# Patient Record
Sex: FEMALE | Race: WHITE | NOT HISPANIC OR LATINO | Employment: FULL TIME | URBAN - METROPOLITAN AREA
[De-identification: names, ages, dates, MRNs, and addresses within clinical notes are randomized per-mention and may not be internally consistent; named-entity substitution may affect disease eponyms.]

---

## 2023-10-03 ENCOUNTER — TELEMEDICINE (OUTPATIENT)
Dept: BEHAVIORAL HEALTH | Facility: CLINIC | Age: 38
End: 2023-10-03
Payer: COMMERCIAL

## 2023-10-03 DIAGNOSIS — F31.9 BIPOLAR 1 DISORDER (MULTI): ICD-10-CM

## 2023-10-03 PROCEDURE — 90853 GROUP PSYCHOTHERAPY: CPT | Mod: 95 | Performed by: PSYCHOLOGIST

## 2023-10-03 PROCEDURE — 90853 GROUP PSYCHOTHERAPY: CPT | Performed by: PSYCHOLOGIST

## 2023-10-05 ENCOUNTER — TELEPHONE (OUTPATIENT)
Dept: BEHAVIORAL HEALTH | Facility: CLINIC | Age: 38
End: 2023-10-05

## 2023-10-05 ENCOUNTER — TELEMEDICINE (OUTPATIENT)
Dept: BEHAVIORAL HEALTH | Facility: CLINIC | Age: 38
End: 2023-10-05
Payer: COMMERCIAL

## 2023-10-05 DIAGNOSIS — F90.0 ATTENTION DEFICIT HYPERACTIVITY DISORDER (ADHD), PREDOMINANTLY INATTENTIVE TYPE: ICD-10-CM

## 2023-10-05 DIAGNOSIS — F31.9 BIPOLAR 1 DISORDER (MULTI): ICD-10-CM

## 2023-10-05 PROCEDURE — 90837 PSYTX W PT 60 MINUTES: CPT | Performed by: PSYCHOLOGIST

## 2023-10-05 RX ORDER — LISDEXAMFETAMINE DIMESYLATE 50 MG/1
50 CAPSULE ORAL EVERY MORNING
Qty: 30 CAPSULE | Refills: 0 | Status: CANCELLED | OUTPATIENT
Start: 2023-12-04 | End: 2024-01-03

## 2023-10-05 RX ORDER — LISDEXAMFETAMINE DIMESYLATE 50 MG/1
50 CAPSULE ORAL EVERY MORNING
Qty: 30 CAPSULE | Refills: 0 | Status: CANCELLED | OUTPATIENT
Start: 2023-11-04 | End: 2023-12-04

## 2023-10-05 RX ORDER — LISDEXAMFETAMINE DIMESYLATE 50 MG/1
50 CAPSULE ORAL EVERY MORNING
Qty: 30 CAPSULE | Refills: 0 | Status: CANCELLED | OUTPATIENT
Start: 2023-10-05 | End: 2023-11-04

## 2023-10-05 NOTE — PSYCHOTHERAPY
Insight Oriented Therapy. 53 minutes. BP1.  We spoke about how she is working on getting back to work, ready to do it as 12 weeks off for healing is a lot.  We spoke about how she is trying to process the losses that happened in October, but being able to put it on the shelf while she continues to get through her day. Talked about self-care and SAD.     Chief complaint - Anxiety   Treatment plan - Insight and action consistent with ACT therapy.   Goals - Self-care, insight, coping skills  Prognosis - Average  Progress - Average  Functional status - 70/100  Focused mental exam - alert and oriented  Frequency - Every two weeks

## 2023-10-06 RX ORDER — LISDEXAMFETAMINE DIMESYLATE 50 MG/1
50 CAPSULE ORAL EVERY MORNING
Qty: 30 CAPSULE | Refills: 0 | Status: SHIPPED | OUTPATIENT
Start: 2023-11-05 | End: 2023-10-13 | Stop reason: SDUPTHER

## 2023-10-06 NOTE — GROUP NOTE
Group Topic: Coping Skills   Group Date: 10/3/2023  Start Time:  6:00 PM  End Time:  7:30 PM  Facilitators: Garima Wong PsyD   Department:  [facility]    Number of Participants: 5   Group Focus: other Cognitive Defusion  Treatment Modality: Cognitive Behavioral Therapy  Interventions utilized were patient education  Purpose: coping skills      Participants had opportunity to check-in with their peers and discuss recent stressors. They received psychoeducation on cognitive defusion and how to utilize this skill. Group members reviewed various cognitive defusion techniques and practiced. They identified instances in which this skill could be helpful and set intention to practice.     Name: Kelli Silva YOB: 1985   MR: [id number]      Kelli was present and actively engaged in discussion. She detailed some recent symptoms related to ángel and how she is coping with these. She identified support and explored ways to use cognitive defusion skills.

## 2023-10-07 ENCOUNTER — TELEPHONE (OUTPATIENT)
Dept: BEHAVIORAL HEALTH | Facility: CLINIC | Age: 38
End: 2023-10-07
Payer: COMMERCIAL

## 2023-10-07 ENCOUNTER — HOSPITAL ENCOUNTER (EMERGENCY)
Facility: HOSPITAL | Age: 38
Discharge: OTHER INSTITUTION | End: 2023-10-08
Attending: STUDENT IN AN ORGANIZED HEALTH CARE EDUCATION/TRAINING PROGRAM
Payer: COMMERCIAL

## 2023-10-07 DIAGNOSIS — F30.10 MANIC BEHAVIOR (MULTI): Primary | ICD-10-CM

## 2023-10-07 DIAGNOSIS — G47.00 INSOMNIA, UNSPECIFIED TYPE: ICD-10-CM

## 2023-10-07 LAB
ALBUMIN SERPL BCP-MCNC: 3.8 G/DL (ref 3.4–5)
ALP SERPL-CCNC: 80 U/L (ref 33–110)
ALT SERPL W P-5'-P-CCNC: 26 U/L (ref 7–45)
AMPHETAMINES UR QL SCN: ABNORMAL
ANION GAP SERPL CALC-SCNC: 10 MMOL/L (ref 10–20)
APAP SERPL-MCNC: <10 UG/ML
AST SERPL W P-5'-P-CCNC: 15 U/L (ref 9–39)
B-HCG SERPL-ACNC: <2 MIU/ML
BARBITURATES UR QL SCN: ABNORMAL
BASOPHILS # BLD AUTO: 0.04 X10*3/UL (ref 0–0.1)
BASOPHILS NFR BLD AUTO: 0.4 %
BENZODIAZ UR QL SCN: ABNORMAL
BILIRUB SERPL-MCNC: 0.2 MG/DL (ref 0–1.2)
BUN SERPL-MCNC: 10 MG/DL (ref 6–23)
BZE UR QL SCN: ABNORMAL
CALCIUM SERPL-MCNC: 8.2 MG/DL (ref 8.6–10.3)
CANNABINOIDS UR QL SCN: ABNORMAL
CHLORIDE SERPL-SCNC: 107 MMOL/L (ref 98–107)
CO2 SERPL-SCNC: 26 MMOL/L (ref 21–32)
CREAT SERPL-MCNC: 0.96 MG/DL (ref 0.5–1.05)
EOSINOPHIL # BLD AUTO: 0.06 X10*3/UL (ref 0–0.7)
EOSINOPHIL NFR BLD AUTO: 0.7 %
ERYTHROCYTE [DISTWIDTH] IN BLOOD BY AUTOMATED COUNT: 14 % (ref 11.5–14.5)
ETHANOL SERPL-MCNC: <10 MG/DL
FENTANYL+NORFENTANYL UR QL SCN: ABNORMAL
GFR SERPL CREATININE-BSD FRML MDRD: 77 ML/MIN/1.73M*2
GLUCOSE SERPL-MCNC: 96 MG/DL (ref 74–99)
HCT VFR BLD AUTO: 33.4 % (ref 36–46)
HGB BLD-MCNC: 10.9 G/DL (ref 12–16)
IMM GRANULOCYTES # BLD AUTO: 0.03 X10*3/UL (ref 0–0.7)
IMM GRANULOCYTES NFR BLD AUTO: 0.3 % (ref 0–0.9)
LYMPHOCYTES # BLD AUTO: 3.12 X10*3/UL (ref 1.2–4.8)
LYMPHOCYTES NFR BLD AUTO: 34.5 %
MCH RBC QN AUTO: 27.6 PG (ref 26–34)
MCHC RBC AUTO-ENTMCNC: 32.6 G/DL (ref 32–36)
MCV RBC AUTO: 85 FL (ref 80–100)
MONOCYTES # BLD AUTO: 0.52 X10*3/UL (ref 0.1–1)
MONOCYTES NFR BLD AUTO: 5.8 %
NEUTROPHILS # BLD AUTO: 5.27 X10*3/UL (ref 1.2–7.7)
NEUTROPHILS NFR BLD AUTO: 58.3 %
NRBC BLD-RTO: 0 /100 WBCS (ref 0–0)
OPIATES UR QL SCN: ABNORMAL
OXYCODONE+OXYMORPHONE UR QL SCN: ABNORMAL
PCP UR QL SCN: ABNORMAL
PLATELET # BLD AUTO: 242 X10*3/UL (ref 150–450)
PMV BLD AUTO: 8.7 FL (ref 7.5–11.5)
POTASSIUM SERPL-SCNC: 3.7 MMOL/L (ref 3.5–5.3)
PROT SERPL-MCNC: 6 G/DL (ref 6.4–8.2)
RBC # BLD AUTO: 3.95 X10*6/UL (ref 4–5.2)
SALICYLATES SERPL-MCNC: <3 MG/DL
SODIUM SERPL-SCNC: 139 MMOL/L (ref 136–145)
WBC # BLD AUTO: 9 X10*3/UL (ref 4.4–11.3)

## 2023-10-07 PROCEDURE — 84702 CHORIONIC GONADOTROPIN TEST: CPT | Performed by: STUDENT IN AN ORGANIZED HEALTH CARE EDUCATION/TRAINING PROGRAM

## 2023-10-07 PROCEDURE — 2500000004 HC RX 250 GENERAL PHARMACY W/ HCPCS (ALT 636 FOR OP/ED): Performed by: STUDENT IN AN ORGANIZED HEALTH CARE EDUCATION/TRAINING PROGRAM

## 2023-10-07 PROCEDURE — 87635 SARS-COV-2 COVID-19 AMP PRB: CPT | Performed by: STUDENT IN AN ORGANIZED HEALTH CARE EDUCATION/TRAINING PROGRAM

## 2023-10-07 PROCEDURE — 99284 EMERGENCY DEPT VISIT MOD MDM: CPT | Performed by: STUDENT IN AN ORGANIZED HEALTH CARE EDUCATION/TRAINING PROGRAM

## 2023-10-07 PROCEDURE — 80329 ANALGESICS NON-OPIOID 1 OR 2: CPT | Performed by: STUDENT IN AN ORGANIZED HEALTH CARE EDUCATION/TRAINING PROGRAM

## 2023-10-07 PROCEDURE — 80307 DRUG TEST PRSMV CHEM ANLYZR: CPT | Performed by: STUDENT IN AN ORGANIZED HEALTH CARE EDUCATION/TRAINING PROGRAM

## 2023-10-07 PROCEDURE — 36415 COLL VENOUS BLD VENIPUNCTURE: CPT | Performed by: STUDENT IN AN ORGANIZED HEALTH CARE EDUCATION/TRAINING PROGRAM

## 2023-10-07 PROCEDURE — S0119 ONDANSETRON 4 MG: HCPCS | Performed by: STUDENT IN AN ORGANIZED HEALTH CARE EDUCATION/TRAINING PROGRAM

## 2023-10-07 PROCEDURE — 99285 EMERGENCY DEPT VISIT HI MDM: CPT

## 2023-10-07 PROCEDURE — 2500000005 HC RX 250 GENERAL PHARMACY W/O HCPCS: Performed by: STUDENT IN AN ORGANIZED HEALTH CARE EDUCATION/TRAINING PROGRAM

## 2023-10-07 PROCEDURE — 85025 COMPLETE CBC W/AUTO DIFF WBC: CPT | Performed by: STUDENT IN AN ORGANIZED HEALTH CARE EDUCATION/TRAINING PROGRAM

## 2023-10-07 PROCEDURE — 80324 DRUG SCREEN AMPHETAMINES 1/2: CPT | Performed by: STUDENT IN AN ORGANIZED HEALTH CARE EDUCATION/TRAINING PROGRAM

## 2023-10-07 PROCEDURE — 80053 COMPREHEN METABOLIC PANEL: CPT | Performed by: STUDENT IN AN ORGANIZED HEALTH CARE EDUCATION/TRAINING PROGRAM

## 2023-10-07 RX ORDER — ONDANSETRON 4 MG/1
4 TABLET, ORALLY DISINTEGRATING ORAL ONCE
Status: COMPLETED | OUTPATIENT
Start: 2023-10-07 | End: 2023-10-07

## 2023-10-07 RX ADMIN — ONDANSETRON 4 MG: 4 TABLET, ORALLY DISINTEGRATING ORAL at 22:25

## 2023-10-07 SDOH — HEALTH STABILITY: MENTAL HEALTH: HOW DID YOU TRY TO KILL YOURSELF?: OD

## 2023-10-07 SDOH — HEALTH STABILITY: MENTAL HEALTH: HAVE YOU EVER TRIED TO KILL YOURSELF?: YES

## 2023-10-07 SDOH — HEALTH STABILITY: MENTAL HEALTH: DEPRESSION SYMPTOMS: NO PROBLEMS REPORTED OR OBSERVED.

## 2023-10-07 SDOH — HEALTH STABILITY: MENTAL HEALTH: WISH TO BE DEAD (PAST 1 MONTH): NO

## 2023-10-07 SDOH — HEALTH STABILITY: MENTAL HEALTH: IN THE PAST FEW WEEKS, HAVE YOU WISHED YOU WERE DEAD?: NO

## 2023-10-07 SDOH — HEALTH STABILITY: MENTAL HEALTH: SUICIDAL BEHAVIOR (LIFETIME): YES

## 2023-10-07 SDOH — HEALTH STABILITY: MENTAL HEALTH: SUICIDAL BEHAVIOR (DESCRIPTION): NOTED ABOVE

## 2023-10-07 SDOH — HEALTH STABILITY: MENTAL HEALTH: IN THE PAST WEEK, HAVE YOU BEEN HAVING THOUGHTS ABOUT KILLING YOURSELF?: NO

## 2023-10-07 SDOH — HEALTH STABILITY: MENTAL HEALTH: NON-SPECIFIC ACTIVE SUICIDAL THOUGHTS (PAST 1 MONTH): NO

## 2023-10-07 SDOH — HEALTH STABILITY: MENTAL HEALTH: ARE YOU HAVING THOUGHTS OF KILLING YOURSELF RIGHT NOW?: NO

## 2023-10-07 SDOH — ECONOMIC STABILITY: HOUSING INSECURITY: FEELS SAFE LIVING IN HOME: YES

## 2023-10-07 SDOH — HEALTH STABILITY: MENTAL HEALTH: ANXIETY SYMPTOMS: GENERALIZED

## 2023-10-07 SDOH — HEALTH STABILITY: MENTAL HEALTH: BEHAVIORS/MOOD: PLEASANT;COOPERATIVE

## 2023-10-07 SDOH — HEALTH STABILITY: MENTAL HEALTH: IN THE PAST FEW WEEKS, HAVE YOU FELT THAT YOU OR YOUR FAMILY WOULD BE BETTER OFF IF YOU WERE DEAD?: NO

## 2023-10-07 SDOH — ECONOMIC STABILITY: GENERAL

## 2023-10-07 SDOH — HEALTH STABILITY: MENTAL HEALTH: SUICIDAL BEHAVIOR (3 MONTHS): NO

## 2023-10-07 SDOH — HEALTH STABILITY: MENTAL HEALTH: WHEN DID YOU TRY TO KILL YOURSELF?: 2004

## 2023-10-07 ASSESSMENT — COLUMBIA-SUICIDE SEVERITY RATING SCALE - C-SSRS
2. HAVE YOU ACTUALLY HAD ANY THOUGHTS OF KILLING YOURSELF?: NO
6. HAVE YOU EVER DONE ANYTHING, STARTED TO DO ANYTHING, OR PREPARED TO DO ANYTHING TO END YOUR LIFE?: YES
6. HAVE YOU EVER DONE ANYTHING, STARTED TO DO ANYTHING, OR PREPARED TO DO ANYTHING TO END YOUR LIFE?: NO
1. IN THE PAST MONTH, HAVE YOU WISHED YOU WERE DEAD OR WISHED YOU COULD GO TO SLEEP AND NOT WAKE UP?: NO

## 2023-10-07 ASSESSMENT — LIFESTYLE VARIABLES
SUBSTANCE_ABUSE_PAST_12_MONTHS: NO
PRESCIPTION_ABUSE_PAST_12_MONTHS: NO

## 2023-10-07 NOTE — PROGRESS NOTES
Pt is a 37 yo female with history of bipolar disorder and T2DM  called answering service for concern for insomnia, restlessness, and racing thoughts. States she had been up since 1:30 am previous day. Stated now new medications started, previous medications taken as prescribed. Denied intention to harm self or others. Denied audio/visual hallucinations. Endorsed increase desire to spend money, increased talkativeness, increased anxiety. Safety assessment done. Denied SI/HI. Pt said she felt safe at home with partner. Counseled on sleep hygiene.  Pt advised to take dose of prescribed ativan an OTC sleep aid, advised to call answering service again or present to ED if unable to sleep/continued or worsening symptoms.    No

## 2023-10-07 NOTE — ED PROVIDER NOTES
"EMERGENCY MEDICINE EVALUATION NOTE    History of Present Illness     Chief Complaint:   Chief Complaint   Patient presents with    Mental Health Problem     Pt presents to ED for bipolar disorder and insomnia x3 days. Pt compliant with meds and calm and cooperative in triage. Pt endorsing migraine. Pt hx of admission and med rec when this has occurred beginning of this year per pt. Pt denies SI/HI, SOB/CP, nausea.  Pt reports visual hallucinations yesterday and none currently. Pt describes silhouettes with no indication of harm.        HPI: Kelli Silva is a 38 y.o. female with past medical history of bipolar disorder who presents with complaint of possible manic episode.  Patient states she has had worsening insomnia and has been unable to sleep only for 2 hours over the past 3 days.  States this was triggered after starting prednisone 2 weeks ago for an episode of bronchitis.  She states she has been compliant with her home antipsychotic medications.  She denies any SI or HI.  Denies current pregnancy.  Nuys any chest pain, shortness of breath, nausea, vomiting abdominal pain.  She does admit to visual hallucinations yesterday but denies any currently.  Admits to racing thoughts.    Previous History     Past Medical History:   Diagnosis Date    Other specified health status     No pertinent past medical history     Past Surgical History:   Procedure Laterality Date    OTHER SURGICAL HISTORY  05/08/2020    Venous access port placement        No family history on file.  Allergies   Allergen Reactions    Compazine [Prochlorperazine] Shortness of breath     \"Asthma attack\" per pt    Iodinated Contrast Media Shortness of breath     \"Asthma attack\" per pt    Toradol [Ketorolac] Shortness of breath     \"Asthma attack\" per pt     Current Outpatient Medications   Medication Instructions    [START ON 11/5/2023] lisdexamfetamine (VYVANSE) 50 mg, oral, Every morning       Physical Exam     Appearance: Alert, oriented , " cooperative,  in acute distress. Well nourished & well hydrated.     Skin: Intact,  dry skin, no lesions, rash, petechiae or purpura.      Eyes: PERRLA, EOMs intact,  Conjunctiva pink with no redness or exudates. Cornea & anterior chamber are clear, Eyelids without lesions. No scleral icterus.      ENT: Hearing grossly intact. External auditory canals patent, tympanic membranes intact with visible landmarks. Nares patent, mucus membranes moist. Dentition without lesions. Pharynx clear, uvula midline.      Neck: Supple, without meningismus. Thyroid not palpable. Trachea at midline. No lymphadenopathy.     Pulmonary: Clear bilaterally with good chest wall excursion. No rales, rhonchi or wheezing. No accessory muscle use or stridor.     Cardiac: Normal S1, S2 without murmur, rub, gallop or extrasystole. No JVD, Carotids without bruits.     Abdomen: Soft, nontender, active bowel sounds.  No palpable organomegaly.  No rebound or guarding.  No CVA tenderness.     Genitourinary: Exam deferred.     Musculoskeletal: Full range of motion. no pain, edema, or deformity. Pulses full and equal. No cyanosis or clubbing.      Neurological:  Cranial nerves II through XII are grossly intact, finger-nose touch is normal, normal sensation, no weakness, no focal findings identified.     Psychiatric: Anxious mood and elevated affect.     Results     Labs Reviewed   CBC WITH AUTO DIFFERENTIAL - Abnormal       Result Value    WBC 9.0      nRBC 0.0      RBC 3.95 (*)     Hemoglobin 10.9 (*)     Hematocrit 33.4 (*)     MCV 85      MCH 27.6      MCHC 32.6      RDW 14.0      Platelets 242      MPV 8.7      Neutrophils % 58.3      Immature Granulocytes %, Automated 0.3      Lymphocytes % 34.5      Monocytes % 5.8      Eosinophils % 0.7      Basophils % 0.4      Neutrophils Absolute 5.27      Immature Granulocytes Absolute, Automated 0.03      Lymphocytes Absolute 3.12      Monocytes Absolute 0.52      Eosinophils Absolute 0.06      Basophils  Absolute 0.04     COMPREHENSIVE METABOLIC PANEL - Abnormal    Glucose 96      Sodium 139      Potassium 3.7      Chloride 107      Bicarbonate 26      Anion Gap 10      Urea Nitrogen 10      Creatinine 0.96      eGFR 77      Calcium 8.2 (*)     Albumin 3.8      Alkaline Phosphatase 80      Total Protein 6.0 (*)     AST 15      Bilirubin, Total 0.2      ALT 26     DRUG SCREEN, URINE WITH REFLEX TO CONFIRMATION - Abnormal    Amphetamine Screen, Urine Presumptive Positive (*)     Barbiturate Screen, Urine Presumptive Negative      Benzodiazepines Screen, Urine Presumptive Negative      Cannabinoid Screen, Urine Presumptive Negative      Cocaine Metabolite Screen, Urine Presumptive Negative      Fentanyl Screen, Urine Presumptive Negative      Opiate Screen, Urine Presumptive Negative      Oxycodone Screen, Urine Presumptive Negative      PCP Screen, Urine Presumptive Negative      Narrative:     Drug screen results are presumptive and should not be used to assess   compliance with prescribed medication. Definitive confirmatory drug testing   has been added to this sample for any positive screen result and will be  reported separately.     Toxicology screening results are reported qualitatively. The concentration must  be greater than or equal to the cutoff to be reported as positive. The concentration   at which the screening test can detect an individual drug or metabolite varies.   The absence of expected drug(s) and/or drug metabolite(s) may indicate non-compliance,   inappropriate timing of specimen collection relative to drug administration, poor drug   absorption, diluted/adulterated urine, or limitations of testing. For medical purposes   only; not valid for forensic use.    Interpretive questions should be directed to the laboratory medical directors.   ACUTE TOXICOLOGY PANEL, BLOOD - Normal    Acetaminophen <10.0      Salicylate  <3      Alcohol <10     HUMAN CHORIONIC GONADOTROPIN, SERUM QUANTITATIVE - Normal  "   HCG, Beta-Quantitative <2      Narrative:      Total HCG measurement is performed using the [employer]   Immunoassay which detects intact HCG and free beta HCG subunit.    This test is not indicated for use as a tumor marker.   HCG testing is performed using a different test methodology at [facility] than other [facility]. Direct result comparison   should only be made within the same method.       AMPHETAMINE CONFIRM, URINE     No orders to display         ED Course & Medical Decision Making   Medications - No data to display  Diagnoses as of 10/07/23 2139   Manic behavior (CMS/[facility])   Insomnia, unspecified type     Heart Rate:  [105]   Temp:  [37.1 °C (98.8 °F)]   Resp:  [19]   BP: (139)/(90)   Height:  [157.5 cm (5' 2\")]   Weight:  [107 kg (235 lb)]   SpO2:  [99 %]      Kelli Silva is a 38 y.o. female with past medical history of bipolar disorder who presents with complaint of possible manic episode.  Patient does appear to be possibly manic with an anxious mood and elevated affect initial examination.  No SI or HI noted.  Patient also with worsening insomnia.  Patient will need EPAT consultation for further evaluation and possible need for psychiatric placement.  EKG completed showed normal sinus rhythm with rate of 98 bpm no signs of acute ischemic change or arrhythmia noted.  No significant electrolyte normality renal dysfunction.  Stable anemia with hemoglobin 10.9 without active hemorrhage.  No leukocytosis.  Beta-hCG negative.  Urinalysis without concern for infection and urine drug screen positive for amphetamines and the most likely cross-contamination.  Toxicology panel negative.  Patient is medically clear from my standpoint for psychiatric admission.  I did consult with the EPAT team who saw the patient and did recommend inpatient psychiatric admission.  I am agreeable this plan she will be transferred to a psychiatric center for further management of her manic " episode.    Procedures   Procedures    Diagnosis     1. Manic behavior (CMS/[facility])    2. Insomnia, unspecified type        Disposition     Patient will be transferred to a psychiatric center for management.    ED Prescriptions    None            Lauri Lee DO  10/07/23 2963

## 2023-10-08 VITALS
BODY MASS INDEX: 43.24 KG/M2 | HEART RATE: 94 BPM | SYSTOLIC BLOOD PRESSURE: 116 MMHG | HEIGHT: 62 IN | WEIGHT: 235 LBS | OXYGEN SATURATION: 98 % | DIASTOLIC BLOOD PRESSURE: 70 MMHG | TEMPERATURE: 97.9 F | RESPIRATION RATE: 18 BRPM

## 2023-10-08 LAB — SARS-COV-2 RNA RESP QL NAA+PROBE: NOT DETECTED

## 2023-10-08 RX ORDER — HEPARIN 100 UNIT/ML
5 SYRINGE INTRAVENOUS ONCE
Status: DISCONTINUED | OUTPATIENT
Start: 2023-10-08 | End: 2023-10-08 | Stop reason: HOSPADM

## 2023-10-08 RX ORDER — HEPARIN 100 UNIT/ML
SYRINGE INTRAVENOUS
Status: DISCONTINUED
Start: 2023-10-08 | End: 2023-10-08 | Stop reason: HOSPADM

## 2023-10-08 SDOH — HEALTH STABILITY: MENTAL HEALTH: BEHAVIORS/MOOD: CALM;COOPERATIVE;SLEEPING

## 2023-10-08 NOTE — SIGNIFICANT EVENT

## 2023-10-08 NOTE — PROGRESS NOTES
EPAT - Social Work Psychiatric Assessment    Arrival Details  Mode of Arrival: Ambulatory  Admission Source: Home  Admission Type: Voluntary  EPAT Assessment Start Date: 10/07/23  EPAT Assessment Start Time: 2030  Name of : ENMANUEL Grady LISW    History of Present Illness  Admission Reason: Manic episode  HPI: Pt, who is a 38 year old female, presents to the [facility] ED with a chief complaint of manic episode. Prior to assessment, pt’s provider note, triage note, and community record were reviewed. Pt drove herself to the ED today and reported that she had not been sleeping much over the past two days. Prior to arriving to the ED today, pt had reached out to the on-call service for her psychiatry provider who noted: Pt is a 39 yo female with history of bipolar disorder and T2DM called answering service for concern for insomnia, restlessness, and racing thoughts. States she had been up since 1:30 am previous day. Stated now new medications started, previous medications taken as prescribed. Denied intention to harm self or others. Denied audio/visual hallucinations. Endorsed increase desire to spend money, increased talkativeness, increased anxiety. Safety assessment done. Denied SI/HI. Pt said she felt safe at home with partner. Counseled on sleep hygiene. Pt advised to take dose of prescribed ativan an OTC sleep aid, advised to call answering service again or present to ED if unable to sleep/continued or worsening symptoms.     Despite this call, pt felt that she needed to come to the ED for further treatment. Pt initially reported that her symptoms started after taking a prednisone prescription recently. Further inquiry revealed 2 other stressors this week including; bees getting into her bedroom making her nervous to sleep and her impending return to work on Tuesday following a 12 week break for a surgery over the summer. EPAT was consulted for further evaluation. For this assessment, pt was talkative,  cooperative, and continuing to state that she is unable to sleep.         Pt has a past psychiatric history of Bipolar disorder. She works with  psychiatry for outpatient psychiatric care. Pt sees her therapist and providers regularly and reported compliance with her medications. Pt has a history of psychiatric admissions, most recently in January, 2023 for a similar episode of ángel.         Pt resides at home with her boyfriend. 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 Pt enjoys the work and likes that she has the opportunity to work from home. Pt reported that she has strong supports in her family and partner.    SW Readmission Information   Readmission within 30 Days: No    Additional Symptoms - Adult  Generalized Anxiety Disorder: Difficult to control worry, Irritability  Obsessive Compulsive Disorder: No problems reported or observed.  Panic Attack: No problems reported or observed.  Post Traumatic Stress Disorder: No problems reported or observed.  Delirium: No problems reported or observed.    Past Psychiatric History/Meds/Treatments  Past Psychiatric History: Previous Psychiatric Inpatient Hospitalizations (Location and Dates): [facility] 1/2022; [facility] 5/2021; [facility] 2019; [facility] 2x in 2004; multiple others.    Previous Substance Abuse Treatment (Location and Dates): Denies    Abilify Maintena 400 MG Intramuscular Prefilled Syringe intramuscluar injection, once a month   Acarbose 25 MG Oral Tablet take 1 tablet by mouth three times a day with meals   Albuterol Sulfate (2.5 MG/3ML) 0.083% Inhalation Nebulization Solution USE 1 VIAL VIA NEBULIZER EVERY 6 HOURS AS NEEDED FOR WHEEZING OR SHORT   Asenapine Maleate 5 MG Sublingual Tablet Sublingual TAKE 1 - 2 SUBLIQUAL EVERY NIGHT AT BED TIME AS NEEDED FOR SLEEP   Atorvastatin Calcium 40 MG Oral Tablet TAKE 1 TABLET DAILY.   Benztropine Mesylate 2 MG Oral Tablet take 1 tablet by mouth twice a day   busPIRone HCl -  "30 MG Oral Tablet Take 1 tablet twice a day   [employer] Glucose 4-6 GM-MG Oral Tablet Chewable TAKE AS DIRECTED.   Desvenlafaxine Succinate  MG Oral Tablet Extended Release 24 Hour TAKE 1 TABLET BY MOUTH EVERY MORNING   Glucagon Emergency 1 MG Injection Kit Only take if BG< 60   HumaLOG KwikPen 100 UNIT/ML Subcutaneous Solution Pen-injector INJECT AS DIRECTED WITH EACH MEAL PER SLIDING SCALE ( Max 25 units /day)   lamoTRIgine 200 MG Oral Tablet Disintegrating take 1 tablet by mouth twice a day   Lantus SoloStar 100 UNIT/ML Subcutaneous Solution Pen-injector INJECT 8 UNIT Bedtime   LORazepam 1 MG Oral Tablet TAKE 1 TABLET TWICE DAILY AS NEEDED.   NovoLOG FlexPen 100 UNIT/ML Subcutaneous Solution Pen-injector     Paliperidone ER 3 MG Oral Tablet Extended Release 24 Hour TABLE 1 - 2 TABLETS BY MOUTH EVERY DAY AT BEDTIME   Paliperidone ER 3 MG Oral Tablet Extended Release 24 Hour Table 1 - 2 tablets, PO, QHS   Pantoprazole Sodium 20 MG Oral Tablet Delayed Release     Pregabalin 50 MG Oral Capsule Take 1 cap, PO, BID, and 3 caps, PO. qhs.   traZODone HCl - 100 MG Oral Tablet TAKE 2 TABLETS BY MOUTH AT BEDTIME   Vraylar 6 MG Oral Capsule TAKE 1 CAPSULE AT BEDTIME   Vyvanse 50 MG Oral Capsule (Lisdexamfetamine Dimesylate) TAKE 1 CAPSULE DAILY IN THE MORNING.   Zolpidem Tartrate ER 6.25 MG Oral Tablet Extended Release TAKE 1 OR 2 TABLETS AT BEDTIME AS NEEDED FOR SLEEP.     Past Violence/Victimization History: Did not assess    Current Mental Health Contacts   Name/Phone Number: (Therapist) Dr. Gabriel at    Last Appointment Date: 10/05/23  Provider Name/Phone Number: Dr. Nova  Provider Last Appointment Date: \"I think I have an upcoming appointment\"    Support System: Immediate family, Friends    Living Arrangement: House    Home Safety  Feels Safe Living in Home: Yes  Potentially Unsafe Housing Conditions: Insects/Pests    Income Information  Employment Status for: Patient  Employment Status: " Employed  Income Source: Employed  Current/Previous Occupation: Healthcare  Shift Worked: First Shift  Income/Expense Information: Income meets expenses  Financial Concerns: None  Employment/ Finance Comments: [employer] Therapist at [employer] Service/Education History  Current or Previous  Service: None  Education Level: Graduate school  History of Learning Problems: No    Social/Cultural History  Social History: 38 year old  female.  Cultural Requests During Hospitalization: None  Spiritual Requests During Hospitalization: None  Important Activities: Family, Social    Legal  Legal Considerations: Patient/ Family Ability to Make Healthcare Decisions  Assistance with Managing/Advocating Healthcare Needs:  (Self)  Criminal Activity/ Legal Involvement Pertinent to Current Situation/ Hospitalization: None  Legal Concerns: None    Drug Screening  Have you used any substances (canabis, cocaine, heroin, hallucinogens, inhalants, etc.) in the past 12 months?: No  Have you used any prescription drugs other than prescribed in the past 12 months?: No  Is a toxicology screen needed?: No         Psychosocial  Behaviors/Mood: Calm, Guarded, Pleasant, Verbal    Orientation  Orientation Level: Oriented X4    General Appearance  Motor Activity: Restlessness  Speech Pattern: Repetitive, Pressured, Rapid  General Attitude: Interested (Irritable)  Appearance/Hygiene: Unremarkable    Thought Process  Coherency: Tangential  Content:  (Pt denied SI/HI)  Delusions:  (None)  Perception: Not altered  Hallucination: None  Judgment/Insight: Limited  Confusion: Mild  Cognition:  (Fair judgement)    Sleep Pattern  Sleep Pattern: Insomnia    Risk Factors  Self Harm/Suicidal Ideation Plan: None  Previous Self Harm/Suicidal Plans: Lamictal OD 2004  Risk Factors: Major mental illness  Description of Thoughts/Ideas Leaving Unit Now: None    Violence Risk Assessment  Assessment of Violence: None noted  Thoughts of Harm to  Others: No    Ability to Assess Risk Screen  Risk Screen - Ability to Assess: Able to be screened  Ask Suicide-Screening Questions  1. In the past few weeks, have you wished you were dead?: No  2. In the past few weeks, have you felt that you or your family would be better off if you were dead?: No  3. In the past week, have you been having thoughts about killing yourself?: No  4. Have you ever tried to kill yourself?: Yes  How did you try to kill yourself?: OD  When did you try to kill yourself?: 2004  5. Are you having thoughts of killing yourself right now?: No  Calculated Risk Score: Potential Risk  [place] Suicide Severity Rating Scale (Screener/Recent Self-Report)  1. Wish to be Dead (Past 1 Month): No  2. Non-Specific Active Suicidal Thoughts (Past 1 Month): No  6. Suicidal Behavior (Lifetime): Yes  6. Suicidal Behavior (3 Months): No  6. Suicidal Behavior (Description): Noted above  Calculated C-SSRS Risk Score (Lifetime/Recent): Moderate Risk  Step 1: Risk Factors  Current & Past Psychiatric Dx: Mood disorder  Presenting Symptoms: Impulsivity, Insomia  Access to Lethal Methods : No  Step 2: Protective Factors   Protective Factors Internal: Ability to cope with stress, Identifies reasons for living  Protective Factors External: Positive therapeutic relationships, Engaged in work or school, Supportive social network or family or friends  Step 5: Documentation  Risk Level: Low suicide risk (Pt denied current or recent SI. Discussed with Dr. Lee)    Psychiatric Impression and Plan of Care  Assessment and Plan: Pt is a 38 year old female presenting for psychiatric evaluation with a chief complaint of ángel. On assessment, pt was calm and generally cooperative. She reported that she believes she is currently having a manic episode and “I wanted to catch it before it gets out of my control.” Pt described minimal sleep over the past two days. She estimates 2-3 hours per night. Pt reported that her sleep has  been broken and inconsistent for more than 2 days though, attributing bees getting into her room as a strain on her ability to sleep comfortably. Pt reported that because she has been unable to sleep, she has been more irritable “with a short fuse,” and “agitated.” Pt was mildly irritable during the interview but never agitated. Her speech was mildly pressured and her thought process was tangential. She spoke rapidly and repetitively. Pt denied AH/VH/SI/HI. Despite telling the on-call service about her increased desire to spend, pt initially answered “well I lost my credit card” when asked by this writer. Pt added that “it’s probably for the best that I lost it because now I can’t use it.” Pt’s boyfriend was contacted and he confirmed the above information. He has been encouraging proper sleep hygiene with pt but “she insists that she needs to get her energy out instead of coming to bed.” He has noticed that she has been more active around the house, more irritable, and barely sleeping. Despite pt’s medication compliance, pt continues to present with symptoms consistent with ángel.         Dx: Bipolar disorder        Plan: Pt is recommended for inpatient psychiatric hospitalization because she is currently manic and would benefit from stabilization.      Specific Resources Provided to Patient: Inpatient  CM Notified: -  PHP/IOP Recommended: -  Specific Information Provided for PHP/IOP: -  Plan Comments: -    Outcome/Disposition  Patient's Perception of Outcome Achieved: Prefers discharge  Assessment, Recommendations and Risk Level Reviewed with: Dr. Lee  Contact Name: Jesu Cornelius  Contact Number(s):   Contact Relationship: Boyfriend  EPAT Assessment Completed Date: 10/07/23  EPAT Assessment Completed Time: 2115

## 2023-10-09 PROBLEM — Z86.39 HISTORY OF DIABETES MELLITUS, TYPE II: Status: ACTIVE | Noted: 2023-06-28

## 2023-10-09 PROBLEM — E66.813 OBESITY, CLASS III, BMI 40-49.9 (MORBID OBESITY): Status: ACTIVE | Noted: 2023-03-17
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
## 2023-10-09 PROBLEM — N39.0 COMPLICATED UTI (URINARY TRACT INFECTION): Status: ACTIVE | Noted: 2023-05-20

## 2023-10-09 PROBLEM — Z85.528 HISTORY OF RENAL CELL CARCINOMA: Status: ACTIVE | Noted: 2023-05-20

## 2023-10-09 PROBLEM — E66.812 OBESITY, CLASS II, BMI 35-39.9: Status: ACTIVE | Noted: 2018-07-31

## 2023-10-09 PROBLEM — F31.62 BIPOLAR 1 DISORDER, MIXED, MODERATE (MULTI): Status: ACTIVE | Noted: 2022-03-02

## 2023-10-09 PROBLEM — R10.9 INTRACTABLE ABDOMINAL PAIN: Status: ACTIVE | Noted: 2020-06-17

## 2023-10-09 PROBLEM — F31.31 BIPOLAR 1 DISORDER, DEPRESSED, MILD (MULTI): Status: ACTIVE | Noted: 2023-10-09

## 2023-10-09 PROBLEM — Z86.718 PERSONAL HISTORY OF DVT (DEEP VEIN THROMBOSIS): Status: ACTIVE | Noted: 2019-04-03

## 2023-10-09 PROBLEM — E83.10 IRON DISORDER: Status: ACTIVE | Noted: 2020-01-27

## 2023-10-09 PROBLEM — F31.32 BIPOLAR 1 DISORDER, DEPRESSED, MODERATE (MULTI): Status: ACTIVE | Noted: 2023-10-09

## 2023-10-09 PROBLEM — F31.75 BIPOLAR DISORDER, IN PARTIAL REMISSION, MOST RECENT EPISODE DEPRESSED (MULTI): Status: ACTIVE | Noted: 2019-12-25

## 2023-10-09 PROBLEM — E66.01 OBESITY, CLASS III, BMI 40-49.9 (MORBID OBESITY) (MULTI): Status: ACTIVE | Noted: 2023-03-17

## 2023-10-09 PROBLEM — G25.81 RESTLESS LEGS SYNDROME: Status: ACTIVE | Noted: 2023-05-03

## 2023-10-09 PROBLEM — H43.393 VITREOUS FLOATERS OF BOTH EYES: Status: ACTIVE | Noted: 2023-08-15

## 2023-10-09 PROBLEM — L68.0 HIRSUTISM: Status: ACTIVE | Noted: 2018-10-12

## 2023-10-09 PROBLEM — N12 PYELONEPHRITIS: Status: ACTIVE | Noted: 2023-05-19

## 2023-10-09 PROBLEM — R19.8 ABNORMAL BOWEL MOVEMENT: Status: ACTIVE | Noted: 2023-10-09

## 2023-10-09 PROBLEM — R00.0 TACHYCARDIA: Status: ACTIVE | Noted: 2023-09-18

## 2023-10-09 PROBLEM — E16.2 HYPOGLYCEMIA: Status: ACTIVE | Noted: 2020-11-03

## 2023-10-09 PROBLEM — K52.9 GASTROENTERITIS: Status: ACTIVE | Noted: 2022-12-19

## 2023-10-09 PROBLEM — C64.2 MALIGNANT NEOPLASM OF LEFT KIDNEY, EXCEPT RENAL PELVIS (MULTI): Status: ACTIVE | Noted: 2020-04-09

## 2023-10-09 PROBLEM — R31.0 GROSS HEMATURIA: Status: ACTIVE | Noted: 2022-07-15

## 2023-10-09 PROBLEM — S29.012A UPPER BACK STRAIN: Status: ACTIVE | Noted: 2023-10-09

## 2023-10-09 PROBLEM — Z86.718 HISTORY OF BLOOD CLOTS: Status: ACTIVE | Noted: 2023-06-28

## 2023-10-09 PROBLEM — G43.409 HEMIPLEGIC MIGRAINE: Status: ACTIVE | Noted: 2020-03-25

## 2023-10-09 PROBLEM — T23.229A SECOND DEGREE BURN OF FINGER: Status: ACTIVE | Noted: 2017-04-27

## 2023-10-09 PROBLEM — E11.9 DIABETES MELLITUS (MULTI): Status: ACTIVE | Noted: 2023-10-09

## 2023-10-09 PROBLEM — R91.1 LUNG NODULE: Status: ACTIVE | Noted: 2023-08-15

## 2023-10-09 PROBLEM — E28.2 PCOS (POLYCYSTIC OVARIAN SYNDROME): Status: ACTIVE | Noted: 2019-09-19

## 2023-10-09 PROBLEM — F98.8 ATTENTION DEFICIT DISORDER: Status: ACTIVE | Noted: 2023-10-09

## 2023-10-09 PROBLEM — H81.10 BPPV (BENIGN PAROXYSMAL POSITIONAL VERTIGO): Status: ACTIVE | Noted: 2020-09-16

## 2023-10-09 PROBLEM — S93.602D FOOT SPRAIN, LEFT, SUBSEQUENT ENCOUNTER: Status: ACTIVE | Noted: 2022-06-20

## 2023-10-09 PROBLEM — E44.1 MALNUTRITION OF MILD DEGREE (MULTI): Status: ACTIVE | Noted: 2020-06-18

## 2023-10-09 PROBLEM — Q63.1 HORSESHOE KIDNEY: Status: ACTIVE | Noted: 2020-04-09

## 2023-10-09 PROBLEM — K59.04 CHRONIC IDIOPATHIC CONSTIPATION: Status: ACTIVE | Noted: 2021-04-21

## 2023-10-09 PROBLEM — Z86.711 PERSONAL HISTORY OF PE (PULMONARY EMBOLISM): Status: ACTIVE | Noted: 2019-04-03

## 2023-10-09 PROBLEM — R05.9 COUGH: Status: ACTIVE | Noted: 2023-10-09

## 2023-10-09 PROBLEM — G47.00 INSOMNIA: Status: ACTIVE | Noted: 2023-10-09

## 2023-10-09 PROBLEM — K92.0 HEMATEMESIS WITH NAUSEA: Status: ACTIVE | Noted: 2023-03-17

## 2023-10-09 PROBLEM — Z96.41 INSULIN PUMP STATUS: Status: ACTIVE | Noted: 2021-09-01

## 2023-10-09 PROBLEM — G43.709 CHRONIC MIGRAINE WITHOUT AURA WITHOUT STATUS MIGRAINOSUS, NOT INTRACTABLE: Status: ACTIVE | Noted: 2023-05-20

## 2023-10-09 PROBLEM — J20.9 ACUTE BRONCHITIS: Status: ACTIVE | Noted: 2023-09-19

## 2023-10-09 PROBLEM — E66.9 OBESITY, CLASS II, BMI 35-39.9: Status: ACTIVE | Noted: 2018-07-31

## 2023-10-09 PROBLEM — L70.0 ACNE VULGARIS: Status: ACTIVE | Noted: 2018-10-12

## 2023-10-09 PROBLEM — N28.89 OTHER SPECIFIED DISORDERS OF KIDNEY AND URETER: Status: ACTIVE | Noted: 2022-08-17

## 2023-10-09 PROBLEM — R82.90 ABNORMAL URINALYSIS: Status: ACTIVE | Noted: 2022-12-18

## 2023-10-09 PROBLEM — F31.0 BIPOLAR AFFECTIVE DISORDER, CURRENT EPISODE HYPOMANIC (MULTI): Status: ACTIVE | Noted: 2023-10-09

## 2023-10-09 PROBLEM — S93.325D LISFRANC DISLOCATION, LEFT, SUBSEQUENT ENCOUNTER: Status: ACTIVE | Noted: 2023-07-17

## 2023-10-09 PROBLEM — M79.7 FIBROMYALGIA, PRIMARY: Status: ACTIVE | Noted: 2023-10-09

## 2023-10-09 PROBLEM — F41.1 GENERALIZED ANXIETY DISORDER: Status: ACTIVE | Noted: 2023-10-09

## 2023-10-09 PROBLEM — F90.0 ATTENTION DEFICIT HYPERACTIVITY DISORDER, PREDOMINANTLY INATTENTIVE TYPE: Status: ACTIVE | Noted: 2023-01-24

## 2023-10-09 PROBLEM — G20.C PARKINSONISM (MULTI): Status: ACTIVE | Noted: 2023-10-09

## 2023-10-09 PROBLEM — D73.4 SPLENIC CYST: Status: ACTIVE | Noted: 2022-10-27

## 2023-10-09 PROBLEM — G43.719 INTRACTABLE CHRONIC MIGRAINE WITHOUT AURA: Status: ACTIVE | Noted: 2023-05-03

## 2023-10-09 RX ORDER — TRAZODONE HYDROCHLORIDE 50 MG/1
50 TABLET ORAL NIGHTLY PRN
COMMUNITY
End: 2024-05-14 | Stop reason: ALTCHOICE

## 2023-10-09 RX ORDER — LANCETS
EACH MISCELLANEOUS 4 TIMES DAILY
COMMUNITY
Start: 2023-08-21 | End: 2023-10-20 | Stop reason: ALTCHOICE

## 2023-10-09 RX ORDER — PREGABALIN 150 MG/1
150 CAPSULE ORAL DAILY
COMMUNITY
Start: 2023-01-13

## 2023-10-09 RX ORDER — MINERAL OIL
180 ENEMA (ML) RECTAL DAILY
COMMUNITY
Start: 2021-09-03 | End: 2023-10-20 | Stop reason: ALTCHOICE

## 2023-10-09 RX ORDER — ALBUTEROL SULFATE 0.83 MG/ML
2.5 SOLUTION RESPIRATORY (INHALATION) EVERY 6 HOURS PRN
COMMUNITY
Start: 2017-02-21

## 2023-10-09 RX ORDER — TRAZODONE HYDROCHLORIDE 100 MG/1
200 TABLET ORAL NIGHTLY
COMMUNITY
Start: 2023-09-20 | End: 2023-10-20 | Stop reason: ALTCHOICE

## 2023-10-09 RX ORDER — BUSPIRONE HYDROCHLORIDE 30 MG/1
1 TABLET ORAL 2 TIMES DAILY
COMMUNITY
Start: 2022-03-14

## 2023-10-09 RX ORDER — INSULIN ASPART 100 [IU]/ML
50 INJECTION, SOLUTION INTRAVENOUS; SUBCUTANEOUS
COMMUNITY
Start: 2023-02-01 | End: 2023-11-07 | Stop reason: ALTCHOICE

## 2023-10-09 RX ORDER — INSULIN ASPART 100 [IU]/ML
INJECTION, SOLUTION INTRAVENOUS; SUBCUTANEOUS
COMMUNITY
Start: 2023-03-20 | End: 2023-11-07 | Stop reason: ALTCHOICE

## 2023-10-09 RX ORDER — LAMOTRIGINE 200 MG/1
200 TABLET, ORALLY DISINTEGRATING ORAL 2 TIMES DAILY
COMMUNITY
Start: 2023-05-01

## 2023-10-09 RX ORDER — DAPAGLIFLOZIN 5 MG/1
5 TABLET, FILM COATED ORAL
COMMUNITY
Start: 2022-12-11 | End: 2023-11-07 | Stop reason: SINTOL

## 2023-10-09 RX ORDER — PROMETHAZINE HYDROCHLORIDE 25 MG/1
25 TABLET ORAL
COMMUNITY
Start: 2023-07-17 | End: 2023-10-20 | Stop reason: ALTCHOICE

## 2023-10-09 RX ORDER — IPRATROPIUM BROMIDE AND ALBUTEROL SULFATE 2.5; .5 MG/3ML; MG/3ML
3 SOLUTION RESPIRATORY (INHALATION) EVERY 6 HOURS
COMMUNITY

## 2023-10-09 RX ORDER — ARIPIPRAZOLE 400 MG
KIT INTRAMUSCULAR
COMMUNITY
Start: 2023-09-11 | End: 2024-05-21 | Stop reason: SDUPTHER

## 2023-10-09 RX ORDER — PANTOPRAZOLE SODIUM 40 MG/1
40 TABLET, DELAYED RELEASE ORAL DAILY
COMMUNITY
Start: 2022-09-15 | End: 2023-10-20 | Stop reason: ALTCHOICE

## 2023-10-09 RX ORDER — CLINDAMYCIN AND BENZOYL PEROXIDE 10; 50 MG/G; MG/G
1 GEL TOPICAL
COMMUNITY
Start: 2018-10-12 | End: 2023-11-07 | Stop reason: ALTCHOICE

## 2023-10-09 RX ORDER — ZOLPIDEM TARTRATE 10 MG/1
10 TABLET ORAL DAILY
COMMUNITY
End: 2023-10-20 | Stop reason: ALTCHOICE

## 2023-10-09 RX ORDER — LORAZEPAM 1 MG/1
1 TABLET ORAL 2 TIMES DAILY PRN
COMMUNITY
Start: 2023-06-27 | End: 2024-05-14 | Stop reason: SDUPTHER

## 2023-10-09 RX ORDER — TIOTROPIUM BROMIDE INHALATION SPRAY 1.56 UG/1
2 SPRAY, METERED RESPIRATORY (INHALATION) DAILY
COMMUNITY
End: 2023-11-07 | Stop reason: ALTCHOICE

## 2023-10-09 RX ORDER — PANTOPRAZOLE SODIUM 20 MG/1
20 TABLET, DELAYED RELEASE ORAL DAILY
COMMUNITY
End: 2023-11-07 | Stop reason: ALTCHOICE

## 2023-10-09 RX ORDER — PANTOPRAZOLE SODIUM 40 MG/1
1 TABLET, DELAYED RELEASE ORAL 2 TIMES DAILY
COMMUNITY
Start: 2023-06-22 | End: 2024-04-05 | Stop reason: ALTCHOICE

## 2023-10-09 RX ORDER — NALOXONE HYDROCHLORIDE 4 MG/.1ML
4 SPRAY NASAL AS NEEDED
COMMUNITY
Start: 2023-08-15 | End: 2023-11-07 | Stop reason: ALTCHOICE

## 2023-10-09 RX ORDER — CLINDAMYCIN AND BENZOYL PEROXIDE 10; 50 MG/G; MG/G
1 GEL TOPICAL
COMMUNITY
Start: 2016-09-06 | End: 2023-11-07 | Stop reason: ALTCHOICE

## 2023-10-09 RX ORDER — DAPAGLIFLOZIN 10 MG/1
10 TABLET, FILM COATED ORAL
COMMUNITY
Start: 2022-12-16 | End: 2023-11-07 | Stop reason: ALTCHOICE

## 2023-10-09 RX ORDER — NYSTATIN 100000 U/G
OINTMENT TOPICAL 2 TIMES DAILY
COMMUNITY
Start: 2023-09-29 | End: 2023-10-20 | Stop reason: ALTCHOICE

## 2023-10-09 RX ORDER — ASENAPINE 5 MG/1
5 TABLET SUBLINGUAL NIGHTLY PRN
COMMUNITY
Start: 2023-05-18 | End: 2023-11-16 | Stop reason: SDUPTHER

## 2023-10-09 RX ORDER — BUDESONIDE 0.5 MG/2ML
0.5 INHALANT ORAL EVERY 12 HOURS
COMMUNITY
Start: 2023-06-06 | End: 2024-04-05 | Stop reason: ALTCHOICE

## 2023-10-09 RX ORDER — DICLOFENAC SODIUM 10 MG/G
2 GEL TOPICAL 4 TIMES DAILY
COMMUNITY
Start: 2023-06-28 | End: 2023-10-20 | Stop reason: ALTCHOICE

## 2023-10-09 RX ORDER — DESVENLAFAXINE 100 MG/1
100 TABLET, EXTENDED RELEASE ORAL DAILY
COMMUNITY
End: 2024-03-07

## 2023-10-09 RX ORDER — LEVALBUTEROL TARTRATE 45 UG/1
1-2 AEROSOL, METERED ORAL EVERY 4 HOURS PRN
COMMUNITY
Start: 2023-05-11 | End: 2024-04-05 | Stop reason: ALTCHOICE

## 2023-10-09 RX ORDER — VILAZODONE HYDROCHLORIDE 10 MG/1
1 TABLET ORAL DAILY
COMMUNITY
Start: 2022-01-28 | End: 2023-11-07 | Stop reason: ALTCHOICE

## 2023-10-09 RX ORDER — TRAZODONE HYDROCHLORIDE 100 MG/1
200 TABLET ORAL NIGHTLY
COMMUNITY
Start: 2022-08-06 | End: 2024-05-14 | Stop reason: ALTCHOICE

## 2023-10-09 RX ORDER — ZAVEGEPANT 10 MG/.1ML
10 SPRAY NASAL AS NEEDED
COMMUNITY
Start: 2023-07-20

## 2023-10-09 RX ORDER — ATORVASTATIN CALCIUM 40 MG/1
1 TABLET, FILM COATED ORAL DAILY
COMMUNITY
Start: 2020-02-12 | End: 2023-11-07 | Stop reason: ALTCHOICE

## 2023-10-09 RX ORDER — TEZEPELUMAB-EKKO 210 MG/1.9ML
210 INJECTION, SOLUTION SUBCUTANEOUS
COMMUNITY

## 2023-10-09 RX ORDER — GRANISETRON HYDROCHLORIDE 1 MG/1
1 TABLET, FILM COATED ORAL EVERY 12 HOURS
COMMUNITY
End: 2024-04-05 | Stop reason: ALTCHOICE

## 2023-10-09 RX ORDER — TENAPANOR HYDROCHLORIDE 53.2 MG/1
50 TABLET ORAL 2 TIMES DAILY
COMMUNITY
Start: 2023-05-23

## 2023-10-09 RX ORDER — GLUCAGON INJECTION, SOLUTION 1 MG/.2ML
1 INJECTION, SOLUTION SUBCUTANEOUS
COMMUNITY
Start: 2021-08-30

## 2023-10-09 RX ORDER — ALBUTEROL SULFATE 90 UG/1
2 AEROSOL, METERED RESPIRATORY (INHALATION)
COMMUNITY
Start: 2023-08-15

## 2023-10-09 RX ORDER — LEVONORGESTREL 52 MG/1
1 INTRAUTERINE DEVICE INTRAUTERINE
COMMUNITY
Start: 2021-03-16

## 2023-10-09 RX ORDER — ZOLPIDEM TARTRATE 12.5 MG/1
12.5 TABLET, FILM COATED, EXTENDED RELEASE ORAL NIGHTLY PRN
COMMUNITY
End: 2024-03-12 | Stop reason: ALTCHOICE

## 2023-10-09 RX ORDER — PREGABALIN 50 MG/1
50 CAPSULE ORAL 2 TIMES DAILY
COMMUNITY
Start: 2023-08-03 | End: 2025-08-02

## 2023-10-09 RX ORDER — BUTALBITAL, ACETAMINOPHEN AND CAFFEINE 50; 325; 40 MG/1; MG/1; MG/1
1 TABLET ORAL EVERY 6 HOURS PRN
COMMUNITY
Start: 2023-06-05 | End: 2024-04-05 | Stop reason: ALTCHOICE

## 2023-10-09 RX ORDER — DAPSONE 75 MG/G
GEL TOPICAL DAILY
COMMUNITY
Start: 2022-12-30 | End: 2023-11-07

## 2023-10-09 RX ORDER — SODIUM BICARBONATE 650 MG/1
325 TABLET ORAL 2 TIMES DAILY
COMMUNITY
Start: 2023-07-13 | End: 2024-05-03 | Stop reason: ALTCHOICE

## 2023-10-09 RX ORDER — LEVOMEFOLATE/ALGAL OIL 15-90.314
1 CAPSULE ORAL DAILY
COMMUNITY
Start: 2018-06-26 | End: 2023-11-07 | Stop reason: ALTCHOICE

## 2023-10-09 RX ORDER — EFLORNITHINE HYDROCHLORIDE 139 MG/G
1 CREAM TOPICAL
COMMUNITY
Start: 2017-02-01 | End: 2023-11-07 | Stop reason: ALTCHOICE

## 2023-10-09 RX ORDER — BENZTROPINE MESYLATE 2 MG/1
2 TABLET ORAL 2 TIMES DAILY
COMMUNITY
Start: 2022-10-18 | End: 2023-11-07 | Stop reason: ALTCHOICE

## 2023-10-09 RX ORDER — IPRATROPIUM BROMIDE AND ALBUTEROL SULFATE 2.5; .5 MG/3ML; MG/3ML
3 SOLUTION RESPIRATORY (INHALATION) EVERY 4 HOURS PRN
COMMUNITY
Start: 2023-04-19

## 2023-10-09 RX ORDER — SULFACETAMIDE SODIUM 100 MG/ML
1 LOTION TOPICAL
COMMUNITY
Start: 2018-10-12 | End: 2023-11-07 | Stop reason: ALTCHOICE

## 2023-10-09 RX ORDER — HYDROCODONE BITARTRATE AND ACETAMINOPHEN 5; 325 MG/1; MG/1
TABLET ORAL
COMMUNITY
Start: 2023-08-08 | End: 2023-11-07 | Stop reason: ALTCHOICE

## 2023-10-09 RX ORDER — PALIPERIDONE PALMITATE 156 MG/ML
156 INJECTION INTRAMUSCULAR
COMMUNITY
Start: 2022-01-28 | End: 2023-11-07 | Stop reason: ALTCHOICE

## 2023-10-09 RX ORDER — OLOPATADINE HYDROCHLORIDE 1 MG/ML
SOLUTION/ DROPS OPHTHALMIC EVERY 12 HOURS
COMMUNITY
End: 2023-11-07 | Stop reason: ALTCHOICE

## 2023-10-09 RX ORDER — CLINDAMYCIN AND BENZOYL PEROXIDE 1 %-5 %
1 KIT TOPICAL
COMMUNITY
Start: 2018-06-27 | End: 2023-11-07 | Stop reason: ALTCHOICE

## 2023-10-09 RX ORDER — ERGOCALCIFEROL 1.25 MG/1
50000 CAPSULE ORAL
COMMUNITY
Start: 2023-04-19

## 2023-10-09 RX ORDER — LIDOCAINE 50 MG/G
1 PATCH TOPICAL DAILY
COMMUNITY
Start: 2023-05-25 | End: 2023-10-20 | Stop reason: ALTCHOICE

## 2023-10-09 RX ORDER — PRUCALOPRIDE 2 MG/1
1 TABLET, FILM COATED ORAL DAILY
COMMUNITY
Start: 2022-10-11 | End: 2023-11-07 | Stop reason: ALTCHOICE

## 2023-10-09 RX ORDER — LASMIDITAN 100 MG/1
1 TABLET ORAL
COMMUNITY
Start: 2023-10-02

## 2023-10-09 RX ORDER — SPIRONOLACTONE 50 MG/1
50 TABLET, FILM COATED ORAL
COMMUNITY
Start: 2017-06-21 | End: 2023-11-07 | Stop reason: ALTCHOICE

## 2023-10-09 RX ORDER — CARIPRAZINE 6 MG/1
6 CAPSULE, GELATIN COATED ORAL NIGHTLY
COMMUNITY
Start: 2023-09-11 | End: 2024-04-11 | Stop reason: SDUPTHER

## 2023-10-09 RX ORDER — CETIRIZINE HYDROCHLORIDE 10 MG/1
10 TABLET ORAL NIGHTLY
COMMUNITY

## 2023-10-09 RX ORDER — INSULIN GLARGINE 100 [IU]/ML
8 INJECTION, SOLUTION SUBCUTANEOUS NIGHTLY
COMMUNITY
Start: 2021-04-26 | End: 2023-11-07 | Stop reason: SINTOL

## 2023-10-09 RX ORDER — PALIPERIDONE 3 MG/1
3-6 TABLET, EXTENDED RELEASE ORAL NIGHTLY
COMMUNITY
Start: 2023-04-03 | End: 2024-05-03 | Stop reason: ALTCHOICE

## 2023-10-09 RX ORDER — ZOLPIDEM TARTRATE 6.25 MG/1
6.25-12.5 TABLET, FILM COATED, EXTENDED RELEASE ORAL NIGHTLY PRN
COMMUNITY
Start: 2023-09-22 | End: 2024-03-12 | Stop reason: ALTCHOICE

## 2023-10-09 RX ORDER — MILNACIPRAN HYDROCHLORIDE 50 MG/1
1 TABLET, FILM COATED ORAL 2 TIMES DAILY
COMMUNITY
Start: 2023-09-14 | End: 2023-10-20 | Stop reason: ALTCHOICE

## 2023-10-09 RX ORDER — LISDEXAMFETAMINE DIMESYLATE 70 MG/1
70 CAPSULE ORAL EVERY MORNING
COMMUNITY
Start: 2023-07-06 | End: 2023-10-13 | Stop reason: SDUPTHER

## 2023-10-09 RX ORDER — ENOXAPARIN SODIUM 100 MG/ML
60 INJECTION SUBCUTANEOUS
COMMUNITY
Start: 2023-07-17 | End: 2023-11-07 | Stop reason: ALTCHOICE

## 2023-10-09 RX ORDER — FLUTICASONE FUROATE AND VILANTEROL 200; 25 UG/1; UG/1
1 POWDER RESPIRATORY (INHALATION) DAILY
COMMUNITY
End: 2023-11-07 | Stop reason: ALTCHOICE

## 2023-10-09 RX ORDER — ACARBOSE 25 MG/1
1 TABLET ORAL
COMMUNITY
Start: 2021-06-17 | End: 2023-11-07 | Stop reason: ALTCHOICE

## 2023-10-09 RX ORDER — KETAMINE HYDROCHLORIDE 100 MG/ML
INJECTION, SOLUTION INTRAMUSCULAR; INTRAVENOUS
COMMUNITY
End: 2023-12-20

## 2023-10-09 RX ORDER — GUAIFENESIN 600 MG/1
600 TABLET, EXTENDED RELEASE ORAL 2 TIMES DAILY
COMMUNITY
Start: 2023-09-26 | End: 2023-10-20 | Stop reason: ALTCHOICE

## 2023-10-09 RX ORDER — INSULIN LISPRO 100 [IU]/ML
INJECTION, SOLUTION INTRAVENOUS; SUBCUTANEOUS
COMMUNITY
Start: 2021-04-26 | End: 2023-11-07 | Stop reason: ALTCHOICE

## 2023-10-09 RX ORDER — CALCIUM CARBONATE/VITAMIN D3 250-3.125
1 TABLET ORAL DAILY
COMMUNITY
Start: 2022-02-27

## 2023-10-09 RX ORDER — AZELASTINE HYDROCHLORIDE, FLUTICASONE PROPIONATE 137; 50 UG/1; UG/1
1 SPRAY, METERED NASAL 2 TIMES DAILY
COMMUNITY
Start: 2023-10-02 | End: 2024-10-01

## 2023-10-09 RX ORDER — IBUPROFEN 200 MG
4 TABLET ORAL
COMMUNITY
Start: 2023-03-03

## 2023-10-09 RX ORDER — LANCETS 26 GAUGE
EACH MISCELLANEOUS
COMMUNITY
Start: 2023-08-21

## 2023-10-09 RX ORDER — FAMOTIDINE 20 MG/1
1 TABLET, FILM COATED ORAL 2 TIMES DAILY
COMMUNITY
Start: 2022-10-06

## 2023-10-09 RX ORDER — FREMANEZUMAB-VFRM 225 MG/1.5ML
225 INJECTION SUBCUTANEOUS
COMMUNITY
End: 2023-11-07 | Stop reason: ALTCHOICE

## 2023-10-09 RX ORDER — DICYCLOMINE HYDROCHLORIDE 20 MG/1
20 TABLET ORAL
COMMUNITY

## 2023-10-09 RX ORDER — BLOOD SUGAR DIAGNOSTIC
STRIP MISCELLANEOUS
COMMUNITY
Start: 2023-06-14

## 2023-10-09 RX ORDER — IVABRADINE 7.5 MG/1
7.5 TABLET, FILM COATED ORAL
COMMUNITY
Start: 2023-09-11

## 2023-10-09 RX ORDER — FLUTICASONE FUROATE, UMECLIDINIUM BROMIDE AND VILANTEROL TRIFENATATE 200; 62.5; 25 UG/1; UG/1; UG/1
1 POWDER RESPIRATORY (INHALATION) DAILY
COMMUNITY
Start: 2023-03-14

## 2023-10-09 RX ORDER — ESOMEPRAZOLE MAGNESIUM 40 MG/1
40 CAPSULE, DELAYED RELEASE ORAL 2 TIMES DAILY
COMMUNITY
End: 2023-11-07 | Stop reason: ALTCHOICE

## 2023-10-10 ENCOUNTER — APPOINTMENT (OUTPATIENT)
Dept: BEHAVIORAL HEALTH | Facility: CLINIC | Age: 38
End: 2023-10-10
Payer: COMMERCIAL

## 2023-10-12 ENCOUNTER — PROCEDURE VISIT (OUTPATIENT)
Dept: BEHAVIORAL HEALTH | Facility: CLINIC | Age: 38
End: 2023-10-12
Payer: COMMERCIAL

## 2023-10-12 ENCOUNTER — TELEMEDICINE (OUTPATIENT)
Dept: BEHAVIORAL HEALTH | Facility: CLINIC | Age: 38
End: 2023-10-12
Payer: COMMERCIAL

## 2023-10-12 VITALS
SYSTOLIC BLOOD PRESSURE: 113 MMHG | RESPIRATION RATE: 16 BRPM | TEMPERATURE: 98.2 F | BODY MASS INDEX: 43.19 KG/M2 | HEIGHT: 62 IN | DIASTOLIC BLOOD PRESSURE: 54 MMHG | HEART RATE: 55 BPM | WEIGHT: 234.7 LBS

## 2023-10-12 DIAGNOSIS — F31.0 BIPOLAR AFFECTIVE DISORDER, CURRENT EPISODE HYPOMANIC (MULTI): Primary | ICD-10-CM

## 2023-10-12 DIAGNOSIS — F31.9 BIPOLAR 1 DISORDER (MULTI): ICD-10-CM

## 2023-10-12 DIAGNOSIS — F90.0 ATTENTION DEFICIT HYPERACTIVITY DISORDER (ADHD), PREDOMINANTLY INATTENTIVE TYPE: Primary | ICD-10-CM

## 2023-10-12 PROCEDURE — 90837 PSYTX W PT 60 MINUTES: CPT | Performed by: PSYCHOLOGIST

## 2023-10-12 PROCEDURE — 99215 OFFICE O/P EST HI 40 MIN: CPT | Performed by: PSYCHIATRY & NEUROLOGY

## 2023-10-12 RX ORDER — QUETIAPINE FUMARATE 200 MG/1
200 TABLET, FILM COATED ORAL NIGHTLY
Qty: 30 TABLET | Refills: 0 | Status: SHIPPED | OUTPATIENT
Start: 2023-10-12 | End: 2024-05-14 | Stop reason: ALTCHOICE

## 2023-10-12 NOTE — PROGRESS NOTES
"Virtual or Telephone Consent    An interactive audio and video telecommunication system which permits real time communications between the patient (at the originating site) and provider (at the distant site) was utilized to provide this telehealth service.   Verbal consent was requested and obtained from Kelli Silva on this date, 10/12/23 for a telehealth visit.    Subjective:  She said she had two periods of hypomania since she received ketamine infusion. She said both were triggered by steroid use. She was hospitalized for 3 days at [facility] although she felt normal in the past Sunday.  She said she felt normal for 4-5 days. She said she tried \"all\" medications (Saphris 20 mg, Invega 6 mg, Ativan 2 mg, Ambien 12.5 mg, trazodone 200 mg) to prevent hypomania, but did not work.  He also stopped Vyvanse when she felt hypomanic. No concern for now. Ate well and slept well. No depression or ángel. Energy and concentration were well. Denied having SI/HI/AVH or paranoia. No side effect from     Objective:   Appearance: well-groomed.   Demeanor: average.   Eye Contact: average.   Motor Activity: average.   Speech: clear.   Language: Neurologic language is intact.   Fund of Knowledge: intact fund of knowledge.   Delusions: None Reported.   Hallucinations: not reported  Self Harm: None Reported.   Aggressive: None Reported.   Mood: depressed and anxious.   Affect: full.   Orientation: alert, oriented x3.   Manner: cooperative.   Thought process: goal-directed.   Thought association: displays rational thought process.   Content of thought: normal  Abstract/ Rational Thought: intact   Memory: grossly intact.   Behavior: normal motor activity, relaxed, good eye contact, calm.   Attention/Concentration: normal.   Cognition: intact.   Intelligence Estimate: average.   Executive Function: intact.   Insight: intact.   Judgement: intact.   Musculoskeletal: no abnormal movement  Impression: bipolar I disorder, most " recently hypomanic due to steroid use   ANUP - worried about of unable to work.   Discussion: Options for preventing hypomania with steroid use including Seroquel and Thorazine were discussed with her. She fully understood.   Plan:  try Seroquel 200-400 mg for preventing steroid-induced hypomania   Continue other medication   Continue ketamine infusion as scheduled.  Will write a notice for returning to work   Follow-up in 2-3 months or earlier   Ata Nova MD.

## 2023-10-12 NOTE — LETTER
Date: 2023  RE:  Kelli Silva  :  1985      To Whom It May Concern:    Our patient, Kelli, has been under our care and now may return back to work without restrictions.    Their return to work date is:  10/13/2023     If you have questions concerning this patient's immediate care, please feel free to contact our office at [phone].    Sincerely,      Ata Nova MD PhD

## 2023-10-12 NOTE — PROGRESS NOTES
Patient here at the clinic for her monthly injection of Abilify Maintena for Bipolar affective disorder,current episode hypomanic. Patient identified by full name and , vitals obtained. Patient last seen by Provider today 10/12/23, last seen by nurse on 23. Medication verified by providers note and medication order.      Appetite:  good  Sleep:  good  Appearance: clean, age appropriate and well groomed  Build: overweight  Attitude: cooperative, calm, pleasant, friendly, open  Eye Contact: normal  Activity: alert, attentive, appropriate  Speech: appropriate & spontaneous, normal rate & flow, clear  Delusions: denies  Thought Content: logical  Thought Process: logical  Judgement/insight:  Intact/good  Mood: calm   Affect: appropriate  AH/VH/SI/HI patient denies  Access to Firearms/weapons: No  Depression: patient denies  Thoughts of hopelessness: denies  Anxiety: Patient denies  Self-injurious behavior: denies at this time  Cravings/Urges: denies  Last Use: NA  Tobacco Use: Non smoker  Spiritual or cultural practices that may affect your care or impact your health care decisions? No  Living situation:  Lives with boyfriend  Employed: No, due to foot injury      Patient has a bright affect, calm, engaged, cooperative and pleasant. Patient  reported that her foot is healing well. Patient  also reported that she was hospitalized 3 and half weeks ago for 2 and half days at [facility] due to Bronchitis. She was given Prednisone which caused hypomaniac episode. She slept through that and was then discharged but she is ok now.  Patient denies SI/HI, VH/AH, self harming thoughts at this time.       Patient received Abilify Maintena 400mg  via 23 gauge needle in right deltoid with no reaction to the injection site, patient tolerated the injection well.  Patient will return to the clinic in 4 weeks.    RN provided education on medication and side effects,  RN educated patient that if she has any adverse effects to  contact RN or Provider immediately,phone no was provide to pt. Patient also received mobile crisis number for emergent issues.    Patient to RTC on 11/9/23    LOT: [id number]  EXP:  MAR 2026  NDC:  76639-394-21    Solange Newton RN,BSN

## 2023-10-13 LAB
AMPHET UR-MCNC: >5000 NG/ML
MDA UR-MCNC: <200 NG/ML
MDEA UR-MCNC: <200 NG/ML
MDMA UR-MCNC: <200 NG/ML
METHAMPHET UR-MCNC: <200 NG/ML
PHENTERMINE UR CFM-MCNC: <200 NG/ML

## 2023-10-13 RX ORDER — LISDEXAMFETAMINE DIMESYLATE 70 MG/1
70 CAPSULE ORAL EVERY MORNING
Qty: 30 CAPSULE | Refills: 0 | Status: SHIPPED | OUTPATIENT
Start: 2023-10-13 | End: 2023-11-07 | Stop reason: SDUPTHER

## 2023-10-13 RX ORDER — LISDEXAMFETAMINE DIMESYLATE 70 MG/1
70 CAPSULE ORAL EVERY MORNING
Qty: 30 CAPSULE | Refills: 0 | Status: SHIPPED | OUTPATIENT
Start: 2023-11-12 | End: 2023-10-13 | Stop reason: SDUPTHER

## 2023-10-13 RX ORDER — LISDEXAMFETAMINE DIMESYLATE 70 MG/1
70 CAPSULE ORAL EVERY MORNING
Qty: 30 CAPSULE | Refills: 0 | Status: SHIPPED | OUTPATIENT
Start: 2023-10-13 | End: 2023-10-13 | Stop reason: SDUPTHER

## 2023-10-13 RX ORDER — LISDEXAMFETAMINE DIMESYLATE 70 MG/1
70 CAPSULE ORAL EVERY MORNING
Qty: 30 CAPSULE | Refills: 0 | Status: SHIPPED | OUTPATIENT
Start: 2023-12-12 | End: 2023-10-13 | Stop reason: SDUPTHER

## 2023-10-17 ENCOUNTER — CLINICAL SUPPORT (OUTPATIENT)
Dept: BEHAVIORAL HEALTH | Facility: CLINIC | Age: 38
End: 2023-10-17
Payer: COMMERCIAL

## 2023-10-17 ENCOUNTER — APPOINTMENT (OUTPATIENT)
Dept: BEHAVIORAL HEALTH | Facility: CLINIC | Age: 38
End: 2023-10-17
Payer: COMMERCIAL

## 2023-10-17 DIAGNOSIS — F31.9 BIPOLAR 1 DISORDER (MULTI): ICD-10-CM

## 2023-10-17 PROCEDURE — 90853 GROUP PSYCHOTHERAPY: CPT | Mod: 95 | Performed by: PSYCHOLOGIST

## 2023-10-17 PROCEDURE — 90853 GROUP PSYCHOTHERAPY: CPT | Performed by: PSYCHOLOGIST

## 2023-10-19 ENCOUNTER — TELEMEDICINE (OUTPATIENT)
Dept: BEHAVIORAL HEALTH | Facility: CLINIC | Age: 38
End: 2023-10-19
Payer: COMMERCIAL

## 2023-10-19 DIAGNOSIS — F31.9 BIPOLAR 1 DISORDER (MULTI): ICD-10-CM

## 2023-10-19 PROCEDURE — 90837 PSYTX W PT 60 MINUTES: CPT | Performed by: PSYCHOLOGIST

## 2023-10-19 NOTE — PROGRESS NOTES
Insight Oriented Therapy. BP1.  We spoke about her return to work going smoothly, some aspirational goals she'd like to work on with her partner for 2024 including perhaps adopting a child . We spoke about how she can look towards herself a bit more compassionately.     Start time: 7am  End time: 753am  Chief complaint - Anxiety   Treatment plan - Insight and action consistent with ACT therapy.   Goals - Self-care, insight, coping skills  Prognosis - Average  Progress - Average  Functional status - 70/100  Focused mental status: Patient was oriented to person, place, time and context  Focused mental exam - alert and oriented  Frequency - Every two weeks

## 2023-10-19 NOTE — GROUP NOTE
Group Topic: Feeling Awareness/Expression   Group Date: 10/17/2023  Start Time:  6:00 PM  End Time:  7:30 PM  Facilitators: Garima Wong PsyD   Department: [facility]    Number of Participants: 6   Group Focus: check in  Treatment Modality: Cognitive Behavioral Therapy  Interventions utilized were exploration  Purpose: feelings    This was a video Recovery Group on a HIPAA compliant platform. All patients were provided with informed consent.  User Name: [email or web address]  Number of Staff: 1  Group topic: Check-in and processing  Group members had opportunity to check-in with their peers and process recent challenges. They discussed recent world events and how these events are impacting their mental health and functioning. Participants offered support and coping to each other.   Garima Wong Psy.D.    Name: Kelli Silva YOB: 1985   MR: [id number]      Pt was present and actively engaged in discussion. She discussed recent changes in mood and identifying warning signs. She shared about recent emotional reactions and coping with events.

## 2023-10-20 ENCOUNTER — HOSPITAL ENCOUNTER (OUTPATIENT)
Dept: POSTOP/PACU | Facility: HOSPITAL | Age: 38
Discharge: HOME | End: 2023-10-20
Attending: PSYCHIATRY & NEUROLOGY | Admitting: PSYCHIATRY & NEUROLOGY

## 2023-10-20 VITALS
OXYGEN SATURATION: 96 % | DIASTOLIC BLOOD PRESSURE: 58 MMHG | SYSTOLIC BLOOD PRESSURE: 113 MMHG | HEART RATE: 90 BPM | RESPIRATION RATE: 16 BRPM | TEMPERATURE: 98.2 F | BODY MASS INDEX: 42.74 KG/M2 | WEIGHT: 233.69 LBS

## 2023-10-20 DIAGNOSIS — F31.9 BIPOLAR I DISORDER WITH DEPRESSION (MULTI): Primary | ICD-10-CM

## 2023-10-20 PROCEDURE — KETSP HC KETAMINE INFUSION SELF-PAY: Performed by: PSYCHIATRY & NEUROLOGY

## 2023-10-20 PROCEDURE — 96360 HYDRATION IV INFUSION INIT: CPT

## 2023-10-20 PROCEDURE — KETSP PR KETAMINE INFUSION SELF-PAY: Performed by: PSYCHIATRY & NEUROLOGY

## 2023-10-20 PROCEDURE — 2500000004 HC RX 250 GENERAL PHARMACY W/ HCPCS (ALT 636 FOR OP/ED): Performed by: PSYCHIATRY & NEUROLOGY

## 2023-10-20 RX ORDER — ONDANSETRON HYDROCHLORIDE 2 MG/ML
4 INJECTION, SOLUTION INTRAVENOUS EVERY 10 MIN PRN
Status: DISCONTINUED | OUTPATIENT
Start: 2023-10-20 | End: 2023-10-21 | Stop reason: HOSPADM

## 2023-10-20 RX ORDER — ONDANSETRON HYDROCHLORIDE 2 MG/ML
4 INJECTION, SOLUTION INTRAVENOUS EVERY 10 MIN PRN
Status: CANCELLED | OUTPATIENT
Start: 2023-10-20

## 2023-10-20 RX ADMIN — ONDANSETRON 4 MG: 2 INJECTION INTRAMUSCULAR; INTRAVENOUS at 11:55

## 2023-10-20 ASSESSMENT — PATIENT HEALTH QUESTIONNAIRE - PHQ9
SUM OF ALL RESPONSES TO PHQ QUESTIONS 1-9: 6
SUM OF ALL RESPONSES TO PHQ9 QUESTIONS 1 AND 2: 2
10. IF YOU CHECKED OFF ANY PROBLEMS, HOW DIFFICULT HAVE THESE PROBLEMS MADE IT FOR YOU TO DO YOUR WORK, TAKE CARE OF THINGS AT HOME, OR GET ALONG WITH OTHER PEOPLE: SOMEWHAT DIFFICULT
1. LITTLE INTEREST OR PLEASURE IN DOING THINGS: SEVERAL DAYS
7. TROUBLE CONCENTRATING ON THINGS, SUCH AS READING THE NEWSPAPER OR WATCHING TELEVISION: NOT AT ALL
9. THOUGHTS THAT YOU WOULD BE BETTER OFF DEAD, OR OF HURTING YOURSELF: NOT AT ALL
5. POOR APPETITE OR OVEREATING: SEVERAL DAYS
4. FEELING TIRED OR HAVING LITTLE ENERGY: NOT AT ALL
2. FEELING DOWN, DEPRESSED OR HOPELESS: SEVERAL DAYS
6. FEELING BAD ABOUT YOURSELF - OR THAT YOU ARE A FAILURE OR HAVE LET YOURSELF OR YOUR FAMILY DOWN: NEARLY EVERY DAY
8. MOVING OR SPEAKING SO SLOWLY THAT OTHER PEOPLE COULD HAVE NOTICED. OR THE OPPOSITE, BEING SO FIGETY OR RESTLESS THAT YOU HAVE BEEN MOVING AROUND A LOT MORE THAN USUAL: NOT AT ALL
3. TROUBLE FALLING OR STAYING ASLEEP OR SLEEPING TOO MUCH: NOT AT ALL

## 2023-10-20 ASSESSMENT — ANXIETY QUESTIONNAIRES
4. TROUBLE RELAXING: NOT AT ALL
2. NOT BEING ABLE TO STOP OR CONTROL WORRYING: NOT AT ALL
1. FEELING NERVOUS, ANXIOUS, OR ON EDGE: NOT AT ALL
IF YOU CHECKED OFF ANY PROBLEMS ON THIS QUESTIONNAIRE, HOW DIFFICULT HAVE THESE PROBLEMS MADE IT FOR YOU TO DO YOUR WORK, TAKE CARE OF THINGS AT HOME, OR GET ALONG WITH OTHER PEOPLE: NOT DIFFICULT AT ALL
GAD7 TOTAL SCORE: 0
7. FEELING AFRAID AS IF SOMETHING AWFUL MIGHT HAPPEN: NOT AT ALL
6. BECOMING EASILY ANNOYED OR IRRITABLE: NOT AT ALL
5. BEING SO RESTLESS THAT IT IS HARD TO SIT STILL: NOT AT ALL
3. WORRYING TOO MUCH ABOUT DIFFERENT THINGS: NOT AT ALL

## 2023-10-20 NOTE — PROGRESS NOTES
Ketamine/Esketamine Procedure    Name: Kelli  : 1985  MRN: [id number]    Date: 10/20/23    Time out was taken with staff to confirm correct patient and correct procedure to be performed.     Ketamine infusion at 0.75 mg/kg in 40 minutes  Subjective:  She said she felt better. No depression and enjoyed life.  No manic sx. Motivation was good. Ate well and slept well. Denied having SI/HI/AVH or paranoia. Energy and concentration were good. Went back to work. No side effect after discharge.   PHQ9=6    Objective: She was awake, alert, cooperative, and pleasant. Good eye contact. Oriented x 3. Speech was normal. Affect was appropriate, restricted most of the time. Mood was good. Denied having SI/HI/AVH or paranoia during the interview. No delusional thought or paranoia. I/J were good. Memory was fair. Attention and concentration were good.     About a half was infused, she felt a little drowsy. No other side effect. Vital signs were stable.   Close to the end of infusion,  she still felt a little drowsy as at the last assessment. No other side effect. Vital signs were stable.     Tolerated ketamine infusion well  No complication   Continue monitoring by nursing staff alone    Imp: bipolar I disorder, most recent hypomania, current in remission   No immediate risk to self or others     Plan: ketamine infusion in 3 weeks.      Ata Nova MD

## 2023-10-24 ENCOUNTER — CLINICAL SUPPORT (OUTPATIENT)
Dept: BEHAVIORAL HEALTH | Facility: CLINIC | Age: 38
End: 2023-10-24
Payer: COMMERCIAL

## 2023-10-24 DIAGNOSIS — F31.9 BIPOLAR 1 DISORDER (MULTI): ICD-10-CM

## 2023-10-24 PROCEDURE — 90853 GROUP PSYCHOTHERAPY: CPT | Mod: 95 | Performed by: PSYCHOLOGIST

## 2023-10-24 PROCEDURE — 90853 GROUP PSYCHOTHERAPY: CPT | Performed by: PSYCHOLOGIST

## 2023-10-25 NOTE — GROUP NOTE
Group Topic: Coping Skills   Group Date: 10/24/2023  Start Time:  6:00 PM  End Time:  7:30 PM  Facilitators: Garima Wong PsyD   Department:  [facility]    Number of Participants: 5   Group Focus: coping skills  Treatment Modality: Psychoeducation  Interventions utilized were exploration  Purpose: coping skills    This was a video IOP Recovery group on a HIPAA compliant platform. All patients were provided with informed consent.  User Name: [email or web address] Number of Staff: 1  IOP Recovery Group: Psychoeducation: Patience   Participants had opportunity to check-in with their peers and discuss recent stressors. They defined the term patience and explored how they learned about patience. Group members identified ways to practice patience and received education on ways to enhance patience.   Garima Wong Psy.D.      Name: Kelli Silva YOB: 1985   MR: [id number]      Pt was present and actively engaged in discussion. She discussed managing emotions around grief during anniversary of her father's passing. She shared about how she practices patience.

## 2023-10-26 ENCOUNTER — TELEMEDICINE (OUTPATIENT)
Dept: BEHAVIORAL HEALTH | Facility: CLINIC | Age: 38
End: 2023-10-26
Payer: COMMERCIAL

## 2023-10-26 DIAGNOSIS — F31.9 BIPOLAR 1 DISORDER (MULTI): ICD-10-CM

## 2023-10-26 PROCEDURE — 90837 PSYTX W PT 60 MINUTES: CPT | Performed by: PSYCHOLOGIST

## 2023-10-26 NOTE — PROGRESS NOTES
Insight Oriented Therapy. 53 min. BP1.  Currently mildly depressed.  We spoke about her schedule and how it takes a while to get it where she would like, based on her expectations and preferences to help smoothe out the rest of her week.     Chief complaint - Anxiety   Treatment plan - Insight and action consistent with ACT therapy.   Goals - Self-care, insight, coping skills  Prognosis - Average  Progress - Average  Functional status - 70/100  Focused mental status: Patient was oriented to person, place, time and context  Focused mental exam - alert and oriented  Frequency - Every two weeks

## 2023-10-31 ENCOUNTER — CLINICAL SUPPORT (OUTPATIENT)
Dept: BEHAVIORAL HEALTH | Facility: CLINIC | Age: 38
End: 2023-10-31
Payer: COMMERCIAL

## 2023-10-31 DIAGNOSIS — F31.9 BIPOLAR 1 DISORDER (MULTI): ICD-10-CM

## 2023-10-31 PROCEDURE — 90853 GROUP PSYCHOTHERAPY: CPT | Mod: AH,95 | Performed by: PSYCHOLOGIST

## 2023-10-31 PROCEDURE — 90853 GROUP PSYCHOTHERAPY: CPT | Performed by: PSYCHOLOGIST

## 2023-11-02 ENCOUNTER — TELEMEDICINE (OUTPATIENT)
Dept: BEHAVIORAL HEALTH | Facility: CLINIC | Age: 38
End: 2023-11-02
Payer: COMMERCIAL

## 2023-11-02 DIAGNOSIS — F31.9 BIPOLAR 1 DISORDER (MULTI): ICD-10-CM

## 2023-11-02 PROCEDURE — 90837 PSYTX W PT 60 MINUTES: CPT | Performed by: PSYCHOLOGIST

## 2023-11-02 NOTE — PROGRESS NOTES
Insight Oriented Therapy. 53 min.  BP1.  We spoke about her being ill this week and a strategy to work thought each hour and recognize when it's time to 'pull the yellow cord' and not work.  We spoke about coping through the holidays and travel.  The importance of her grandparents.    Chief complaint - Anxiety   Treatment plan - Insight and action consistent with ACT therapy.   Goals - Self-care, insight, coping skills  Prognosis - Average  Progress - Average  Functional status - 70/100  Focused mental status: Patient was oriented to person, place, time and context  Focused mental exam - alert and oriented  Frequency - Every two weeks

## 2023-11-03 NOTE — GROUP NOTE
Group Topic: Feeling Awareness/Expression   Group Date: 10/31/2023  Start Time:  6:00 PM  End Time:  7:30 PM  Facilitators: Garima Wong PsyD   Department:  [facility]    Number of Participants: 9   Group Focus: check in  Treatment Modality: Other: Process Group  Interventions utilized were exploration  Purpose: feelings    This was a video IOP Recovery group on a HIPAA compliant platform. All patients were provided with informed consent.  User Name: [email or web address] Number of Staff: 1  IOP Aftercare Group: Check-in  Participants had opportunity to check-in with their peers and discuss recent stressors. They spent time supporting peers that were struggling with distressing situations and reviewed coping.        Name: Kelli Silva YOB: 1985   MR: [id number]      Pt was present and actively engaged in discussion. She discussed recent stressors related to family and health. She was supportive of her peers.

## 2023-11-06 DIAGNOSIS — F31.9 BIPOLAR 1 DISORDER (MULTI): ICD-10-CM

## 2023-11-06 DIAGNOSIS — F31.81 BIPOLAR 2 DISORDER (MULTI): ICD-10-CM

## 2023-11-07 ENCOUNTER — TELEMEDICINE (OUTPATIENT)
Dept: BEHAVIORAL HEALTH | Facility: CLINIC | Age: 38
End: 2023-11-07
Payer: COMMERCIAL

## 2023-11-07 DIAGNOSIS — F90.0 ATTENTION DEFICIT HYPERACTIVITY DISORDER (ADHD), PREDOMINANTLY INATTENTIVE TYPE: ICD-10-CM

## 2023-11-07 DIAGNOSIS — F31.9 BIPOLAR 1 DISORDER (MULTI): ICD-10-CM

## 2023-11-07 PROCEDURE — 99214 OFFICE O/P EST MOD 30 MIN: CPT | Performed by: PSYCHIATRY & NEUROLOGY

## 2023-11-07 RX ORDER — LISDEXAMFETAMINE DIMESYLATE 70 MG/1
70 CAPSULE ORAL EVERY MORNING
Qty: 30 CAPSULE | Refills: 0 | Status: SHIPPED | OUTPATIENT
Start: 2023-11-07 | End: 2023-12-12 | Stop reason: SDUPTHER

## 2023-11-07 RX ORDER — BUPROPION HYDROCHLORIDE 150 MG/1
150 TABLET ORAL EVERY MORNING
Qty: 30 TABLET | Refills: 1 | Status: SHIPPED | OUTPATIENT
Start: 2023-11-07 | End: 2024-03-12 | Stop reason: SDUPTHER

## 2023-11-07 SDOH — ECONOMIC STABILITY: GENERAL
WHICH OF THE FOLLOWING DO YOU KNOW HOW TO USE AND HAVE ACCESS TO EVERY DAY? (CHOOSE ALL THAT APPLY): DESKTOP COMPUTER, LAPTOP COMPUTER, OR TABLET WITH BROADBAND INTERNET CONNECTION;SMARTPHONE WITH CELLULAR DATA PLAN

## 2023-11-07 SDOH — HEALTH STABILITY: PHYSICAL HEALTH: ON AVERAGE, HOW MANY MINUTES DO YOU ENGAGE IN EXERCISE AT THIS LEVEL?: 0 MIN

## 2023-11-07 SDOH — ECONOMIC STABILITY: TRANSPORTATION INSECURITY
IN THE PAST 12 MONTHS, HAS THE LACK OF TRANSPORTATION KEPT YOU FROM MEDICAL APPOINTMENTS OR FROM GETTING MEDICATIONS?: NO

## 2023-11-07 SDOH — ECONOMIC STABILITY: TRANSPORTATION INSECURITY
IN THE PAST 12 MONTHS, HAS LACK OF TRANSPORTATION KEPT YOU FROM MEETINGS, WORK, OR FROM GETTING THINGS NEEDED FOR DAILY LIVING?: NO

## 2023-11-07 SDOH — ECONOMIC STABILITY: FOOD INSECURITY: WITHIN THE PAST 12 MONTHS, YOU WORRIED THAT YOUR FOOD WOULD RUN OUT BEFORE YOU GOT MONEY TO BUY MORE.: NEVER TRUE

## 2023-11-07 SDOH — ECONOMIC STABILITY: FOOD INSECURITY: WITHIN THE PAST 12 MONTHS, THE FOOD YOU BOUGHT JUST DIDN'T LAST AND YOU DIDN'T HAVE MONEY TO GET MORE.: NEVER TRUE

## 2023-11-07 SDOH — ECONOMIC STABILITY: GENERAL

## 2023-11-07 SDOH — ECONOMIC STABILITY: GENERAL
WHICH OF THE FOLLOWING WOULD YOU LIKE TO GET CONNECTED TO IN ORDER TO RECEIVE A DISCOUNT OR FOR FREE? (CHOOSE ALL THAT APPLY): BROADBAND INTERNET SUBSCRIPTION;DIGITAL/INTERNET SKILLS TRAINING

## 2023-11-07 SDOH — ECONOMIC STABILITY: HOUSING INSECURITY
IN THE LAST 12 MONTHS, WAS THERE A TIME WHEN YOU DID NOT HAVE A STEADY PLACE TO SLEEP OR SLEPT IN A SHELTER (INCLUDING NOW)?: NO

## 2023-11-07 SDOH — ECONOMIC STABILITY: INCOME INSECURITY: IN THE LAST 12 MONTHS, WAS THERE A TIME WHEN YOU WERE NOT ABLE TO PAY THE MORTGAGE OR RENT ON TIME?: NO

## 2023-11-07 SDOH — HEALTH STABILITY: PHYSICAL HEALTH: ON AVERAGE, HOW MANY DAYS PER WEEK DO YOU ENGAGE IN MODERATE TO STRENUOUS EXERCISE (LIKE A BRISK WALK)?: 0 DAYS

## 2023-11-07 SDOH — ECONOMIC STABILITY: GENERAL
WHICH OF THE FOLLOWING WOULD YOU LIKE TO GET CONNECTED TO IN ORDER TO RECEIVE A DISCOUNT OR FOR FREE? (CHOOSE ALL THAT APPLY): COMPUTER;BROADBAND INTERNET SUBSCRIPTION;DIGITAL/INTERNET SKILLS TRAINING

## 2023-11-07 ASSESSMENT — SOCIAL DETERMINANTS OF HEALTH (SDOH)
WITHIN THE LAST YEAR, HAVE TO BEEN RAPED OR FORCED TO HAVE ANY KIND OF SEXUAL ACTIVITY BY YOUR PARTNER OR EX-PARTNER?: NO
WITHIN THE LAST YEAR, HAVE YOU BEEN AFRAID OF YOUR PARTNER OR EX-PARTNER?: NO
WITHIN THE LAST YEAR, HAVE YOU BEEN HUMILIATED OR EMOTIONALLY ABUSED IN OTHER WAYS BY YOUR PARTNER OR EX-PARTNER?: NO
DO YOU BELONG TO ANY CLUBS OR ORGANIZATIONS SUCH AS CHURCH GROUPS UNIONS, FRATERNAL OR ATHLETIC GROUPS, OR SCHOOL GROUPS?: NO
HOW OFTEN DO YOU ATTEND CHURCH OR RELIGIOUS SERVICES?: NEVER
IN A TYPICAL WEEK, HOW MANY TIMES DO YOU TALK ON THE PHONE WITH FAMILY, FRIENDS, OR NEIGHBORS?: MORE THAN THREE TIMES A WEEK
ARE YOU MARRIED, WIDOWED, DIVORCED, SEPARATED, NEVER MARRIED, OR LIVING WITH A PARTNER?: LIVING WITH PARTNER
HOW HARD IS IT FOR YOU TO PAY FOR THE VERY BASICS LIKE FOOD, HOUSING, MEDICAL CARE, AND HEATING?: NOT HARD AT ALL
WITHIN THE LAST YEAR, HAVE YOU BEEN KICKED, HIT, SLAPPED, OR OTHERWISE PHYSICALLY HURT BY YOUR PARTNER OR EX-PARTNER?: NO
HOW OFTEN DO YOU ATTENT MEETINGS OF THE CLUB OR ORGANIZATION YOU BELONG TO?: NEVER
HOW OFTEN DO YOU GET TOGETHER WITH FRIENDS OR RELATIVES?: MORE THAN THREE TIMES A WEEK

## 2023-11-07 ASSESSMENT — PATIENT HEALTH QUESTIONNAIRE - PHQ9
SUM OF ALL RESPONSES TO PHQ9 QUESTIONS 1 & 2: 2
2. FEELING DOWN, DEPRESSED OR HOPELESS: SEVERAL DAYS
1. LITTLE INTEREST OR PLEASURE IN DOING THINGS: SEVERAL DAYS

## 2023-11-07 NOTE — PROGRESS NOTES
Follow-up visit  Subjective: She said she did not feel well, and felt depressed again for 2 days. Before then, she felt well. No trigger for her depression. She said she felt depressed all the time with severity 7 of 10 in last 2 days. Ten was worst. [place] hopeless, but denied having SI/HI/AVH or paranoia. No manic sx. Did not enjoy things used to enjoy. No problem of falling asleep or staying asleep, but slept too much. Slept 10-12 hours per night. Did not have energy during daytime. Concentration was not good either.  Appetite was not good in last 2 days. Felt a little irritable. Anxiety was  a little more than before.  No panic attack.   Able to take care of herself, but was hard; did not take shower regularly as before.   No side effect from medication  Has a light box, but hasn't used it.     Objective:   Appearance: well-groomed.   Build: overweight . 5'2' tall, 230 pounds.   Demeanor: average.   Eye Contact: average.   Motor Activity: average.   Speech: clear.   Language: Neurologic language is intact.   Fund of Knowledge: intact fund of knowledge.   Delusions: None Reported.   Hallucinations: not reported  Self Harm: None Reported.   Aggressive: None Reported.   Mood: depressed   Affect: restricted most of the time   Orientation: alert, oriented x3.   Manner: cooperative.   Thought process: goal-directed.   Thought association: displays rational thought process.   Content of thought: normal  Abstract/ Rational Thought: intact   Memory: grossly intact.   Behavior: normal motor activity, relaxed, good eye contact, calm.   Attention/Concentration: normal.   Cognition: intact.   Intelligence Estimate: average.   Executive Function: intact.   Insight: intact.   Judgement: intact.   Musculoskeletal: normal strength and tone. No abnormal movement  Impression: bipolar I depression   Discussion/Plan:  options including light therapy and restart Wellbutrin  Agreed to use light therapy again  Agreed to add Wellbutrin  150 mg (did well in the past)  Continue other medications for now.   Ketamine infusion as scheduled.    Ata Nova MD.

## 2023-11-09 ENCOUNTER — PROCEDURE VISIT (OUTPATIENT)
Dept: BEHAVIORAL HEALTH | Facility: CLINIC | Age: 38
End: 2023-11-09
Payer: COMMERCIAL

## 2023-11-09 ENCOUNTER — TELEMEDICINE (OUTPATIENT)
Dept: BEHAVIORAL HEALTH | Facility: CLINIC | Age: 38
End: 2023-11-09
Payer: COMMERCIAL

## 2023-11-09 VITALS
TEMPERATURE: 98.7 F | WEIGHT: 234.2 LBS | BODY MASS INDEX: 43.1 KG/M2 | SYSTOLIC BLOOD PRESSURE: 124 MMHG | HEIGHT: 62 IN | HEART RATE: 107 BPM | DIASTOLIC BLOOD PRESSURE: 74 MMHG | RESPIRATION RATE: 18 BRPM

## 2023-11-09 DIAGNOSIS — F31.0 BIPOLAR AFFECTIVE DISORDER, CURRENT EPISODE HYPOMANIC (MULTI): ICD-10-CM

## 2023-11-09 DIAGNOSIS — F31.9 BIPOLAR 1 DISORDER (MULTI): ICD-10-CM

## 2023-11-09 PROCEDURE — 90837 PSYTX W PT 60 MINUTES: CPT | Performed by: PSYCHOLOGIST

## 2023-11-09 NOTE — PROGRESS NOTES
Patient here at the clinic for her monthly injection of Abilify Maintena for Bipolar affective disorder,current episode hypomanic. Patient identified by full name and , vitals obtained. Patient last seen by Provider today 23, last seen by nurse on 10/12/23. Medication verified by providers note and medication order.      Appetite:  good  Sleep:  good  Appearance: clean, age appropriate and well groomed  Build: overweight  Attitude: cooperative, calm, pleasant, friendly, open  Eye Contact: normal  Activity: alert, attentive, appropriate  Speech: appropriate & spontaneous, normal rate & flow, clear  Delusions: denies  Thought Content: logical  Thought Process: logical  Judgement/insight:  Intact/good  Mood: calm   Affect: appropriate  AH/VH/SI/HI patient denies  Access to Firearms/weapons: No  Depression: 710  Thoughts of hopelessness: denies  Anxiety: Patient denies  Self-injurious behavior: denies at this time  Cravings/Urges: denies  Last Use: NA  Tobacco Use: Non smoker  Spiritual or cultural practices that may affect your care or impact your health care decisions? No  Living situation:  Lives with boyfriend  Employed: Employed at [employer]      Patient looks good, engaged, cooperative and pleasant. Patient  reported that her foot is healing well and braces off. Patient's depression is 7/10 but she started a new depression medicine Wellbutrin 2 days ago. Will follow up on her next visit. Patient denies SI/HI, VH/AH, self harming thoughts at this time.       Patient received Abilify Maintena 400mg  via 23 gauge needle in left deltoid with no reaction to the injection site, patient tolerated the injection well.  Patient will return to the clinic in 4 weeks.    RN provided education on medication and side effects,  Patient is aware to contact nurse for any adverse reactions or  911/ mobile crisis number for emergent issues.    Patient to RTC on 23    LOT: [id number]  EXP:  MAR 2026  NDC:   85015-851-57. This is the correct NDC number but epic does not have it. The ndc no they have is 39290-796-57 and if I don't pick it,[facility] will not let me document injection    Solange Newton RN,BSN

## 2023-11-09 NOTE — PROGRESS NOTES
Insight Oriented Therapy. 53 min.   Bipolar, depressed.  We spoke about how she is coping with a bit of depression, we feel is associated with some difficult clients that have left and having her feel a bit of lack of confidence.  We processed this as well as pivoting energy towards positive coping.     Chief complaint - Anxiety   Treatment plan - Insight and action consistent with ACT therapy.   Goals - Self-care, insight, coping skills  Prognosis - Average  Progress - Average  Functional status - 70/100  Focused mental status: Patient was oriented to person, place, time and context  Focused mental exam - alert and oriented  Frequency - Every two weeks

## 2023-11-16 ENCOUNTER — TELEMEDICINE (OUTPATIENT)
Dept: BEHAVIORAL HEALTH | Facility: CLINIC | Age: 38
End: 2023-11-16
Payer: COMMERCIAL

## 2023-11-16 DIAGNOSIS — F31.9 BIPOLAR 1 DISORDER (MULTI): ICD-10-CM

## 2023-11-16 DIAGNOSIS — F31.0 BIPOLAR DISORDER, CURRENT EPISODE HYPOMANIC (MULTI): ICD-10-CM

## 2023-11-16 PROCEDURE — 90837 PSYTX W PT 60 MINUTES: CPT | Performed by: PSYCHOLOGIST

## 2023-11-16 RX ORDER — ASENAPINE 5 MG/1
5 TABLET SUBLINGUAL NIGHTLY PRN
Qty: 30 TABLET | Refills: 2 | Status: SHIPPED | OUTPATIENT
Start: 2023-11-16 | End: 2023-11-16 | Stop reason: SDUPTHER

## 2023-11-16 RX ORDER — ASENAPINE 5 MG/1
TABLET SUBLINGUAL
Qty: 180 TABLET | Refills: 1 | Status: SHIPPED | OUTPATIENT
Start: 2023-11-16

## 2023-11-16 NOTE — PROGRESS NOTES
Insight Oriented Therapy. 53 min.  BP1.  We spoke about how she is working her plan to feel better, thin slicing small goals to allow herself some proactive coping to feel better.  Talked about turning the corner soon, looking forward to the holidays.     Chief complaint - depression,   Treatment plan - Insight and action consistent with ACT therapy.   Goals - Self-care, insight, coping skills  Prognosis - Average  Progress - Average  Functional status - 70/100  Focused mental status: Patient was oriented to person, place, time and context  Focused mental exam - alert and oriented  Frequency - Every two weeks

## 2023-11-17 ENCOUNTER — HOSPITAL ENCOUNTER (OUTPATIENT)
Dept: POSTOP/PACU | Facility: HOSPITAL | Age: 38
Discharge: HOME | End: 2023-11-17
Attending: PSYCHIATRY & NEUROLOGY

## 2023-11-17 VITALS
BODY MASS INDEX: 42.98 KG/M2 | HEART RATE: 99 BPM | WEIGHT: 235.01 LBS | TEMPERATURE: 99.9 F | DIASTOLIC BLOOD PRESSURE: 64 MMHG | OXYGEN SATURATION: 98 % | SYSTOLIC BLOOD PRESSURE: 133 MMHG

## 2023-11-17 DIAGNOSIS — F31.9 BIPOLAR I DISORDER WITH DEPRESSION (MULTI): Primary | ICD-10-CM

## 2023-11-17 PROCEDURE — KETSP HC KETAMINE INFUSION SELF-PAY: Performed by: PSYCHIATRY & NEUROLOGY

## 2023-11-17 PROCEDURE — 2500000005 HC RX 250 GENERAL PHARMACY W/O HCPCS: Performed by: PSYCHIATRY & NEUROLOGY

## 2023-11-17 PROCEDURE — 2500000004 HC RX 250 GENERAL PHARMACY W/ HCPCS (ALT 636 FOR OP/ED): Performed by: PSYCHIATRY & NEUROLOGY

## 2023-11-17 PROCEDURE — KETSP PR KETAMINE INFUSION SELF-PAY: Performed by: PSYCHIATRY & NEUROLOGY

## 2023-11-17 RX ORDER — KETAMINE HCL IN 0.9 % NACL 200 MG/200
0.75 PLASTIC BAG, INJECTION (ML) INTRAVENOUS ONCE
Status: COMPLETED | OUTPATIENT
Start: 2023-11-17 | End: 2023-11-17

## 2023-11-17 RX ORDER — ONDANSETRON HYDROCHLORIDE 2 MG/ML
4 INJECTION, SOLUTION INTRAVENOUS ONCE
Status: COMPLETED | OUTPATIENT
Start: 2023-11-17 | End: 2023-11-17

## 2023-11-17 RX ADMIN — Medication: at 12:15

## 2023-11-17 RX ADMIN — ONDANSETRON 4 MG: 2 INJECTION INTRAMUSCULAR; INTRAVENOUS at 12:07

## 2023-11-17 ASSESSMENT — PATIENT HEALTH QUESTIONNAIRE - PHQ9
3. TROUBLE FALLING OR STAYING ASLEEP OR SLEEPING TOO MUCH: MORE THAN HALF THE DAYS
4. FEELING TIRED OR HAVING LITTLE ENERGY: MORE THAN HALF THE DAYS
8. MOVING OR SPEAKING SO SLOWLY THAT OTHER PEOPLE COULD HAVE NOTICED. OR THE OPPOSITE, BEING SO FIGETY OR RESTLESS THAT YOU HAVE BEEN MOVING AROUND A LOT MORE THAN USUAL: NOT AT ALL
6. FEELING BAD ABOUT YOURSELF - OR THAT YOU ARE A FAILURE OR HAVE LET YOURSELF OR YOUR FAMILY DOWN: NEARLY EVERY DAY
10. IF YOU CHECKED OFF ANY PROBLEMS, HOW DIFFICULT HAVE THESE PROBLEMS MADE IT FOR YOU TO DO YOUR WORK, TAKE CARE OF THINGS AT HOME, OR GET ALONG WITH OTHER PEOPLE: EXTREMELY DIFFICULT
SUM OF ALL RESPONSES TO PHQ QUESTIONS 1-9: 14
1. LITTLE INTEREST OR PLEASURE IN DOING THINGS: MORE THAN HALF THE DAYS
2. FEELING DOWN, DEPRESSED OR HOPELESS: MORE THAN HALF THE DAYS
7. TROUBLE CONCENTRATING ON THINGS, SUCH AS READING THE NEWSPAPER OR WATCHING TELEVISION: NOT AT ALL
SUM OF ALL RESPONSES TO PHQ9 QUESTIONS 1 AND 2: 4
9. THOUGHTS THAT YOU WOULD BE BETTER OFF DEAD, OR OF HURTING YOURSELF: NOT AT ALL
5. POOR APPETITE OR OVEREATING: NEARLY EVERY DAY

## 2023-11-17 ASSESSMENT — COLUMBIA-SUICIDE SEVERITY RATING SCALE - C-SSRS
6. HAVE YOU EVER DONE ANYTHING, STARTED TO DO ANYTHING, OR PREPARED TO DO ANYTHING TO END YOUR LIFE?: NO
1. IN THE PAST MONTH, HAVE YOU WISHED YOU WERE DEAD OR WISHED YOU COULD GO TO SLEEP AND NOT WAKE UP?: NO
2. HAVE YOU ACTUALLY HAD ANY THOUGHTS OF KILLING YOURSELF?: NO

## 2023-11-17 ASSESSMENT — ANXIETY QUESTIONNAIRES
IF YOU CHECKED OFF ANY PROBLEMS ON THIS QUESTIONNAIRE, HOW DIFFICULT HAVE THESE PROBLEMS MADE IT FOR YOU TO DO YOUR WORK, TAKE CARE OF THINGS AT HOME, OR GET ALONG WITH OTHER PEOPLE: VERY DIFFICULT
1. FEELING NERVOUS, ANXIOUS, OR ON EDGE: SEVERAL DAYS
3. WORRYING TOO MUCH ABOUT DIFFERENT THINGS: SEVERAL DAYS
4. TROUBLE RELAXING: SEVERAL DAYS
6. BECOMING EASILY ANNOYED OR IRRITABLE: SEVERAL DAYS
GAD7 TOTAL SCORE: 7
5. BEING SO RESTLESS THAT IT IS HARD TO SIT STILL: SEVERAL DAYS
2. NOT BEING ABLE TO STOP OR CONTROL WORRYING: SEVERAL DAYS
7. FEELING AFRAID AS IF SOMETHING AWFUL MIGHT HAPPEN: SEVERAL DAYS

## 2023-11-17 NOTE — PROGRESS NOTES
Ketamine/Esketamine Procedure    Name: Kelli  : 1985  MRN: [id number]    Date: 23    Time out was taken with staff to confirm correct patient and correct procedure to be performed.     Ketamine infusion at .75 mg/kg in 40 minutes  Subjective:     Objective: She was awake, alert, cooperative, and pleasant. Good eye contact. Oriented x 3. Speech was normal. Affect was appropriate with smile. Mood was good. Denied having SI/HI/AVH or paranoia during the interview. No delusional thought or paranoia. I/J were good. Memory was fair. Attention and concentration were good.     Ketamine infusion observation:  10 minutes after starting infusion, she experienced .... No other side effect. Vital signs were stable.   20 minutes after starting infusion, she still experienced .... No other side effect. Vital signs were stable.   30 minutes after starting infusion, she still experienced .... No other side effect. Vital signs were stable.   Close to the end of infusion, she still experienced .... No other side effect. Vital signs were stable.       Tolerated ketamine infusion well  No complication     Imp:    Plan:    Ata Nova MD.

## 2023-11-17 NOTE — ADDENDUM NOTE
Encounter addended by: Sherrie Toscano on: 11/17/2023 1:30 PM   Actions taken: Order Reconciliation Section accessed, Order list changed

## 2023-11-17 NOTE — PROGRESS NOTES
Ketamine/Esketamine Procedure    Name: Kelli  : 1985  MRN: [id number]    Date: 23    Time out was taken with staff to confirm correct patient and correct procedure to be performed.     Ketamine infusion at 0.75 mg/kg in 40 minutes  Subjective: She said she felt a little better, less depressed. She believed Wellbutrin helped her depression. She said she slept better with Seroquel and did not have problem anymore. She said her energy was low. Appetite was also low. Denied having SI/HI/AVH or paranoia. No side effect after discharge.   No change in medical history  No physical complaint   PHQ9=14  GAD7=7    Objective: She was awake, alert, cooperative, and pleasant. Normal gait. No abnormal movement. Good eye contact. Oriented x 3. Speech was normal. Affect was appropriate, restricted most of the time. Mood was better. Denied having SI/HI/AVH or paranoia during the interview. No delusional thought. I/J were good. Memory was fair. Attention and concentration were good.     Ketamine infusion observation:  About 20 minutes after starting infusion, she still experienced drowsiness and floaty on her bed as before.  No other side effect. Vital signs were stable.   Shortly after the end of infusion, she still experienced rowsiness and floaty on her bed as at the last assessment. She said she felt better compared to before the infusion. No other side effect. Vital signs were stable.     Tolerated ketamine infusion well  No complication     Imp: Bipolar I depression, recurrent, currently moderate   ANUP - mild     Plan: ketamine infusion in 3 weeks  May increase Wellbutrin to 300 mg if necessary  Will hold Seroquel for sleep.     Ata Nova MD.

## 2023-11-25 ENCOUNTER — TELEMEDICINE (OUTPATIENT)
Dept: BEHAVIORAL HEALTH | Facility: CLINIC | Age: 38
End: 2023-11-25
Payer: COMMERCIAL

## 2023-11-25 DIAGNOSIS — F31.9 BIPOLAR 1 DISORDER (MULTI): ICD-10-CM

## 2023-11-25 PROCEDURE — 90837 PSYTX W PT 60 MINUTES: CPT | Performed by: PSYCHOLOGIST

## 2023-11-25 NOTE — PROGRESS NOTES
Insight Oriented Therapy. 53 min.  BP1. We spoke about her weathering this week, getting ready for a nice rest of weekend, feeling less depressed.  We spoke about how she is feeling bitter/sweet about the holidays.     Chief complaint - Anxiety   Treatment plan - Insight and action consistent with ACT therapy.   Goals - Self-care, insight, coping skills  Prognosis - Average  Progress - Average  Functional status - 70/100  Focused mental status: Patient was oriented to person, place, time and context  Focused mental exam - alert and oriented  Frequency - Every two weeks

## 2023-11-28 ENCOUNTER — CLINICAL SUPPORT (OUTPATIENT)
Dept: BEHAVIORAL HEALTH | Facility: CLINIC | Age: 38
End: 2023-11-28
Payer: COMMERCIAL

## 2023-11-28 DIAGNOSIS — F31.9 BIPOLAR 1 DISORDER (MULTI): ICD-10-CM

## 2023-11-28 PROCEDURE — 90853 GROUP PSYCHOTHERAPY: CPT | Performed by: PSYCHOLOGIST

## 2023-11-30 ENCOUNTER — TELEMEDICINE (OUTPATIENT)
Dept: BEHAVIORAL HEALTH | Facility: CLINIC | Age: 38
End: 2023-11-30
Payer: COMMERCIAL

## 2023-11-30 DIAGNOSIS — F31.9 BIPOLAR 1 DISORDER (MULTI): ICD-10-CM

## 2023-11-30 PROCEDURE — 90837 PSYTX W PT 60 MINUTES: CPT | Performed by: PSYCHOLOGIST

## 2023-11-30 NOTE — PROGRESS NOTES
Insight Oriented Therapy. 53 min.  Bipolar1.  We spoke about how she is doing well, focusing on her coping, had a good week with patients, working on self-care, looking forward to the holidays, headaches were handled well with meds, looking forward to social structure this weekend.     Chief complaint - Anxiety   Treatment plan - Insight and action consistent with ACT therapy.   Goals - Self-care, insight, coping skills  Prognosis - Average  Progress - Average  Functional status - 70/100  Focused mental status: Patient was oriented to person, place, time and context  Focused mental exam - alert and oriented  Frequency - Every week

## 2023-12-04 NOTE — GROUP NOTE
Group Topic: Coping Skills   Group Date: 11/28/2023  Start Time:  6:00 PM  End Time:  7:30 PM  Facilitators: Garima Wong PsyD   Department:  [facility]    Number of Participants: 10   Group Focus: coping skills  Treatment Modality: Psychoeducation  Interventions utilized were patient education  Purpose: coping skills    This was a video IOP aftercare group on a HIPAA compliant platform. All patients were provided with informed consent.  User Name: [email or web address] Number of Staff: 1  IOP Aftercare Group: Self-soothing  Participants had opportunity to check-in with their peers and discuss recent stressors. They explored how they used to self-soothe as children and how this might look different as an adult. They explored utilizing their senses to self-soothe.  Garima Wong Psy.D.      Name: Kelli Silva YOB: 1985   MR: [id number]      Kelli was present and actively engaged in discussion. She processed recent holiday and discussed ways in which she practices self-soothing.

## 2023-12-05 ENCOUNTER — CLINICAL SUPPORT (OUTPATIENT)
Dept: BEHAVIORAL HEALTH | Facility: CLINIC | Age: 38
End: 2023-12-05
Payer: COMMERCIAL

## 2023-12-05 DIAGNOSIS — F31.9 BIPOLAR 1 DISORDER (MULTI): ICD-10-CM

## 2023-12-05 PROCEDURE — 90853 GROUP PSYCHOTHERAPY: CPT | Performed by: PSYCHOLOGIST

## 2023-12-07 ENCOUNTER — PROCEDURE VISIT (OUTPATIENT)
Dept: BEHAVIORAL HEALTH | Facility: CLINIC | Age: 38
End: 2023-12-07
Payer: COMMERCIAL

## 2023-12-07 ENCOUNTER — TELEMEDICINE (OUTPATIENT)
Dept: BEHAVIORAL HEALTH | Facility: CLINIC | Age: 38
End: 2023-12-07
Payer: COMMERCIAL

## 2023-12-07 VITALS
HEART RATE: 121 BPM | HEIGHT: 62 IN | SYSTOLIC BLOOD PRESSURE: 134 MMHG | RESPIRATION RATE: 16 BRPM | WEIGHT: 232.7 LBS | DIASTOLIC BLOOD PRESSURE: 83 MMHG | TEMPERATURE: 97.4 F | BODY MASS INDEX: 42.82 KG/M2

## 2023-12-07 DIAGNOSIS — F31.0 BIPOLAR AFFECTIVE DISORDER, CURRENT EPISODE HYPOMANIC (MULTI): Primary | ICD-10-CM

## 2023-12-07 DIAGNOSIS — F31.9 BIPOLAR 1 DISORDER (MULTI): ICD-10-CM

## 2023-12-07 PROCEDURE — 90837 PSYTX W PT 60 MINUTES: CPT | Performed by: PSYCHOLOGIST

## 2023-12-07 NOTE — PROGRESS NOTES
Insight Oriented Therapy. 53 min.  BP1. She talked about feeling well and wanting to utilize coping and proactive positive structure to keep up the good momentum.  We talked about her looking at houses, what they are looking for, the trip around the holidays, work and some work related issues.      Chief complaint - Anxiety   Treatment plan - Insight and action consistent with ACT therapy.   Goals - Self-care, insight, coping skills  Prognosis - Average  Progress - Average  Functional status - 73/100  Focused mental status: Patient was oriented to person, place, time and context  Focused mental exam - alert and oriented  Frequency - Every week

## 2023-12-07 NOTE — PROGRESS NOTES
Patient here at the clinic for her monthly injection of Abilify Maintena for Bipolar affective disorder,current episode hypomanic. Patient identified by full name and , vitals obtained. Patient last seen by Provider on 23, last seen by nurse on 23. Medication verified by providers note and medication order.      Appetite:  good  Sleep:  good  Appearance: clean, age appropriate and well groomed  Build: overweight  Attitude: cooperative, calm, pleasant, friendly, open  Eye Contact: normal  Activity: alert, attentive, appropriate  Speech: appropriate & spontaneous, normal rate & flow, clear  Delusions: denies  Thought Content: logical  Thought Process: logical  Judgement/insight:  Intact/good  Mood: calm   Affect: appropriate  AH/VH/SI/HI patient denies  Access to Firearms/weapons: No  Depression: denies  Thoughts of hopelessness: denies  Anxiety: Patient denies  Self-injurious behavior: denies at this time  Cravings/Urges: denies  Last Use: NA  Tobacco Use: Non smoker  Spiritual or cultural practices that may affect your care or impact your health care decisions? No  Living situation:  Lives with boyfriend  Employed: Employed at [employer]      Patient looks good, cooperative and pleasant. Patient  reported that her foot is healing well and braces off. Patient denied any depression and anxiety today. Patient stated that she's taking Wellbutrin as prescribed and her depression is under control. Patient denies SI/HI, VH/AH, self harming thoughts at this time.       Patient received Abilify Maintena 400mg  via 23 gauge needle in right deltoid with no reaction to the injection site, patient tolerated the injection well.  Patient will return to the clinic in 4 weeks.    RN provided education on medication and side effects,  Patient is aware to contact nurse for any adverse reactions or  911/ mobile crisis number for emergent issues.    Patient to RTC on 23    LOT: [id number]  EXP:  2026  NDC:   87527-871-52.     Solange Newton, RN,BSN

## 2023-12-08 ENCOUNTER — APPOINTMENT (OUTPATIENT)
Dept: CARDIOLOGY | Facility: CLINIC | Age: 38
End: 2023-12-08
Payer: COMMERCIAL

## 2023-12-12 ENCOUNTER — CLINICAL SUPPORT (OUTPATIENT)
Dept: BEHAVIORAL HEALTH | Facility: CLINIC | Age: 38
End: 2023-12-12
Payer: COMMERCIAL

## 2023-12-12 ENCOUNTER — TELEPHONE (OUTPATIENT)
Dept: BEHAVIORAL HEALTH | Facility: CLINIC | Age: 38
End: 2023-12-12

## 2023-12-12 DIAGNOSIS — F90.0 ATTENTION DEFICIT HYPERACTIVITY DISORDER (ADHD), PREDOMINANTLY INATTENTIVE TYPE: ICD-10-CM

## 2023-12-12 DIAGNOSIS — F31.9 BIPOLAR 1 DISORDER (MULTI): ICD-10-CM

## 2023-12-12 PROCEDURE — 90853 GROUP PSYCHOTHERAPY: CPT | Performed by: PSYCHOLOGIST

## 2023-12-12 RX ORDER — LISDEXAMFETAMINE DIMESYLATE 70 MG/1
70 CAPSULE ORAL EVERY MORNING
Qty: 30 CAPSULE | Refills: 0 | Status: SHIPPED | OUTPATIENT
Start: 2023-12-12 | End: 2024-01-17 | Stop reason: SDUPTHER

## 2023-12-12 RX ORDER — LISDEXAMFETAMINE DIMESYLATE 70 MG/1
70 CAPSULE ORAL EVERY MORNING
Qty: 30 CAPSULE | Refills: 0 | Status: CANCELLED | OUTPATIENT
Start: 2023-12-12 | End: 2024-01-11

## 2023-12-13 NOTE — GROUP NOTE
Group Topic: Problem Solving   Group Date: 12/5/2023  Start Time:  6:00 PM  End Time:  7:30 PM  Facilitators: Garima Wong PsyD   Department:  [facility]    Number of Participants: 9   Group Focus: problem solving  Treatment Modality: Behavior Modification Therapy  Interventions utilized were exploration and patient education  Purpose: other: Learning techniques for following through on tasks    Name: Kelli Silva YOB: 1985   MR: [id number]      Kelli was present and actively engaged in discussion. She discussed recent challenges related to her physical and mental health. She discussed ways to overcome issues with follow through.

## 2023-12-14 ENCOUNTER — TELEMEDICINE (OUTPATIENT)
Dept: BEHAVIORAL HEALTH | Facility: CLINIC | Age: 38
End: 2023-12-14
Payer: COMMERCIAL

## 2023-12-14 DIAGNOSIS — F31.9 BIPOLAR 1 DISORDER (MULTI): ICD-10-CM

## 2023-12-14 PROCEDURE — 90837 PSYTX W PT 60 MINUTES: CPT | Performed by: PSYCHOLOGIST

## 2023-12-14 NOTE — PROGRESS NOTES
Insight Oriented Therapy. 53 min.  BP1.  We spoke about how she is trying to keep up the positive momentum she's had this week heading into next with great things to look forward to. She is able to eat and enjoy I for the first time in months.     Chief complaint - Anxiety   Treatment plan - Insight and action consistent with ACT therapy.   Goals - Self-care, insight, coping skills  Prognosis - Average  Progress - Average  Functional status - 70/100  Focused mental status: Patient was oriented to person, place, time and context  Focused mental exam - alert and oriented  Frequency - Every two weeks

## 2023-12-14 NOTE — GROUP NOTE
Group Topic: Feeling Awareness/Expression   Group Date: 12/12/2023  Start Time:  6:00 PM  End Time:  7:30 PM  Facilitators: Garima Wong PsyD   Department:  [facility]    Number of Participants: 10   Group Focus: check in  Treatment Modality: Patient-Centered Therapy  Interventions utilized were other Check-in  Purpose: feelings and other: Stress management    This was a video IOP aftercare group on a HIPAA compliant platform. All patients were provided with informed consent.  User Name: [email or web address] Number of Staff: 1  IOP Aftercare Group: Check-in  Participants had opportunity to check-in with their peers and discuss recent stressors. They spent time discussing coping and providing support to other group members.   Garima Wong Psy.D.    Name: Kelli Silva YOB: 1985   MR: [id number]      Kelli was present and actively engaged in discussion. She discussed recent changes in her mood and ways in which she has been monitoring this. She discussed coping with the group.

## 2023-12-20 ENCOUNTER — HOSPITAL ENCOUNTER (OUTPATIENT)
Dept: POSTOP/PACU | Facility: HOSPITAL | Age: 38
Discharge: HOME | End: 2023-12-20
Payer: COMMERCIAL

## 2023-12-20 VITALS
DIASTOLIC BLOOD PRESSURE: 80 MMHG | TEMPERATURE: 98.2 F | HEART RATE: 125 BPM | RESPIRATION RATE: 14 BRPM | BODY MASS INDEX: 42.9 KG/M2 | SYSTOLIC BLOOD PRESSURE: 137 MMHG | OXYGEN SATURATION: 94 % | WEIGHT: 234.57 LBS

## 2023-12-20 DIAGNOSIS — F31.9 BIPOLAR I DISORDER WITH DEPRESSION (MULTI): Primary | ICD-10-CM

## 2023-12-20 LAB — GLUCOSE BLD MANUAL STRIP-MCNC: 117 MG/DL (ref 74–99)

## 2023-12-20 PROCEDURE — 82947 ASSAY GLUCOSE BLOOD QUANT: CPT

## 2023-12-20 PROCEDURE — 2500000004 HC RX 250 GENERAL PHARMACY W/ HCPCS (ALT 636 FOR OP/ED): Performed by: PSYCHIATRY & NEUROLOGY

## 2023-12-20 PROCEDURE — KETSP HC KETAMINE INFUSION SELF-PAY: Performed by: PSYCHIATRY & NEUROLOGY

## 2023-12-20 PROCEDURE — KETSP PR KETAMINE INFUSION SELF-PAY: Performed by: PSYCHIATRY & NEUROLOGY

## 2023-12-20 PROCEDURE — 99213 OFFICE O/P EST LOW 20 MIN: CPT | Performed by: PSYCHIATRY & NEUROLOGY

## 2023-12-20 PROCEDURE — 2500000005 HC RX 250 GENERAL PHARMACY W/O HCPCS: Performed by: PSYCHIATRY & NEUROLOGY

## 2023-12-20 RX ORDER — ONDANSETRON HYDROCHLORIDE 2 MG/ML
4 INJECTION, SOLUTION INTRAVENOUS ONCE AS NEEDED
Status: COMPLETED | OUTPATIENT
Start: 2023-12-20 | End: 2023-12-20

## 2023-12-20 RX ORDER — KETAMINE HCL IN 0.9 % NACL 200 MG/200
0.75 PLASTIC BAG, INJECTION (ML) INTRAVENOUS ONCE
Status: COMPLETED | OUTPATIENT
Start: 2023-12-20 | End: 2023-12-20

## 2023-12-20 RX ADMIN — Medication 79.8 MG: at 13:05

## 2023-12-20 RX ADMIN — ONDANSETRON 4 MG: 2 INJECTION, SOLUTION INTRAMUSCULAR; INTRAVENOUS at 12:57

## 2023-12-20 ASSESSMENT — PATIENT HEALTH QUESTIONNAIRE - PHQ9
6. FEELING BAD ABOUT YOURSELF - OR THAT YOU ARE A FAILURE OR HAVE LET YOURSELF OR YOUR FAMILY DOWN: NOT AT ALL
5. POOR APPETITE OR OVEREATING: NOT AT ALL
3. TROUBLE FALLING OR STAYING ASLEEP OR SLEEPING TOO MUCH: SEVERAL DAYS
7. TROUBLE CONCENTRATING ON THINGS, SUCH AS READING THE NEWSPAPER OR WATCHING TELEVISION: NOT AT ALL
1. LITTLE INTEREST OR PLEASURE IN DOING THINGS: SEVERAL DAYS
SUM OF ALL RESPONSES TO PHQ9 QUESTIONS 1 AND 2: 2
8. MOVING OR SPEAKING SO SLOWLY THAT OTHER PEOPLE COULD HAVE NOTICED. OR THE OPPOSITE, BEING SO FIGETY OR RESTLESS THAT YOU HAVE BEEN MOVING AROUND A LOT MORE THAN USUAL: NOT AT ALL
9. THOUGHTS THAT YOU WOULD BE BETTER OFF DEAD, OR OF HURTING YOURSELF: NOT AT ALL
2. FEELING DOWN, DEPRESSED OR HOPELESS: SEVERAL DAYS
10. IF YOU CHECKED OFF ANY PROBLEMS, HOW DIFFICULT HAVE THESE PROBLEMS MADE IT FOR YOU TO DO YOUR WORK, TAKE CARE OF THINGS AT HOME, OR GET ALONG WITH OTHER PEOPLE: SOMEWHAT DIFFICULT
SUM OF ALL RESPONSES TO PHQ QUESTIONS 1-9: 4
4. FEELING TIRED OR HAVING LITTLE ENERGY: SEVERAL DAYS

## 2023-12-20 ASSESSMENT — ANXIETY QUESTIONNAIRES
4. TROUBLE RELAXING: SEVERAL DAYS
7. FEELING AFRAID AS IF SOMETHING AWFUL MIGHT HAPPEN: NOT AT ALL
2. NOT BEING ABLE TO STOP OR CONTROL WORRYING: SEVERAL DAYS
5. BEING SO RESTLESS THAT IT IS HARD TO SIT STILL: SEVERAL DAYS
3. WORRYING TOO MUCH ABOUT DIFFERENT THINGS: SEVERAL DAYS
IF YOU CHECKED OFF ANY PROBLEMS ON THIS QUESTIONNAIRE, HOW DIFFICULT HAVE THESE PROBLEMS MADE IT FOR YOU TO DO YOUR WORK, TAKE CARE OF THINGS AT HOME, OR GET ALONG WITH OTHER PEOPLE: SOMEWHAT DIFFICULT
GAD7 TOTAL SCORE: 5
1. FEELING NERVOUS, ANXIOUS, OR ON EDGE: SEVERAL DAYS
6. BECOMING EASILY ANNOYED OR IRRITABLE: NOT AT ALL

## 2023-12-20 NOTE — H&P
Psychiatry Procedure H&P Monthly Provider Assessment    Name: Kelli  : 1985  MRN: [id number]    Date: 23     HPI: She came in for planned ketamine infusion for bipolar depression. She needed an update on her H&P. She said she started having depression again in last week or so.  She also had cough for about 3 days. She said her COVID test at home was negative. She denied having any other sx, but her heart rate was high, over 120 b/m. She said she did not feel tachycardia before we told her. She said her glucose level at home was not very high.  She did not have any physical complaint.     Medications Reviewed: Yes  Prophylactic Antibiotics Needed:  No  This is my H&P:  Yes  Emotional/Behavioral: a little bit depressed.   Respiratory: Regular, CTA bilateral after cough.   Gastrointestinal: obese abdomen, soft, no pain or tenderness.   Level of Consciousness: awake and alert  Cardiovascular: Regular rate, but fast. No murmur.   Orientation: x 4  Genitourinary: denied   Comments: tachycardia might be related to her upper respiratory tract infection or her diabetes.  Sedation Plan: no sedation  Procedure Plan: the risk and benefit with treatment today was discussed with her. She fully understood. 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 She agreed to bring down her heart rate with esmolol before we are treating her. She also agreed to check her blood glucose.   COVID-19 Risk Content: Yes: Surgeon has reviewed key risks related to the risk of meli COVID-19 and if they contract COVID-19 what the risks are.    Blood glucose was 117.   Her heart rate was below 120 b/m after giving 20 mg esmolol and before we started the infusion.   Will give more esmolol if necessary.  Ata Nova MD.

## 2023-12-20 NOTE — PROGRESS NOTES
Ketamine/Esketamine Procedure    Name: Kelli  : 1985  MRN: [id number]    Date: 23    Time out was taken with staff to confirm correct patient and correct procedure to be performed.     Ketamine infusion at 0.75 mg/kg in 40 minutes  Subjective: She said she felt overall, but a little more depressed again for about a week. She was tachycardia, but did not have any other sx. She said she had cough for 3 days, but did not have other sx. She wanted to have the treatment today as planned because she will not have time next week. She did not want her depression worsening. She denied having SI/HI/AVH or paranoia. Still ate well and slept well. No side effect after discharge.  PHQ9=4  GAD7=5  Objective: She was awake, alert, cooperative, and pleasant. Normal gait. No abnormal movement. Good eye contact. Oriented x 3. Speech was normal. Affect was appropriate, restricted most of the time. Mood was ok. Denied having SI/HI/AVH or paranoia during the interview. No delusional thought. I/J were good. Memory was fair. Attention and concentration were good.     Ketamine infusion observation:  About 10 minutes after starting infusion, she experienced mildly drowsy. No other side effect. Vital signs were stable although her HR was over 120 mg/d periodically.    About 20 minutes after starting the infusion, she still felt drowsy. No other side effect. Vital signs were stable and heart rates were around 120 mg/d and occasionally over 125 b/m.    Close to the end of infusion, she still felt drowsy without any other side effect. Vital signs were stable with heart rates around 120-125 b/m.     Tolerated ketamine infusion well  No complication     Imp: bipolar I depression, mild to partial remission   ANUP - under control   Tachycardia on medication. She said she did not feel palpation until her heart rates are over 160 b/m. She agreed to see her cardiologist as soon as possible.     Plan: Ketamine infusion in 3 weeks.      Ata Nova MD.

## 2023-12-21 ENCOUNTER — TELEMEDICINE (OUTPATIENT)
Dept: BEHAVIORAL HEALTH | Facility: CLINIC | Age: 38
End: 2023-12-21
Payer: COMMERCIAL

## 2023-12-21 DIAGNOSIS — F31.9 BIPOLAR 1 DISORDER (MULTI): ICD-10-CM

## 2023-12-21 PROCEDURE — 90837 PSYTX W PT 60 MINUTES: CPT | Performed by: PSYCHOLOGIST

## 2023-12-21 NOTE — PROGRESS NOTES
Insight Oriented Therapy. 53 min.  BP1.  Depressed, mild.  We spoke about her currently being ill and not being able to go to [place].  We spent time talking about a contingency plan for the holidays here at home.     Chief complaint - Anxiety   Treatment plan - Insight and action consistent with ACT therapy.   Goals - Self-care, insight, coping skills  Prognosis - Average  Progress - Average  Functional status - 70/100  Focused mental status: Patient was oriented to person, place, time and context  Focused mental exam - alert and oriented  Frequency - Every week

## 2023-12-28 ENCOUNTER — TELEMEDICINE (OUTPATIENT)
Dept: BEHAVIORAL HEALTH | Facility: CLINIC | Age: 38
End: 2023-12-28
Payer: COMMERCIAL

## 2023-12-28 DIAGNOSIS — F31.9 BIPOLAR 1 DISORDER (MULTI): ICD-10-CM

## 2023-12-28 PROCEDURE — 90837 PSYTX W PT 60 MINUTES: CPT | Performed by: PSYCHOLOGIST

## 2023-12-28 NOTE — PROGRESS NOTES
Insight Oriented Therapy. 53 min.  BP1.  We spoke mostly about her physical recovery, the difficulty getting back into the swing of work, the work related stress, what is and isn't in her control with work and clients, and how to plan the remainder of week.     Chief complaint - Anxiety   Treatment plan - Insight and action consistent with ACT therapy.   Goals - Self-care, insight, coping skills  Prognosis - Average  Progress - Average  Functional status - 70/100  Focused mental status: Patient was oriented to person, place, time and context  Focused mental exam - alert and oriented  Frequency - Every two weeks

## 2024-01-04 ENCOUNTER — TELEMEDICINE (OUTPATIENT)
Dept: BEHAVIORAL HEALTH | Facility: CLINIC | Age: 39
End: 2024-01-04
Payer: COMMERCIAL

## 2024-01-04 DIAGNOSIS — F31.9 BIPOLAR 1 DISORDER (MULTI): ICD-10-CM

## 2024-01-04 PROCEDURE — 90837 PSYTX W PT 60 MINUTES: CPT | Performed by: PSYCHOLOGIST

## 2024-01-04 NOTE — PROGRESS NOTES
Insight Oriented Therapy. 53 min. BP1 Depressed, mild.  We spoke about her feeling better the last week, working hard, keeping in her emilia, managing work stressors appropriately, talked about ways to keep focused and balanced.  Homework will be goals for 2024.     Chief complaint - Anxiety   Treatment plan - Insight and action consistent with ACT therapy.   Goals - Self-care, insight, coping skills  Prognosis - Average  Progress - Average  Functional status - 70/100  Focused mental status: Patient was oriented to person, place, time and context  Focused mental exam - alert and oriented  Frequency - Every two weeks

## 2024-01-09 ENCOUNTER — CLINICAL SUPPORT (OUTPATIENT)
Dept: BEHAVIORAL HEALTH | Facility: CLINIC | Age: 39
End: 2024-01-09
Payer: COMMERCIAL

## 2024-01-09 ENCOUNTER — APPOINTMENT (OUTPATIENT)
Dept: BEHAVIORAL HEALTH | Facility: CLINIC | Age: 39
End: 2024-01-09
Payer: COMMERCIAL

## 2024-01-09 DIAGNOSIS — F90.0 ATTENTION DEFICIT HYPERACTIVITY DISORDER (ADHD), PREDOMINANTLY INATTENTIVE TYPE: ICD-10-CM

## 2024-01-09 DIAGNOSIS — F31.9 BIPOLAR 1 DISORDER (MULTI): ICD-10-CM

## 2024-01-09 PROCEDURE — 90853 GROUP PSYCHOTHERAPY: CPT | Mod: U2,95

## 2024-01-11 ENCOUNTER — TELEMEDICINE (OUTPATIENT)
Dept: BEHAVIORAL HEALTH | Facility: CLINIC | Age: 39
End: 2024-01-11
Payer: COMMERCIAL

## 2024-01-11 ENCOUNTER — PROCEDURE VISIT (OUTPATIENT)
Dept: BEHAVIORAL HEALTH | Facility: CLINIC | Age: 39
End: 2024-01-11
Payer: COMMERCIAL

## 2024-01-11 VITALS
DIASTOLIC BLOOD PRESSURE: 86 MMHG | HEIGHT: 62 IN | RESPIRATION RATE: 16 BRPM | WEIGHT: 229.4 LBS | SYSTOLIC BLOOD PRESSURE: 123 MMHG | TEMPERATURE: 97.3 F | BODY MASS INDEX: 42.21 KG/M2 | HEART RATE: 133 BPM

## 2024-01-11 DIAGNOSIS — F41.9 ANXIETY: ICD-10-CM

## 2024-01-11 DIAGNOSIS — F31.0 BIPOLAR AFFECTIVE DISORDER, CURRENT EPISODE HYPOMANIC (MULTI): ICD-10-CM

## 2024-01-11 PROCEDURE — 90837 PSYTX W PT 60 MINUTES: CPT | Performed by: PSYCHOLOGIST

## 2024-01-11 NOTE — PROGRESS NOTES
Insight Oriented Therapy. 53 min.  BP1. We spoke about some moderate symptoms surfacing and how she is trying to balance work and self-care/appointments the best she can while looking forward to/planning for the weekend.     Chief complaint - Anxiety   Treatment plan - Insight and action consistent with ACT therapy.   Goals - Self-care, insight, coping skills  Prognosis - Average  Progress - Average  Functional status - 70/100  Focused mental status: Patient was oriented to person, place, time and context  Focused mental exam - alert and oriented  Frequency - Every week   [FreeTextEntry1] : DENISE is a 15yo girl with a h/o hereditary spherocytosis and resultant cholelithiasis who is now s/p laparoscopic cholecystectomy and splenectomy with Dr. Knott on 12/13. She has recovered well from her surgery.  On exam her incisions are well healed. I removed the steri strips from over the incision sites.  Dr. Knott in to see and examine the patient and is pleased with her recovery.  She is cleared to resume full physical activities and to submerge in water.  We reviewed the pathology with the family and provided a copy of the report. She is counseled to continue follow up with the pediatrician and with hematology as usual and with pediatric surgery should any new or concerning symptoms arise.  All questions answered.

## 2024-01-11 NOTE — PROGRESS NOTES
Patient here at the clinic for her monthly injection of Abilify Maintena for Bipolar affective disorder,current episode hypomanic. Patient identified by full name and , vitals obtained. Patient last seen by Provider on 24, last seen by nurse on 23. Medication verified by providers note and medication order.      Appetite:  good  Sleep:  good  Appearance: clean, age appropriate and well groomed  Build: overweight  Attitude: cooperative, calm, pleasant, friendly, open  Eye Contact: normal  Activity: alert, attentive, appropriate  Speech: appropriate & spontaneous, normal rate & flow, clear  Delusions: denies  Thought Content: logical  Thought Process: logical  Judgement/insight:  Intact/good  Mood: calm   Affect: appropriate  AH/VH/SI/HI patient denies  Access to Firearms/weapons: No  Depression: 8/10  Thoughts of hopelessness: denies  Anxiety: Patient denies  Self-injurious behavior: denies at this time  Cravings/Urges: denies  Last Use: NA  Tobacco Use: Non smoker  Spiritual or cultural practices that may affect your care or impact your health care decisions? No  Living situation:  Lives with boyfriend  Employed: Employed at [employer]      Patient looks good, cooperative and pleasant. Patient  reported that her foot is healing well and braces off. Patient stated that she's having seasonal depression on a scale of 8/10. Patient stated that she's taking Wellbutrin as prescribed and will discuss dose increase with Dr Nova during her next follow up appointment. Patient denies SI/HI, VH/AH, self harming thoughts at this time.       Patient received Abilify Maintena 400mg  via 23 gauge needle in left deltoid with no reaction to the injection site, patient tolerated the injection well.  Patient will return to the clinic in 4 weeks.    RN provided education on medication and side effects,  Patient is aware to contact nurse for any adverse reactions or  911/ mobile crisis number for emergent  issues.      LOT: [id number]  EXP:  JULY 2026  NDC:  [id number].     Solange Newton RN,BSN   1.2

## 2024-01-11 NOTE — GROUP NOTE
Group Topic: Feeling Awareness/Expression   Group Date: 1/9/2024  Start Time:  6:00 PM  End Time:  7:30 PM  Facilitators: ROSIE Weber   Department: [facility]    This was a video IOP group on a HIPAA compliant program. All patients were provided with informed consent.     Date: 1/9/2024, Time: 6-7:30pm, Number of Patients: 11, Username: [email or web address], Number of Staff: 1  Group Topic(s): check ins. The group engaged in checking in and sharing how their week has been going. Patients shared a high and a low from the week as well as something they are looking forward to as the week continues. Group discussion followed.     Name: Kelli Sliva YOB: 1985   MR: [id number]      Kelli was on topic and present. Patient shared appropriately and participated fully. Patient endorsed a low of her week as having some recent onset of depressive symptoms. Patient was offered support from her peers and endorsed planning to add in some routine and structure. Patient endorsed a high point of her week as spending time with her nephew. Patient shared looking forward to spending time in [place] with a  friend this weekend.   ROSIE Yee

## 2024-01-15 ENCOUNTER — HOSPITAL ENCOUNTER (OUTPATIENT)
Dept: POSTOP/PACU | Facility: HOSPITAL | Age: 39
Discharge: HOME | End: 2024-01-15
Payer: COMMERCIAL

## 2024-01-15 VITALS
SYSTOLIC BLOOD PRESSURE: 113 MMHG | DIASTOLIC BLOOD PRESSURE: 63 MMHG | RESPIRATION RATE: 14 BRPM | OXYGEN SATURATION: 97 % | TEMPERATURE: 99.1 F | HEART RATE: 80 BPM

## 2024-01-15 DIAGNOSIS — F31.9 BIPOLAR I DISORDER WITH DEPRESSION (MULTI): Primary | ICD-10-CM

## 2024-01-15 PROCEDURE — KETSP PR KETAMINE INFUSION SELF-PAY: Performed by: PSYCHIATRY & NEUROLOGY

## 2024-01-15 PROCEDURE — KETSP HC KETAMINE INFUSION SELF-PAY: Performed by: PSYCHIATRY & NEUROLOGY

## 2024-01-15 PROCEDURE — 2500000005 HC RX 250 GENERAL PHARMACY W/O HCPCS: Performed by: PSYCHIATRY & NEUROLOGY

## 2024-01-15 PROCEDURE — 2500000004 HC RX 250 GENERAL PHARMACY W/ HCPCS (ALT 636 FOR OP/ED): Performed by: PSYCHIATRY & NEUROLOGY

## 2024-01-15 RX ORDER — ONDANSETRON HYDROCHLORIDE 2 MG/ML
4 INJECTION, SOLUTION INTRAVENOUS ONCE AS NEEDED
Status: COMPLETED | OUTPATIENT
Start: 2024-01-15 | End: 2024-01-15

## 2024-01-15 RX ORDER — KETAMINE HCL IN 0.9 % NACL 200 MG/200
0.75 PLASTIC BAG, INJECTION (ML) INTRAVENOUS ONCE
Status: COMPLETED | OUTPATIENT
Start: 2024-01-15 | End: 2024-01-15

## 2024-01-15 RX ORDER — ONDANSETRON HYDROCHLORIDE 2 MG/ML
4 INJECTION, SOLUTION INTRAVENOUS ONCE
Status: COMPLETED | OUTPATIENT
Start: 2024-01-15 | End: 2024-01-15

## 2024-01-15 RX ADMIN — ONDANSETRON 4 MG: 2 INJECTION, SOLUTION INTRAMUSCULAR; INTRAVENOUS at 13:30

## 2024-01-15 RX ADMIN — Medication 78 MG: at 12:06

## 2024-01-15 RX ADMIN — ONDANSETRON 4 MG: 2 INJECTION, SOLUTION INTRAMUSCULAR; INTRAVENOUS at 12:04

## 2024-01-15 ASSESSMENT — PATIENT HEALTH QUESTIONNAIRE - PHQ9
SUM OF ALL RESPONSES TO PHQ QUESTIONS 1-9: 22
1. LITTLE INTEREST OR PLEASURE IN DOING THINGS: NEARLY EVERY DAY
4. FEELING TIRED OR HAVING LITTLE ENERGY: NEARLY EVERY DAY
3. TROUBLE FALLING OR STAYING ASLEEP OR SLEEPING TOO MUCH: NEARLY EVERY DAY
SUM OF ALL RESPONSES TO PHQ9 QUESTIONS 1 AND 2: 6
6. FEELING BAD ABOUT YOURSELF - OR THAT YOU ARE A FAILURE OR HAVE LET YOURSELF OR YOUR FAMILY DOWN: NEARLY EVERY DAY
8. MOVING OR SPEAKING SO SLOWLY THAT OTHER PEOPLE COULD HAVE NOTICED. OR THE OPPOSITE, BEING SO FIGETY OR RESTLESS THAT YOU HAVE BEEN MOVING AROUND A LOT MORE THAN USUAL: NEARLY EVERY DAY
5. POOR APPETITE OR OVEREATING: NEARLY EVERY DAY
10. IF YOU CHECKED OFF ANY PROBLEMS, HOW DIFFICULT HAVE THESE PROBLEMS MADE IT FOR YOU TO DO YOUR WORK, TAKE CARE OF THINGS AT HOME, OR GET ALONG WITH OTHER PEOPLE: EXTREMELY DIFFICULT
7. TROUBLE CONCENTRATING ON THINGS, SUCH AS READING THE NEWSPAPER OR WATCHING TELEVISION: SEVERAL DAYS
9. THOUGHTS THAT YOU WOULD BE BETTER OFF DEAD, OR OF HURTING YOURSELF: NOT AT ALL
2. FEELING DOWN, DEPRESSED OR HOPELESS: NEARLY EVERY DAY

## 2024-01-15 ASSESSMENT — ANXIETY QUESTIONNAIRES
1. FEELING NERVOUS, ANXIOUS, OR ON EDGE: NEARLY EVERY DAY
GAD7 TOTAL SCORE: 11
3. WORRYING TOO MUCH ABOUT DIFFERENT THINGS: MORE THAN HALF THE DAYS
7. FEELING AFRAID AS IF SOMETHING AWFUL MIGHT HAPPEN: NOT AT ALL
IF YOU CHECKED OFF ANY PROBLEMS ON THIS QUESTIONNAIRE, HOW DIFFICULT HAVE THESE PROBLEMS MADE IT FOR YOU TO DO YOUR WORK, TAKE CARE OF THINGS AT HOME, OR GET ALONG WITH OTHER PEOPLE: SOMEWHAT DIFFICULT
4. TROUBLE RELAXING: MORE THAN HALF THE DAYS
6. BECOMING EASILY ANNOYED OR IRRITABLE: NOT AT ALL
2. NOT BEING ABLE TO STOP OR CONTROL WORRYING: MORE THAN HALF THE DAYS
5. BEING SO RESTLESS THAT IT IS HARD TO SIT STILL: MORE THAN HALF THE DAYS

## 2024-01-15 NOTE — NURSING NOTE
Patient complain of nausea towards end of procedure.   Patient received 4mg of Zofran prior to procedure and an additional 4mg post procedure.   Dr. Nova called and discussed with Dr. Nova how she was feeling.   Patients nausea resolved prior to discharge.

## 2024-01-15 NOTE — PROGRESS NOTES
Ketamine/Esketamine Procedure    Name: Kelli  : 1985  MRN: [id number]    Date: 01/15/24    Time out was taken with staff to confirm correct patient and correct procedure to be performed.     Ketamine infusion at 0.75 mg/kg in 40 minutes  Subjective: She said she felt more depressed again for about a week, but she did not know why. She said she felt ok before then. She did not have motivation or energy. Had problem with sleep. Denied having SI/HI/AVH.  No side effect after discharge.   She said she communicated with her cardiologist who recommended her seeing her endocrinologist for thyroid problem. She said her cardiologist ordered an echo to evaluate her tachycardia. She said she did not have any symptom from tachycardia. No side effect from ketamine after discharge.   Currently used light therapy 30 minutes per day  No change in medical hx   No physical complaint   PHQ9=22  GAD7=11    Objective: She was awake, alert, cooperative, and pleasant. Normal gain. No abnormal movement. Good eye contact. Oriented x 3. Speech was normal. Affect was appropriate, restricted most of the time. Mood was not good. Denied having SI/HI/AVH or paranoia during the interview. No delusional thought. I/J were good. Memory was fair. Attention and concentration were good.     Ketamine infusion observation:  20 minutes after starting infusion, she felt drowsy, happy, and mildly floaty on her bed. No other side effect. Vital signs were stable.   Close to the end of infusion, she felt very drowsy and mildly floaty on her bed. No other side effect. Vital signs were stable.     Tolerated ketamine infusion well  No complication   No immediate risk to self others.     Imp: Bipolar I depression, recurrent, severe   ANUP - moderate     Plan: ketamine infusion in 2 weeks   Use light therapy up to 60 minutes per day; may try twice a day if necessary     Ata Nova MD.

## 2024-01-15 NOTE — PATIENT INSTRUCTIONS
Homegoing Instructions    Patient: Kelli Silva  MRN: [id number]  YOB: 1985    Care Providers: Dr. Ata Roman, RN       Allergies:  Aspirin, Cigarette smoke, Fish containing products, Iodinated contrast media, Ketorolac, Ketorolac tromethamine, Ondansetron, Other, Prochlorperazine, Shellfish containing products, Sumatriptan, Azithromycin, Ceftriaxone, Doxycycline, Dulaglutide, House dust, Liraglutide, Metformin, Metoclopramide, Prednisone, Sitagliptin, [id number]-[id number] antimigraine agents, and Zolmitriptan    Medications:  Current Outpatient Medications   Medication Sig Dispense Refill    Abilify Maintena 400 mg injection syringe       Accu-Chek Guide test strips strip USE TO CHECK BLOOD SUGAR UP TO 4 TIMES PER DAY AS INSTRUCTED. E11.9      albuterol 2.5 mg /3 mL (0.083 %) nebulizer solution Inhale 3 mL (2.5 mg) every 6 hours if needed for wheezing or shortness of breath.      albuterol 90 mcg/actuation inhaler Inhale 2 puffs. 6 TIMES DAILY.      asenapine (Saphris) SL tablet TAKE 1 - 2 SUBLIQUAL EVERY NIGHT AT BED TIME AS NEEDED FOR SLEEP 180 tablet 1    Autolet lancing device USE AS INSTRUCTED TO CHECK BLOOD SUGAR UP TO 4 TIMES DAILY. E11.9      azelastine-fluticasone 137-50 mcg/spray spray,non-aerosol Administer 1 spray into each nostril twice a day.      budesonide (Pulmicort) 0.5 mg/2 mL nebulizer solution Inhale 2 mL (0.5 mg) every 12 hours. INHALE OVER 5-15 MINUTES      buPROPion XL (Wellbutrin XL) 150 mg 24 hr tablet Take 1 tablet (150 mg) by mouth once daily in the morning. Do not crush, chew, or split. 30 tablet 1    busPIRone (Buspar) 30 mg tablet Take 1 tablet (30 mg) by mouth 2 times a day.      butalbital-acetaminophen-caff -40 mg tablet Take 1 tablet by mouth every 6 hours if needed for headaches.      calcium carbonate-vitamin D3 (Oyster Shell) 250 mg-3.125 mcg (125 unit) tablet Take 1 tablet by mouth once daily.      cetirizine (ZyrTEC) 10 mg tablet Take 1 tablet  (10 mg) by mouth once daily at bedtime.      CHLORZOXAZONE ORAL Take 500 mg by mouth 2 times a day as needed.      Corlanor 7.5 mg tablet Take 1 tablet (7.5 mg) by mouth 2 times a day with meals.      desvenlafaxine 100 mg 24 hr tablet Take 1 tablet (100 mg) by mouth once daily.      dextrose 5 % in water (D5W) parenteral solution 250 mL with ketamine 100 mg/mL solution Infuse 80 mg into a venous catheter continuously.      dicyclomine (Bentyl) 20 mg tablet Take 1 tablet (20 mg) by mouth 4 times a day before meals.      ergocalciferol (Vitamin D-2) 1.25 MG (25467 UT) capsule Take 1 capsule (50,000 Units) by mouth. EVERY TUESDAY AND SATURDAY.      famotidine (Pepcid) 20 mg tablet Take 1 tablet (20 mg) by mouth twice a day.      glucagon (Glucagen) 1 mg injection 1 mg.  Only take if BG< 60      glucagon (Gvoke HypoPen 2-Pack) 1 mg/0.2 mL auto-injector Inject 1 Pen under the skin.      glucose 4 gram chewable tablet Chew 4 tablets (16 g).      granisetron (Kytril) 1 mg tablet 1 tablet (1 mg) every 12 hours.      Ibsrela 50 mg tablet tablet Take 1 tablet (50 mg) by mouth 2 times a day.      ipratropium-albuteroL (Duo-Neb) 0.5-2.5 mg/3 mL nebulizer solution Take 3 mL by nebulization every 4 hours if needed for wheezing or shortness of breath.      ipratropium-albuteroL (Duo-Neb) 0.5-2.5 mg/3 mL nebulizer solution 3 mL every 6 hours.      lactobacillus (Culturelle) 10 billion cell capsule Take 1 capsule by mouth twice a day.      lamoTRIgine (LaMICtal) 200 mg disintegrating tablet Take 1 tablet (200 mg) by mouth twice a day.      levalbuterol (Xopenex) 45 mcg/actuation inhaler Inhale 1-2 puffs every 4 hours if needed for wheezing or shortness of breath.      levonorgestrel (Mirena) 21 mcg/24 hours (8 yrs) 52 mg IUD 52 mg by intrauterine route.      lisdexamfetamine (Vyvanse) 70 mg capsule Take 1 capsule (70 mg) by mouth once daily in the morning. 30 capsule 0    LORazepam (Ativan) 1 mg tablet Take 1 tablet (1 mg) by mouth  2 times a day as needed.      paliperidone (Invega) 3 mg 24 hr tablet Take 1-2 tablets (3-6 mg) by mouth once daily at bedtime.      pantoprazole (ProtoNix) 40 mg EC tablet Take 1 tablet (40 mg) by mouth 2 times a day.      pregabalin (Lyrica) 150 mg capsule Take 1 capsule (150 mg) by mouth once daily. each day at the same time.      pregabalin (Lyrica) 50 mg capsule Take 1 capsule (50 mg) by mouth 2 times a day.  (am and afternoon) in addition to 150 mg capsule at night      QUEtiapine (SEROquel) 200 mg tablet Take 1 tablet (200 mg) by mouth once daily at bedtime. 30 tablet 0    Reyvow 100 mg tablet Take 1 tablet by mouth every 24 (twenty four) hours if needed. AT ONSET OF MIGRAINE NO MORE THAN 1X      rimegepant (Nurtec ODT) 75 mg tablet,disintegrating Take 1 tablet (75 mg) by mouth if needed (migraine).      sodium bicarbonate 650 mg tablet Take 0.5 tablets (325 mg) by mouth 2 times a day.      sodium chloride (Ayr) 0.65 % nasal drops Administer 1 spray into each nostril if needed for congestion.      tezepelumab-ekko (Tezspire) SubQ syringe Inject 210 mg under the skin every 28 (twenty-eight) days.      traZODone (Desyrel) 100 mg tablet Take 2 tablets (200 mg) by mouth once daily at bedtime.      traZODone (Desyrel) 50 mg tablet Take 1 tablet (50 mg) by mouth as needed at bedtime.      Trelegy Ellipta 200-62.5-25 mcg blister with device 1 puff once daily.      Vraylar 6 mg capsule Take 1 capsule (6 mg) by mouth once daily at bedtime.      zavegepant (Zavzpret) 10 mg/actuation spray,non-aerosol Administer 10 mg into affected nostril(s) if needed.      zolpidem CR (Ambien CR) 12.5 mg ER tablet Take 1 tablet (12.5 mg) by mouth as needed at bedtime for sleep.      zolpidem CR (Ambien CR) 6.25 mg ER tablet Take 1-2 tablets (6.25-12.5 mg) by mouth as needed at bedtime for sleep.       Current Facility-Administered Medications   Medication Dose Route Frequency Provider Last Rate Last Admin    ARIPiprazole (Abilify)  injection 400 mg  400 mg intramuscular q28 days Ata Nova MD PhD   400 mg at 01/11/24 0851       Your Providers Instructions:  Do not consume alcoholic beverages for 24 hours.  Do not make important decisions or sign any important documents for the next 24 hours  It is recommended that a responsible adult remain with you for the next 24 hours as you may be light headed/dizzy.    Call Physician:  For persistent nausea and/or vomiting over 24 hours.  Diet:                  Regular diet  May not drive: Do not operate motor vehicles for 24 hours        When to Call Your Doctor:  Call your doctor for questions and/or concerns  Call 911 for an emergency situation     Health Maintenance:  If you currently use tobacco products it is advised that you quit, to improve your health.  If you currently use or have stopped using tobacco products within the last 12 months, the following are resources that can help you:  American Heart Association ([phone], (www.americanheart.org)    [place] Tobacco Quit Line 1-859.496.3920 (QUIT-NOW)  (http://www.od.[place].[facility]/odhPrograms/hprr/tob_risk1.aspx)    Do you or a loved one need help for alcohol or drug use? Call the [employer] at 211 or visit FindTreatment.gov for resources.    To find out where you can safely dispose unused medicines, visit www.rxdrugdropbox.org, or call your local police department.    Please call us at [phone] with any concerns.         Please make certain you have transportation arranged to and from your appointment. This can be a friend, family member or arranged thru your health insurance provider.    What to do before your first appointment:  Stop or reduce smoking if at all possible.  No alcohol for 24 hours before your appointment.  Day of treatment:  You may have clear liquids (water, tea, coffee, no pulp juice, plain Jello, hard candy BUT no milk, cream or non-dairy creamers) up until 30 minutes and solids up to 2 hours before your appointment time.     Please bring headphones to listen to calming music, guided meditation or anything you'd like. You can also bring a journal to write in during the treatment time.   Set an intention for each appointment. Your intention can be qualities you want to cultivate in yourself, mental health goals you would like to achieve or experiences you would like to attract into your life.   Bring any inhalers you may need while at your appointment.  Take all morning medications as prescribed except the followin hours before your appointment, no chlordiazeproxide (Librium), diazepam (Valium), or Flurazepam (Dalmane)   12 hours before your appointment, no lamotrigine.  Try to avoid taking any anti-anxiety medications if at all possible the day of your appointment.. However, if you're anxiety is unbearable and would prevent you from attending you appointment, please take your prescribed dose.   No stimulants prior to your treatment, you may take them once you are home.  Use any nasal sprays up to 1 hour before your appointment.   Cancellations:   If you need to cancel your appointment, we must have at least a 24 hour notice.  If you cancel with less than 24 hour notice or do not show three times- no additional appointments will be scheduled until the treatment plan is discussed with your outpatient psychiatrist.  Also, if you arrive more than 15 minutes late, we may need to cancel your appointment and reschedule.  Please call [phone] to cancel appointments.  Thank you for your cooperation!  Homegoing Instructions    Patient: Kelli Silva  MRN: [id number]  YOB: 1985    Care Providers: Dr. Ata Roman RN       Allergies:  Aspirin, Cigarette smoke, Fish containing products, Iodinated contrast media, Ketorolac, Ketorolac tromethamine, Ondansetron, Other, Prochlorperazine, Shellfish containing products, Sumatriptan, Azithromycin, Ceftriaxone, Doxycycline, Dulaglutide, House dust, Liraglutide,  Metformin, Metoclopramide, Prednisone, Sitagliptin, [id number]-[id number] antimigraine agents, and Zolmitriptan    Medications:  Current Outpatient Medications   Medication Sig Dispense Refill    Abilify Maintena 400 mg injection syringe       Accu-Chek Guide test strips strip USE TO CHECK BLOOD SUGAR UP TO 4 TIMES PER DAY AS INSTRUCTED. E11.9      albuterol 2.5 mg /3 mL (0.083 %) nebulizer solution Inhale 3 mL (2.5 mg) every 6 hours if needed for wheezing or shortness of breath.      albuterol 90 mcg/actuation inhaler Inhale 2 puffs. 6 TIMES DAILY.      asenapine (Saphris) SL tablet TAKE 1 - 2 SUBLIQUAL EVERY NIGHT AT BED TIME AS NEEDED FOR SLEEP 180 tablet 1    Autolet lancing device USE AS INSTRUCTED TO CHECK BLOOD SUGAR UP TO 4 TIMES DAILY. E11.9      azelastine-fluticasone 137-50 mcg/spray spray,non-aerosol Administer 1 spray into each nostril twice a day.      budesonide (Pulmicort) 0.5 mg/2 mL nebulizer solution Inhale 2 mL (0.5 mg) every 12 hours. INHALE OVER 5-15 MINUTES      buPROPion XL (Wellbutrin XL) 150 mg 24 hr tablet Take 1 tablet (150 mg) by mouth once daily in the morning. Do not crush, chew, or split. 30 tablet 1    busPIRone (Buspar) 30 mg tablet Take 1 tablet (30 mg) by mouth 2 times a day.      butalbital-acetaminophen-caff -40 mg tablet Take 1 tablet by mouth every 6 hours if needed for headaches.      calcium carbonate-vitamin D3 (Oyster Shell) 250 mg-3.125 mcg (125 unit) tablet Take 1 tablet by mouth once daily.      cetirizine (ZyrTEC) 10 mg tablet Take 1 tablet (10 mg) by mouth once daily at bedtime.      CHLORZOXAZONE ORAL Take 500 mg by mouth 2 times a day as needed.      Corlanor 7.5 mg tablet Take 1 tablet (7.5 mg) by mouth 2 times a day with meals.      desvenlafaxine 100 mg 24 hr tablet Take 1 tablet (100 mg) by mouth once daily.      dextrose 5 % in water (D5W) parenteral solution 250 mL with ketamine 100 mg/mL solution Infuse 80 mg into a venous catheter continuously.       dicyclomine (Bentyl) 20 mg tablet Take 1 tablet (20 mg) by mouth 4 times a day before meals.      ergocalciferol (Vitamin D-2) 1.25 MG (30872 UT) capsule Take 1 capsule (50,000 Units) by mouth. EVERY TUESDAY AND SATURDAY.      famotidine (Pepcid) 20 mg tablet Take 1 tablet (20 mg) by mouth twice a day.      glucagon (Glucagen) 1 mg injection 1 mg.  Only take if BG< 60      glucagon (Gvoke HypoPen 2-Pack) 1 mg/0.2 mL auto-injector Inject 1 Pen under the skin.      glucose 4 gram chewable tablet Chew 4 tablets (16 g).      granisetron (Kytril) 1 mg tablet 1 tablet (1 mg) every 12 hours.      Ibsrela 50 mg tablet tablet Take 1 tablet (50 mg) by mouth 2 times a day.      ipratropium-albuteroL (Duo-Neb) 0.5-2.5 mg/3 mL nebulizer solution Take 3 mL by nebulization every 4 hours if needed for wheezing or shortness of breath.      ipratropium-albuteroL (Duo-Neb) 0.5-2.5 mg/3 mL nebulizer solution 3 mL every 6 hours.      lactobacillus (Culturelle) 10 billion cell capsule Take 1 capsule by mouth twice a day.      lamoTRIgine (LaMICtal) 200 mg disintegrating tablet Take 1 tablet (200 mg) by mouth twice a day.      levalbuterol (Xopenex) 45 mcg/actuation inhaler Inhale 1-2 puffs every 4 hours if needed for wheezing or shortness of breath.      levonorgestrel (Mirena) 21 mcg/24 hours (8 yrs) 52 mg IUD 52 mg by intrauterine route.      lisdexamfetamine (Vyvanse) 70 mg capsule Take 1 capsule (70 mg) by mouth once daily in the morning. 30 capsule 0    LORazepam (Ativan) 1 mg tablet Take 1 tablet (1 mg) by mouth 2 times a day as needed.      paliperidone (Invega) 3 mg 24 hr tablet Take 1-2 tablets (3-6 mg) by mouth once daily at bedtime.      pantoprazole (ProtoNix) 40 mg EC tablet Take 1 tablet (40 mg) by mouth 2 times a day.      pregabalin (Lyrica) 150 mg capsule Take 1 capsule (150 mg) by mouth once daily. each day at the same time.      pregabalin (Lyrica) 50 mg capsule Take 1 capsule (50 mg) by mouth 2 times a day.  (am and  afternoon) in addition to 150 mg capsule at night      QUEtiapine (SEROquel) 200 mg tablet Take 1 tablet (200 mg) by mouth once daily at bedtime. 30 tablet 0    Reyvow 100 mg tablet Take 1 tablet by mouth every 24 (twenty four) hours if needed. AT ONSET OF MIGRAINE NO MORE THAN 1X      rimegepant (Nurtec ODT) 75 mg tablet,disintegrating Take 1 tablet (75 mg) by mouth if needed (migraine).      sodium bicarbonate 650 mg tablet Take 0.5 tablets (325 mg) by mouth 2 times a day.      sodium chloride (Ayr) 0.65 % nasal drops Administer 1 spray into each nostril if needed for congestion.      tezepelumab-ekko (Tezspire) SubQ syringe Inject 210 mg under the skin every 28 (twenty-eight) days.      traZODone (Desyrel) 100 mg tablet Take 2 tablets (200 mg) by mouth once daily at bedtime.      traZODone (Desyrel) 50 mg tablet Take 1 tablet (50 mg) by mouth as needed at bedtime.      Trelegy Ellipta 200-62.5-25 mcg blister with device 1 puff once daily.      Vraylar 6 mg capsule Take 1 capsule (6 mg) by mouth once daily at bedtime.      zavegepant (Zavzpret) 10 mg/actuation spray,non-aerosol Administer 10 mg into affected nostril(s) if needed.      zolpidem CR (Ambien CR) 12.5 mg ER tablet Take 1 tablet (12.5 mg) by mouth as needed at bedtime for sleep.      zolpidem CR (Ambien CR) 6.25 mg ER tablet Take 1-2 tablets (6.25-12.5 mg) by mouth as needed at bedtime for sleep.       Current Facility-Administered Medications   Medication Dose Route Frequency Provider Last Rate Last Admin    ARIPiprazole (Abilify) injection 400 mg  400 mg intramuscular q28 days Ata Nova MD PhD   400 mg at 01/11/24 0851       Your Providers Instructions:  Do not consume alcoholic beverages for 24 hours.  Do not make important decisions or sign any important documents for the next 24 hours  It is recommended that a responsible adult remain with you for the next 24 hours as you may be light headed/dizzy.    Call Physician:  For persistent nausea and/or  vomiting over 24 hours.  Diet:                  Regular diet  May not drive: Do not operate motor vehicles for 24 hours        When to Call Your Doctor:  Call your doctor for questions and/or concerns  Call 911 for an emergency situation     Health Maintenance:  If you currently use tobacco products it is advised that you quit, to improve your health.  If you currently use or have stopped using tobacco products within the last 12 months, the following are resources that can help you:  American Heart Association ([phone] 743-0059, (www.americanheart.org)    [place] Tobacco Quit Line 1-104.632.4510 (QUIT-NOW)  (http://www.[facility].[place].[facility]/odhPrograms/hprr/tob_risk1.aspx)    Do you or a loved one need help for alcohol or drug use? Call the "[employer]" at 211 or visit [employer] for resources.    To find out where you can safely dispose unused medicines, visit www.rxdrugdropbox.org, or call your local police department.    Please call us at [phone] with any concerns.         Please make certain you have transportation arranged to and from your appointment. This can be a friend, family member or arranged thru your health insurance provider.    What to do before your first appointment:  Stop or reduce smoking if at all possible.  No alcohol for 24 hours before your appointment.  Day of treatment:  You may have clear liquids (water, tea, coffee, no pulp juice, plain Jello, hard candy BUT no milk, cream or non-dairy creamers) up until 30 minutes and solids up to 2 hours before your appointment time.    Please bring headphones to listen to calming music, guided meditation or anything you'd like. You can also bring a journal to write in during the treatment time.   Set an intention for each appointment. Your intention can be qualities you want to cultivate in yourself, mental health goals you would like to achieve or experiences you would like to attract into your life.   Bring any inhalers you may need while at your  appointment.  Take all morning medications as prescribed except the followin hours before your appointment, no chlordiazeproxide (Librium), diazepam (Valium), or Flurazepam (Dalmane)   12 hours before your appointment, no lamotrigine.  Try to avoid taking any anti-anxiety medications if at all possible the day of your appointment.. However, if you're anxiety is unbearable and would prevent you from attending you appointment, please take your prescribed dose.   No stimulants prior to your treatment, you may take them once you are home.  Use any nasal sprays up to 1 hour before your appointment.   Cancellations:   If you need to cancel your appointment, we must have at least a 24 hour notice.  If you cancel with less than 24 hour notice or do not show three times- no additional appointments will be scheduled until the treatment plan is discussed with your outpatient psychiatrist.  Also, if you arrive more than 15 minutes late, we may need to cancel your appointment and reschedule.  Please call [phone] to cancel appointments.  Thank you for your cooperation!

## 2024-01-15 NOTE — H&P
Psychiatry Procedure H&P for each visit    Name: Kelli  : 1985  MRN: [id number]    Date: 01/15/24     History & Physical Reviewed:  Pregnancy/Lactating:  Are You Pregnant: No  Are You Currently Breastfeeding: No    I have reviewed the History and Physical dated: 23  History and Physical Reviewed and relevant findings noted. Patient examined to review pertinent physical findings: Yes  Home Medications Reviewed: Yes  Allergies Reviewed: Yes    ERAS (Enhanced Recovery After Surgery):  ERAS Patient: No    Consent:  COVID-19 Consent:   COVID-19 Risk Consent: Yes: Surgeon has reviewed key risks related to the risk of meli COVID-19 and if they contract COVID-19 what the risks are.

## 2024-01-16 ENCOUNTER — CLINICAL SUPPORT (OUTPATIENT)
Dept: BEHAVIORAL HEALTH | Facility: CLINIC | Age: 39
End: 2024-01-16
Payer: COMMERCIAL

## 2024-01-16 DIAGNOSIS — F31.9 BIPOLAR 1 DISORDER (MULTI): ICD-10-CM

## 2024-01-16 DIAGNOSIS — F41.9 ANXIETY: ICD-10-CM

## 2024-01-17 DIAGNOSIS — F90.0 ATTENTION DEFICIT HYPERACTIVITY DISORDER (ADHD), PREDOMINANTLY INATTENTIVE TYPE: ICD-10-CM

## 2024-01-17 RX ORDER — LISDEXAMFETAMINE DIMESYLATE 70 MG/1
70 CAPSULE ORAL EVERY MORNING
Qty: 30 CAPSULE | Refills: 0 | Status: SHIPPED | OUTPATIENT
Start: 2024-01-17 | End: 2024-02-06 | Stop reason: SDUPTHER

## 2024-01-18 ENCOUNTER — TELEMEDICINE (OUTPATIENT)
Dept: BEHAVIORAL HEALTH | Facility: CLINIC | Age: 39
End: 2024-01-18
Payer: COMMERCIAL

## 2024-01-18 DIAGNOSIS — F31.9 BIPOLAR 1 DISORDER (MULTI): ICD-10-CM

## 2024-01-18 PROCEDURE — 90837 PSYTX W PT 60 MINUTES: CPT | Performed by: PSYCHOLOGIST

## 2024-01-18 NOTE — PROGRESS NOTES
Insight Oriented Therapy. 53 min.  BP1.  We spoke about how she is not feeling well again and working on the coping skills of being more present, taking one hour at a time, thin slicing goals, making good decisions.     Chief complaint - physical ailments and depression   Treatment plan - Insight and action consistent with ACT therapy.   Goals - Self-care, insight, coping skills  Prognosis - Average  Progress - Average  Functional status - 70/100  Focused mental status: Patient was oriented to person, place, time and context  Focused mental exam - alert and oriented  Frequency - Every week

## 2024-01-19 ENCOUNTER — HOSPITAL ENCOUNTER (OUTPATIENT)
Dept: POSTOP/PACU | Facility: HOSPITAL | Age: 39
Discharge: HOME | End: 2024-01-19
Payer: COMMERCIAL

## 2024-01-19 ENCOUNTER — APPOINTMENT (OUTPATIENT)
Dept: POSTOP/PACU | Facility: HOSPITAL | Age: 39
End: 2024-01-19
Attending: PSYCHIATRY & NEUROLOGY

## 2024-01-19 ENCOUNTER — APPOINTMENT (OUTPATIENT)
Dept: POSTOP/PACU | Facility: HOSPITAL | Age: 39
End: 2024-01-19

## 2024-01-19 VITALS
TEMPERATURE: 97 F | SYSTOLIC BLOOD PRESSURE: 122 MMHG | RESPIRATION RATE: 14 BRPM | HEART RATE: 79 BPM | DIASTOLIC BLOOD PRESSURE: 55 MMHG | OXYGEN SATURATION: 97 %

## 2024-01-19 DIAGNOSIS — F31.9 BIPOLAR I DISORDER WITH DEPRESSION (MULTI): Primary | ICD-10-CM

## 2024-01-19 PROCEDURE — KETSP HC KETAMINE INFUSION SELF-PAY: Performed by: PSYCHIATRY & NEUROLOGY

## 2024-01-19 PROCEDURE — 2500000004 HC RX 250 GENERAL PHARMACY W/ HCPCS (ALT 636 FOR OP/ED): Performed by: PSYCHIATRY & NEUROLOGY

## 2024-01-19 PROCEDURE — 2500000005 HC RX 250 GENERAL PHARMACY W/O HCPCS: Performed by: PSYCHIATRY & NEUROLOGY

## 2024-01-19 PROCEDURE — KETSP PR KETAMINE INFUSION SELF-PAY: Performed by: PSYCHIATRY & NEUROLOGY

## 2024-01-19 RX ORDER — ONDANSETRON HYDROCHLORIDE 2 MG/ML
4 INJECTION, SOLUTION INTRAVENOUS ONCE AS NEEDED
Status: COMPLETED | OUTPATIENT
Start: 2024-01-19 | End: 2024-01-19

## 2024-01-19 RX ORDER — KETAMINE HCL IN 0.9 % NACL 200 MG/200
0.75 PLASTIC BAG, INJECTION (ML) INTRAVENOUS ONCE
Status: COMPLETED | OUTPATIENT
Start: 2024-01-19 | End: 2024-01-19

## 2024-01-19 RX ADMIN — Medication 78 MG: at 11:20

## 2024-01-19 RX ADMIN — ONDANSETRON 4 MG: 2 INJECTION, SOLUTION INTRAMUSCULAR; INTRAVENOUS at 11:15

## 2024-01-19 ASSESSMENT — ANXIETY QUESTIONNAIRES
GAD7 TOTAL SCORE: 10
IF YOU CHECKED OFF ANY PROBLEMS ON THIS QUESTIONNAIRE, HOW DIFFICULT HAVE THESE PROBLEMS MADE IT FOR YOU TO DO YOUR WORK, TAKE CARE OF THINGS AT HOME, OR GET ALONG WITH OTHER PEOPLE: SOMEWHAT DIFFICULT
1. FEELING NERVOUS, ANXIOUS, OR ON EDGE: MORE THAN HALF THE DAYS
4. TROUBLE RELAXING: MORE THAN HALF THE DAYS
6. BECOMING EASILY ANNOYED OR IRRITABLE: NOT AT ALL
7. FEELING AFRAID AS IF SOMETHING AWFUL MIGHT HAPPEN: NOT AT ALL
3. WORRYING TOO MUCH ABOUT DIFFERENT THINGS: MORE THAN HALF THE DAYS
2. NOT BEING ABLE TO STOP OR CONTROL WORRYING: MORE THAN HALF THE DAYS
5. BEING SO RESTLESS THAT IT IS HARD TO SIT STILL: MORE THAN HALF THE DAYS

## 2024-01-19 ASSESSMENT — COLUMBIA-SUICIDE SEVERITY RATING SCALE - C-SSRS
6. HAVE YOU EVER DONE ANYTHING, STARTED TO DO ANYTHING, OR PREPARED TO DO ANYTHING TO END YOUR LIFE?: NO
2. HAVE YOU ACTUALLY HAD ANY THOUGHTS OF KILLING YOURSELF?: NO
1. IN THE PAST MONTH, HAVE YOU WISHED YOU WERE DEAD OR WISHED YOU COULD GO TO SLEEP AND NOT WAKE UP?: NO

## 2024-01-19 ASSESSMENT — PATIENT HEALTH QUESTIONNAIRE - PHQ9
SUM OF ALL RESPONSES TO PHQ9 QUESTIONS 1 AND 2: 4
6. FEELING BAD ABOUT YOURSELF - OR THAT YOU ARE A FAILURE OR HAVE LET YOURSELF OR YOUR FAMILY DOWN: MORE THAN HALF THE DAYS
2. FEELING DOWN, DEPRESSED OR HOPELESS: MORE THAN HALF THE DAYS
10. IF YOU CHECKED OFF ANY PROBLEMS, HOW DIFFICULT HAVE THESE PROBLEMS MADE IT FOR YOU TO DO YOUR WORK, TAKE CARE OF THINGS AT HOME, OR GET ALONG WITH OTHER PEOPLE: EXTREMELY DIFFICULT
8. MOVING OR SPEAKING SO SLOWLY THAT OTHER PEOPLE COULD HAVE NOTICED. OR THE OPPOSITE, BEING SO FIGETY OR RESTLESS THAT YOU HAVE BEEN MOVING AROUND A LOT MORE THAN USUAL: MORE THAN HALF THE DAYS
7. TROUBLE CONCENTRATING ON THINGS, SUCH AS READING THE NEWSPAPER OR WATCHING TELEVISION: MORE THAN HALF THE DAYS
SUM OF ALL RESPONSES TO PHQ QUESTIONS 1-9: 17
1. LITTLE INTEREST OR PLEASURE IN DOING THINGS: MORE THAN HALF THE DAYS
3. TROUBLE FALLING OR STAYING ASLEEP OR SLEEPING TOO MUCH: MORE THAN HALF THE DAYS
9. THOUGHTS THAT YOU WOULD BE BETTER OFF DEAD, OR OF HURTING YOURSELF: NOT AT ALL
5. POOR APPETITE OR OVEREATING: NEARLY EVERY DAY
4. FEELING TIRED OR HAVING LITTLE ENERGY: MORE THAN HALF THE DAYS

## 2024-01-19 NOTE — PROGRESS NOTES
Ketamine/Esketamine Procedure    Name: Kelli  : 1985  MRN: [id number]    Date: 24    Time out was taken with staff to confirm correct patient and correct procedure to be performed.     Ketamine infusion at 0.75 mg/kg in 40 minutes  Subjective:  She said she felt a little better, taking edge off, after last treatment, but not much. She said still felt unable to function and made this appointment. She said she continued feeling depressed and lack of motivation. Energy and concentration were low. Denied having SI/HI/AVH or paranoia.  She said she had an echo this morning. No tachycardia today.  No change in medical hx  No physical complaint  PHQ9=17  GAD7=10    Objective: She was awake, alert, cooperative, and pleasant. Normal gait. No abnormal movement.  Good eye contact. Oriented x 3. Speech was normal. Affect was appropriate, restricted most of the time. Mood was not good. Denied having SI/HI/AVH or paranoia during the interview. No delusional thought or paranoia. I/J were good. Memory was fair. Attention and concentration were good.     Ketamine infusion observation:  About 20 minutes after starting infusion, she felt floaty on her bed and a little drowsy.  No other side effect. Vital signs were stable.   About 10 minutes after the infusion, she felt close to normal although she still felt a little bit floaty.     Tolerated ketamine infusion well  No complication     Imp: bipolar I depression, recurrent, severe   ANUP - moderate   No immediate risk to self or others    Plan: Ketamine infusion in 2 weeks     Ata Nova MD.

## 2024-01-19 NOTE — PATIENT INSTRUCTIONS
Homegoing Instructions    Patient: Kelli Silva  MRN: [id number]  YOB: 1985    Care Providers: Dr. Ata Roman, RN       Allergies:  Aspirin, Cigarette smoke, Fish containing products, Iodinated contrast media, Ketorolac, Ketorolac tromethamine, Ondansetron, Other, Prochlorperazine, Shellfish containing products, Sumatriptan, Azithromycin, Ceftriaxone, Doxycycline, Dulaglutide, House dust, Liraglutide, Metformin, Metoclopramide, Prednisone, Sitagliptin, Ybbiyljd-[id number] antimigraine agents, and Zolmitriptan    Medications:  Current Outpatient Medications   Medication Sig Dispense Refill    Abilify Maintena 400 mg injection syringe       Accu-Chek Guide test strips strip USE TO CHECK BLOOD SUGAR UP TO 4 TIMES PER DAY AS INSTRUCTED. E11.9      albuterol 2.5 mg /3 mL (0.083 %) nebulizer solution Inhale 3 mL (2.5 mg) every 6 hours if needed for wheezing or shortness of breath.      albuterol 90 mcg/actuation inhaler Inhale 2 puffs. 6 TIMES DAILY.      asenapine (Saphris) SL tablet TAKE 1 - 2 SUBLIQUAL EVERY NIGHT AT BED TIME AS NEEDED FOR SLEEP 180 tablet 1    Autolet lancing device USE AS INSTRUCTED TO CHECK BLOOD SUGAR UP TO 4 TIMES DAILY. E11.9      azelastine-fluticasone 137-50 mcg/spray spray,non-aerosol Administer 1 spray into each nostril twice a day.      budesonide (Pulmicort) 0.5 mg/2 mL nebulizer solution Inhale 2 mL (0.5 mg) every 12 hours. INHALE OVER 5-15 MINUTES      buPROPion XL (Wellbutrin XL) 150 mg 24 hr tablet Take 1 tablet (150 mg) by mouth once daily in the morning. Do not crush, chew, or split. 30 tablet 1    busPIRone (Buspar) 30 mg tablet Take 1 tablet (30 mg) by mouth 2 times a day.      butalbital-acetaminophen-caff -40 mg tablet Take 1 tablet by mouth every 6 hours if needed for headaches.      calcium carbonate-vitamin D3 (Oyster Shell) 250 mg-3.125 mcg (125 unit) tablet Take 1 tablet by mouth once daily.      cetirizine (ZyrTEC) 10 mg tablet Take 1 tablet  (10 mg) by mouth once daily at bedtime.      CHLORZOXAZONE ORAL Take 500 mg by mouth 2 times a day as needed.      Corlanor 7.5 mg tablet Take 1 tablet (7.5 mg) by mouth 2 times a day with meals.      desvenlafaxine 100 mg 24 hr tablet Take 1 tablet (100 mg) by mouth once daily.      dextrose 5 % in water (D5W) parenteral solution 250 mL with ketamine 100 mg/mL solution Infuse 80 mg into a venous catheter continuously.      dicyclomine (Bentyl) 20 mg tablet Take 1 tablet (20 mg) by mouth 4 times a day before meals.      ergocalciferol (Vitamin D-2) 1.25 MG (88939 UT) capsule Take 1 capsule (50,000 Units) by mouth. EVERY TUESDAY AND SATURDAY.      famotidine (Pepcid) 20 mg tablet Take 1 tablet (20 mg) by mouth twice a day.      glucagon (Glucagen) 1 mg injection 1 mg.  Only take if BG< 60      glucagon (Gvoke HypoPen 2-Pack) 1 mg/0.2 mL auto-injector Inject 1 Pen under the skin.      glucose 4 gram chewable tablet Chew 4 tablets (16 g).      granisetron (Kytril) 1 mg tablet 1 tablet (1 mg) every 12 hours.      Ibsrela 50 mg tablet tablet Take 1 tablet (50 mg) by mouth 2 times a day.      ipratropium-albuteroL (Duo-Neb) 0.5-2.5 mg/3 mL nebulizer solution Take 3 mL by nebulization every 4 hours if needed for wheezing or shortness of breath.      ipratropium-albuteroL (Duo-Neb) 0.5-2.5 mg/3 mL nebulizer solution 3 mL every 6 hours.      lactobacillus (Culturelle) 10 billion cell capsule Take 1 capsule by mouth twice a day.      lamoTRIgine (LaMICtal) 200 mg disintegrating tablet Take 1 tablet (200 mg) by mouth twice a day.      levalbuterol (Xopenex) 45 mcg/actuation inhaler Inhale 1-2 puffs every 4 hours if needed for wheezing or shortness of breath.      levonorgestrel (Mirena) 21 mcg/24 hours (8 yrs) 52 mg IUD 52 mg by intrauterine route.      lisdexamfetamine (Vyvanse) 70 mg capsule Take 1 capsule (70 mg) by mouth once daily in the morning. 30 capsule 0    LORazepam (Ativan) 1 mg tablet Take 1 tablet (1 mg) by mouth  2 times a day as needed.      milnacipran (SavElla) 100 mg tablet 1 tablet (100 mg) 2 times a day.      paliperidone (Invega) 3 mg 24 hr tablet Take 1-2 tablets (3-6 mg) by mouth once daily at bedtime.      pantoprazole (ProtoNix) 40 mg EC tablet Take 1 tablet (40 mg) by mouth 2 times a day.      pregabalin (Lyrica) 150 mg capsule Take 1 capsule (150 mg) by mouth once daily. each day at the same time.      pregabalin (Lyrica) 50 mg capsule Take 1 capsule (50 mg) by mouth 2 times a day.  (am and afternoon) in addition to 150 mg capsule at night      QUEtiapine (SEROquel) 200 mg tablet Take 1 tablet (200 mg) by mouth once daily at bedtime. 30 tablet 0    Reyvow 100 mg tablet Take 1 tablet by mouth every 24 (twenty four) hours if needed. AT ONSET OF MIGRAINE NO MORE THAN 1X      rimegepant (Nurtec ODT) 75 mg tablet,disintegrating Take 1 tablet (75 mg) by mouth if needed (migraine).      sodium bicarbonate 650 mg tablet Take 0.5 tablets (325 mg) by mouth 2 times a day.      sodium chloride (Ayr) 0.65 % nasal drops Administer 1 spray into each nostril if needed for congestion.      tezepelumab-ekko (Tezspire) SubQ syringe Inject 210 mg under the skin every 28 (twenty-eight) days.      traZODone (Desyrel) 100 mg tablet Take 2 tablets (200 mg) by mouth once daily at bedtime.      traZODone (Desyrel) 50 mg tablet Take 1 tablet (50 mg) by mouth as needed at bedtime.      Trelegy Ellipta 200-62.5-25 mcg blister with device 1 puff once daily.      Vraylar 6 mg capsule Take 1 capsule (6 mg) by mouth once daily at bedtime.      zavegepant (Zavzpret) 10 mg/actuation spray,non-aerosol Administer 10 mg into affected nostril(s) if needed.      zolpidem CR (Ambien CR) 12.5 mg ER tablet Take 1 tablet (12.5 mg) by mouth as needed at bedtime for sleep.      zolpidem CR (Ambien CR) 6.25 mg ER tablet Take 1-2 tablets (6.25-12.5 mg) by mouth as needed at bedtime for sleep.       Current Facility-Administered Medications   Medication Dose  Route Frequency Provider Last Rate Last Admin    ARIPiprazole (Abilify) injection 400 mg  400 mg intramuscular q28 days Ata Nova MD PhD   400 mg at 01/11/24 0851       Your Providers Instructions:  Do not consume alcoholic beverages for 24 hours.  Do not make important decisions or sign any important documents for the next 24 hours  It is recommended that a responsible adult remain with you for the next 24 hours as you may be light headed/dizzy.    Call Physician:  For persistent nausea and/or vomiting over 24 hours.  Diet:                  Regular diet  May not drive: Do not operate motor vehicles for 24 hours        When to Call Your Doctor:  Call your doctor for questions and/or concerns  Call 911 for an emergency situation     Health Maintenance:  If you currently use tobacco products it is advised that you quit, to improve your health.  If you currently use or have stopped using tobacco products within the last 12 months, the following are resources that can help you:  American Heart Association [phone], (www.americanheart.org)    [place] Tobacco Quit Line 1-478.714.8124 (QUIT-NOW)  (http://www.[facility].[place].[facility]/odhPrograms/hprr/tob_risk1.aspx)    Do you or a loved one need help for alcohol or drug use? Call the [employer] at 211 or visit FindTreatment.gov for resources.    To find out where you can safely dispose unused medicines, visit www.rxdrugdropbox.org, or call your local police department.    Please call us at [phone] with any concerns.         Please make certain you have transportation arranged to and from your appointment. This can be a friend, family member or arranged thru your health insurance provider.    What to do before your first appointment:  Stop or reduce smoking if at all possible.  No alcohol for 24 hours before your appointment.  Day of treatment:  You may have clear liquids (water, tea, coffee, no pulp juice, plain Jello, hard candy BUT no milk, cream or non-dairy creamers) up  until 30 minutes and solids up to 2 hours before your appointment time.    Please bring headphones to listen to calming music, guided meditation or anything you'd like. You can also bring a journal to write in during the treatment time.   Set an intention for each appointment. Your intention can be qualities you want to cultivate in yourself, mental health goals you would like to achieve or experiences you would like to attract into your life.   Bring any inhalers you may need while at your appointment.  Take all morning medications as prescribed except the followin hours before your appointment, no chlordiazeproxide (Librium), diazepam (Valium), or Flurazepam (Dalmane)   12 hours before your appointment, no lamotrigine.  Try to avoid taking any anti-anxiety medications if at all possible the day of your appointment.. However, if you're anxiety is unbearable and would prevent you from attending you appointment, please take your prescribed dose.   No stimulants prior to your treatment, you may take them once you are home.  Use any nasal sprays up to 1 hour before your appointment.   Cancellations:   If you need to cancel your appointment, we must have at least a 24 hour notice.  If you cancel with less than 24 hour notice or do not show three times- no additional appointments will be scheduled until the treatment plan is discussed with your outpatient psychiatrist.  Also, if you arrive more than 15 minutes late, we may need to cancel your appointment and reschedule.  Please call [phone] to cancel appointments.  Thank you for your cooperation!

## 2024-01-19 NOTE — H&P
Psychiatry Procedure H&P for each visit    Name: Kelli  : 1985  MRN: [id number]    Date: 24     History & Physical Reviewed:  Pregnancy/Lactating:  Are You Pregnant: No  Are You Currently Breastfeeding: No    I have reviewed the History and Physical dated: 23  History and Physical Reviewed and relevant findings noted. Patient examined to review pertinent physical findings: yes  Home Medications Reviewed: yes  Allergies Reviewed: yes    ERAS (Enhanced Recovery After Surgery):  ERAS Patient: No    Consent:  COVID-19 Consent:   COVID-19 Risk Consent: Yes: Surgeon has reviewed key risks related to the risk of meli COVID-19 and if they contract COVID-19 what the risks are.

## 2024-01-22 NOTE — GROUP NOTE
Group Topic: Goals   Group Date: 1/16/2024  Start Time:  6:00 PM  End Time:  7:30 PM  Facilitators: Garima Wong PsyD   Department:  [facility]    Number of Participants: 14   Group Focus: check in  Treatment Modality: Patient-Centered Therapy  Interventions utilized were group exercise  Purpose: other: Check-in    This was a video IOP aftercare group on a HIPAA compliant platform. All patients were provided with informed consent.  IOP Aftercare Group: Check-in  Participants had opportunity to check-in with their peers and discuss recent stressors. They were able to support other group members with recent challenges and provide ideas for coping based on personal experiences.   Garima Wong Psy.D.    Name: Kelli Silva YOB: 1985   MR: [id number]      Kelli was present and actively engaged in discussion. She processed recent changes in her mood and how she has been coping. She received support from her peers.

## 2024-01-23 ENCOUNTER — APPOINTMENT (OUTPATIENT)
Dept: BEHAVIORAL HEALTH | Facility: CLINIC | Age: 39
End: 2024-01-23
Payer: COMMERCIAL

## 2024-01-23 ENCOUNTER — CLINICAL SUPPORT (OUTPATIENT)
Dept: BEHAVIORAL HEALTH | Facility: CLINIC | Age: 39
End: 2024-01-23
Payer: COMMERCIAL

## 2024-01-23 DIAGNOSIS — F41.9 ANXIETY: ICD-10-CM

## 2024-01-23 DIAGNOSIS — F31.9 BIPOLAR 1 DISORDER (MULTI): ICD-10-CM

## 2024-01-25 ENCOUNTER — TELEMEDICINE (OUTPATIENT)
Dept: BEHAVIORAL HEALTH | Facility: CLINIC | Age: 39
End: 2024-01-25
Payer: COMMERCIAL

## 2024-01-25 DIAGNOSIS — F31.9 BIPOLAR 1 DISORDER (MULTI): ICD-10-CM

## 2024-01-25 PROCEDURE — 90837 PSYTX W PT 60 MINUTES: CPT | Performed by: PSYCHOLOGIST

## 2024-01-25 NOTE — GROUP NOTE
Group Topic: Feeling Awareness/Expression   Group Date: 1/23/2024  Start Time:  6:00 PM  End Time:  7:30 PM  Facilitators: Garima Wong PsyD   Department:  [facility]    Number of Participants: 11   Group Focus: feeling awareness/expression and psychiatric education  Treatment Modality: Patient-Centered Therapy and Psychoeducation  Interventions utilized were exploration and patient education  Purpose: feelings    This was a video IOP aftercare group on a HIPAA compliant platform. All patients were provided with informed consent.    IOP Aftercare Group: Guilt  Participants received education around the emotion of guilt. They defined what guilt looks like and how it feels to experience guilt. They discussed ways in which they have experienced this emotion and were provided education on coping with guilt.   Garima Wong Psy.D.      Name: Kelli Silva YOB: 1985   MR: [id number]      Kelli was present and actively engaged in discussion. She explored how her mental health has been impacted by guilt. She was supportive of her peers.

## 2024-01-25 NOTE — PROGRESS NOTES
Insight Oriented Therapy. 53 min.  BP1.  We spoke about coping through medical issues and how she is trying and doing a good job at work, working on consistency.  We spoke about how to navigate some patients she feels intimidated by.     Chief complaint - Phase of life   Treatment plan - Insight and action consistent with ACT therapy.   Goals - Self-care, insight, coping skills  Prognosis - Average  Progress - Average  Functional status - 70/100  Focused mental status: Patient was oriented to person, place, time and context  Focused mental exam - alert and oriented  Frequency - Every week

## 2024-01-30 ENCOUNTER — CLINICAL SUPPORT (OUTPATIENT)
Dept: BEHAVIORAL HEALTH | Facility: CLINIC | Age: 39
End: 2024-01-30
Payer: COMMERCIAL

## 2024-01-30 DIAGNOSIS — F41.9 ANXIETY: ICD-10-CM

## 2024-01-30 DIAGNOSIS — F31.9 BIPOLAR 1 DISORDER (MULTI): ICD-10-CM

## 2024-01-31 ENCOUNTER — HOSPITAL ENCOUNTER (OUTPATIENT)
Dept: POSTOP/PACU | Facility: HOSPITAL | Age: 39
Discharge: HOME | End: 2024-01-31

## 2024-01-31 VITALS
WEIGHT: 229.28 LBS | BODY MASS INDEX: 41.94 KG/M2 | DIASTOLIC BLOOD PRESSURE: 66 MMHG | SYSTOLIC BLOOD PRESSURE: 139 MMHG | HEART RATE: 65 BPM | OXYGEN SATURATION: 93 % | TEMPERATURE: 98.1 F | RESPIRATION RATE: 14 BRPM

## 2024-01-31 DIAGNOSIS — F31.9 BIPOLAR I DISORDER WITH DEPRESSION (MULTI): Primary | ICD-10-CM

## 2024-01-31 PROCEDURE — 2500000005 HC RX 250 GENERAL PHARMACY W/O HCPCS: Performed by: PSYCHIATRY & NEUROLOGY

## 2024-01-31 PROCEDURE — 2500000004 HC RX 250 GENERAL PHARMACY W/ HCPCS (ALT 636 FOR OP/ED): Performed by: PSYCHIATRY & NEUROLOGY

## 2024-01-31 RX ORDER — ONDANSETRON HYDROCHLORIDE 2 MG/ML
4 INJECTION, SOLUTION INTRAVENOUS ONCE
Status: COMPLETED | OUTPATIENT
Start: 2024-01-31 | End: 2024-01-31

## 2024-01-31 RX ORDER — ONDANSETRON HYDROCHLORIDE 2 MG/ML
4 INJECTION, SOLUTION INTRAVENOUS ONCE
Status: CANCELLED
Start: 2024-01-31 | End: 2024-01-31

## 2024-01-31 RX ORDER — KETAMINE HCL IN 0.9 % NACL 200 MG/200
0.75 PLASTIC BAG, INJECTION (ML) INTRAVENOUS ONCE
Status: CANCELLED
Start: 2024-01-31 | End: 2024-01-31

## 2024-01-31 RX ORDER — KETAMINE HCL IN 0.9 % NACL 200 MG/200
0.75 PLASTIC BAG, INJECTION (ML) INTRAVENOUS ONCE
Status: COMPLETED | OUTPATIENT
Start: 2024-01-31 | End: 2024-01-31

## 2024-01-31 RX ADMIN — ONDANSETRON 4 MG: 2 INJECTION, SOLUTION INTRAMUSCULAR; INTRAVENOUS at 11:20

## 2024-01-31 RX ADMIN — Medication 78 MG: at 12:23

## 2024-01-31 ASSESSMENT — PATIENT HEALTH QUESTIONNAIRE - PHQ9
5. POOR APPETITE OR OVEREATING: SEVERAL DAYS
3. TROUBLE FALLING OR STAYING ASLEEP OR SLEEPING TOO MUCH: NEARLY EVERY DAY
2. FEELING DOWN, DEPRESSED OR HOPELESS: SEVERAL DAYS
1. LITTLE INTEREST OR PLEASURE IN DOING THINGS: SEVERAL DAYS
8. MOVING OR SPEAKING SO SLOWLY THAT OTHER PEOPLE COULD HAVE NOTICED. OR THE OPPOSITE, BEING SO FIGETY OR RESTLESS THAT YOU HAVE BEEN MOVING AROUND A LOT MORE THAN USUAL: NOT AT ALL
4. FEELING TIRED OR HAVING LITTLE ENERGY: NEARLY EVERY DAY
10. IF YOU CHECKED OFF ANY PROBLEMS, HOW DIFFICULT HAVE THESE PROBLEMS MADE IT FOR YOU TO DO YOUR WORK, TAKE CARE OF THINGS AT HOME, OR GET ALONG WITH OTHER PEOPLE: SOMEWHAT DIFFICULT
SUM OF ALL RESPONSES TO PHQ QUESTIONS 1-9: 13
6. FEELING BAD ABOUT YOURSELF - OR THAT YOU ARE A FAILURE OR HAVE LET YOURSELF OR YOUR FAMILY DOWN: NEARLY EVERY DAY
SUM OF ALL RESPONSES TO PHQ9 QUESTIONS 1 AND 2: 2
9. THOUGHTS THAT YOU WOULD BE BETTER OFF DEAD, OR OF HURTING YOURSELF: NOT AT ALL
7. TROUBLE CONCENTRATING ON THINGS, SUCH AS READING THE NEWSPAPER OR WATCHING TELEVISION: SEVERAL DAYS

## 2024-01-31 ASSESSMENT — ANXIETY QUESTIONNAIRES
6. BECOMING EASILY ANNOYED OR IRRITABLE: NOT AT ALL
5. BEING SO RESTLESS THAT IT IS HARD TO SIT STILL: NEARLY EVERY DAY
2. NOT BEING ABLE TO STOP OR CONTROL WORRYING: SEVERAL DAYS
4. TROUBLE RELAXING: NEARLY EVERY DAY
GAD7 TOTAL SCORE: 14
7. FEELING AFRAID AS IF SOMETHING AWFUL MIGHT HAPPEN: NEARLY EVERY DAY
3. WORRYING TOO MUCH ABOUT DIFFERENT THINGS: NEARLY EVERY DAY
1. FEELING NERVOUS, ANXIOUS, OR ON EDGE: SEVERAL DAYS
IF YOU CHECKED OFF ANY PROBLEMS ON THIS QUESTIONNAIRE, HOW DIFFICULT HAVE THESE PROBLEMS MADE IT FOR YOU TO DO YOUR WORK, TAKE CARE OF THINGS AT HOME, OR GET ALONG WITH OTHER PEOPLE: VERY DIFFICULT

## 2024-01-31 NOTE — PATIENT INSTRUCTIONS
Homegoing Instructions    Patient: Kelli Silva  MRN: [id number]  YOB: 1985    Care Providers: Dr. Ata Roman, RN       Allergies:  Aspirin, Cigarette smoke, Fish containing products, Iodinated contrast media, Ketorolac, Ketorolac tromethamine, Ondansetron, Other, Prochlorperazine, Shellfish containing products, Sumatriptan, Azithromycin, Ceftriaxone, Doxycycline, Dulaglutide, House dust, Liraglutide, Metformin, Metoclopramide, Prednisone, Sitagliptin, Tqzlaios-3-oq5 antimigraine agents, and Zolmitriptan    Medications:  Current Outpatient Medications   Medication Sig Dispense Refill    Abilify Maintena 400 mg injection syringe       Accu-Chek Guide test strips strip USE TO CHECK BLOOD SUGAR UP TO 4 TIMES PER DAY AS INSTRUCTED. E11.9      albuterol 2.5 mg /3 mL (0.083 %) nebulizer solution Inhale 3 mL (2.5 mg) every 6 hours if needed for wheezing or shortness of breath.      albuterol 90 mcg/actuation inhaler Inhale 2 puffs. 6 TIMES DAILY.      asenapine (Saphris) SL tablet TAKE 1 - 2 SUBLIQUAL EVERY NIGHT AT BED TIME AS NEEDED FOR SLEEP 180 tablet 1    Autolet lancing device USE AS INSTRUCTED TO CHECK BLOOD SUGAR UP TO 4 TIMES DAILY. E11.9      azelastine-fluticasone 137-50 mcg/spray spray,non-aerosol Administer 1 spray into each nostril twice a day.      budesonide (Pulmicort) 0.5 mg/2 mL nebulizer solution Inhale 2 mL (0.5 mg) every 12 hours. INHALE OVER 5-15 MINUTES      buPROPion XL (Wellbutrin XL) 150 mg 24 hr tablet Take 1 tablet (150 mg) by mouth once daily in the morning. Do not crush, chew, or split. 30 tablet 1    busPIRone (Buspar) 30 mg tablet Take 1 tablet (30 mg) by mouth 2 times a day.      butalbital-acetaminophen-caff -40 mg tablet Take 1 tablet by mouth every 6 hours if needed for headaches.      calcium carbonate-vitamin D3 (Oyster Shell) 250 mg-3.125 mcg (125 unit) tablet Take 1 tablet by mouth once daily.      cetirizine (ZyrTEC) 10 mg tablet Take 1 tablet  (10 mg) by mouth once daily at bedtime.      CHLORZOXAZONE ORAL Take 500 mg by mouth 2 times a day as needed.      Corlanor 7.5 mg tablet Take 1 tablet (7.5 mg) by mouth 2 times a day with meals.      desvenlafaxine 100 mg 24 hr tablet Take 1 tablet (100 mg) by mouth once daily.      dextrose 5 % in water (D5W) parenteral solution 250 mL with ketamine 100 mg/mL solution Infuse 80 mg into a venous catheter continuously.      dicyclomine (Bentyl) 20 mg tablet Take 1 tablet (20 mg) by mouth 4 times a day before meals.      ergocalciferol (Vitamin D-2) 1.25 MG (53070 UT) capsule Take 1 capsule (50,000 Units) by mouth. EVERY TUESDAY AND SATURDAY.      famotidine (Pepcid) 20 mg tablet Take 1 tablet (20 mg) by mouth twice a day.      glucagon (Glucagen) 1 mg injection 1 mg.  Only take if BG< 60      glucagon (Gvoke HypoPen 2-Pack) 1 mg/0.2 mL auto-injector Inject 1 Pen under the skin.      glucose 4 gram chewable tablet Chew 4 tablets (16 g).      granisetron (Kytril) 1 mg tablet 1 tablet (1 mg) every 12 hours.      Ibsrela 50 mg tablet tablet Take 1 tablet (50 mg) by mouth 2 times a day.      ipratropium-albuteroL (Duo-Neb) 0.5-2.5 mg/3 mL nebulizer solution Take 3 mL by nebulization every 4 hours if needed for wheezing or shortness of breath.      ipratropium-albuteroL (Duo-Neb) 0.5-2.5 mg/3 mL nebulizer solution 3 mL every 6 hours.      lactobacillus (Culturelle) 10 billion cell capsule Take 1 capsule by mouth twice a day.      lamoTRIgine (LaMICtal) 200 mg disintegrating tablet Take 1 tablet (200 mg) by mouth twice a day.      levalbuterol (Xopenex) 45 mcg/actuation inhaler Inhale 1-2 puffs every 4 hours if needed for wheezing or shortness of breath.      levonorgestrel (Mirena) 21 mcg/24 hours (8 yrs) 52 mg IUD 52 mg by intrauterine route.      lisdexamfetamine (Vyvanse) 70 mg capsule Take 1 capsule (70 mg) by mouth once daily in the morning. 30 capsule 0    LORazepam (Ativan) 1 mg tablet Take 1 tablet (1 mg) by mouth  2 times a day as needed.      milnacipran (SavElla) 100 mg tablet 1 tablet (100 mg) 2 times a day.      paliperidone (Invega) 3 mg 24 hr tablet Take 1-2 tablets (3-6 mg) by mouth once daily at bedtime.      pantoprazole (ProtoNix) 40 mg EC tablet Take 1 tablet (40 mg) by mouth 2 times a day.      pregabalin (Lyrica) 150 mg capsule Take 1 capsule (150 mg) by mouth once daily. each day at the same time.      pregabalin (Lyrica) 50 mg capsule Take 1 capsule (50 mg) by mouth 2 times a day.  (am and afternoon) in addition to 150 mg capsule at night      QUEtiapine (SEROquel) 200 mg tablet Take 1 tablet (200 mg) by mouth once daily at bedtime. 30 tablet 0    Reyvow 100 mg tablet Take 1 tablet by mouth every 24 (twenty four) hours if needed. AT ONSET OF MIGRAINE NO MORE THAN 1X      rimegepant (Nurtec ODT) 75 mg tablet,disintegrating Take 1 tablet (75 mg) by mouth if needed (migraine).      sodium bicarbonate 650 mg tablet Take 0.5 tablets (325 mg) by mouth 2 times a day.      sodium chloride (Ayr) 0.65 % nasal drops Administer 1 spray into each nostril if needed for congestion.      tezepelumab-ekko (Tezspire) SubQ syringe Inject 210 mg under the skin every 28 (twenty-eight) days.      traZODone (Desyrel) 100 mg tablet Take 2 tablets (200 mg) by mouth once daily at bedtime.      traZODone (Desyrel) 50 mg tablet Take 1 tablet (50 mg) by mouth as needed at bedtime.      Trelegy Ellipta 200-62.5-25 mcg blister with device 1 puff once daily.      Vraylar 6 mg capsule Take 1 capsule (6 mg) by mouth once daily at bedtime.      zavegepant (Zavzpret) 10 mg/actuation spray,non-aerosol Administer 10 mg into affected nostril(s) if needed.      zolpidem CR (Ambien CR) 12.5 mg ER tablet Take 1 tablet (12.5 mg) by mouth as needed at bedtime for sleep.      zolpidem CR (Ambien CR) 6.25 mg ER tablet Take 1-2 tablets (6.25-12.5 mg) by mouth as needed at bedtime for sleep.       Current Facility-Administered Medications   Medication Dose  Route Frequency Provider Last Rate Last Admin    ARIPiprazole (Abilify) injection 400 mg  400 mg intramuscular q28 days Ata Nova MD PhD   400 mg at 01/11/24 0851    ketamine (Ketalar) in sodium chloride 0.9 % IV 78 mg  0.75 mg/kg intravenous Once Ata Nova MD PhD        ondansetron (Zofran) injection 4 mg  4 mg intravenous Once Ata Nova MD PhD           Your Providers Instructions:  Do not consume alcoholic beverages for 24 hours.  Do not make important decisions or sign any important documents for the next 24 hours  It is recommended that a responsible adult remain with you for the next 24 hours as you may be light headed/dizzy.    Call Physician:  For persistent nausea and/or vomiting over 24 hours.  Diet:                  Regular diet  May not drive: Do not operate motor vehicles for 24 hours        When to Call Your Doctor:  Call your doctor for questions and/or concerns  Call 911 for an emergency situation     Health Maintenance:  If you currently use tobacco products it is advised that you quit, to improve your health.  If you currently use or have stopped using tobacco products within the last 12 months, the following are resources that can help you:  American Heart Association [phone], (www.americanheart.org)    [place] Tobacco Quit Line 1-831.972.7717 (QUIT-NOW)  (http://www.[facility].[place].gov/odhPrograms/hprr/tob_risk1.aspx)    Do you or a loved one need help for alcohol or drug use? Call the [employer] at 211 or visit FindTreatment.gov for resources.    To find out where you can safely dispose unused medicines, visit www.rxdrugdropbox.org, or call your local police department.    Please call us at [phone] with any concerns.         Please make certain you have transportation arranged to and from your appointment. This can be a friend, family member or arranged thru your health insurance provider.    What to do before your first appointment:  Stop or reduce smoking if at all possible.  No  alcohol for 24 hours before your appointment.  Day of treatment:  You may have clear liquids (water, tea, coffee, no pulp juice, plain Jello, hard candy BUT no milk, cream or non-dairy creamers) up until 30 minutes and solids up to 2 hours before your appointment time.    Please bring headphones to listen to calming music, guided meditation or anything you'd like. You can also bring a journal to write in during the treatment time.   Set an intention for each appointment. Your intention can be qualities you want to cultivate in yourself, mental health goals you would like to achieve or experiences you would like to attract into your life.   Bring any inhalers you may need while at your appointment.  Take all morning medications as prescribed except the followin hours before your appointment, no chlordiazeproxide (Librium), diazepam (Valium), or Flurazepam (Dalmane)   12 hours before your appointment, no lamotrigine.  Try to avoid taking any anti-anxiety medications if at all possible the day of your appointment.. However, if you're anxiety is unbearable and would prevent you from attending you appointment, please take your prescribed dose.   No stimulants prior to your treatment, you may take them once you are home.  Use any nasal sprays up to 1 hour before your appointment.   Cancellations:   If you need to cancel your appointment, we must have at least a 24 hour notice.  If you cancel with less than 24 hour notice or do not show three times- no additional appointments will be scheduled until the treatment plan is discussed with your outpatient psychiatrist.  Also, if you arrive more than 15 minutes late, we may need to cancel your appointment and reschedule.  Please call [phone] to cancel appointments.  Thank you for your cooperation!

## 2024-01-31 NOTE — PROGRESS NOTES
Ketamine/Esketamine Procedure    Name: Kelli  : 1985  MRN: [id number]    Date: 24    Time out was taken with staff to confirm correct patient and correct procedure to be performed.     Ketamine infusion at .75 mg/kg in 40 minutes  Subjective:  She said she felt better, less depressed.  Her depressive sx has been mild to moderate.  No severe depression and enjoyed life overall. Appetite was not good. Still had problem with sleep. Energy and concentration were ok. Denied having SI/HI/AVH or paranoia. No side effect after discharge.   Had electrolyte abnormality last week and hospitalized over night. No other change in medical hx.  No physical complaint.   PHQ9=13  GAD7=13    Objective: She was awake, alert, cooperative, and pleasant. Good eye contact. Oriented x 3. Speech was normal. Affect was appropriate, restricted most of the time. Mood was better. Denied having SI/HI/AVH or paranoia during the interview. No delusional thought. I/J were good. Memory was fair. Attention and concentration were good.     Ketamine infusion observation:  About 15 minutes after starting infusion, she experienced drowsiness and floaty on her bed. Her systolic blood pressure was overall 180 mmHg on 2 ratings, but came down after repositioning her arm. Did not have headache or chest pain. No other side effect. Vital signs were stable.   At the end of infusion, she felt sleepy, tired, and very floaty on her bed and had blurred vision. No other side effect. Vital signs were stable.     Tolerated ketamine infusion well  No complication   Will continue monitoring by nursing staff alone     Imp: bipolar I depression, severe, currently mild to moderate     Plan: ketamine infusion in 3 weeks per patient     Ata Nova MD.

## 2024-01-31 NOTE — H&P
Psychiatry Procedure H&P for each visit    Name: Kelli  : 1985  MRN: [id number]    Date: 24     History & Physical Reviewed:  Pregnancy/Lactating:  Are You Pregnant: n/a  Are You Currently Breastfeeding: n/a    I have reviewed the History and Physical dated:   History and Physical Reviewed and relevant findings noted. Patient examined to review pertinent physical findings: Yes  Home Medications Reviewed: Yes  Allergies Reviewed: Yes    ERAS (Enhanced Recovery After Surgery):  ERAS Patient: No    Consent:  COVID-19 Consent:   COVID-19 Risk Consent: Yes: Surgeon has reviewed key risks related to the risk of meli COVID-19 and if they contract COVID-19 what the risks are.

## 2024-01-31 NOTE — PERIOPERATIVE NURSING NOTE
Pt. Reported left eye blurriness after ketamine infusion. Pt. Reported it had decreased by discharge time, 2hrs after first spray of esketamine. Dr. Nova spoke to Pt. at discharge time and told her that sometimes ketamine can relax eye muscles and it should continue to improve. Dr Nova said it was okay to discharge Pt.

## 2024-01-31 NOTE — NURSING NOTE
Patient presented to treatment with elevated BP. 182/92  Ketamine infusion began at 1223.  Blood pressure remained increased through infusion.   Patient states that one eye became blurry and remained blurry through treatment.   Patient stated that the blurry vision resolved at completion of 1 hour of monitoring.

## 2024-01-31 NOTE — H&P
Psychiatry Procedure H&P for each visit    Name: Kelli  : 1985  MRN: [id number]    Date: 24     History & Physical Reviewed:  Pregnancy/Lactating:  Are You Pregnant: No  Are You Currently Breastfeeding: No    I have reviewed the History and Physical dated: 24  History and Physical Reviewed and relevant findings noted. Patient examined to review pertinent physical findings: yes  Home Medications Reviewed: Yes  Allergies Reviewed: Yes    ERAS (Enhanced Recovery After Surgery):  ERAS Patient: No    Consent:  COVID-19 Consent:   COVID-19 Risk Consent: Yes: Surgeon has reviewed key risks related to the risk of meli COVID-19 and if they contract COVID-19 what the risks are.

## 2024-02-01 ENCOUNTER — APPOINTMENT (OUTPATIENT)
Dept: BEHAVIORAL HEALTH | Facility: CLINIC | Age: 39
End: 2024-02-01
Payer: COMMERCIAL

## 2024-02-01 ENCOUNTER — TELEMEDICINE (OUTPATIENT)
Dept: BEHAVIORAL HEALTH | Facility: CLINIC | Age: 39
End: 2024-02-01
Payer: COMMERCIAL

## 2024-02-01 DIAGNOSIS — F31.9 BIPOLAR 1 DISORDER (MULTI): ICD-10-CM

## 2024-02-01 PROCEDURE — 90837 PSYTX W PT 60 MINUTES: CPT | Performed by: PSYCHOLOGIST

## 2024-02-01 NOTE — PROGRESS NOTES
Insight Oriented Therapy. 53 min.  BP1.  Mild Depression. Pt was seen Wednesday at 7am due to another appt.  We spoke about how she is trying to keep her medical concerns in check while being able to be as present as possible with her work.  We spoke about some ways to work on being more present in the moment.      Chief complaint - Phase of life, physical issues, work confidence  Treatment plan - Insight and action consistent with ACT therapy.   Goals - Self-care, insight, coping skills  Prognosis - Average  Progress - Average  Functional status - 60/100  Focused mental status: Patient was oriented to person, place, time and context  Focused mental exam - alert and oriented  Frequency - Every week

## 2024-02-02 NOTE — GROUP NOTE
Group Topic: Feeling Awareness/Expression   Group Date: 1/30/2024  Start Time:  6:00 PM  End Time:  7:30 PM  Facilitators: Garima Wong PsyD   Department:  [facility]    Number of Participants: 15   Group Focus: check in  Treatment Modality: Patient-Centered Therapy  Interventions utilized were support  Purpose: feelings and other: Check-in      This was a video IOP aftercare group on a HIPAA compliant platform. All patients were provided with informed consent.    IOP Aftercare Group: Check-in  Participants spent group time checking-in with their peers and discussing recent stressors. They had opportunities to support other group members and share relatable experiences.   Garima Wong Psy.D.    Name: Kelli Silva YOB: 1985   MR: [id number]      Kelli was present and actively engaged in discussion. She stated that she has been experiencing some anxiety recently, which has led to sleep difficulties. She denies any hypomanic/manic related symptoms. She noted that she is working on decreasing her anxiety around driving and practicing mindful journaling.

## 2024-02-06 ENCOUNTER — CLINICAL SUPPORT (OUTPATIENT)
Dept: BEHAVIORAL HEALTH | Facility: CLINIC | Age: 39
End: 2024-02-06
Payer: COMMERCIAL

## 2024-02-06 ENCOUNTER — TELEMEDICINE (OUTPATIENT)
Dept: BEHAVIORAL HEALTH | Facility: CLINIC | Age: 39
End: 2024-02-06
Payer: COMMERCIAL

## 2024-02-06 DIAGNOSIS — F41.9 ANXIETY: ICD-10-CM

## 2024-02-06 DIAGNOSIS — F31.9 BIPOLAR 1 DISORDER (MULTI): ICD-10-CM

## 2024-02-06 DIAGNOSIS — F90.0 ATTENTION DEFICIT HYPERACTIVITY DISORDER (ADHD), PREDOMINANTLY INATTENTIVE TYPE: ICD-10-CM

## 2024-02-06 PROCEDURE — 1036F TOBACCO NON-USER: CPT | Performed by: PSYCHIATRY & NEUROLOGY

## 2024-02-06 PROCEDURE — 99214 OFFICE O/P EST MOD 30 MIN: CPT | Performed by: PSYCHIATRY & NEUROLOGY

## 2024-02-06 PROCEDURE — 90853 GROUP PSYCHOTHERAPY: CPT | Performed by: PSYCHOLOGIST

## 2024-02-06 RX ORDER — LISDEXAMFETAMINE DIMESYLATE 70 MG/1
70 CAPSULE ORAL EVERY MORNING
Qty: 30 CAPSULE | Refills: 0 | Status: SHIPPED | OUTPATIENT
Start: 2024-02-06 | End: 2024-03-12 | Stop reason: SDUPTHER

## 2024-02-06 NOTE — PROGRESS NOTES
Follow-up visit  Subjective: She said she felt more depressed lately because a patient filed a complaint against her because she was unable complete a session 1.5 years ago due to her hypoglycemia. She said she learned the complaint last Wednesday. She felt a little more depressed from then on. She felt very anxious and depressed in last several day. She scored her depression 7 of 10. Not feel hopeless or helpless. Denied having SI/HI/AVH or paranoia. Still enjoyed her work for most part, but felt lack of motivation. No problem of falling or staying asleep. Slept too much. Did not feel rested in the morning. Energy was low during daytime. Concentration was ok with Adderall. No appetite, hardly eating, a bad thing for her. Not feel irritable.  Still work 5 days a week.   No problem at work   Not neglected herself so far.     Objective:   Appearance: well-groomed.   Demeanor: average.   Eye Contact: average.   Motor Activity: average.   Speech: clear.   Language: Neurologic language is intact.   Fund of Knowledge: intact fund of knowledge.   Delusions: None Reported.   Hallucinations: not reported  Self Harm: None Reported.   Aggressive: None Reported.   Mood: depressed and anxious.   Affect: restricted most of the time.    Orientation: alert, oriented x3.   Manner: cooperative.   Thought process: goal-directed.   Thought association: displays rational thought process.   Content of thought: normal  Abstract/ Rational Thought: intact   Memory: grossly intact.   Behavior: normal motor activity, relaxed, good eye contact, calm.   Attention/Concentration: normal.   Cognition: intact.   Intelligence Estimate: average.   Executive Function: intact.   Insight: intact.   Judgement: intact.   Musculoskeletal: normal strength and tone. No abnormal movement.   Impression: bipolar I depression, moderate - triggered by stressor   ANUP - severe   ADHD - manageable   Discussion/Plan:    Continue current meds  Continue light therapy    Continue therapy with her therapist   Continue ketamine infusion as scheduled  Follow-up in a month in-person.     Ata Nova MD.

## 2024-02-08 ENCOUNTER — PROCEDURE VISIT (OUTPATIENT)
Dept: BEHAVIORAL HEALTH | Facility: CLINIC | Age: 39
End: 2024-02-08
Payer: COMMERCIAL

## 2024-02-08 ENCOUNTER — TELEMEDICINE (OUTPATIENT)
Dept: BEHAVIORAL HEALTH | Facility: CLINIC | Age: 39
End: 2024-02-08
Payer: COMMERCIAL

## 2024-02-08 VITALS
BODY MASS INDEX: 42.34 KG/M2 | WEIGHT: 230.1 LBS | SYSTOLIC BLOOD PRESSURE: 123 MMHG | TEMPERATURE: 98.4 F | DIASTOLIC BLOOD PRESSURE: 81 MMHG | HEIGHT: 62 IN | RESPIRATION RATE: 16 BRPM | HEART RATE: 116 BPM

## 2024-02-08 DIAGNOSIS — F31.0 BIPOLAR AFFECTIVE DISORDER, CURRENT EPISODE HYPOMANIC (MULTI): Primary | ICD-10-CM

## 2024-02-08 DIAGNOSIS — F31.9 BIPOLAR 1 DISORDER (MULTI): ICD-10-CM

## 2024-02-08 PROCEDURE — 1036F TOBACCO NON-USER: CPT | Performed by: PSYCHOLOGIST

## 2024-02-08 PROCEDURE — 90837 PSYTX W PT 60 MINUTES: CPT | Performed by: PSYCHOLOGIST

## 2024-02-08 NOTE — PROGRESS NOTES
Patient here at the clinic for her monthly injection of Abilify Maintena for Bipolar affective disorder,current episode hypomanic. Patient identified by full name and , vitals obtained. Patient last seen by Provider on 24, last seen by nurse on 24. Medication verified by providers note and medication order.      Appetite:  good  Sleep:  good  Appearance: clean, age appropriate and well groomed  Build: overweight  Attitude: cooperative, calm, pleasant, friendly, open  Eye Contact: normal  Activity: alert, attentive, appropriate  Speech: appropriate & spontaneous, normal rate & flow, clear  Delusions: denies  Thought Content: logical  Thought Process: logical  Judgement/insight:  Intact/good  Mood: calm   Affect: appropriate  AH/VH/SI/HI patient denies  Access to Firearms/weapons: No  Depression: 5/10  Thoughts of hopelessness: denies  Anxiety: 8/10  Self-injurious behavior: denies at this time  Cravings/Urges: denies  Last Use: NA  Tobacco Use: Non smoker  Spiritual or cultural practices that may affect your care or impact your health care decisions? No  Living situation:  Lives with boyfriend  Employed: Employed at [employer]      Patient looks good, cooperative and pleasant. Patient stated that she's having seasonal depression on a scale of 5/10 and anxiety on a scale of 8/10. Patient stated that she's taking her meds as prescribed and will discuss the depression and anxiety with Dr Nova during her next follow up appointment. Patient denies SI/HI, VH/AH, self harming thoughts at this time.       Patient received Abilify Maintena 400mg  via 23 gauge needle in right deltoid with no reaction to the injection site, patient tolerated the injection well.  Patient will return to the clinic in 4 weeks.    RN provided education on medication and side effects,  Patient is aware to contact nurse for any adverse reactions or  911/ [employer] crisis number for emergent issues.      LOT: [id number]  EXP:  JULY  2026  NDC:  01563-214-76.     Solange Newton, RN,BSN

## 2024-02-08 NOTE — PROGRESS NOTES
Insight Oriented Therapy. 53 min.  BP1.  We spoke about her concerns with the state board for ending an eval after 20 min due to having a diabetes situation, how to respond to the letter, that she has been working on ways to manage her physical struggles in her own therapy for some time.  We spoke about balance, and some other things she's looking forward to.     Chief complaint - Situational stress, physical health  Treatment plan - Insight and action consistent with ACT therapy.   Goals - Self-care, insight, coping skills  Prognosis - Average  Progress - Average  Functional status - 65/100  Focused mental status: Patient was oriented to person, place, time and context  Focused mental exam - alert and oriented  Frequency - Every two weeks

## 2024-02-09 NOTE — GROUP NOTE
Group Topic: Feeling Awareness/Expression   Group Date: 2/6/2024  Start Time:  6:00 PM  End Time:  7:30 PM  Facilitators: Garima Wong PsyD   Department:  [facility]    Number of Participants: 15   Group Focus: feeling awareness/expression  Treatment Modality: Psychoeducation  Interventions utilized were exploration and patient education  Purpose: feelings    This was a video IOP aftercare group on a HIPAA compliant platform. All patients were provided with informed consent.    IOP Aftercare Group: Shame  Participants reviewed group rules and guidelines. They received education on the emotion of shame and explored moments that they have experienced this feeling. Group members discussed ways to cope with shame and guilt.   Garima Wong Psy.D.      Name: Kelli Silva YOB: 1985   MR: [id number]      Kelli was present and actively listened to discussion.

## 2024-02-13 ENCOUNTER — CLINICAL SUPPORT (OUTPATIENT)
Dept: BEHAVIORAL HEALTH | Facility: CLINIC | Age: 39
End: 2024-02-13
Payer: COMMERCIAL

## 2024-02-13 DIAGNOSIS — F31.9 BIPOLAR 1 DISORDER (MULTI): ICD-10-CM

## 2024-02-13 DIAGNOSIS — F41.9 ANXIETY: ICD-10-CM

## 2024-02-13 PROCEDURE — 90853 GROUP PSYCHOTHERAPY: CPT | Performed by: PSYCHOLOGIST

## 2024-02-15 ENCOUNTER — TELEMEDICINE (OUTPATIENT)
Dept: BEHAVIORAL HEALTH | Facility: CLINIC | Age: 39
End: 2024-02-15
Payer: COMMERCIAL

## 2024-02-15 ENCOUNTER — DOCUMENTATION (OUTPATIENT)
Dept: NEUROLOGY | Facility: HOSPITAL | Age: 39
End: 2024-02-15

## 2024-02-15 DIAGNOSIS — F31.9 BIPOLAR 1 DISORDER (MULTI): ICD-10-CM

## 2024-02-15 PROCEDURE — 90837 PSYTX W PT 60 MINUTES: CPT | Performed by: PSYCHOLOGIST

## 2024-02-15 PROCEDURE — 1036F TOBACCO NON-USER: CPT | Performed by: PSYCHOLOGIST

## 2024-02-15 NOTE — PROGRESS NOTES
Insight Oriented Therapy. 53 min.  BP1.  We spoke more about her feeling better with support and a plan for the state board accusation.  We spoke at length on how to manage the trillogy of personal, professional, and physical needs and balancing it out.     Chief complaint - Work/life balance  Treatment plan - Insight and action consistent with ACT therapy.   Goals - Self-care, insight, coping skills  Prognosis - Average  Progress - Average  Functional status - 65/100  Focused mental status: Patient was oriented to person, place, time and context  Focused mental exam - alert and oriented  Frequency - Every week

## 2024-02-15 NOTE — PROGRESS NOTES
Ms. Silva is a 38F, PMHx HLD, POTS, hx of DVT/PE, BPPV, DM2, GERD, hx RCC, past psych hx notable for ADHD, bipolar 1 disorder, ANUP, who presents with answering service chief concern regarding sleep medications.     States she has been sleeping poorly and took ambien last night. Was wondering regarding whether she can take ativan this AM. Counseled regarding sleep hygiene/CBT-I and recommended against ativan for sleep given poor quality of sleep and reiterated that she should not take ambien again at this time.    Also endorsed some sx of physical restlessness potentially c/f mild akisthisia I/s/o recent abilify HARTMANN. States no recent dose increase or frequency of HARTMANN administration increase. States sx currently it are tolerable. Denies mental anxiety. Counseled to closely monitor and F/U with Dr. Nova should symptoms not improve for potential medical management of akithisia sx.

## 2024-02-16 NOTE — GROUP NOTE
Group Topic: Feeling Awareness/Expression   Group Date: 2/13/2024  Start Time:  6:00 PM  End Time:  7:30 PM  Facilitators: Garima Wong PsyD   Department:  [facility]    Number of Participants: 17   Group Focus: feeling awareness/expression  Treatment Modality: Patient-Centered Therapy  Interventions utilized were other Check-in  Purpose: feelings and other: Check-in    This was a video IOP aftercare group on a HIPAA compliant platform. All patients were provided with informed consent.    IOP Aftercare Group: Weekly Check-in  Participants had opportunity to check-in with their peers and discuss recent stressors. They supported each other with challenges and shared ideas for coping.   Garima Wong Psy.D.    Name: Kelli Silva YOB: 1985   MR: [id number]      Kelli was present and actively engaged in discussion. She stated that she processed a recent challenge at work with her therapist. She noted that she is focused on maintaining a good sleep schedule.

## 2024-02-17 ENCOUNTER — TELEMEDICINE (OUTPATIENT)
Dept: BEHAVIORAL HEALTH | Facility: CLINIC | Age: 39
End: 2024-02-17
Payer: COMMERCIAL

## 2024-02-17 DIAGNOSIS — F41.9 ANXIETY: ICD-10-CM

## 2024-02-17 DIAGNOSIS — F31.9 BIPOLAR 1 DISORDER (MULTI): ICD-10-CM

## 2024-02-17 PROCEDURE — 90832 PSYTX W PT 30 MINUTES: CPT | Performed by: PSYCHOLOGIST

## 2024-02-17 PROCEDURE — 1036F TOBACCO NON-USER: CPT | Performed by: PSYCHOLOGIST

## 2024-02-17 NOTE — PROGRESS NOTES
Insight Oriented Therapy. 30 min.  We spoke about her feeling a bit hypomanic, grounding techniques, some excitement for girls weekend, managing the bookshelves of bedtime and awake, slow pace with work notes.     Chief complaint - Phase of life   Treatment plan - Insight and action consistent with ACT therapy.   Goals - Self-care, insight, coping skills  Prognosis - Average  Progress - Average  Functional status - 70/100  Focused mental status: Patient was oriented to person, place, time and context  Focused mental exam - alert and oriented  Frequency - Every week

## 2024-02-21 ENCOUNTER — HOSPITAL ENCOUNTER (OUTPATIENT)
Dept: POSTOP/PACU | Facility: HOSPITAL | Age: 39
Discharge: HOME | End: 2024-02-21

## 2024-02-21 VITALS
TEMPERATURE: 97.9 F | DIASTOLIC BLOOD PRESSURE: 67 MMHG | SYSTOLIC BLOOD PRESSURE: 128 MMHG | RESPIRATION RATE: 14 BRPM | OXYGEN SATURATION: 94 % | WEIGHT: 229.28 LBS | HEART RATE: 85 BPM | BODY MASS INDEX: 41.94 KG/M2

## 2024-02-21 DIAGNOSIS — F31.9 BIPOLAR I DISORDER WITH DEPRESSION (MULTI): Primary | ICD-10-CM

## 2024-02-21 DIAGNOSIS — Z00.00 ENCOUNTER FOR ROUTINE HISTORY AND PHYSICAL EXAM IN FEMALE: ICD-10-CM

## 2024-02-21 PROCEDURE — 2500000005 HC RX 250 GENERAL PHARMACY W/O HCPCS: Performed by: PSYCHIATRY & NEUROLOGY

## 2024-02-21 PROCEDURE — 2500000004 HC RX 250 GENERAL PHARMACY W/ HCPCS (ALT 636 FOR OP/ED): Performed by: PSYCHIATRY & NEUROLOGY

## 2024-02-21 PROCEDURE — 99212 OFFICE O/P EST SF 10 MIN: CPT | Performed by: PSYCHIATRY & NEUROLOGY

## 2024-02-21 PROCEDURE — KETSP HC KETAMINE INFUSION SELF-PAY: Performed by: PSYCHIATRY & NEUROLOGY

## 2024-02-21 PROCEDURE — KETSP PR KETAMINE INFUSION SELF-PAY: Performed by: PSYCHIATRY & NEUROLOGY

## 2024-02-21 RX ORDER — ONDANSETRON HYDROCHLORIDE 2 MG/ML
8 INJECTION, SOLUTION INTRAVENOUS ONCE
Status: CANCELLED
Start: 2024-02-21 | End: 2024-02-21

## 2024-02-21 RX ORDER — ONDANSETRON HYDROCHLORIDE 2 MG/ML
8 INJECTION, SOLUTION INTRAVENOUS ONCE
Status: COMPLETED | OUTPATIENT
Start: 2024-02-21 | End: 2024-02-21

## 2024-02-21 RX ORDER — KETAMINE HCL IN 0.9 % NACL 200 MG/200
0.75 PLASTIC BAG, INJECTION (ML) INTRAVENOUS ONCE
Status: COMPLETED | OUTPATIENT
Start: 2024-02-21 | End: 2024-02-21

## 2024-02-21 RX ORDER — KETAMINE HCL IN 0.9 % NACL 200 MG/200
0.75 PLASTIC BAG, INJECTION (ML) INTRAVENOUS ONCE
Status: CANCELLED
Start: 2024-02-21 | End: 2024-02-21

## 2024-02-21 RX ADMIN — Medication 78 MG: at 11:35

## 2024-02-21 RX ADMIN — ONDANSETRON 8 MG: 2 INJECTION, SOLUTION INTRAMUSCULAR; INTRAVENOUS at 11:30

## 2024-02-21 ASSESSMENT — ANXIETY QUESTIONNAIRES
IF YOU CHECKED OFF ANY PROBLEMS ON THIS QUESTIONNAIRE, HOW DIFFICULT HAVE THESE PROBLEMS MADE IT FOR YOU TO DO YOUR WORK, TAKE CARE OF THINGS AT HOME, OR GET ALONG WITH OTHER PEOPLE: SOMEWHAT DIFFICULT
GAD7 TOTAL SCORE: 5
5. BEING SO RESTLESS THAT IT IS HARD TO SIT STILL: SEVERAL DAYS
3. WORRYING TOO MUCH ABOUT DIFFERENT THINGS: NOT AT ALL
4. TROUBLE RELAXING: MORE THAN HALF THE DAYS
7. FEELING AFRAID AS IF SOMETHING AWFUL MIGHT HAPPEN: NOT AT ALL
1. FEELING NERVOUS, ANXIOUS, OR ON EDGE: SEVERAL DAYS
2. NOT BEING ABLE TO STOP OR CONTROL WORRYING: SEVERAL DAYS
6. BECOMING EASILY ANNOYED OR IRRITABLE: NOT AT ALL

## 2024-02-21 ASSESSMENT — PATIENT HEALTH QUESTIONNAIRE - PHQ9
8. MOVING OR SPEAKING SO SLOWLY THAT OTHER PEOPLE COULD HAVE NOTICED. OR THE OPPOSITE, BEING SO FIGETY OR RESTLESS THAT YOU HAVE BEEN MOVING AROUND A LOT MORE THAN USUAL: SEVERAL DAYS
1. LITTLE INTEREST OR PLEASURE IN DOING THINGS: SEVERAL DAYS
10. IF YOU CHECKED OFF ANY PROBLEMS, HOW DIFFICULT HAVE THESE PROBLEMS MADE IT FOR YOU TO DO YOUR WORK, TAKE CARE OF THINGS AT HOME, OR GET ALONG WITH OTHER PEOPLE: SOMEWHAT DIFFICULT
9. THOUGHTS THAT YOU WOULD BE BETTER OFF DEAD, OR OF HURTING YOURSELF: NOT AT ALL
5. POOR APPETITE OR OVEREATING: NOT AT ALL
7. TROUBLE CONCENTRATING ON THINGS, SUCH AS READING THE NEWSPAPER OR WATCHING TELEVISION: NOT AT ALL
SUM OF ALL RESPONSES TO PHQ QUESTIONS 1-9: 9
2. FEELING DOWN, DEPRESSED OR HOPELESS: SEVERAL DAYS
3. TROUBLE FALLING OR STAYING ASLEEP OR SLEEPING TOO MUCH: NEARLY EVERY DAY
SUM OF ALL RESPONSES TO PHQ9 QUESTIONS 1 AND 2: 2
6. FEELING BAD ABOUT YOURSELF - OR THAT YOU ARE A FAILURE OR HAVE LET YOURSELF OR YOUR FAMILY DOWN: MORE THAN HALF THE DAYS
4. FEELING TIRED OR HAVING LITTLE ENERGY: SEVERAL DAYS

## 2024-02-21 NOTE — PROGRESS NOTES
Ketamine/Esketamine Procedure    Name: Kelli  : 1985  MRN: [id number]    Date: 24    Time out was taken with staff to confirm correct patient and correct procedure to be performed.     Ketamine infusion at 0.75 mg/kg in 40 minutes  Subjective: She said she felt ok and relatively stable, but felt a little depressed for about 3 days. No trigger for her depressed. She did have meeting with her  yesterday and she need to have communication training recommended by her Board because of one patient's complaint. No persistent depression enjoyed life. Ate well and slept ok. Denied having SI/HI/AVH or paranoia.   H&P was updated today   PHQ9=9  GAD7=5    Objective: She was awake, alert, cooperative, and pleasant. Good eye contact. Oriented x 3. Speech was normal. Affect was appropriate, restricted most of the time. Mood was ok. Denied having SI/HI/AVH or paranoia during the interview. No delusional thought. I/J were good. Memory was fair. Attention and concentration were good.     Ketamine infusion observation:  About 10 minutes after starting infusion, she felt a little drowsy. No other side effect. Vital signs were stable.   About 35 minutes after starting infusion, she did not have any side effect. Vital signs were stable.   Shortly, ended the infusion, she still did not have side effect. Vital signs were stable.     Tolerated ketamine infusion well  No complication     Imp: bipolar I depression, recurrent midl  ANUP - mild     Plan: Ketamine infusion in 3 weeks.     Ata Nova MD.

## 2024-02-21 NOTE — H&P
Psychiatry Procedure H&P Monthly Provider Assessment    Name: Kelli  : 1985  MRN: [id number]    Date: 24     HPI: She came in for maintenance ketamine infusion and needed an update H & P.  She said she felt better until a few day ago. She said she felt a little depressed again. denied having SI/HI/AVH or paranoia.   She said her potassium and magnesium were low and had take supplements.  Migraine headache also daily for about 3 weeks. She had a long history of migraine.   No other change in medical hx    No problem with eyes,ears, nose or throat  No SOB or chest pain  No GI sx  No  sx  No other neurological problem with exception of headache.  No problem with bones, joints, or muscles  No problem with blood   No problem with skin      Medications Reviewed: Yes  Prophylactic Antibiotics Needed:  No  This is my H&P:  Yes  Emotional/Behavioral: still a little depressed and anxious  Respiratory: Regular and CAT bilateral   Gastrointestinal: abdomen soft. No pain or tenderness   Level of Consciousness: awake and alert   Cardiovascular: Regular heart rate, no murmur   Orientation: x 4  Genitourinary: deferred   Neurological:  grossly intact   Comments: no change in medical hx; unremarkable on physical exam  Sedation Plan: no sedation   Procedure Plan: ketamine infusion as planned.   COVID-19 Risk Content: Yes: Surgeon has reviewed key risks related to the risk of meli COVID-19 and if they contract COVID-19 what the risks are.    Ata Nova MD.

## 2024-02-21 NOTE — PATIENT INSTRUCTIONS
Homegoing Instructions    Patient: Kelli Silva  MRN: [id number]  YOB: 1985    Care Providers: Dr. Ata Roman, RN       Allergies:  Aspirin, Cigarette smoke, Fish containing products, Iodinated contrast media, Ketorolac, Ketorolac tromethamine, Ondansetron, Other, Prochlorperazine, Shellfish containing products, Sumatriptan, Azithromycin, Ceftriaxone, Doxycycline, Dulaglutide, House dust, Liraglutide, Metformin, Metoclopramide, Prednisone, Sitagliptin, [id number] antimigraine agents, and Zolmitriptan    Medications:  Current Outpatient Medications   Medication Sig Dispense Refill    Abilify Maintena 400 mg injection syringe       Accu-Chek Guide test strips strip USE TO CHECK BLOOD SUGAR UP TO 4 TIMES PER DAY AS INSTRUCTED. E11.9      albuterol 2.5 mg /3 mL (0.083 %) nebulizer solution Inhale 3 mL (2.5 mg) every 6 hours if needed for wheezing or shortness of breath.      albuterol 90 mcg/actuation inhaler Inhale 2 puffs. 6 TIMES DAILY.      asenapine (Saphris) SL tablet TAKE 1 - 2 SUBLIQUAL EVERY NIGHT AT BED TIME AS NEEDED FOR SLEEP 180 tablet 1    Autolet lancing device USE AS INSTRUCTED TO CHECK BLOOD SUGAR UP TO 4 TIMES DAILY. E11.9      azelastine-fluticasone 137-50 mcg/spray spray,non-aerosol Administer 1 spray into each nostril twice a day.      budesonide (Pulmicort) 0.5 mg/2 mL nebulizer solution Inhale 2 mL (0.5 mg) every 12 hours. INHALE OVER 5-15 MINUTES      buPROPion XL (Wellbutrin XL) 150 mg 24 hr tablet Take 1 tablet (150 mg) by mouth once daily in the morning. Do not crush, chew, or split. 30 tablet 1    busPIRone (Buspar) 30 mg tablet Take 1 tablet (30 mg) by mouth 2 times a day.      butalbital-acetaminophen-caff -40 mg tablet Take 1 tablet by mouth every 6 hours if needed for headaches.      calcium carbonate-vitamin D3 (Oyster Shell) 250 mg-3.125 mcg (125 unit) tablet Take 1 tablet by mouth once daily.      cetirizine (ZyrTEC) 10 mg tablet Take 1 tablet  (10 mg) by mouth once daily at bedtime.      CHLORZOXAZONE ORAL Take 500 mg by mouth 2 times a day as needed.      Corlanor 7.5 mg tablet Take 1 tablet (7.5 mg) by mouth 2 times a day with meals.      desvenlafaxine 100 mg 24 hr tablet Take 1 tablet (100 mg) by mouth once daily.      dextrose 5 % in water (D5W) parenteral solution 250 mL with ketamine 100 mg/mL solution Infuse 80 mg into a venous catheter continuously.      dicyclomine (Bentyl) 20 mg tablet Take 1 tablet (20 mg) by mouth 4 times a day before meals.      ergocalciferol (Vitamin D-2) 1.25 MG (22746 UT) capsule Take 1 capsule (50,000 Units) by mouth. EVERY TUESDAY AND SATURDAY.      famotidine (Pepcid) 20 mg tablet Take 1 tablet (20 mg) by mouth twice a day.      glucagon (Glucagen) 1 mg injection 1 mg.  Only take if BG< 60      glucagon (Gvoke HypoPen 2-Pack) 1 mg/0.2 mL auto-injector Inject 1 Pen under the skin.      glucose 4 gram chewable tablet Chew 4 tablets (16 g).      granisetron (Kytril) 1 mg tablet 1 tablet (1 mg) every 12 hours.      Ibsrela 50 mg tablet tablet Take 1 tablet (50 mg) by mouth 2 times a day.      ipratropium-albuteroL (Duo-Neb) 0.5-2.5 mg/3 mL nebulizer solution Take 3 mL by nebulization every 4 hours if needed for wheezing or shortness of breath.      ipratropium-albuteroL (Duo-Neb) 0.5-2.5 mg/3 mL nebulizer solution 3 mL every 6 hours.      lactobacillus (Culturelle) 10 billion cell capsule Take 1 capsule by mouth twice a day.      lamoTRIgine (LaMICtal) 200 mg disintegrating tablet Take 1 tablet (200 mg) by mouth twice a day.      levalbuterol (Xopenex) 45 mcg/actuation inhaler Inhale 1-2 puffs every 4 hours if needed for wheezing or shortness of breath.      levonorgestrel (Mirena) 21 mcg/24 hours (8 yrs) 52 mg IUD 52 mg by intrauterine route.      lisdexamfetamine (Vyvanse) 70 mg capsule Take 1 capsule (70 mg) by mouth once daily in the morning. 30 capsule 0    LORazepam (Ativan) 1 mg tablet Take 1 tablet (1 mg) by mouth  2 times a day as needed.      milnacipran (SavElla) 100 mg tablet 1 tablet (100 mg) 2 times a day.      paliperidone (Invega) 3 mg 24 hr tablet Take 1-2 tablets (3-6 mg) by mouth once daily at bedtime.      pantoprazole (ProtoNix) 40 mg EC tablet Take 1 tablet (40 mg) by mouth 2 times a day.      pregabalin (Lyrica) 150 mg capsule Take 1 capsule (150 mg) by mouth once daily. each day at the same time.      pregabalin (Lyrica) 50 mg capsule Take 1 capsule (50 mg) by mouth 2 times a day.  (am and afternoon) in addition to 150 mg capsule at night      QUEtiapine (SEROquel) 200 mg tablet Take 1 tablet (200 mg) by mouth once daily at bedtime. 30 tablet 0    Reyvow 100 mg tablet Take 1 tablet by mouth every 24 (twenty four) hours if needed. AT ONSET OF MIGRAINE NO MORE THAN 1X      rimegepant (Nurtec ODT) 75 mg tablet,disintegrating Take 1 tablet (75 mg) by mouth if needed (migraine).      sodium bicarbonate 650 mg tablet Take 0.5 tablets (325 mg) by mouth 2 times a day.      sodium chloride (Ayr) 0.65 % nasal drops Administer 1 spray into each nostril if needed for congestion.      tezepelumab-ekko (Tezspire) SubQ syringe Inject 210 mg under the skin every 28 (twenty-eight) days.      traZODone (Desyrel) 100 mg tablet Take 2 tablets (200 mg) by mouth once daily at bedtime.      traZODone (Desyrel) 50 mg tablet Take 1 tablet (50 mg) by mouth as needed at bedtime.      Trelegy Ellipta 200-62.5-25 mcg blister with device 1 puff once daily.      Vraylar 6 mg capsule Take 1 capsule (6 mg) by mouth once daily at bedtime.      zavegepant (Zavzpret) 10 mg/actuation spray,non-aerosol Administer 10 mg into affected nostril(s) if needed.      zolpidem CR (Ambien CR) 12.5 mg ER tablet Take 1 tablet (12.5 mg) by mouth as needed at bedtime for sleep.      zolpidem CR (Ambien CR) 6.25 mg ER tablet Take 1-2 tablets (6.25-12.5 mg) by mouth as needed at bedtime for sleep.       No current facility-administered medications for this  encounter.       Your Providers Instructions:  Do not consume alcoholic beverages for 24 hours.  Do not make important decisions or sign any important documents for the next 24 hours  It is recommended that a responsible adult remain with you for the next 24 hours as you may be light headed/dizzy.    Call Physician:  For persistent nausea and/or vomiting over 24 hours.  Diet:                  Regular diet  May not drive: Do not operate motor vehicles for 24 hours        When to Call Your Doctor:  Call your doctor for questions and/or concerns  Call 911 for an emergency situation     Health Maintenance:  If you currently use tobacco products it is advised that you quit, to improve your health.  If you currently use or have stopped using tobacco products within the last 12 months, the following are resources that can help you:  American Heart Association ([phone], (www.americanheart.org)    [place] Tobacco Quit Line 1-343.453.5824 (QUIT-NOW)  (http://www.od.[place].[facility]/odhPrograms/hprr/tob_risk1.aspx)    Do you or a loved one need help for alcohol or drug use? Call the [employer] at 211 or visit FindTreatment.gov for resources.    To find out where you can safely dispose unused medicines, visit www.rxdrugdropbox.org, or call your local police department.    Please call us at [phone] with any concerns.         Please make certain you have transportation arranged to and from your appointment. This can be a friend, family member or arranged thru your health insurance provider.    What to do before your first appointment:  Stop or reduce smoking if at all possible.  No alcohol for 24 hours before your appointment.  Day of treatment:  You may have clear liquids (water, tea, coffee, no pulp juice, plain Jello, hard candy BUT no milk, cream or non-dairy creamers) up until 30 minutes and solids up to 2 hours before your appointment time.    Please bring headphones to listen to calming music, guided meditation or  anything you'd like. You can also bring a journal to write in during the treatment time.   Set an intention for each appointment. Your intention can be qualities you want to cultivate in yourself, mental health goals you would like to achieve or experiences you would like to attract into your life.   Bring any inhalers you may need while at your appointment.  Take all morning medications as prescribed except the followin hours before your appointment, no chlordiazeproxide (Librium), diazepam (Valium), or Flurazepam (Dalmane)   12 hours before your appointment, no lamotrigine.  Try to avoid taking any anti-anxiety medications if at all possible the day of your appointment.. However, if you're anxiety is unbearable and would prevent you from attending you appointment, please take your prescribed dose.   No stimulants prior to your treatment, you may take them once you are home.  Use any nasal sprays up to 1 hour before your appointment.   Cancellations:   If you need to cancel your appointment, we must have at least a 24 hour notice.  If you cancel with less than 24 hour notice or do not show three times- no additional appointments will be scheduled until the treatment plan is discussed with your outpatient psychiatrist.  Also, if you arrive more than 15 minutes late, we may need to cancel your appointment and reschedule.  Please call [phone] to cancel appointments.  Thank you for your cooperation!

## 2024-02-22 ENCOUNTER — TELEMEDICINE (OUTPATIENT)
Dept: BEHAVIORAL HEALTH | Facility: CLINIC | Age: 39
End: 2024-02-22
Payer: COMMERCIAL

## 2024-02-22 DIAGNOSIS — F31.9 BIPOLAR 1 DISORDER (MULTI): ICD-10-CM

## 2024-02-22 PROCEDURE — 1036F TOBACCO NON-USER: CPT | Performed by: PSYCHOLOGIST

## 2024-02-22 PROCEDURE — 90837 PSYTX W PT 60 MINUTES: CPT | Performed by: PSYCHOLOGIST

## 2024-02-22 NOTE — PROGRESS NOTES
Insight Oriented Therapy. 50 min.  BP1.  We spoke about her having a headache for a couple weeks which has been challenging for her to keep up with everything.  Still trying to find the 'sweet spot' for number of clients considering appointments etc.  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  Kelli is carving out her evenings to be able to see houses with her partner, this is enjoyable.     Chief complaint - Phase of life   Treatment plan - Insight and action consistent with ACT therapy.   Goals - Self-care, insight, coping skills  Prognosis - Average  Progress - Average  Functional status - 60/100  Focused mental status: Patient was oriented to person, place, time and context  Focused mental exam - alert and oriented  Frequency - Every week

## 2024-02-29 ENCOUNTER — TELEMEDICINE (OUTPATIENT)
Dept: BEHAVIORAL HEALTH | Facility: CLINIC | Age: 39
End: 2024-02-29
Payer: COMMERCIAL

## 2024-02-29 ENCOUNTER — APPOINTMENT (OUTPATIENT)
Dept: BEHAVIORAL HEALTH | Facility: CLINIC | Age: 39
End: 2024-02-29
Payer: COMMERCIAL

## 2024-02-29 DIAGNOSIS — F31.9 BIPOLAR 1 DISORDER (MULTI): ICD-10-CM

## 2024-02-29 PROCEDURE — 90837 PSYTX W PT 60 MINUTES: CPT | Performed by: PSYCHOLOGIST

## 2024-02-29 PROCEDURE — 1036F TOBACCO NON-USER: CPT | Performed by: PSYCHOLOGIST

## 2024-02-29 NOTE — PROGRESS NOTES
Insight Oriented Therapy. 50 min.  BP1.  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     Chief complaint - Phase of life   Treatment plan - Insight and action consistent with ACT therapy.   Goals - Self-care, insight, coping skills  Prognosis - Average  Progress - Average  Functional status - 70/100  Focused mental status: Patient was oriented to person, place, time and context  Focused mental exam - alert and oriented  Frequency - Every week

## 2024-03-05 ENCOUNTER — CLINICAL SUPPORT (OUTPATIENT)
Dept: BEHAVIORAL HEALTH | Facility: CLINIC | Age: 39
End: 2024-03-05
Payer: COMMERCIAL

## 2024-03-05 DIAGNOSIS — F41.9 ANXIETY: ICD-10-CM

## 2024-03-05 DIAGNOSIS — F31.9 BIPOLAR 1 DISORDER (MULTI): ICD-10-CM

## 2024-03-05 PROCEDURE — 90853 GROUP PSYCHOTHERAPY: CPT | Performed by: PSYCHOLOGIST

## 2024-03-06 NOTE — ADDENDUM NOTE
Encounter addended by: Madison Leonard RN on: 3/6/2024 1:08 PM   Actions taken: Charge Capture section accepted

## 2024-03-06 NOTE — GROUP NOTE
Group Topic: Coping Skills   Group Date: 3/5/2024  Start Time:  6:00 PM  End Time:  7:30 PM  Facilitators: Garima Wong PsyD   Department:  [facility]    Number of Participants: 12   Group Focus: other Self-compassion  Treatment Modality: Psychoeducation  Interventions utilized were exploration and patient education  Purpose: feelings and other: Self-compassion      This was a video IOP aftercare group on a HIPAA compliant platform. All patients were provided with informed consent.    IOP Aftercare Group: Self-compassion  Participants had opportunity to check-in with their peers and discuss recent stressors. They received education the components of self-compassion and engaged in compassion meditation. Group members identified ways to practice self-compassion.   Garima Wong Psy.D.    Name: Kelli Silva YOB: 1985   MR: [id number]      Kelli was present and actively engaged in discussion. She stated that she has been working on setting better boundaries at work and practicing common humanity. She shared positive news with her peers.

## 2024-03-07 ENCOUNTER — PROCEDURE VISIT (OUTPATIENT)
Dept: BEHAVIORAL HEALTH | Facility: CLINIC | Age: 39
End: 2024-03-07
Payer: COMMERCIAL

## 2024-03-07 ENCOUNTER — TELEMEDICINE (OUTPATIENT)
Dept: BEHAVIORAL HEALTH | Facility: CLINIC | Age: 39
End: 2024-03-07
Payer: COMMERCIAL

## 2024-03-07 VITALS
HEART RATE: 109 BPM | WEIGHT: 234.7 LBS | BODY MASS INDEX: 43.19 KG/M2 | TEMPERATURE: 98.4 F | SYSTOLIC BLOOD PRESSURE: 124 MMHG | RESPIRATION RATE: 16 BRPM | DIASTOLIC BLOOD PRESSURE: 82 MMHG | HEIGHT: 62 IN

## 2024-03-07 DIAGNOSIS — F31.32 BIPOLAR DISORDER, CURRENT EPISODE DEPRESSED, MODERATE (MULTI): ICD-10-CM

## 2024-03-07 DIAGNOSIS — F31.9 BIPOLAR 1 DISORDER (MULTI): ICD-10-CM

## 2024-03-07 DIAGNOSIS — F31.0 BIPOLAR AFFECTIVE DISORDER, CURRENT EPISODE HYPOMANIC (MULTI): Primary | ICD-10-CM

## 2024-03-07 PROCEDURE — 1036F TOBACCO NON-USER: CPT | Performed by: PSYCHOLOGIST

## 2024-03-07 PROCEDURE — 90837 PSYTX W PT 60 MINUTES: CPT | Performed by: PSYCHOLOGIST

## 2024-03-07 RX ORDER — DESVENLAFAXINE 100 MG/1
100 TABLET, EXTENDED RELEASE ORAL
Qty: 90 TABLET | Refills: 1 | Status: SHIPPED | OUTPATIENT
Start: 2024-03-07

## 2024-03-07 NOTE — PROGRESS NOTES
Patient here at the clinic for her monthly injection of Abilify Maintena for Bipolar affective disorder,current episode hypomanic. Patient identified by full name and , vitals obtained. Patient last seen by Provider on 24, last seen by nurse on 24. Medication verified by providers note and medication order.      Appetite:  good  Sleep:  good  Appearance: clean, age appropriate and well groomed  Build: overweight  Attitude: cooperative, calm, pleasant, friendly, open  Eye Contact: normal  Activity: alert, attentive, appropriate  Speech: appropriate & spontaneous, normal rate & flow, clear  Delusions: denies  Thought Content: logical  Thought Process: logical  Judgement/insight:  Intact/good  Mood: calm   Affect: appropriate  AH/VH/SI/HI patient denies  Access to Firearms/weapons: No  Depression: Patient denies  Thoughts of hopelessness: denies  Anxiety: 810  Self-injurious behavior: denies at this time  Cravings/Urges: denies  Last Use: NA  Tobacco Use: Non smoker  Spiritual or cultural practices that may affect your care or impact your health care decisions? No  Living situation:  Lives with boyfriend  Employed: Employed at [employer]      Patient looks good, cooperative and pleasant. Patient denied any depression today. Patient is on ketamine treatment for depression and it is working.Patients anxiety is still high,on a scale of 1 to 10,it's 8. Patient has upcoming appointment with Dr Nova next week will discuss the anxiety with him. Patient denies SI/HI, VH/AH, self harming thoughts at this time.       Patient received Abilify Maintena 400mg  via 23 gauge needle in left deltoid with no reaction to the injection site, patient tolerated the injection well.  Patient will return to the clinic in 4 weeks.    RN provided education on medication and side effects,  Patient is aware to contact nurse for any adverse reactions or  911/ [employer] crisis number for emergent issues.      LOT: [id number]  EXP:  AUG  2026  NDC:  25734-853-50.     Solange Newton, RN,BSN

## 2024-03-07 NOTE — PROGRESS NOTES
Insight Oriented Therapy. 50 min.  BP1.  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  We spoke about her vision, what she wants to be doing/seeing what's happening.  We talked about how she is thinking about adoption, working and managing her schedule to find balance and stay healthy.     Chief complaint - Phase of life   Treatment plan - Insight and action consistent with ACT therapy.   Goals - Self-care, insight, coping skills  Prognosis - Average  Progress - Average  Functional status - 70/100  Focused mental status: Patient was oriented to person, place, time and context  Focused mental exam - alert and oriented  Frequency - Every week

## 2024-03-12 ENCOUNTER — OFFICE VISIT (OUTPATIENT)
Dept: BEHAVIORAL HEALTH | Facility: CLINIC | Age: 39
End: 2024-03-12
Payer: COMMERCIAL

## 2024-03-12 VITALS
HEART RATE: 114 BPM | TEMPERATURE: 98.1 F | HEIGHT: 62 IN | BODY MASS INDEX: 44.29 KG/M2 | DIASTOLIC BLOOD PRESSURE: 89 MMHG | WEIGHT: 240.7 LBS | SYSTOLIC BLOOD PRESSURE: 122 MMHG | RESPIRATION RATE: 16 BRPM

## 2024-03-12 DIAGNOSIS — F31.9 BIPOLAR 1 DISORDER (MULTI): ICD-10-CM

## 2024-03-12 DIAGNOSIS — F51.01 PRIMARY INSOMNIA: ICD-10-CM

## 2024-03-12 DIAGNOSIS — Z79.899 CONTROLLED SUBSTANCE AGREEMENT SIGNED: ICD-10-CM

## 2024-03-12 DIAGNOSIS — F90.0 ATTENTION DEFICIT HYPERACTIVITY DISORDER (ADHD), PREDOMINANTLY INATTENTIVE TYPE: ICD-10-CM

## 2024-03-12 PROCEDURE — 80324 DRUG SCREEN AMPHETAMINES 1/2: CPT

## 2024-03-12 PROCEDURE — 3074F SYST BP LT 130 MM HG: CPT | Performed by: PSYCHIATRY & NEUROLOGY

## 2024-03-12 PROCEDURE — 3079F DIAST BP 80-89 MM HG: CPT | Performed by: PSYCHIATRY & NEUROLOGY

## 2024-03-12 PROCEDURE — 80307 DRUG TEST PRSMV CHEM ANLYZR: CPT

## 2024-03-12 PROCEDURE — 1036F TOBACCO NON-USER: CPT | Performed by: PSYCHIATRY & NEUROLOGY

## 2024-03-12 PROCEDURE — 99214 OFFICE O/P EST MOD 30 MIN: CPT | Performed by: PSYCHIATRY & NEUROLOGY

## 2024-03-12 RX ORDER — LISDEXAMFETAMINE DIMESYLATE 70 MG/1
70 CAPSULE ORAL EVERY MORNING
Qty: 30 CAPSULE | Refills: 0 | Status: SHIPPED | OUTPATIENT
Start: 2024-03-12 | End: 2024-04-11 | Stop reason: SDUPTHER

## 2024-03-12 RX ORDER — ZOLPIDEM TARTRATE 12.5 MG/1
12.5 TABLET, FILM COATED, EXTENDED RELEASE ORAL NIGHTLY PRN
Qty: 30 TABLET | Refills: 2 | Status: SHIPPED | OUTPATIENT
Start: 2024-03-12 | End: 2024-06-10

## 2024-03-12 RX ORDER — BUPROPION HYDROCHLORIDE 150 MG/1
150 TABLET ORAL EVERY MORNING
Qty: 90 TABLET | Refills: 3 | Status: SHIPPED | OUTPATIENT
Start: 2024-03-12 | End: 2024-05-14 | Stop reason: SDUPTHER

## 2024-03-12 NOTE — PROGRESS NOTES
Follow-up visit  Subjective: She said she felt better after last ketamine infusion and started feeling depressed again with mild sx. She will have a ketamine infusion this Friday.  No persistent depression. Still enjoyed life most of the time.  Denied having SI/HI/AVH or paranoia. Ate well. Still had problem of staying asleep due to restless leg. She was on medication for restless leg before. Energy was low. Concentration was good. Felt irritable mildly on and off.   No side effect from medication.   No problem with her job  Able take care of herself.     Objective:   Appearance: well-groomed.   Demeanor: average.   Eye Contact: average.   Motor Activity: average.   Speech: clear.   Language: Neurologic language is intact.   Fund of Knowledge: intact fund of knowledge.   Delusions: None Reported.   Hallucinations: not reported  Self Harm: None Reported.   Aggressive: None Reported.   Mood: ok  Affect: full, restricted most of the time.   Orientation: alert, oriented x3.   Manner: cooperative.   Thought process: goal-directed.   Thought association: displays rational thought process.   Content of thought: normal  Abstract/ Rational Thought: intact   Memory: grossly intact.   Behavior: normal motor activity, relaxed, good eye contact, calm.   Attention/Concentration: normal.   Cognition: intact.   Intelligence Estimate: average.   Executive Function: intact.   Insight: intact.   Judgement: intact.   Musculoskeletal: normal strength and tone. Normal gait. No abnormal movement.   Impression: bipolar I depression, mild  ANUP - under control  Insomnia   ADHD  Discussion/Plan:    Continue current medication   Agreements for controlled substances signed   Urine drug screen was order   Ketamine infusion this Friday  Follow-up in 3 months   Ata Nova MD.

## 2024-03-13 LAB
AMPHETAMINES UR QL SCN: ABNORMAL
BARBITURATES UR QL SCN: ABNORMAL
BENZODIAZ UR QL SCN: ABNORMAL
BZE UR QL SCN: ABNORMAL
CANNABINOIDS UR QL SCN: ABNORMAL
FENTANYL+NORFENTANYL UR QL SCN: ABNORMAL
METHADONE UR QL SCN: ABNORMAL
OPIATES UR QL SCN: ABNORMAL
OXYCODONE+OXYMORPHONE UR QL SCN: ABNORMAL
PCP UR QL SCN: ABNORMAL

## 2024-03-14 ENCOUNTER — TELEMEDICINE (OUTPATIENT)
Dept: BEHAVIORAL HEALTH | Facility: CLINIC | Age: 39
End: 2024-03-14
Payer: COMMERCIAL

## 2024-03-14 DIAGNOSIS — F31.9 BIPOLAR 1 DISORDER (MULTI): ICD-10-CM

## 2024-03-14 PROCEDURE — 1036F TOBACCO NON-USER: CPT | Performed by: PSYCHOLOGIST

## 2024-03-14 PROCEDURE — 90837 PSYTX W PT 60 MINUTES: CPT | Performed by: PSYCHOLOGIST

## 2024-03-14 NOTE — PROGRESS NOTES
Insight Oriented Therapy. 50 min.  BP1.  We spoke about how she is doing well now, stable, but the weekend was wrought with anxiety around the house and the owners not wanting to fix the repairs.  They agreed to it last minute.  We spoke about her efficacy around work and being able to do 6-8 hours consistently.  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     Chief complaint - Phase of life   Treatment plan - Insight and action consistent with ACT therapy.   Goals - Self-care, insight, coping skills  Prognosis - Average  Progress - Average  Functional status - 70/100  Focused mental status: Patient was oriented to person, place, time and context  Focused mental exam - alert and oriented  Frequency - Every week

## 2024-03-15 ENCOUNTER — HOSPITAL ENCOUNTER (OUTPATIENT)
Dept: POSTOP/PACU | Facility: HOSPITAL | Age: 39
Discharge: HOME | End: 2024-03-15

## 2024-03-15 VITALS
RESPIRATION RATE: 16 BRPM | OXYGEN SATURATION: 95 % | TEMPERATURE: 97.5 F | DIASTOLIC BLOOD PRESSURE: 67 MMHG | SYSTOLIC BLOOD PRESSURE: 152 MMHG | HEART RATE: 71 BPM

## 2024-03-15 DIAGNOSIS — F31.9 BIPOLAR I DISORDER WITH DEPRESSION (MULTI): Primary | ICD-10-CM

## 2024-03-15 DIAGNOSIS — R11.2 DRUG-INDUCED NAUSEA AND VOMITING: ICD-10-CM

## 2024-03-15 DIAGNOSIS — T50.905A DRUG-INDUCED NAUSEA AND VOMITING: ICD-10-CM

## 2024-03-15 PROCEDURE — KETSP HC KETAMINE INFUSION SELF-PAY: Performed by: PSYCHIATRY & NEUROLOGY

## 2024-03-15 PROCEDURE — KETSP PR KETAMINE INFUSION SELF-PAY: Performed by: PSYCHIATRY & NEUROLOGY

## 2024-03-15 PROCEDURE — 2500000004 HC RX 250 GENERAL PHARMACY W/ HCPCS (ALT 636 FOR OP/ED): Performed by: PSYCHIATRY & NEUROLOGY

## 2024-03-15 PROCEDURE — 2500000005 HC RX 250 GENERAL PHARMACY W/O HCPCS: Performed by: PSYCHIATRY & NEUROLOGY

## 2024-03-15 RX ORDER — ONDANSETRON HYDROCHLORIDE 2 MG/ML
8 INJECTION, SOLUTION INTRAVENOUS ONCE
Status: CANCELLED
Start: 2024-03-15 | End: 2024-03-15

## 2024-03-15 RX ORDER — ONDANSETRON HYDROCHLORIDE 2 MG/ML
8 INJECTION, SOLUTION INTRAVENOUS ONCE
Status: COMPLETED | OUTPATIENT
Start: 2024-03-15 | End: 2024-03-15

## 2024-03-15 RX ORDER — KETAMINE HCL IN 0.9 % NACL 200 MG/200
0.75 PLASTIC BAG, INJECTION (ML) INTRAVENOUS ONCE
Status: CANCELLED
Start: 2024-03-15 | End: 2024-03-15

## 2024-03-15 RX ORDER — KETAMINE HCL IN 0.9 % NACL 200 MG/200
0.75 PLASTIC BAG, INJECTION (ML) INTRAVENOUS ONCE
Status: COMPLETED | OUTPATIENT
Start: 2024-03-15 | End: 2024-03-15

## 2024-03-15 RX ADMIN — Medication 82 MG: at 11:22

## 2024-03-15 RX ADMIN — ONDANSETRON 8 MG: 2 INJECTION INTRAMUSCULAR; INTRAVENOUS at 11:30

## 2024-03-15 ASSESSMENT — PATIENT HEALTH QUESTIONNAIRE - PHQ9
3. TROUBLE FALLING OR STAYING ASLEEP OR SLEEPING TOO MUCH: NOT AT ALL
7. TROUBLE CONCENTRATING ON THINGS, SUCH AS READING THE NEWSPAPER OR WATCHING TELEVISION: SEVERAL DAYS
SUM OF ALL RESPONSES TO PHQ9 QUESTIONS 1 AND 2: 2
4. FEELING TIRED OR HAVING LITTLE ENERGY: SEVERAL DAYS
9. THOUGHTS THAT YOU WOULD BE BETTER OFF DEAD, OR OF HURTING YOURSELF: NOT AT ALL
5. POOR APPETITE OR OVEREATING: NEARLY EVERY DAY
8. MOVING OR SPEAKING SO SLOWLY THAT OTHER PEOPLE COULD HAVE NOTICED. OR THE OPPOSITE, BEING SO FIGETY OR RESTLESS THAT YOU HAVE BEEN MOVING AROUND A LOT MORE THAN USUAL: NOT AT ALL
6. FEELING BAD ABOUT YOURSELF - OR THAT YOU ARE A FAILURE OR HAVE LET YOURSELF OR YOUR FAMILY DOWN: SEVERAL DAYS
2. FEELING DOWN, DEPRESSED OR HOPELESS: SEVERAL DAYS
10. IF YOU CHECKED OFF ANY PROBLEMS, HOW DIFFICULT HAVE THESE PROBLEMS MADE IT FOR YOU TO DO YOUR WORK, TAKE CARE OF THINGS AT HOME, OR GET ALONG WITH OTHER PEOPLE: SOMEWHAT DIFFICULT
SUM OF ALL RESPONSES TO PHQ QUESTIONS 1-9: 8
1. LITTLE INTEREST OR PLEASURE IN DOING THINGS: SEVERAL DAYS

## 2024-03-15 ASSESSMENT — ANXIETY QUESTIONNAIRES
IF YOU CHECKED OFF ANY PROBLEMS ON THIS QUESTIONNAIRE, HOW DIFFICULT HAVE THESE PROBLEMS MADE IT FOR YOU TO DO YOUR WORK, TAKE CARE OF THINGS AT HOME, OR GET ALONG WITH OTHER PEOPLE: EXTREMELY DIFFICULT
6. BECOMING EASILY ANNOYED OR IRRITABLE: NOT AT ALL
4. TROUBLE RELAXING: NEARLY EVERY DAY
7. FEELING AFRAID AS IF SOMETHING AWFUL MIGHT HAPPEN: NEARLY EVERY DAY
2. NOT BEING ABLE TO STOP OR CONTROL WORRYING: NEARLY EVERY DAY
5. BEING SO RESTLESS THAT IT IS HARD TO SIT STILL: NEARLY EVERY DAY
1. FEELING NERVOUS, ANXIOUS, OR ON EDGE: NEARLY EVERY DAY
GAD7 TOTAL SCORE: 18
3. WORRYING TOO MUCH ABOUT DIFFERENT THINGS: NEARLY EVERY DAY

## 2024-03-15 NOTE — H&P
Psychiatry Procedure H&P for each visit    Name: Kelli  : 1985  MRN: [id number]    Date: 03/15/24     History & Physical Reviewed:  Pregnancy/Lactating:  Are You Pregnant: No  Are You Currently Breastfeeding: No    I have reviewed the History and Physical dated: 24  History and Physical Reviewed and relevant findings noted. Patient examined to review pertinent physical findings: Yes  Home Medications Reviewed: Yes  Allergies Reviewed: Yes    ERAS (Enhanced Recovery After Surgery):  ERAS Patient: No    Consent:  COVID-19 Consent:   COVID-19 Risk Consent: Yes: Surgeon has reviewed key risks related to the risk of meli COVID-19 and if they contract COVID-19 what the risks are.

## 2024-03-15 NOTE — PATIENT INSTRUCTIONS
Homegoing Instructions    Patient: Kelli Silva  MRN: [id number]  YOB: 1985    Care Providers: Dr. Ata Encarnacion, RN       Allergies:  Aspirin, Cigarette smoke, Fish containing products, Iodinated contrast media, Ketorolac, Ketorolac tromethamine, Ondansetron, Other, Prochlorperazine, Shellfish containing products, Sumatriptan, Azithromycin, Ceftriaxone, Doxycycline, Dulaglutide, House dust, Liraglutide, Metformin, Metoclopramide, Prednisone, Sitagliptin, [id number] antimigraine agents, and Zolmitriptan    Medications:  Current Outpatient Medications   Medication Sig Dispense Refill    Abilify Maintena 400 mg injection syringe       Accu-Chek Guide test strips strip USE TO CHECK BLOOD SUGAR UP TO 4 TIMES PER DAY AS INSTRUCTED. E11.9      albuterol 2.5 mg /3 mL (0.083 %) nebulizer solution Inhale 3 mL (2.5 mg) every 6 hours if needed for wheezing or shortness of breath.      albuterol 90 mcg/actuation inhaler Inhale 2 puffs. 6 TIMES DAILY.      asenapine (Saphris) SL tablet TAKE 1 - 2 SUBLIQUAL EVERY NIGHT AT BED TIME AS NEEDED FOR SLEEP 180 tablet 1    Autolet lancing device USE AS INSTRUCTED TO CHECK BLOOD SUGAR UP TO 4 TIMES DAILY. E11.9      azelastine-fluticasone 137-50 mcg/spray spray,non-aerosol Administer 1 spray into each nostril twice a day.      budesonide (Pulmicort) 0.5 mg/2 mL nebulizer solution Inhale 2 mL (0.5 mg) every 12 hours. INHALE OVER 5-15 MINUTES      buPROPion XL (Wellbutrin XL) 150 mg 24 hr tablet Take 1 tablet (150 mg) by mouth once daily in the morning. Do not crush, chew, or split. 90 tablet 3    busPIRone (Buspar) 30 mg tablet Take 1 tablet (30 mg) by mouth 2 times a day.      butalbital-acetaminophen-caff -40 mg tablet Take 1 tablet by mouth every 6 hours if needed for headaches.      calcium carbonate-vitamin D3 (Oyster Shell) 250 mg-3.125 mcg (125 unit) tablet Take 1 tablet by mouth once daily.      cetirizine (ZyrTEC) 10 mg tablet Take 1 tablet  (10 mg) by mouth once daily at bedtime.      CHLORZOXAZONE ORAL Take 500 mg by mouth 2 times a day as needed.      Corlanor 7.5 mg tablet Take 1 tablet (7.5 mg) by mouth 2 times a day with meals.      desvenlafaxine 100 mg 24 hr tablet TAKE 1 TABLET BY MOUTH EVERY DAY IN THE MORNING 90 tablet 1    dextrose 5 % in water (D5W) parenteral solution 250 mL with ketamine 100 mg/mL solution Infuse 80 mg into a venous catheter continuously.      dicyclomine (Bentyl) 20 mg tablet Take 1 tablet (20 mg) by mouth 4 times a day before meals.      ergocalciferol (Vitamin D-2) 1.25 MG (32719 UT) capsule Take 1 capsule (50,000 Units) by mouth. EVERY TUESDAY AND SATURDAY.      famotidine (Pepcid) 20 mg tablet Take 1 tablet (20 mg) by mouth twice a day.      glucagon (Glucagen) 1 mg injection 1 mg.  Only take if BG< 60      glucagon (Gvoke HypoPen 2-Pack) 1 mg/0.2 mL auto-injector Inject 1 Pen under the skin.      glucose 4 gram chewable tablet Chew 4 tablets (16 g).      granisetron (Kytril) 1 mg tablet 1 tablet (1 mg) every 12 hours.      Ibsrela 50 mg tablet tablet Take 1 tablet (50 mg) by mouth 2 times a day.      ipratropium-albuteroL (Duo-Neb) 0.5-2.5 mg/3 mL nebulizer solution Take 3 mL by nebulization every 4 hours if needed for wheezing or shortness of breath.      ipratropium-albuteroL (Duo-Neb) 0.5-2.5 mg/3 mL nebulizer solution 3 mL every 6 hours.      lactobacillus (Culturelle) 10 billion cell capsule Take 1 capsule by mouth twice a day.      lamoTRIgine (LaMICtal) 200 mg disintegrating tablet Take 1 tablet (200 mg) by mouth twice a day.      levalbuterol (Xopenex) 45 mcg/actuation inhaler Inhale 1-2 puffs every 4 hours if needed for wheezing or shortness of breath.      levonorgestrel (Mirena) 21 mcg/24 hours (8 yrs) 52 mg IUD 52 mg by intrauterine route.      lisdexamfetamine (Vyvanse) 70 mg capsule Take 1 capsule (70 mg) by mouth once daily in the morning. 30 capsule 0    LORazepam (Ativan) 1 mg tablet Take 1 tablet  (1 mg) by mouth 2 times a day as needed.      milnacipran (SavElla) 100 mg tablet 1 tablet (100 mg) 2 times a day.      paliperidone (Invega) 3 mg 24 hr tablet Take 1-2 tablets (3-6 mg) by mouth once daily at bedtime.      pantoprazole (ProtoNix) 40 mg EC tablet Take 1 tablet (40 mg) by mouth 2 times a day.      pregabalin (Lyrica) 150 mg capsule Take 1 capsule (150 mg) by mouth once daily. each day at the same time.      pregabalin (Lyrica) 50 mg capsule Take 1 capsule (50 mg) by mouth 2 times a day.  (am and afternoon) in addition to 150 mg capsule at night      QUEtiapine (SEROquel) 200 mg tablet Take 1 tablet (200 mg) by mouth once daily at bedtime. 30 tablet 0    Reyvow 100 mg tablet Take 1 tablet by mouth every 24 (twenty four) hours if needed. AT ONSET OF MIGRAINE NO MORE THAN 1X      rimegepant (Nurtec ODT) 75 mg tablet,disintegrating Take 1 tablet (75 mg) by mouth if needed (migraine).      sodium bicarbonate 650 mg tablet Take 0.5 tablets (325 mg) by mouth 2 times a day.      sodium chloride (Ayr) 0.65 % nasal drops Administer 1 spray into each nostril if needed for congestion.      tezepelumab-ekko (Tezspire) SubQ syringe Inject 210 mg under the skin every 28 (twenty-eight) days.      traZODone (Desyrel) 100 mg tablet Take 2 tablets (200 mg) by mouth once daily at bedtime.      traZODone (Desyrel) 50 mg tablet Take 1 tablet (50 mg) by mouth as needed at bedtime.      Trelegy Ellipta 200-62.5-25 mcg blister with device 1 puff once daily.      Vraylar 6 mg capsule Take 1 capsule (6 mg) by mouth once daily at bedtime.      zavegepant (Zavzpret) 10 mg/actuation spray,non-aerosol Administer 10 mg into affected nostril(s) if needed.      zolpidem CR (Ambien CR) 12.5 mg ER tablet Take 1 tablet (12.5 mg) by mouth as needed at bedtime for sleep. 30 tablet 2     No current facility-administered medications for this encounter.       Your Providers Instructions:  Do not consume alcoholic beverages for 24 hours.  Do  not make important decisions or sign any important documents for the next 24 hours  It is recommended that a responsible adult remain with you for the next 24 hours as you may be light headed/dizzy.    Call Physician:  For persistent nausea and/or vomiting over 24 hours.  Diet:                  Regular diet  May not drive: Do not operate motor vehicles for 24 hours        When to Call Your Doctor:  Call your doctor for questions and/or concerns  Call 911 for an emergency situation     Health Maintenance:  If you currently use tobacco products it is advised that you quit, to improve your health.  If you currently use or have stopped using tobacco products within the last 12 months, the following are resources that can help you:  American Heart Association ([phone] [phone], (www.americanheart.org)    [place] Tobacco Quit Line 1-330.374.6068 (QUIT-NOW)  (http://www.[facility].[place].[facility]/odhPrograms/hprr/tob_risk1.aspx)    Do you or a loved one need help for alcohol or drug use? Call the [employer] at 211 or visit FindTreatment.gov for resources.    To find out where you can safely dispose unused medicines, visit www.rxdrugdropbox.org, or call your local police department.    Please call us at [phone] with any concerns.         Please make certain you have transportation arranged to and from your appointment. This can be a friend, family member or arranged thru your health insurance provider.    What to do before your first appointment:  Stop or reduce smoking if at all possible.  No alcohol for 24 hours before your appointment.  Day of treatment:  You may have clear liquids (water, tea, coffee, no pulp juice, plain Jello, hard candy BUT no milk, cream or non-dairy creamers) up until 30 minutes and solids up to 2 hours before your appointment time.    Please bring headphones to listen to calming music, guided meditation or anything you'd like. You can also bring a journal to write in during the treatment time.   Set an intention  for each appointment. Your intention can be qualities you want to cultivate in yourself, mental health goals you would like to achieve or experiences you would like to attract into your life.   Bring any inhalers you may need while at your appointment.  Take all morning medications as prescribed except the followin hours before your appointment, no chlordiazeproxide (Librium), diazepam (Valium), or Flurazepam (Dalmane)   12 hours before your appointment, no lamotrigine.  Try to avoid taking any anti-anxiety medications if at all possible the day of your appointment.. However, if you're anxiety is unbearable and would prevent you from attending you appointment, please take your prescribed dose.   No stimulants prior to your treatment, you may take them once you are home.  Use any nasal sprays up to 1 hour before your appointment.   Cancellations:   If you need to cancel your appointment, we must have at least a 24 hour notice.  If you cancel with less than 24 hour notice or do not show three times- no additional appointments will be scheduled until the treatment plan is discussed with your outpatient psychiatrist.  Also, if you arrive more than 15 minutes late, we may need to cancel your appointment and reschedule.  Please call [phone] to cancel appointments.  Thank you for your cooperation!

## 2024-03-15 NOTE — PROGRESS NOTES
Ketamine/Esketamine Procedure    Name: Kelli  : 1985  MRN: [id number]    Date: 03/15/24    Time out was taken with staff to confirm correct patient and correct procedure to be performed.     Ketamine infusion at .75 mg/kg in 40 minutes  Subjective: She said she felt a little more depressed than on this past Tuesday in my office. She believed it was because the effect of ketamine waned off. No persistent depression and enjoyed life. Ate better after taking a medication for nausea. Still enjoyed some activities. Denied having SI/HI/AVH or paranoia. She said her anxious was severe, but did not know why.   No change in medical hx  No physical complaint  PHQ9=8  GAD7=18    Objective: She was awake, alert, cooperative, and pleasant. Normal gain. No abnormal movement. Good eye contact. Oriented x 3. Speech was normal. Affect was appropriate, restricted most of the time. Mood was ok. Denied having SI/HI/AVH or paranoia during the interview. No delusional thought. I/J were good. Memory was fair. Attention and concentration were good.     Ketamine infusion observation:  About a half was infused, she experienced floaty on her bed, drowsy, happy, and tingling on her fingers. No other side effect. Vital signs were stable.   Shortly, ended the infusion, she still experienced floaty on her bed, drowsy, happy, and tingling on her fingers. No other side effect. Vital signs were stable.   About 20 minutes after the infusion, she felt close to normal, but eyes was floating. No other side effect. Vital signs were stable.     Tolerated ketamine infusion well  No complication     Imp: bipolar I depression, mild  ANUP - severe   No immediate risk to self or other     Plan: Ketamine infusion in 3 weeks    Ata Nova MD.

## 2024-03-19 ENCOUNTER — CLINICAL SUPPORT (OUTPATIENT)
Dept: BEHAVIORAL HEALTH | Facility: CLINIC | Age: 39
End: 2024-03-19
Payer: COMMERCIAL

## 2024-03-19 DIAGNOSIS — F31.9 BIPOLAR 1 DISORDER (MULTI): ICD-10-CM

## 2024-03-19 PROCEDURE — 90853 GROUP PSYCHOTHERAPY: CPT | Performed by: PSYCHOLOGIST

## 2024-03-21 ENCOUNTER — TELEMEDICINE (OUTPATIENT)
Dept: BEHAVIORAL HEALTH | Facility: CLINIC | Age: 39
End: 2024-03-21
Payer: COMMERCIAL

## 2024-03-21 DIAGNOSIS — F31.9 BIPOLAR 1 DISORDER (MULTI): ICD-10-CM

## 2024-03-21 PROCEDURE — 90837 PSYTX W PT 60 MINUTES: CPT | Performed by: PSYCHOLOGIST

## 2024-03-21 PROCEDURE — 1036F TOBACCO NON-USER: CPT | Performed by: PSYCHOLOGIST

## 2024-03-21 NOTE — PROGRESS NOTES
Insight Oriented Therapy. 50 min.  BP1.  We spoke about her morning and routine to increase the likelihood of having a good morning.  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  We spoke about how she grows from adversity, how she worked hard to get to this vacation she's taking and going to enjoy it.     Chief complaint - Phase of life   Treatment plan - Insight and action consistent with ACT therapy.   Goals - Self-care, insight, coping skills  Prognosis - Average  Progress - Average  Functional status - 70/100  Focused mental status: Patient was oriented to person, place, time and context  Focused mental exam - alert and oriented  Frequency - Every two weeks

## 2024-03-22 NOTE — GROUP NOTE
Group Topic: Coping Skills   Group Date: 3/19/2024  Start Time:  6:00 PM  End Time:  7:30 PM  Facilitators: Garima Wong PsyD   Department: [facility]    Number of Participants: 10   Group Focus: coping skills  Treatment Modality: Dialectical Behavioral Therapy  Interventions utilized were patient education  Purpose: coping skills    This was a video IOP recovery group on a HIPAA compliant platform. All patients were provided with informed consent.    IOP Aftercare Group: Opposite Action  Participants had opportunity to check-in with their peers and discuss recent stressors. They received psychoeducation on the skill of opposite action. They explored how they have used opposite action and when it can be a useful skill.   Garima Wong Psy.D.      Name: Kelli Silva YOB: 1985   MR: [id number]      Kelli was present and actively engaged in discussion. She shared recent stressors related to buying a new home. She shared how opposite action has been useful to her in the past.

## 2024-03-25 ENCOUNTER — TELEPHONE (OUTPATIENT)
Dept: OTHER | Age: 39
End: 2024-03-25
Payer: COMMERCIAL

## 2024-03-26 ENCOUNTER — CLINICAL SUPPORT (OUTPATIENT)
Dept: BEHAVIORAL HEALTH | Facility: CLINIC | Age: 39
End: 2024-03-26
Payer: COMMERCIAL

## 2024-03-26 DIAGNOSIS — F31.9 BIPOLAR 1 DISORDER (MULTI): ICD-10-CM

## 2024-03-26 DIAGNOSIS — F41.9 ANXIETY: ICD-10-CM

## 2024-03-26 PROCEDURE — 90853 GROUP PSYCHOTHERAPY: CPT | Performed by: PSYCHOLOGIST

## 2024-03-28 ENCOUNTER — APPOINTMENT (OUTPATIENT)
Dept: BEHAVIORAL HEALTH | Facility: CLINIC | Age: 39
End: 2024-03-28
Payer: COMMERCIAL

## 2024-03-29 NOTE — GROUP NOTE
Group Topic: Feeling Awareness/Expression   Group Date: 3/26/2024  Start Time:  6:00 PM  End Time:  7:30 PM  Facilitators: Garima Wong PsyD   Department: [facility]    Number of Participants: 11   Group Focus: check in  Treatment Modality: Patient-Centered Therapy  Interventions utilized were exploration and support  Purpose: coping skills and feelings    This was a video IOP aftercare group on a HIPAA compliant platform. All patients were provided with informed consent.    IOP Aftercare Group: Check-in  Participants had opportunity to check-in with their peers and discuss recent stressors. They received support and recommendations from other group members on ways to cope with challenges and symptoms.   Garima Wong Psy.D.      Name: Kelli Silva YOB: 1985   MR: [id number]      Kelli was present and actively engaged in discussion. She stated that her anxiety has been higher recently and processed what may be contributing to this. She stated that she is feeling excited about moving.

## 2024-04-04 ENCOUNTER — TELEMEDICINE (OUTPATIENT)
Dept: BEHAVIORAL HEALTH | Facility: CLINIC | Age: 39
End: 2024-04-04
Payer: COMMERCIAL

## 2024-04-04 DIAGNOSIS — F31.31 BIPOLAR 1 DISORDER, DEPRESSED, MILD (MULTI): ICD-10-CM

## 2024-04-04 PROCEDURE — 90837 PSYTX W PT 60 MINUTES: CPT | Performed by: PSYCHOLOGIST

## 2024-04-05 ENCOUNTER — HOSPITAL ENCOUNTER (OUTPATIENT)
Dept: POSTOP/PACU | Facility: HOSPITAL | Age: 39
Setting detail: OUTPATIENT SURGERY
Discharge: HOME | End: 2024-04-05

## 2024-04-05 VITALS
HEART RATE: 64 BPM | SYSTOLIC BLOOD PRESSURE: 146 MMHG | RESPIRATION RATE: 14 BRPM | BODY MASS INDEX: 43.95 KG/M2 | TEMPERATURE: 98.4 F | OXYGEN SATURATION: 97 % | DIASTOLIC BLOOD PRESSURE: 63 MMHG | WEIGHT: 240.3 LBS

## 2024-04-05 DIAGNOSIS — F31.9 BIPOLAR I DISORDER WITH DEPRESSION (MULTI): Primary | ICD-10-CM

## 2024-04-05 DIAGNOSIS — Z00.00 ENCOUNTER FOR ROUTINE HISTORY AND PHYSICAL EXAM IN FEMALE: ICD-10-CM

## 2024-04-05 PROCEDURE — 2500000004 HC RX 250 GENERAL PHARMACY W/ HCPCS (ALT 636 FOR OP/ED): Performed by: PSYCHIATRY & NEUROLOGY

## 2024-04-05 PROCEDURE — KETSP PR KETAMINE INFUSION SELF-PAY: Performed by: PSYCHIATRY & NEUROLOGY

## 2024-04-05 PROCEDURE — KETSP HC KETAMINE INFUSION SELF-PAY: Performed by: PSYCHIATRY & NEUROLOGY

## 2024-04-05 PROCEDURE — 99212 OFFICE O/P EST SF 10 MIN: CPT | Performed by: PSYCHIATRY & NEUROLOGY

## 2024-04-05 PROCEDURE — 2500000005 HC RX 250 GENERAL PHARMACY W/O HCPCS: Performed by: PSYCHIATRY & NEUROLOGY

## 2024-04-05 RX ORDER — KETAMINE HCL IN 0.9 % NACL 200 MG/200
0.75 PLASTIC BAG, INJECTION (ML) INTRAVENOUS ONCE
Status: CANCELLED
Start: 2024-04-05 | End: 2024-04-05

## 2024-04-05 RX ORDER — ONDANSETRON HYDROCHLORIDE 2 MG/ML
8 INJECTION, SOLUTION INTRAVENOUS ONCE
Status: CANCELLED
Start: 2024-04-05 | End: 2024-04-05

## 2024-04-05 RX ORDER — ONDANSETRON HYDROCHLORIDE 2 MG/ML
8 INJECTION, SOLUTION INTRAVENOUS ONCE
Status: COMPLETED | OUTPATIENT
Start: 2024-04-05 | End: 2024-04-05

## 2024-04-05 RX ORDER — KETAMINE HCL IN 0.9 % NACL 200 MG/200
0.75 PLASTIC BAG, INJECTION (ML) INTRAVENOUS ONCE
Status: COMPLETED | OUTPATIENT
Start: 2024-04-05 | End: 2024-04-05

## 2024-04-05 RX ADMIN — Medication 81.7 MG: at 11:51

## 2024-04-05 RX ADMIN — ONDANSETRON 8 MG: 2 INJECTION, SOLUTION INTRAMUSCULAR; INTRAVENOUS at 11:45

## 2024-04-05 ASSESSMENT — ANXIETY QUESTIONNAIRES
4. TROUBLE RELAXING: NEARLY EVERY DAY
1. FEELING NERVOUS, ANXIOUS, OR ON EDGE: NEARLY EVERY DAY
2. NOT BEING ABLE TO STOP OR CONTROL WORRYING: NEARLY EVERY DAY
5. BEING SO RESTLESS THAT IT IS HARD TO SIT STILL: NEARLY EVERY DAY
IF YOU CHECKED OFF ANY PROBLEMS ON THIS QUESTIONNAIRE, HOW DIFFICULT HAVE THESE PROBLEMS MADE IT FOR YOU TO DO YOUR WORK, TAKE CARE OF THINGS AT HOME, OR GET ALONG WITH OTHER PEOPLE: EXTREMELY DIFFICULT
3. WORRYING TOO MUCH ABOUT DIFFERENT THINGS: NEARLY EVERY DAY
7. FEELING AFRAID AS IF SOMETHING AWFUL MIGHT HAPPEN: NOT AT ALL
6. BECOMING EASILY ANNOYED OR IRRITABLE: MORE THAN HALF THE DAYS
GAD7 TOTAL SCORE: 17

## 2024-04-05 ASSESSMENT — PATIENT HEALTH QUESTIONNAIRE - PHQ9
7. TROUBLE CONCENTRATING ON THINGS, SUCH AS READING THE NEWSPAPER OR WATCHING TELEVISION: NEARLY EVERY DAY
2. FEELING DOWN, DEPRESSED OR HOPELESS: SEVERAL DAYS
1. LITTLE INTEREST OR PLEASURE IN DOING THINGS: SEVERAL DAYS
4. FEELING TIRED OR HAVING LITTLE ENERGY: SEVERAL DAYS
6. FEELING BAD ABOUT YOURSELF - OR THAT YOU ARE A FAILURE OR HAVE LET YOURSELF OR YOUR FAMILY DOWN: SEVERAL DAYS
9. THOUGHTS THAT YOU WOULD BE BETTER OFF DEAD, OR OF HURTING YOURSELF: NOT AT ALL
3. TROUBLE FALLING OR STAYING ASLEEP OR SLEEPING TOO MUCH: NOT AT ALL
SUM OF ALL RESPONSES TO PHQ QUESTIONS 1-9: 7
10. IF YOU CHECKED OFF ANY PROBLEMS, HOW DIFFICULT HAVE THESE PROBLEMS MADE IT FOR YOU TO DO YOUR WORK, TAKE CARE OF THINGS AT HOME, OR GET ALONG WITH OTHER PEOPLE: SOMEWHAT DIFFICULT
8. MOVING OR SPEAKING SO SLOWLY THAT OTHER PEOPLE COULD HAVE NOTICED. OR THE OPPOSITE, BEING SO FIGETY OR RESTLESS THAT YOU HAVE BEEN MOVING AROUND A LOT MORE THAN USUAL: NOT AT ALL
SUM OF ALL RESPONSES TO PHQ9 QUESTIONS 1 AND 2: 2
5. POOR APPETITE OR OVEREATING: NOT AT ALL

## 2024-04-05 NOTE — PATIENT INSTRUCTIONS
Homegoing Instructions    Patient: Kelli Silva  MRN: [id number]  YOB: 1985    Care Providers: Dr. Ata Roman, RN       Allergies:  Aspirin, Cigarette smoke, Fish containing products, Iodinated contrast media, Ketorolac, Ketorolac tromethamine, Ondansetron, Other, Prochlorperazine, Shellfish containing products, Sumatriptan, Azithromycin, Ceftriaxone, Doxycycline, Dulaglutide, House dust, Liraglutide, Metformin, Metoclopramide, Prednisone, Sitagliptin, Zcmqrzqb-5-oh3 antimigraine agents, and Zolmitriptan    Medications:  Current Outpatient Medications   Medication Sig Dispense Refill    Abilify Maintena 400 mg injection syringe       Accu-Chek Guide test strips strip USE TO CHECK BLOOD SUGAR UP TO 4 TIMES PER DAY AS INSTRUCTED. E11.9      albuterol 2.5 mg /3 mL (0.083 %) nebulizer solution Inhale 3 mL (2.5 mg) every 6 hours if needed for wheezing or shortness of breath.      albuterol 90 mcg/actuation inhaler Inhale 2 puffs. 6 TIMES DAILY.      asenapine (Saphris) SL tablet TAKE 1 - 2 SUBLIQUAL EVERY NIGHT AT BED TIME AS NEEDED FOR SLEEP 180 tablet 1    Autolet lancing device USE AS INSTRUCTED TO CHECK BLOOD SUGAR UP TO 4 TIMES DAILY. E11.9      azelastine-fluticasone 137-50 mcg/spray spray,non-aerosol Administer 1 spray into each nostril twice a day.      buPROPion XL (Wellbutrin XL) 150 mg 24 hr tablet Take 1 tablet (150 mg) by mouth once daily in the morning. Do not crush, chew, or split. 90 tablet 3    busPIRone (Buspar) 30 mg tablet Take 1 tablet (30 mg) by mouth 2 times a day.      calcium carbonate-vitamin D3 (Oyster Shell) 250 mg-3.125 mcg (125 unit) tablet Take 1 tablet by mouth once daily.      cetirizine (ZyrTEC) 10 mg tablet Take 1 tablet (10 mg) by mouth once daily at bedtime.      CHLORZOXAZONE ORAL Take 500 mg by mouth 2 times a day as needed.      Corlanor 7.5 mg tablet Take 1 tablet (7.5 mg) by mouth 2 times a day with meals.      desvenlafaxine 100 mg 24 hr tablet  TAKE 1 TABLET BY MOUTH EVERY DAY IN THE MORNING 90 tablet 1    dextrose 5 % in water (D5W) parenteral solution 250 mL with ketamine 100 mg/mL solution Infuse 80 mg into a venous catheter continuously.      dicyclomine (Bentyl) 20 mg tablet Take 1 tablet (20 mg) by mouth 4 times a day before meals.      ergocalciferol (Vitamin D-2) 1.25 MG (01199 UT) capsule Take 1 capsule (50,000 Units) by mouth. EVERY TUESDAY AND SATURDAY.      famotidine (Pepcid) 20 mg tablet Take 1 tablet (20 mg) by mouth twice a day.      glucagon (Glucagen) 1 mg injection 1 mg.  Only take if BG< 60      glucagon (Gvoke HypoPen 2-Pack) 1 mg/0.2 mL auto-injector Inject 1 Pen under the skin.      glucose 4 gram chewable tablet Chew 4 tablets (16 g).      Ibsrela 50 mg tablet tablet Take 1 tablet (50 mg) by mouth 2 times a day.      ipratropium-albuteroL (Duo-Neb) 0.5-2.5 mg/3 mL nebulizer solution Take 3 mL by nebulization every 4 hours if needed for wheezing or shortness of breath.      ipratropium-albuteroL (Duo-Neb) 0.5-2.5 mg/3 mL nebulizer solution 3 mL every 6 hours.      lactobacillus (Culturelle) 10 billion cell capsule Take 1 capsule by mouth twice a day.      lamoTRIgine (LaMICtal) 200 mg disintegrating tablet Take 1 tablet (200 mg) by mouth twice a day.      levonorgestrel (Mirena) 21 mcg/24 hours (8 yrs) 52 mg IUD 52 mg by intrauterine route.      lisdexamfetamine (Vyvanse) 70 mg capsule Take 1 capsule (70 mg) by mouth once daily in the morning. 30 capsule 0    LORazepam (Ativan) 1 mg tablet Take 1 tablet (1 mg) by mouth 2 times a day as needed.      milnacipran (SavElla) 100 mg tablet 1 tablet (100 mg) 2 times a day.      paliperidone (Invega) 3 mg 24 hr tablet Take 1-2 tablets (3-6 mg) by mouth once daily at bedtime.      pregabalin (Lyrica) 150 mg capsule Take 1 capsule (150 mg) by mouth once daily. each day at the same time.      pregabalin (Lyrica) 50 mg capsule Take 1 capsule (50 mg) by mouth 2 times a day.  (am and afternoon) in  addition to 150 mg capsule at night      QUEtiapine (SEROquel) 200 mg tablet Take 1 tablet (200 mg) by mouth once daily at bedtime. 30 tablet 0    Reyvow 100 mg tablet Take 1 tablet by mouth every 24 (twenty four) hours if needed. AT ONSET OF MIGRAINE NO MORE THAN 1X      sodium bicarbonate 650 mg tablet Take 0.5 tablets (325 mg) by mouth 2 times a day.      tezepelumab-ekko (Tezspire) SubQ syringe Inject 210 mg under the skin every 28 (twenty-eight) days.      traZODone (Desyrel) 100 mg tablet Take 2 tablets (200 mg) by mouth once daily at bedtime.      traZODone (Desyrel) 50 mg tablet Take 1 tablet (50 mg) by mouth as needed at bedtime.      Trelegy Ellipta 200-62.5-25 mcg blister with device 1 puff once daily.      Vraylar 6 mg capsule Take 1 capsule (6 mg) by mouth once daily at bedtime.      zavegepant (Zavzpret) 10 mg/actuation spray,non-aerosol Administer 10 mg into affected nostril(s) if needed.      zolpidem CR (Ambien CR) 12.5 mg ER tablet Take 1 tablet (12.5 mg) by mouth as needed at bedtime for sleep. 30 tablet 2     No current facility-administered medications for this encounter.       Your Providers Instructions:  Do not consume alcoholic beverages for 24 hours.  Do not make important decisions or sign any important documents for the next 24 hours  It is recommended that a responsible adult remain with you for the next 24 hours as you may be light headed/dizzy.    Call Physician:  For persistent nausea and/or vomiting over 24 hours.  Diet:                  Regular diet  May not drive: Do not operate motor vehicles for 24 hours        When to Call Your Doctor:  Call your doctor for questions and/or concerns  Call 911 for an emergency situation     Health Maintenance:  If you currently use tobacco products it is advised that you quit, to improve your health.  If you currently use or have stopped using tobacco products within the last 12 months, the following are resources that can help you:  American  Heart Association ([phone], (www.americanheart.org)    [place] Tobacco Quit Line 1-151.897.9735 (QUIT-NOW)  (http://www.[place].gov/odhPrograms/hprr/tob_risk1.aspx)    Do you or a loved one need help for alcohol or drug use? Call the [employer] at 211 or visit FindTreatment.gov for resources.    To find out where you can safely dispose unused medicines, visit www.rxdrugdropbox.org, or call your local police department.    Please call us at [phone] with any concerns.         Please make certain you have transportation arranged to and from your appointment. This can be a friend, family member or arranged thru your health insurance provider.    What to do before your first appointment:  Stop or reduce smoking if at all possible.  No alcohol for 24 hours before your appointment.  Day of treatment:  You may have clear liquids (water, tea, coffee, no pulp juice, plain Jello, hard candy BUT no milk, cream or non-dairy creamers) up until 30 minutes and solids up to 2 hours before your appointment time.    Please bring headphones to listen to calming music, guided meditation or anything you'd like. You can also bring a journal to write in during the treatment time.   Set an intention for each appointment. Your intention can be qualities you want to cultivate in yourself, mental health goals you would like to achieve or experiences you would like to attract into your life.   Bring any inhalers you may need while at your appointment.  Take all morning medications as prescribed except the followin hours before your appointment, no chlordiazeproxide (Librium), diazepam (Valium), or Flurazepam (Dalmane)   12 hours before your appointment, no lamotrigine.  Try to avoid taking any anti-anxiety medications if at all possible the day of your appointment.. However, if you're anxiety is unbearable and would prevent you from attending you appointment, please take your prescribed dose.   No stimulants prior to your  treatment, you may take them once you are home.  Use any nasal sprays up to 1 hour before your appointment.   Cancellations:   If you need to cancel your appointment, we must have at least a 24 hour notice.  If you cancel with less than 24 hour notice or do not show three times- no additional appointments will be scheduled until the treatment plan is discussed with your outpatient psychiatrist.  Also, if you arrive more than 15 minutes late, we may need to cancel your appointment and reschedule.  Please call [phone] to cancel appointments.  Thank you for your cooperation!

## 2024-04-05 NOTE — H&P
Psychiatry Procedure H&P Monthly Provider Assessment    Name: Kelli  : 1985  MRN: [id number]    Date: 24     HPI: She came in for maintenance ketamine infusion and needed an update H & P.  She said she felt ok, but a little more depressed for a week. Denied having SI/HI/AVH or paranoia. Just was told that she had anemia. No other change in medical hx.     No problem with eyes,ears, nose or throat  No SOB or chest pain  No GI sx  No  sx  Still had migraine, but no change. No neurological problem with exception of headache.  No problem with bones, joints, or muscles  No other problem with blood   No problem with skin      Medications Reviewed: Yes  Prophylactic Antibiotics Needed:  No  This is my H&P:  Yes  Emotional/Behavioral: still a little depressed and anxious  Respiratory: Regular and CAT bilateral   Gastrointestinal: abdomen soft. No pain or tenderness   Level of Consciousness: awake and alert   Cardiovascular: Regular heart rate, no murmur   Orientation: x 4  Genitourinary: deferred   Neurological:  grossly intact   Comments: no significant change in medical hx; unremarkable on physical exam  Sedation Plan: no sedation   Procedure Plan: ketamine infusion as planned.   COVID-19 Risk Content: Yes: Surgeon has reviewed key risks related to the risk of meli COVID-19 and if they contract COVID-19 what the risks are.    Ata Nova MD.

## 2024-04-05 NOTE — PROGRESS NOTES
Ketamine/Esketamine Procedure    Name: Kelli  : 1985  MRN: [id number]    Date: 24    Time out was taken with staff to confirm correct patient and correct procedure to be performed.     Ketamine infusion at 0.75 mg/kg in 40 minutes  Subjective: She said she felt ok and relatively stable in last 3 weeks, but did not feel a little more depressed for about a week. She said her anxiety got worse because she is moving the [facility] to live. Still ate well and slept well. No persistent depression and enjoyed life overall. Denied having SI/HI/AVH or paranoia. No side effect after discharge.   PHQ9=7  GAD7=17    Objective: She was awake, alert, cooperative, and pleasant. Normal gait. No abnormal movement. Good eye contact. Oriented x 3. Speech was normal. Affect was appropriate, restricted most of the time. Mood was ok. Denied having SI/HI/AVH or paranoia during the interview. No delusional thought or paranoia. I/J were good. Memory was fair. Attention and concentration were good.     Ketamine infusion observation:  About 20 minutes after starting infusion, she still experienced drowsy, happy, floaty on her bed, and tingling all over her body as before. No other side effect. Vital signs were stable.    Shortly ended the infusion, she still experienced drowsy, happy, floaty on her bed, and tingling all over her body as at the last assessment. No other side effect. Vital signs were stable.     Tolerated ketamine infusion well  No complication     Imp: bipolar I depression, severe, currently mild  ANUP - severe   ADHD - manageable with stimulant     Plan: ketamine infusion in 3 weeks     Ata Nova MD.

## 2024-04-10 NOTE — ADDENDUM NOTE
Encounter addended by: Madison Leonard RN on: 4/10/2024 10:15 AM   Actions taken: Therapy plan modified, Charge Capture section accepted

## 2024-04-11 ENCOUNTER — PROCEDURE VISIT (OUTPATIENT)
Dept: BEHAVIORAL HEALTH | Facility: CLINIC | Age: 39
End: 2024-04-11
Payer: COMMERCIAL

## 2024-04-11 ENCOUNTER — TELEMEDICINE (OUTPATIENT)
Dept: BEHAVIORAL HEALTH | Facility: CLINIC | Age: 39
End: 2024-04-11
Payer: COMMERCIAL

## 2024-04-11 VITALS
DIASTOLIC BLOOD PRESSURE: 86 MMHG | SYSTOLIC BLOOD PRESSURE: 130 MMHG | TEMPERATURE: 97.6 F | HEART RATE: 111 BPM | HEIGHT: 62 IN | WEIGHT: 236.3 LBS | BODY MASS INDEX: 43.48 KG/M2 | RESPIRATION RATE: 16 BRPM

## 2024-04-11 DIAGNOSIS — F31.0 BIPOLAR AFFECTIVE DISORDER, CURRENT EPISODE HYPOMANIC (MULTI): Primary | ICD-10-CM

## 2024-04-11 DIAGNOSIS — F90.0 ATTENTION DEFICIT HYPERACTIVITY DISORDER (ADHD), PREDOMINANTLY INATTENTIVE TYPE: ICD-10-CM

## 2024-04-11 DIAGNOSIS — F31.31 BIPOLAR 1 DISORDER, DEPRESSED, MILD (MULTI): Primary | ICD-10-CM

## 2024-04-11 DIAGNOSIS — F31.9 BIPOLAR 1 DISORDER (MULTI): ICD-10-CM

## 2024-04-11 PROCEDURE — 90837 PSYTX W PT 60 MINUTES: CPT | Performed by: PSYCHOLOGIST

## 2024-04-11 RX ORDER — LISDEXAMFETAMINE DIMESYLATE 70 MG/1
70 CAPSULE ORAL EVERY MORNING
Qty: 30 CAPSULE | Refills: 0 | Status: SHIPPED | OUTPATIENT
Start: 2024-04-11 | End: 2024-05-14 | Stop reason: SDUPTHER

## 2024-04-11 NOTE — PROGRESS NOTES
Patient here at the clinic for her monthly injection of Abilify Maintena for Bipolar affective disorder,current episode hypomanic. Patient identified by full name and , vitals obtained. Patient last seen by Provider on 3/12/24, last seen by nurse on 3/7/24. Medication verified by providers note and medication order.      Appetite:  good  Sleep:  good  Appearance: clean, age appropriate and well groomed  Build: overweight  Attitude: cooperative, calm, pleasant, friendly, open  Eye Contact: normal  Activity: alert, attentive, appropriate  Speech: appropriate & spontaneous, normal rate & flow, clear  Delusions: denies  Thought Content: logical  Thought Process: logical  Judgement/insight:  Intact/good  Mood: calm   Affect: appropriate  AH/VH/SI/HI patient denies  Access to Firearms/weapons: No  Depression: Patient denies  Thoughts of hopelessness: denies  Anxiety: pt denies  Self-injurious behavior: denies at this time  Cravings/Urges: denies  Last Use: NA  Tobacco Use: Non smoker  Spiritual or cultural practices that may affect your care or impact your health care decisions? No  Living situation:  Lives with boyfriend  Employed: Employed at [employer]      Patient appears calm and pleasant. Patient stated that she missed the last 24 injection because she was in the ER due to an asthma attack. She was discharged the same day and doing well. Patient denies SI/HI, VH/AH, self harming thoughts at this time.       Patient received Abilify Maintena 400mg  via 23 gauge needle in right deltoid with no reaction to the injection site, patient tolerated the injection well.  Patient will return to the clinic in 4 weeks.    RN provided education on medication and side effects,  Patient is aware to contact nurse for any adverse reactions or  911/ [facility] crisis number for emergent issues.      LOT: [id number]  EXP:  AUG 2026  NDC:  46637-177-39.     Solange Newton, RN,BSN

## 2024-04-11 NOTE — PROGRESS NOTES
Insight Oriented Therapy. 50 min.  BP1.  We spoke about how she is stressed with the move,  but we spoke about small steps to pace herself recognizing this is a GOOD thing, important, but not urgent, and how to find balance between work and life.     Chief complaint - Phase of life   Treatment plan - Insight and action consistent with ACT therapy.   Goals - Self-care, insight, coping skills  Prognosis - Average  Progress - Average  Functional status - 70/100  Focused mental status: Patient was oriented to person, place, time and context  Focused mental exam - alert and oriented  Frequency - Every week

## 2024-04-18 ENCOUNTER — TELEMEDICINE (OUTPATIENT)
Dept: BEHAVIORAL HEALTH | Facility: CLINIC | Age: 39
End: 2024-04-18
Payer: COMMERCIAL

## 2024-04-18 DIAGNOSIS — F31.9 BIPOLAR 1 DISORDER (MULTI): ICD-10-CM

## 2024-04-18 PROCEDURE — 90837 PSYTX W PT 60 MINUTES: CPT | Performed by: PSYCHOLOGIST

## 2024-04-18 NOTE — PROGRESS NOTES
Insight Oriented Therapy. 50 min.  BP1. We spoke about moving day today and how she is going to pace herself, one thing at a time.  Talked more broadly about her work trajectory and how things are much better now and how to get the notes done as it's how she is paid.     Chief complaint - Phase of life   Treatment plan - Insight and action consistent with ACT therapy.   Goals - Self-care, insight, coping skills  Prognosis - Average  Progress - Average  Functional status - 70/100  Focused mental status: Patient was oriented to person, place, time and context  Focused mental exam - alert and oriented  Frequency - Every two weeks

## 2024-04-23 ENCOUNTER — CLINICAL SUPPORT (OUTPATIENT)
Dept: BEHAVIORAL HEALTH | Facility: CLINIC | Age: 39
End: 2024-04-23
Payer: COMMERCIAL

## 2024-04-23 DIAGNOSIS — F31.9 BIPOLAR 1 DISORDER (MULTI): ICD-10-CM

## 2024-04-23 DIAGNOSIS — F41.9 ANXIETY: ICD-10-CM

## 2024-04-23 PROCEDURE — 90853 GROUP PSYCHOTHERAPY: CPT | Performed by: PSYCHOLOGIST

## 2024-04-24 NOTE — GROUP NOTE
Group Topic: Coping Skills   Group Date: 4/23/2024  Start Time:  6:00 PM  End Time:  7:30 PM  Facilitators: Garima Wong PsyD   Department:  [facility]    Number of Participants: 11   Group Focus: check in and coping skills  Treatment Modality: Cognitive Behavioral Therapy  Interventions utilized were exploration and support  Purpose: coping skills    This was a video IOP aftercare group on a HIPAA compliant platform. All patients were provided with informed consent.    IOP Aftercare Group: Coping skills  Participants had opportunity to check-in with their peers and discuss recent stressors. They shared coping that they find helpful for various stressors and had opportunity to support one another.   Garima Wong Psy.D.      Name: Kelli Silva YOB: 1985   MR: [id number]      Kelli was present and actively engaged in discussion. She reported that she moved into her new home and is feeling excited about this. She shared that finding purpose in her action is helpful for coping.

## 2024-04-25 ENCOUNTER — TELEMEDICINE (OUTPATIENT)
Dept: BEHAVIORAL HEALTH | Facility: CLINIC | Age: 39
End: 2024-04-25
Payer: COMMERCIAL

## 2024-04-25 DIAGNOSIS — F31.9 BIPOLAR 1 DISORDER (MULTI): ICD-10-CM

## 2024-04-25 PROCEDURE — 90837 PSYTX W PT 60 MINUTES: CPT | Performed by: PSYCHOLOGIST

## 2024-04-25 NOTE — PROGRESS NOTES
Insight Oriented Therapy. 50 min.  BP1.   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  We talked about how she can respond and drafted this.  We spoke about gratitude and coping, her move and pacing herself.     Chief complaint - Phase of life   Treatment plan - Insight and action consistent with ACT therapy.   Goals - Self-care, insight, coping skills  Prognosis - Average  Progress - Average  Functional status - 65/100  Focused mental status: Patient was oriented to person, place, time and context  Focused mental exam - alert and oriented  Frequency - Every week

## 2024-05-02 ENCOUNTER — TELEMEDICINE (OUTPATIENT)
Dept: BEHAVIORAL HEALTH | Facility: CLINIC | Age: 39
End: 2024-05-02
Payer: COMMERCIAL

## 2024-05-02 DIAGNOSIS — F31.9 BIPOLAR 1 DISORDER (MULTI): ICD-10-CM

## 2024-05-02 PROCEDURE — 90837 PSYTX W PT 60 MINUTES: CPT | Performed by: PSYCHOLOGIST

## 2024-05-02 NOTE — PROGRESS NOTES
Insight Oriented Therapy. 50 min. BP1.  We spoke about how she is still reeling a bit from work related stress.  We developed a plan for her to actively, consistently be working on balance as well as how to manage work related stress.    Chief complaint - Work/life balance and stress management  Treatment plan - Insight and action consistent with ACT therapy.   Goals - Self-care, insight, coping skills  Prognosis - Average  Progress - Average  Functional status - 70/100  Focused mental status: Patient was oriented to person, place, time and context  Focused mental exam - alert and oriented  Frequency - Every week

## 2024-05-03 ENCOUNTER — HOSPITAL ENCOUNTER (OUTPATIENT)
Dept: POSTOP/PACU | Facility: HOSPITAL | Age: 39
Discharge: HOME | End: 2024-05-03

## 2024-05-03 VITALS
RESPIRATION RATE: 14 BRPM | DIASTOLIC BLOOD PRESSURE: 69 MMHG | SYSTOLIC BLOOD PRESSURE: 138 MMHG | WEIGHT: 235.89 LBS | TEMPERATURE: 97.9 F | HEART RATE: 74 BPM | OXYGEN SATURATION: 95 % | BODY MASS INDEX: 43.15 KG/M2

## 2024-05-03 DIAGNOSIS — F31.9 BIPOLAR I DISORDER WITH DEPRESSION (MULTI): Primary | ICD-10-CM

## 2024-05-03 PROCEDURE — 99213 OFFICE O/P EST LOW 20 MIN: CPT | Performed by: PSYCHIATRY & NEUROLOGY

## 2024-05-03 PROCEDURE — KETSP HC KETAMINE INFUSION SELF-PAY: Performed by: PSYCHIATRY & NEUROLOGY

## 2024-05-03 PROCEDURE — 2500000004 HC RX 250 GENERAL PHARMACY W/ HCPCS (ALT 636 FOR OP/ED): Performed by: PSYCHIATRY & NEUROLOGY

## 2024-05-03 PROCEDURE — 2500000005 HC RX 250 GENERAL PHARMACY W/O HCPCS: Performed by: PSYCHIATRY & NEUROLOGY

## 2024-05-03 PROCEDURE — KETSP PR KETAMINE INFUSION SELF-PAY: Performed by: PSYCHIATRY & NEUROLOGY

## 2024-05-03 RX ORDER — KETAMINE HCL IN 0.9 % NACL 200 MG/200
0.75 PLASTIC BAG, INJECTION (ML) INTRAVENOUS ONCE
Status: COMPLETED | OUTPATIENT
Start: 2024-05-03 | End: 2024-05-03

## 2024-05-03 RX ORDER — KETAMINE HCL IN 0.9 % NACL 200 MG/200
0.75 PLASTIC BAG, INJECTION (ML) INTRAVENOUS ONCE
Start: 2024-05-03 | End: 2024-05-03

## 2024-05-03 RX ORDER — ONDANSETRON HYDROCHLORIDE 2 MG/ML
8 INJECTION, SOLUTION INTRAVENOUS ONCE
Status: CANCELLED
Start: 2024-05-03 | End: 2024-05-03

## 2024-05-03 RX ORDER — ONDANSETRON HYDROCHLORIDE 2 MG/ML
8 INJECTION, SOLUTION INTRAVENOUS ONCE
Status: COMPLETED | OUTPATIENT
Start: 2024-05-03 | End: 2024-05-03

## 2024-05-03 RX ADMIN — ONDANSETRON 8 MG: 2 INJECTION, SOLUTION INTRAMUSCULAR; INTRAVENOUS at 11:38

## 2024-05-03 RX ADMIN — Medication 80 MG: at 11:40

## 2024-05-03 ASSESSMENT — PATIENT HEALTH QUESTIONNAIRE - PHQ9
8. MOVING OR SPEAKING SO SLOWLY THAT OTHER PEOPLE COULD HAVE NOTICED. OR THE OPPOSITE, BEING SO FIGETY OR RESTLESS THAT YOU HAVE BEEN MOVING AROUND A LOT MORE THAN USUAL: NOT AT ALL
4. FEELING TIRED OR HAVING LITTLE ENERGY: NEARLY EVERY DAY
7. TROUBLE CONCENTRATING ON THINGS, SUCH AS READING THE NEWSPAPER OR WATCHING TELEVISION: NEARLY EVERY DAY
10. IF YOU CHECKED OFF ANY PROBLEMS, HOW DIFFICULT HAVE THESE PROBLEMS MADE IT FOR YOU TO DO YOUR WORK, TAKE CARE OF THINGS AT HOME, OR GET ALONG WITH OTHER PEOPLE: VERY DIFFICULT
SUM OF ALL RESPONSES TO PHQ QUESTIONS 1-9: 14
5. POOR APPETITE OR OVEREATING: NOT AT ALL
SUM OF ALL RESPONSES TO PHQ9 QUESTIONS 1 AND 2: 2
9. THOUGHTS THAT YOU WOULD BE BETTER OFF DEAD, OR OF HURTING YOURSELF: NOT AT ALL
6. FEELING BAD ABOUT YOURSELF - OR THAT YOU ARE A FAILURE OR HAVE LET YOURSELF OR YOUR FAMILY DOWN: NEARLY EVERY DAY
2. FEELING DOWN, DEPRESSED OR HOPELESS: SEVERAL DAYS
3. TROUBLE FALLING OR STAYING ASLEEP OR SLEEPING TOO MUCH: NEARLY EVERY DAY
1. LITTLE INTEREST OR PLEASURE IN DOING THINGS: SEVERAL DAYS

## 2024-05-03 ASSESSMENT — ANXIETY QUESTIONNAIRES
5. BEING SO RESTLESS THAT IT IS HARD TO SIT STILL: MORE THAN HALF THE DAYS
GAD7 TOTAL SCORE: 6
3. WORRYING TOO MUCH ABOUT DIFFERENT THINGS: SEVERAL DAYS
2. NOT BEING ABLE TO STOP OR CONTROL WORRYING: SEVERAL DAYS
7. FEELING AFRAID AS IF SOMETHING AWFUL MIGHT HAPPEN: NOT AT ALL
1. FEELING NERVOUS, ANXIOUS, OR ON EDGE: SEVERAL DAYS
4. TROUBLE RELAXING: SEVERAL DAYS
6. BECOMING EASILY ANNOYED OR IRRITABLE: NOT AT ALL
IF YOU CHECKED OFF ANY PROBLEMS ON THIS QUESTIONNAIRE, HOW DIFFICULT HAVE THESE PROBLEMS MADE IT FOR YOU TO DO YOUR WORK, TAKE CARE OF THINGS AT HOME, OR GET ALONG WITH OTHER PEOPLE: SOMEWHAT DIFFICULT

## 2024-05-03 NOTE — PERIOPERATIVE NURSING NOTE
Pt. Has her own continuous blood glucose monitor. Pt. States her blood glucose at 1125 is 102. Pt. Is asymptomatic at this time.  Pt. States her blood glucose at 1335 is 101. Pt. Is asymptomatic at discharge.

## 2024-05-03 NOTE — PATIENT INSTRUCTIONS
Homegoing Instructions    Patient: Kelli Silva  MRN: [id number]  YOB: 1985    Care Providers: Dr. Ata Roman, RN       Allergies:  Aspirin, Cigarette smoke, Fish containing products, Iodinated contrast media, Ketorolac, Ketorolac tromethamine, Ondansetron, Other, Prochlorperazine, Shellfish containing products, Sumatriptan, Azithromycin, Ceftriaxone, Doxycycline, Dulaglutide, House dust, Liraglutide, Metformin, Metoclopramide, Prednisone, Sitagliptin, [id number] antimigraine agents, and Zolmitriptan    Medications:  Current Outpatient Medications   Medication Sig Dispense Refill    Abilify Maintena 400 mg injection syringe       Accu-Chek Guide test strips strip USE TO CHECK BLOOD SUGAR UP TO 4 TIMES PER DAY AS INSTRUCTED. E11.9      albuterol 2.5 mg /3 mL (0.083 %) nebulizer solution Inhale 3 mL (2.5 mg) every 6 hours if needed for wheezing or shortness of breath.      albuterol 90 mcg/actuation inhaler Inhale 2 puffs. 6 TIMES DAILY.      asenapine (Saphris) SL tablet TAKE 1 - 2 SUBLIQUAL EVERY NIGHT AT BED TIME AS NEEDED FOR SLEEP 180 tablet 1    Autolet lancing device USE AS INSTRUCTED TO CHECK BLOOD SUGAR UP TO 4 TIMES DAILY. E11.9      azelastine-fluticasone 137-50 mcg/spray spray,non-aerosol Administer 1 spray into each nostril twice a day.      buPROPion XL (Wellbutrin XL) 150 mg 24 hr tablet Take 1 tablet (150 mg) by mouth once daily in the morning. Do not crush, chew, or split. 90 tablet 3    busPIRone (Buspar) 30 mg tablet Take 1 tablet (30 mg) by mouth 2 times a day.      calcium carbonate-vitamin D3 (Oyster Shell) 250 mg-3.125 mcg (125 unit) tablet Take 1 tablet by mouth once daily.      cariprazine (Vraylar) 6 mg capsule Take 1 capsule (6 mg) by mouth once daily at bedtime. 30 capsule 6    cetirizine (ZyrTEC) 10 mg tablet Take 1 tablet (10 mg) by mouth once daily at bedtime.      CHLORZOXAZONE ORAL Take 500 mg by mouth 2 times a day as needed.      Corlanor 7.5 mg  tablet Take 1 tablet (7.5 mg) by mouth 2 times a day with meals.      desvenlafaxine 100 mg 24 hr tablet TAKE 1 TABLET BY MOUTH EVERY DAY IN THE MORNING 90 tablet 1    dicyclomine (Bentyl) 20 mg tablet Take 1 tablet (20 mg) by mouth 4 times a day before meals.      ergocalciferol (Vitamin D-2) 1.25 MG (73344 UT) capsule Take 1 capsule (50,000 Units) by mouth. EVERY TUESDAY AND SATURDAY.      famotidine (Pepcid) 20 mg tablet Take 1 tablet (20 mg) by mouth twice a day.      Ibsrela 50 mg tablet tablet Take 1 tablet (50 mg) by mouth 2 times a day.      ipratropium-albuteroL (Duo-Neb) 0.5-2.5 mg/3 mL nebulizer solution Take 3 mL by nebulization every 4 hours if needed for wheezing or shortness of breath.      ipratropium-albuteroL (Duo-Neb) 0.5-2.5 mg/3 mL nebulizer solution 3 mL every 6 hours.      lactobacillus (Culturelle) 10 billion cell capsule Take 1 capsule by mouth twice a day.      lamoTRIgine (LaMICtal) 200 mg disintegrating tablet Take 1 tablet (200 mg) by mouth twice a day.      lisdexamfetamine (Vyvanse) 70 mg capsule Take 1 capsule (70 mg) by mouth once daily in the morning. 30 capsule 0    LORazepam (Ativan) 1 mg tablet Take 1 tablet (1 mg) by mouth 2 times a day as needed.      milnacipran (SavElla) 100 mg tablet 1 tablet (100 mg) 2 times a day.      pregabalin (Lyrica) 150 mg capsule Take 1 capsule (150 mg) by mouth once daily. each day at the same time.      pregabalin (Lyrica) 50 mg capsule Take 1 capsule (50 mg) by mouth 2 times a day.  (am and afternoon) in addition to 150 mg capsule at night      Reyvow 100 mg tablet Take 1 tablet by mouth every 24 (twenty four) hours if needed. AT ONSET OF MIGRAINE NO MORE THAN 1X      tezepelumab-ekko (Tezspire) SubQ syringe Inject 210 mg under the skin every 28 (twenty-eight) days.      Trelegy Ellipta 200-62.5-25 mcg blister with device 1 puff once daily.      zavegepant (Zavzpret) 10 mg/actuation spray,non-aerosol Administer 10 mg into affected nostril(s) if  needed.      zolpidem CR (Ambien CR) 12.5 mg ER tablet Take 1 tablet (12.5 mg) by mouth as needed at bedtime for sleep. 30 tablet 2    dextrose 5 % in water (D5W) parenteral solution 250 mL with ketamine 100 mg/mL solution Infuse 80 mg into a venous catheter continuously.      glucagon (Glucagen) 1 mg injection 1 mg.  Only take if BG< 60      glucagon (Gvoke HypoPen 2-Pack) 1 mg/0.2 mL auto-injector Inject 1 Pen under the skin.      glucose 4 gram chewable tablet Chew 4 tablets (16 g).      levonorgestrel (Mirena) 21 mcg/24 hours (8 yrs) 52 mg IUD 52 mg by intrauterine route.      QUEtiapine (SEROquel) 200 mg tablet Take 1 tablet (200 mg) by mouth once daily at bedtime. 30 tablet 0    traZODone (Desyrel) 100 mg tablet Take 2 tablets (200 mg) by mouth once daily at bedtime.      traZODone (Desyrel) 50 mg tablet Take 1 tablet (50 mg) by mouth as needed at bedtime.       No current facility-administered medications for this encounter.       Your Providers Instructions:  Do not consume alcoholic beverages for 24 hours.  Do not make important decisions or sign any important documents for the next 24 hours  It is recommended that a responsible adult remain with you for the next 24 hours as you may be light headed/dizzy.    Call Physician:  For persistent nausea and/or vomiting over 24 hours.  Diet:                  Regular diet  May not drive: Do not operate motor vehicles for 24 hours        When to Call Your Doctor:  Call your doctor for questions and/or concerns  Call 911 for an emergency situation     Health Maintenance:  If you currently use tobacco products it is advised that you quit, to improve your health.  If you currently use or have stopped using tobacco products within the last 12 months, the following are resources that can help you:  American Heart Association ([phone], (www.americanheart.org)    [place] Tobacco Quit Line 1-617.104.6649  (QUIT-NOW)  (http://www.od.[place].gov/odhPrograms/hprr/tob_risk1.aspx)    Do you or a loved one need help for alcohol or drug use? Call the [facility] at 211 or visit FindTreatment.gov for resources.    To find out where you can safely dispose unused medicines, visit www.rxdrugdropbox.org, or call your local police department.    Please call us at [phone] with any concerns.         Please make certain you have transportation arranged to and from your appointment. This can be a friend, family member or arranged thru your health insurance provider.    What to do before your first appointment:  Stop or reduce smoking if at all possible.  No alcohol for 24 hours before your appointment.  Day of treatment:  You may have clear liquids (water, tea, coffee, no pulp juice, plain Jello, hard candy BUT no milk, cream or non-dairy creamers) up until 30 minutes and solids up to 2 hours before your appointment time.    Please bring headphones to listen to calming music, guided meditation or anything you'd like. You can also bring a journal to write in during the treatment time.   Set an intention for each appointment. Your intention can be qualities you want to cultivate in yourself, mental health goals you would like to achieve or experiences you would like to attract into your life.   Bring any inhalers you may need while at your appointment.  Take all morning medications as prescribed except the followin hours before your appointment, no chlordiazeproxide (Librium), diazepam (Valium), or Flurazepam (Dalmane)   12 hours before your appointment, no lamotrigine.  Try to avoid taking any anti-anxiety medications if at all possible the day of your appointment.. However, if you're anxiety is unbearable and would prevent you from attending you appointment, please take your prescribed dose.   No stimulants prior to your treatment, you may take them once you are home.  Use any nasal sprays up to 1 hour before your  appointment.   Cancellations:   If you need to cancel your appointment, we must have at least a 24 hour notice.  If you cancel with less than 24 hour notice or do not show three times- no additional appointments will be scheduled until the treatment plan is discussed with your outpatient psychiatrist.  Also, if you arrive more than 15 minutes late, we may need to cancel your appointment and reschedule.  Please call [phone] to cancel appointments.  Thank you for your cooperation!

## 2024-05-03 NOTE — H&P
Psychiatry Procedure H&P for each visit    Name: Kelli  : 1985  MRN: [id number]    Date: 24     History & Physical Reviewed:  Pregnancy/Lactating:  Are You Pregnant: No  Are You Currently Breastfeeding: No    I have reviewed the History and Physical dated: 24  History and Physical Reviewed and relevant findings noted. Patient examined to review pertinent physical findings: Yes  Home Medications Reviewed: Yes  Allergies Reviewed: Yes    ERAS (Enhanced Recovery After Surgery):  ERAS Patient: No    Consent:  COVID-19 Consent:   COVID-19 Risk Consent: Yes: Surgeon has reviewed key risks related to the risk of meli COVID-19 and if they contract COVID-19 what the risks are.

## 2024-05-03 NOTE — PROGRESS NOTES
Ketamine/Esketamine Procedure    Name: Kelli  : 1985  MRN: [id number]    Date: 24    Time out was taken with staff to confirm correct patient and correct procedure to be performed.     Ketamine infusion at .75 mg/kg in 40 minutes  Subjective: She said she felt ok overall and relatively stable. She felt a little more depressed again in last few days. She aid she did not sleep well lately because migraine headache was faring up. She said she did not have energy because of poor sleep. She said her concentration was not good and did not feel Vyvanse was as helpful as before.  Appetite was back to normal. Denied having SI/HI*/AVH or paranoia. No side effect after discharge.   Has to start iron infusion again. No other change in medical hx  No physical complaint   PHQ9=12  GAD7=6    Objective: She was awake, alert, cooperative, and pleasant. Casually groomed.  Good eye contact. Oriented x 3. Speech was normal. Affect was appropriate, restricted most of the time. Mood was ok. Denied having SI/HI/AVH or paranoia during the interview. No delusional thought. I/J were good. Memory, attention and concentration were good.     Vital signs before ketamine infusion: HR: 75; Pulse Ox: 98%; BP: 121/62 mmHg.  Ketamine infusion observation:  About 20 minutes after starting infusion, she did not have any side effect.  Vital signs were stable. HR: 73; Pulse Ox: 96%; BP: 145/66 mmHg.  At the end of infusion, she still did not have any side effect. Vital signs were stable. HR: 71; Pulse Ox: 98%; BP: 144/67 mmHg.   About 20 minutes after stopping infusion, she still did not feel any side effect. Vital signs were stable. HR: 67; Pulse Ox: 94%; BP: 134/69 mmHg.  About 40 minutes after stopping infusion, she was sleeping. Her vital signs were stable. HR: 70; Pulse Ox: 96%; BP: 140/69 mmHg.    Tolerated ketamine infusion well  No complication     Imp: bipolar I depression, recurrent, severe, currently mild, most sx was related to  sleep  ANUP - under control  ADHD - still bothered her   No immediate risk to self or others    Plan: Ketamine infusion on May 29th (Wednesday)    Ata Nova MD.

## 2024-05-06 DIAGNOSIS — F31.9 BIPOLAR 1 DISORDER (MULTI): Primary | ICD-10-CM

## 2024-05-06 DIAGNOSIS — F31.81 BIPOLAR 2 DISORDER (MULTI): ICD-10-CM

## 2024-05-06 RX ORDER — PALIPERIDONE 3 MG/1
3-6 TABLET, EXTENDED RELEASE ORAL NIGHTLY
Qty: 60 TABLET | Refills: 1 | Status: SHIPPED | OUTPATIENT
Start: 2024-05-06 | End: 2024-07-05

## 2024-05-06 NOTE — ADDENDUM NOTE
Encounter addended by: Ata Nova MD PhD on: 5/6/2024 4:35 PM   Actions taken: Clinical Note Signed, Level of Service modified

## 2024-05-06 NOTE — PROGRESS NOTES
Called her back.  She reported that she had some manic sx and could not sleep well. She said she restless during daytime over the weekend. She said she had problem with sleep before the ketamine infusion on last Friday. She believed it was due to migraine then. Currently, she believed it might be due to manic/hypomanic relapse. She said Sapharis 10 with Ambien did not help her sleep. She said she did well with Invega in the past, especially during the daytime. She said she ran out of Invega and wanted to have refills.   A script of Invega 3 mg Table, take 1-2 tables, with 60 pills and a refill was sent to her new pharmacy.     SObjective: She was awake, alert, cooperative, and pleasant.  Speech was normal. Affect was Mood was ok, but felt restless and irritable. . Denied having SI/HI/AVH or paranoia during the interview. No delusional thought. I/J were good. Memory was fair. Attention and concentration were good.     Imp: bipolar I disorder, hypomanic?  ANUP - mild  Insomnia - might be relate to hypomania     Plan:  resume Invega 3-6 mg PRN for manic/hypomanic sx   Continued other medication for now.     Ata Nova MD

## 2024-05-07 ENCOUNTER — CLINICAL SUPPORT (OUTPATIENT)
Dept: BEHAVIORAL HEALTH | Facility: CLINIC | Age: 39
End: 2024-05-07
Payer: COMMERCIAL

## 2024-05-07 DIAGNOSIS — F31.9 BIPOLAR 1 DISORDER (MULTI): ICD-10-CM

## 2024-05-07 DIAGNOSIS — F41.9 ANXIETY: ICD-10-CM

## 2024-05-07 PROCEDURE — 90853 GROUP PSYCHOTHERAPY: CPT | Performed by: PSYCHOLOGIST

## 2024-05-09 ENCOUNTER — TELEMEDICINE (OUTPATIENT)
Dept: BEHAVIORAL HEALTH | Facility: CLINIC | Age: 39
End: 2024-05-09
Payer: COMMERCIAL

## 2024-05-09 ENCOUNTER — PROCEDURE VISIT (OUTPATIENT)
Dept: BEHAVIORAL HEALTH | Facility: CLINIC | Age: 39
End: 2024-05-09
Payer: COMMERCIAL

## 2024-05-09 VITALS
HEIGHT: 62 IN | BODY MASS INDEX: 44.4 KG/M2 | HEART RATE: 107 BPM | DIASTOLIC BLOOD PRESSURE: 82 MMHG | SYSTOLIC BLOOD PRESSURE: 128 MMHG | TEMPERATURE: 97.9 F | RESPIRATION RATE: 16 BRPM | WEIGHT: 241.3 LBS

## 2024-05-09 DIAGNOSIS — F31.0 BIPOLAR AFFECTIVE DISORDER, CURRENT EPISODE HYPOMANIC (MULTI): Primary | ICD-10-CM

## 2024-05-09 DIAGNOSIS — F31.9 BIPOLAR 1 DISORDER (MULTI): ICD-10-CM

## 2024-05-09 DIAGNOSIS — F41.9 ANXIETY: ICD-10-CM

## 2024-05-09 PROCEDURE — 90837 PSYTX W PT 60 MINUTES: CPT | Performed by: PSYCHOLOGIST

## 2024-05-09 NOTE — PROGRESS NOTES
"Patient here at the clinic for her monthly injection of Abilify Maintena for Bipolar affective disorder,current episode hypomanic. Patient identified by full name and , vitals obtained. Patient last seen by Provider on 3/12/24, last seen by nurse on 24. Medication verified by providers note and medication order.      Appetite:  good  Sleep:  good  Appearance: clean, age appropriate and well groomed  Build: overweight  Attitude: cooperative, calm, pleasant, friendly, open  Eye Contact: normal  Activity: alert, attentive, appropriate  Speech: appropriate & spontaneous, normal rate & flow, clear  Delusions: denies  Thought Content: logical  Thought Process: logical  Judgement/insight:  Intact/good  Mood: calm   Affect: appropriate  AH/VH/SI/HI patient denies  Access to Firearms/weapons: No  Depression: Patient denies  Thoughts of hopelessness: denies  Anxiety: pt denies  Self-injurious behavior: denies at this time  Cravings/Urges: denies  Last Use: NA  Tobacco Use: Non smoker  Spiritual or cultural practices that may affect your care or impact your health care decisions? No  Living situation:  Lives with boyfriend  Employed: Employed at [employer]     Patient appears to be in pain today,stating that when she was trying to sit down this morning, she heard a \"pop\" and she believed that she must have strained her left lower back muscle. She stated that that the pain is radiating from her lower left hip down to her knee. She will contact her pcp [facility] as well as [facility]. Nurse demonstrated to patient good  when sitting down. Patient denies SI/HI, VH/AH, self harming thoughts at this time.       Patient received Abilify Maintena 400mg  via 23 gauge needle in left deltoid with no reaction to the injection site, patient tolerated the injection well.  Patient will return to the clinic in 4 weeks.    RN provided education on medication and side effects,  Patient is aware to contact " nurse for any adverse reactions or  911/ mobile crisis number for emergent issues.      LOT: [id number]  EXP:  SEP 2026  NDC:  [id number].     Solange Newton RN,BSN

## 2024-05-09 NOTE — PROGRESS NOTES
Insight Oriented Therapy. 50 min.  BP1.  We spoke about her being manic last week triggered by lack of sleep and a very busy clinical schedule.  We spoke about how she continues to practice self-care and regulate through this elevation with her skills while taking small steps through her day taking it sometimes one hour at a time. We spoke about how the other house is going up for sale and how she has a great relationship with her partner.     Chief complaint - Phase of life, ángel, work stress  Treatment plan - Insight and action consistent with ACT therapy. Providing support, safe space to process their thoughts and feelings, developing therapeutic relationship.  Goals - Self-care, insight, coping skills  Prognosis - Average  Progress - Average  Functional status - 70/100  Focused mental status: Patient was oriented to person, place, time and context  Focused mental exam - alert and oriented  Frequency - Every two weeks

## 2024-05-10 NOTE — GROUP NOTE
Group Topic: Social Skills   Group Date: 5/7/2024  Start Time:  6:00 PM  End Time:  7:30 PM  Facilitators: Garima Wong PsyD   Department: [facility]    Number of Participants: 13   Group Focus: social skills  Treatment Modality: Psychoeducation  Interventions utilized were exploration and patient education  Purpose: other: Relationships    This was a video IOP Recovery group on a HIPAA compliant platform. All patients were provided with informed consent.    IOP Aftercare Group: Healthy vs Unhealthy relationships   Participants received psychoeducation on characteristics of healthy vs unhealthy relationships. They explored these characteristics in their own relationships and shared examples of how they have coped with unhealthy dynamics.   Garima Wong Psy.D.    Name: Kelli Silva YOB: 1985   MR: [id number]      Kelli was present and actively engaged in discussion. She stated that she had noticed recent increase in hypomanic symptoms and shared how she responded to these.

## 2024-05-14 ENCOUNTER — CLINICAL SUPPORT (OUTPATIENT)
Dept: BEHAVIORAL HEALTH | Facility: CLINIC | Age: 39
End: 2024-05-14
Payer: COMMERCIAL

## 2024-05-14 ENCOUNTER — OFFICE VISIT (OUTPATIENT)
Dept: BEHAVIORAL HEALTH | Facility: CLINIC | Age: 39
End: 2024-05-14
Payer: COMMERCIAL

## 2024-05-14 VITALS
RESPIRATION RATE: 16 BRPM | BODY MASS INDEX: 43.19 KG/M2 | WEIGHT: 234.7 LBS | DIASTOLIC BLOOD PRESSURE: 87 MMHG | HEIGHT: 62 IN | HEART RATE: 116 BPM | SYSTOLIC BLOOD PRESSURE: 128 MMHG | TEMPERATURE: 98.1 F

## 2024-05-14 DIAGNOSIS — F31.9 BIPOLAR 1 DISORDER (MULTI): ICD-10-CM

## 2024-05-14 DIAGNOSIS — F41.1 GAD (GENERALIZED ANXIETY DISORDER): ICD-10-CM

## 2024-05-14 DIAGNOSIS — F90.0 ATTENTION DEFICIT HYPERACTIVITY DISORDER (ADHD), PREDOMINANTLY INATTENTIVE TYPE: Primary | ICD-10-CM

## 2024-05-14 PROCEDURE — 1036F TOBACCO NON-USER: CPT | Performed by: PSYCHIATRY & NEUROLOGY

## 2024-05-14 PROCEDURE — 99215 OFFICE O/P EST HI 40 MIN: CPT | Performed by: PSYCHIATRY & NEUROLOGY

## 2024-05-14 PROCEDURE — 3079F DIAST BP 80-89 MM HG: CPT | Performed by: PSYCHIATRY & NEUROLOGY

## 2024-05-14 PROCEDURE — 90853 GROUP PSYCHOTHERAPY: CPT | Performed by: PSYCHOLOGIST

## 2024-05-14 PROCEDURE — 3074F SYST BP LT 130 MM HG: CPT | Performed by: PSYCHIATRY & NEUROLOGY

## 2024-05-14 RX ORDER — LORAZEPAM 1 MG/1
1 TABLET ORAL 2 TIMES DAILY PRN
Qty: 60 TABLET | Refills: 2 | Status: SHIPPED | OUTPATIENT
Start: 2024-05-14 | End: 2024-08-12

## 2024-05-14 RX ORDER — BUPROPION HYDROCHLORIDE 300 MG/1
300 TABLET ORAL EVERY MORNING
Qty: 90 TABLET | Refills: 3 | Status: SHIPPED | OUTPATIENT
Start: 2024-05-14 | End: 2025-05-14

## 2024-05-14 RX ORDER — LISDEXAMFETAMINE DIMESYLATE 70 MG/1
70 CAPSULE ORAL EVERY MORNING
Qty: 30 CAPSULE | Refills: 0 | Status: SHIPPED | OUTPATIENT
Start: 2024-05-14 | End: 2024-06-13

## 2024-05-14 RX ORDER — ATOMOXETINE 18 MG/1
18 CAPSULE ORAL DAILY
Qty: 30 CAPSULE | Refills: 3 | Status: SHIPPED | OUTPATIENT
Start: 2024-05-14 | End: 2025-05-14

## 2024-05-14 NOTE — PROGRESS NOTES
Follow-up visit   Subjective: She said she had some hypomanic sx shortly after the ketamine infusion 11 days again. Saphris did not help at that time, but Invega 3 mg did bright down the hyppmanic sx and she stopped Invega. She said she started having hypomanic sx again from last Thursday after started taking  a steroid for her back pain. She said she took Invega 3 mg at bedtime again from last Thursday and felt calm down again from yesterday. She said she felt normal again for the second day. No depression. Enjoyed life. Ate well. No change in appetite or weight. Slept ok with Invega 3 mg, slept 9 hours last night. Felt rested this morning. Energy was good. Concentration was not good. Vyvanse did not help her as much as before. She said it was still better than nothing. Still enjoyed life overall. Denied having SI/HI/AVH or paranoia. No side effect from medications  Able to take care of herself. No problem at work or at home.    No problem with eyes,ears, nose or throat  No SOB or chest pain  No GI sx  No  sx  Back pain on medication. Migraine under control,  but no other neurological problem   No problem with bones, joints, but the back pain with twisted muscles  No problem with blood   No problem with skin    DM - under control   Asthma - under control   No new allergy    Objective:   Appearance: well-groomed.   Demeanor: average.   Eye Contact: average.   Motor Activity: average.   Speech: clear.   Language: Neurologic language is intact.   Fund of Knowledge: intact fund of knowledge.   Delusions: None Reported.   Hallucinations: not reported  Self Harm: None Reported.   Aggressive: None Reported.   Mood: good  Affect: full with smile occasionally   Orientation: alert, oriented x3.   Manner: cooperative.   Thought process: goal-directed.   Thought association: displays rational thought process.   Content of thought: normal  Abstract/ Rational Thought: intact   Memory: grossly intact.   Behavior: normal motor  activity, relaxed, good eye contact, calm.   Attention/Concentration: normal.   Cognition: intact.   Intelligence Estimate: average.   Executive Function: intact.   Insight: intact.   Judgement: intact.   Musculoskeletal: normal strength and tone. Normal gait. No abnormal movement   Impression:   Bipolar I disorder, most recently, hypomanic sx, improved with Invega 3 mg at bedtime  ANUP - under control   ADHD - still problematic even with Vyvanse   Discussion/Plan:   Add Strattera 18 mg for ADHD, may double the dose after 3-4 weeks if doesn't have much improvement. Risk for hypomania was explained. She fully understood.   Increase Wellbutrin to 300 mg per day to see if ADHD sx improves   Continue other medications   Follow-up in 3 months.   Ata Nova MD.

## 2024-05-16 ENCOUNTER — TELEMEDICINE (OUTPATIENT)
Dept: BEHAVIORAL HEALTH | Facility: CLINIC | Age: 39
End: 2024-05-16
Payer: COMMERCIAL

## 2024-05-16 DIAGNOSIS — F31.9 BIPOLAR 1 DISORDER (MULTI): ICD-10-CM

## 2024-05-16 PROCEDURE — 90837 PSYTX W PT 60 MINUTES: CPT | Performed by: PSYCHOLOGIST

## 2024-05-16 NOTE — GROUP NOTE
Group Topic: Social Skills   Group Date: 5/14/2024  Start Time:  6:00 PM  End Time:  7:30 PM  Facilitators: Garima Wong PsyD   Department:  [facility]    Number of Participants: 11   Group Focus: social skills  Treatment Modality: Psychoeducation  Interventions utilized were exploration  Purpose: other: Relationship dynamics    This was a video IOP Recovery group on a HIPAA compliant platform. All patients were provided with informed consent.    IOP Aftercare Group: Relationship dynamics  Participants had opportunity to check-in with their peers and discuss recent stressors. They explored ways to nurture relationships as well as how to cope with relationships that have begun to deteriorate. Group members offered support to their peers.   Garima Wong Psy.D.      Name: Kelli Silva YOB: 1985   MR: [id number]      Kelli was present and actively engaged in discussion. She stated that she has been checking in with warning signs for ángel and responding as necessary. She reported that she was struggling with pain due to a recent injury and left group early.

## 2024-05-16 NOTE — PROGRESS NOTES
Insight Oriented Therapy. 50 min.  BP1. We spoke about her work, a couple difficult clients she wasn't sure what to do, realized she was taking too much responsibility for the clients progress.  We spoke about her getting more acclimated to her living situation. We also spoke a bit more about catching up on notes this weekend and the training she has coming up.     Chief complaint - Phase of life   Treatment plan - Insight and action consistent with ACT therapy. Providing support, safe space to process their thoughts and feelings, developing therapeutic relationship.  Goals - Self-care, insight, coping skills  Prognosis - Average  Progress - Average  Functional status - 70/100  Focused mental status: Patient was oriented to person, place, time and context  Focused mental exam - alert and oriented  Frequency - Every two weeks

## 2024-05-20 ENCOUNTER — TELEPHONE (OUTPATIENT)
Dept: BEHAVIORAL HEALTH | Facility: CLINIC | Age: 39
End: 2024-05-20
Payer: COMMERCIAL

## 2024-05-21 ENCOUNTER — CLINICAL SUPPORT (OUTPATIENT)
Dept: BEHAVIORAL HEALTH | Facility: CLINIC | Age: 39
End: 2024-05-21
Payer: COMMERCIAL

## 2024-05-21 DIAGNOSIS — F31.9 BIPOLAR 1 DISORDER (MULTI): ICD-10-CM

## 2024-05-21 DIAGNOSIS — F31.9 BIPOLAR 1 DISORDER (MULTI): Primary | ICD-10-CM

## 2024-05-21 DIAGNOSIS — F41.1 GAD (GENERALIZED ANXIETY DISORDER): ICD-10-CM

## 2024-05-21 PROCEDURE — 90853 GROUP PSYCHOTHERAPY: CPT | Performed by: PSYCHOLOGIST

## 2024-05-21 RX ORDER — ARIPIPRAZOLE 400 MG
400 KIT INTRAMUSCULAR
Qty: 1.3 ML | Refills: 11 | Status: SHIPPED | OUTPATIENT
Start: 2024-05-21

## 2024-05-21 RX ORDER — ARIPIPRAZOLE 400 MG
400 KIT INTRAMUSCULAR
Qty: 1.3 ML | Refills: 11 | Status: SHIPPED | OUTPATIENT
Start: 2024-05-21 | End: 2024-05-21 | Stop reason: SDUPTHER

## 2024-05-22 ENCOUNTER — APPOINTMENT (OUTPATIENT)
Dept: CARDIOLOGY | Facility: HOSPITAL | Age: 39
End: 2024-05-22
Payer: COMMERCIAL

## 2024-05-22 ENCOUNTER — APPOINTMENT (OUTPATIENT)
Dept: RADIOLOGY | Facility: HOSPITAL | Age: 39
End: 2024-05-22
Payer: COMMERCIAL

## 2024-05-22 ENCOUNTER — HOSPITAL ENCOUNTER (EMERGENCY)
Facility: HOSPITAL | Age: 39
Discharge: HOME | End: 2024-05-22
Attending: STUDENT IN AN ORGANIZED HEALTH CARE EDUCATION/TRAINING PROGRAM
Payer: COMMERCIAL

## 2024-05-22 VITALS
DIASTOLIC BLOOD PRESSURE: 65 MMHG | OXYGEN SATURATION: 100 % | RESPIRATION RATE: 18 BRPM | SYSTOLIC BLOOD PRESSURE: 140 MMHG | HEART RATE: 83 BPM

## 2024-05-22 DIAGNOSIS — E16.2 HYPOGLYCEMIA: Primary | ICD-10-CM

## 2024-05-22 DIAGNOSIS — R03.0 ELEVATED BLOOD PRESSURE READING: ICD-10-CM

## 2024-05-22 DIAGNOSIS — N30.00 ACUTE CYSTITIS WITHOUT HEMATURIA: ICD-10-CM

## 2024-05-22 LAB
ALBUMIN SERPL BCP-MCNC: 3.9 G/DL (ref 3.4–5)
ALP SERPL-CCNC: 79 U/L (ref 33–110)
ALT SERPL W P-5'-P-CCNC: 25 U/L (ref 7–45)
ANION GAP SERPL CALC-SCNC: 11 MMOL/L (ref 10–20)
APPEARANCE UR: CLEAR
AST SERPL W P-5'-P-CCNC: 15 U/L (ref 9–39)
B-HCG SERPL-ACNC: <2 MIU/ML
BASOPHILS # BLD AUTO: 0.02 X10*3/UL (ref 0–0.1)
BASOPHILS NFR BLD AUTO: 0.2 %
BILIRUB SERPL-MCNC: 0.2 MG/DL (ref 0–1.2)
BILIRUB UR STRIP.AUTO-MCNC: NEGATIVE MG/DL
BUN SERPL-MCNC: 7 MG/DL (ref 6–23)
CALCIUM SERPL-MCNC: 8.9 MG/DL (ref 8.6–10.3)
CHLORIDE SERPL-SCNC: 106 MMOL/L (ref 98–107)
CO2 SERPL-SCNC: 27 MMOL/L (ref 21–32)
COLOR UR: ABNORMAL
CREAT SERPL-MCNC: 0.77 MG/DL (ref 0.5–1.05)
EGFRCR SERPLBLD CKD-EPI 2021: >90 ML/MIN/1.73M*2
EOSINOPHIL # BLD AUTO: 0.03 X10*3/UL (ref 0–0.7)
EOSINOPHIL NFR BLD AUTO: 0.3 %
ERYTHROCYTE [DISTWIDTH] IN BLOOD BY AUTOMATED COUNT: 13.7 % (ref 11.5–14.5)
GLUCOSE BLD MANUAL STRIP-MCNC: 70 MG/DL (ref 74–99)
GLUCOSE BLD MANUAL STRIP-MCNC: 98 MG/DL (ref 74–99)
GLUCOSE SERPL-MCNC: 65 MG/DL (ref 74–99)
GLUCOSE UR STRIP.AUTO-MCNC: NORMAL MG/DL
HCT VFR BLD AUTO: 40.6 % (ref 36–46)
HGB BLD-MCNC: 13.7 G/DL (ref 12–16)
IMM GRANULOCYTES # BLD AUTO: 0.07 X10*3/UL (ref 0–0.7)
IMM GRANULOCYTES NFR BLD AUTO: 0.6 % (ref 0–0.9)
KETONES UR STRIP.AUTO-MCNC: NEGATIVE MG/DL
LACTATE SERPL-SCNC: 1 MMOL/L (ref 0.4–2)
LEUKOCYTE ESTERASE UR QL STRIP.AUTO: ABNORMAL
LIPASE SERPL-CCNC: 42 U/L (ref 9–82)
LYMPHOCYTES # BLD AUTO: 2.6 X10*3/UL (ref 1.2–4.8)
LYMPHOCYTES NFR BLD AUTO: 22.5 %
MAGNESIUM SERPL-MCNC: 1.77 MG/DL (ref 1.6–2.4)
MCH RBC QN AUTO: 29.6 PG (ref 26–34)
MCHC RBC AUTO-ENTMCNC: 33.7 G/DL (ref 32–36)
MCV RBC AUTO: 88 FL (ref 80–100)
MONOCYTES # BLD AUTO: 0.66 X10*3/UL (ref 0.1–1)
MONOCYTES NFR BLD AUTO: 5.7 %
MUCOUS THREADS #/AREA URNS AUTO: ABNORMAL /LPF
NEUTROPHILS # BLD AUTO: 8.17 X10*3/UL (ref 1.2–7.7)
NEUTROPHILS NFR BLD AUTO: 70.7 %
NITRITE UR QL STRIP.AUTO: NEGATIVE
NRBC BLD-RTO: 0 /100 WBCS (ref 0–0)
PH UR STRIP.AUTO: 7 [PH]
PLATELET # BLD AUTO: 255 X10*3/UL (ref 150–450)
POTASSIUM SERPL-SCNC: 3.9 MMOL/L (ref 3.5–5.3)
PROT SERPL-MCNC: 6.3 G/DL (ref 6.4–8.2)
PROT UR STRIP.AUTO-MCNC: NEGATIVE MG/DL
RBC # BLD AUTO: 4.63 X10*6/UL (ref 4–5.2)
RBC # UR STRIP.AUTO: NEGATIVE /UL
RBC #/AREA URNS AUTO: ABNORMAL /HPF
SODIUM SERPL-SCNC: 140 MMOL/L (ref 136–145)
SP GR UR STRIP.AUTO: 1.02
SQUAMOUS #/AREA URNS AUTO: ABNORMAL /HPF
UROBILINOGEN UR STRIP.AUTO-MCNC: NORMAL MG/DL
WBC # BLD AUTO: 11.6 X10*3/UL (ref 4.4–11.3)
WBC #/AREA URNS AUTO: ABNORMAL /HPF
YEAST BUDDING #/AREA UR COMP ASSIST: PRESENT /HPF

## 2024-05-22 PROCEDURE — 74176 CT ABD & PELVIS W/O CONTRAST: CPT

## 2024-05-22 PROCEDURE — 87086 URINE CULTURE/COLONY COUNT: CPT | Mod: PARLAB

## 2024-05-22 PROCEDURE — 2500000004 HC RX 250 GENERAL PHARMACY W/ HCPCS (ALT 636 FOR OP/ED)

## 2024-05-22 PROCEDURE — 84702 CHORIONIC GONADOTROPIN TEST: CPT

## 2024-05-22 PROCEDURE — 82947 ASSAY GLUCOSE BLOOD QUANT: CPT

## 2024-05-22 PROCEDURE — 74176 CT ABD & PELVIS W/O CONTRAST: CPT | Performed by: RADIOLOGY

## 2024-05-22 PROCEDURE — 83735 ASSAY OF MAGNESIUM: CPT

## 2024-05-22 PROCEDURE — 83605 ASSAY OF LACTIC ACID: CPT

## 2024-05-22 PROCEDURE — 2500000001 HC RX 250 WO HCPCS SELF ADMINISTERED DRUGS (ALT 637 FOR MEDICARE OP)

## 2024-05-22 PROCEDURE — 2500000005 HC RX 250 GENERAL PHARMACY W/O HCPCS

## 2024-05-22 PROCEDURE — 81003 URINALYSIS AUTO W/O SCOPE: CPT

## 2024-05-22 PROCEDURE — 83690 ASSAY OF LIPASE: CPT

## 2024-05-22 PROCEDURE — 99285 EMERGENCY DEPT VISIT HI MDM: CPT | Mod: 25

## 2024-05-22 PROCEDURE — 80053 COMPREHEN METABOLIC PANEL: CPT

## 2024-05-22 PROCEDURE — 93005 ELECTROCARDIOGRAM TRACING: CPT

## 2024-05-22 PROCEDURE — 85025 COMPLETE CBC W/AUTO DIFF WBC: CPT

## 2024-05-22 RX ORDER — HEPARIN 100 UNIT/ML
1 SYRINGE INTRAVENOUS ONCE
Status: COMPLETED | OUTPATIENT
Start: 2024-05-22 | End: 2024-05-22

## 2024-05-22 RX ORDER — CIPROFLOXACIN 500 MG/1
250 TABLET ORAL ONCE
Status: COMPLETED | OUTPATIENT
Start: 2024-05-22 | End: 2024-05-22

## 2024-05-22 RX ORDER — CIPROFLOXACIN 500 MG/1
250 TABLET ORAL 2 TIMES DAILY
Qty: 3 TABLET | Refills: 0 | Status: SHIPPED | OUTPATIENT
Start: 2024-05-22 | End: 2024-05-25

## 2024-05-22 RX ORDER — ONDANSETRON 4 MG/1
4 TABLET, ORALLY DISINTEGRATING ORAL ONCE
Status: COMPLETED | OUTPATIENT
Start: 2024-05-22 | End: 2024-05-22

## 2024-05-22 RX ADMIN — HEPARIN 100 UNITS: 100 SYRINGE at 17:30

## 2024-05-22 RX ADMIN — ONDANSETRON 4 MG: 4 TABLET, ORALLY DISINTEGRATING ORAL at 16:50

## 2024-05-22 RX ADMIN — CIPROFLOXACIN 250 MG: 500 TABLET ORAL at 16:35

## 2024-05-22 ASSESSMENT — COLUMBIA-SUICIDE SEVERITY RATING SCALE - C-SSRS
2. HAVE YOU ACTUALLY HAD ANY THOUGHTS OF KILLING YOURSELF?: NO
6. HAVE YOU EVER DONE ANYTHING, STARTED TO DO ANYTHING, OR PREPARED TO DO ANYTHING TO END YOUR LIFE?: NO
1. IN THE PAST MONTH, HAVE YOU WISHED YOU WERE DEAD OR WISHED YOU COULD GO TO SLEEP AND NOT WAKE UP?: NO

## 2024-05-22 NOTE — ED PROVIDER NOTES
HPI   Chief Complaint   Patient presents with    Hypoglycemia       Kelli is a 39-year-old female with a past medical history of T2DM on insulin, POTS, and bipolar disorder presenting to the emergency department by EMS with hypoglycemia.  Patient states earlier this morning while at work, she started to feel faint and had an episode of diaphoresis and dizziness.  She checked her blood sugar level with her DexCom and it read 55. Patient subsequently called EMS. Upon presentation, she is feeling slightly improved as she received sugar en route. She has struggled with hypoglycemia in the past and states it often occurs when she has a urinary tract infection. Patient is endorsing increased urinary frequency, but denies hematuria, pyuria, or dysuria. She feels slightly nauseous, which is another typical symptom she gets with urinary tract infections. Patient denies any fever, chills. She is endorsing a generalized abdominal pain. History of cholecystectomy. Denies chance of pregnancy.  No chest pain or shortness of breath. 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 Patient still has a PICC line for infusions. She tells me she gets infusions for migraine and pain, but no chemotherapy.                           Luanne Coma Scale Score: 15                     Patient History   Past Medical History:   Diagnosis Date    Normal routine history and physical examination     Normal routine history and physical examination     Other specified health status     No pertinent past medical history     Past Surgical History:   Procedure Laterality Date    OTHER SURGICAL HISTORY  05/08/2020    Venous access port placement     Family History   Problem Relation Name Age of Onset    Brain cancer Father      Kidney cancer Maternal Grandfather      Kidney cancer Sibling       Social History     Tobacco Use    Smoking status: Never    Smokeless tobacco: Never   Vaping Use     Vaping status: Never Used   Substance Use Topics    Alcohol use: Never    Drug use: Never       Physical Exam   ED Triage Vitals [05/22/24 1145]   Temp Heart Rate Respirations BP   -- 95 18 (!) 136/96      Pulse Ox Temp src Heart Rate Source Patient Position   96 % -- -- --      BP Location FiO2 (%)     -- --       Physical Exam  Constitutional:       Appearance: She is obese.   HENT:      Mouth/Throat:      Mouth: Mucous membranes are moist.      Pharynx: Oropharynx is clear.   Eyes:      Extraocular Movements: Extraocular movements intact.   Cardiovascular:      Rate and Rhythm: Normal rate and regular rhythm.      Pulses: Normal pulses.      Heart sounds: Normal heart sounds.   Pulmonary:      Effort: Pulmonary effort is normal.      Breath sounds: Normal breath sounds.   Abdominal:      General: Abdomen is flat.      Palpations: Abdomen is soft.      Tenderness: There is abdominal tenderness. There is no guarding or rebound.   Musculoskeletal:      Cervical back: Normal range of motion. No rigidity.   Neurological:      General: No focal deficit present.      Mental Status: She is alert and oriented to person, place, and time.         ED Course & MDM   ED Course as of 05/22/24 2221   Wed May 22, 2024   1310 LEUKOCYTES (10*3/UL) IN BLOOD BY AUTOMATED COUNT, GAYLE(!): 11.6 [AH]   1310 NEUTROPHILS (10*3/UL) IN BLOOD BY AUTOMATED COUNT, GAYLE(!): 8.17 [AH]   1310 EKG normal sinus rate and rhythm.  .  QTc 416.  No STEMI. [AH]   1349 CT abdomen pelvis wo IV contrast  Acute no significant change compared to 09/23/2021. [AH]   1407 Dexcom reading sugar of 130. Pending POC by nursing staff. [AH]   1624 Leukocyte Esterase, Urine(!): 75 Connie/µL [AH]   1708 Budding Yeast, Urine(!): PRESENT []      ED Course User Index  [] Paola Daley PA-C         Diagnoses as of 05/22/24 2221   Hypoglycemia   Acute cystitis without hematuria   Elevated blood pressure reading       Medical Decision Making  Kelli oscar  39-year-old female with a past medical history of T2DM on insulin, POTS, and bipolar disorder presenting to the emergency department by EMS with hypoglycemia. Patient is endorsing increased urinary frequency, generalized abdominal cramping, and nausea in the ED today.     Medical Decision Making: The patient is a chronically ill-appearing, obese female that does not appear to be in any acute distress.  Vital signs reviewed and stable.  POC glucose on arrival was 70.  Patient is endorsing generalized abdominal pain with generalized tenderness to palpation.  Workup included CBC, CMP, magnesium, lipase, lactate, quantitative hCG, EKG, UA, CT abdomen pelvis without IV contrast.  Patient is allergic to contrast and my suspicion for intra-abdominal process is low as she has no point tenderness rather generalized tenderness.  Considering this, I am okay with CT without IV contrast.  CBC shows mild leukocytosis of 11.6.  CMP demonstrated glucose of 65.  No critical electrolyte finding or RAJIV.  Lactate was unremarkable.  CT does not show any acute changes.  UA demonstrates leukocyte Estrace.  There are no white blood cells or bacteria, however patient does have a mild leukocytosis and she is endorsing urinary symptoms.  At this time, she will be treated for acute cystitis without hematuria.  Unfortunately, she has a lot of allergies.  The only medication that she can tolerate is ciprofloxacin.  Patient was given the first dose of ciprofloxacin in the emergency room today.  I emphasized the importance of avoiding exertion while on this medication due to the black box warning of tendon rupture.  Patient was understanding and agreeable to taking this medication.  Throughout her stay in the emergency room, I was encouraging patient to eat food and drink apple juice.  She was able to tolerate p.o. intake.  Blood sugar on her Dexcom started to read in the 130s range.  At discharge, her POC was 95.  Patient does state that she  feels improved and is ready to go home.  I recommended following up with her primary care provider regarding her visit to the ED today and to discuss both her hypoglycemia and elevated blood pressure reading collected today.  She should return if she develops hypoglycemia, worsening abdominal pain/urinary symptoms, persistent nausea/vomiting, fever, chest pain, shortness of breath.  All question concerns were addressed.     Differential diagnoses considered: Hypoglycemia, electrolyte disturbance, RAJIV, arrhythmia, urinary tract infection, pyelonephritis, diverticulitis, appendicitis, pregnancy, SBO, etc.     Medications given: Zofran and ciprofloxacin      Diagnosis: Acute cystitis without hematuria, elevated blood pressure reading, hypoglycemia    Plan: Discharge          Procedure  Procedures     Paola Daley PA-C  05/23/24 1543       Paola Daley PA-C  05/23/24 1543

## 2024-05-22 NOTE — DISCHARGE INSTRUCTIONS
You were seen in the emergency room today for low blood sugar and urinary symptoms.  In the emergency room, we are able to correct your low blood sugar and found that you do have a urinary tract infection.  You were given your first dose of ciprofloxacin in the emergency room today.  Please be advised this medication does cause increased risk of tendon rupture.  You should not overexert yourself while taking this antibiotic.  Please take the antibiotic as in full and follow-up with your primary care provider regarding your visit to the ED today.  You should also discuss your elevated blood pressure reading in the emergency room today.  Reasons to return to the emergency room include chest pain, shortness of breath, fever/chills, abdominal pain, low blood sugar, high blood sugar.

## 2024-05-22 NOTE — ED TRIAGE NOTES
Patient to ER for Hypoglycemia. States that her sugar was in the 50s this morning. She called EMS who gave her 15grams of sugar, current glucose is 70 at triage. Patient awake and alert, states that she was shaky and sweaty this morning when it happened.

## 2024-05-22 NOTE — ED NOTES
Writer discussed discharge information with patient. Patient verbalized understanding. Questions answered. No acute distress upon discharge. Port removed.      Mary Zelaya RN  05/22/24 0877     Rituxan Pregnancy And Lactation Text: This medication is Pregnancy Category C and it isn't know if it is safe during pregnancy. It is unknown if this medication is excreted in breast milk but similar antibodies are known to be excreted.

## 2024-05-22 NOTE — Clinical Note
Kelli Silva was seen and treated in our emergency department on 5/22/2024.  She may return to work on 05/23/2024.       If you have any questions or concerns, please don't hesitate to call.      Paola Daley PA-C

## 2024-05-23 ENCOUNTER — TELEMEDICINE (OUTPATIENT)
Dept: BEHAVIORAL HEALTH | Facility: CLINIC | Age: 39
End: 2024-05-23
Payer: COMMERCIAL

## 2024-05-23 DIAGNOSIS — F31.9 BIPOLAR 1 DISORDER (MULTI): ICD-10-CM

## 2024-05-23 LAB
BACTERIA UR CULT: NORMAL
HOLD SPECIMEN: NORMAL

## 2024-05-23 PROCEDURE — 90832 PSYTX W PT 30 MINUTES: CPT | Performed by: PSYCHOLOGIST

## 2024-05-23 NOTE — PROGRESS NOTES
Insight Oriented Therapy. 50 min. BP1.  We spoke about her having a kidney infection that's been weighing pretty heavy on her despite getting good news this week that her complaint was dismissed by the board.  We spoke about taking today off and trying to let herself cope as tomorrow is a big day.     Chief complaint - Phase of life   Treatment plan - Insight and action consistent with ACT therapy. Providing support, safe space to process their thoughts and feelings, developing therapeutic relationship.  Goals - Self-care, insight, coping skills  Prognosis - Average  Progress - Average  Functional status - 70/100  Focused mental status: Patient was oriented to person, place, time and context  Focused mental exam - alert and oriented  Frequency - Every two weeks

## 2024-05-28 ENCOUNTER — CLINICAL SUPPORT (OUTPATIENT)
Dept: BEHAVIORAL HEALTH | Facility: CLINIC | Age: 39
End: 2024-05-28
Payer: COMMERCIAL

## 2024-05-28 DIAGNOSIS — F31.9 BIPOLAR 1 DISORDER (MULTI): ICD-10-CM

## 2024-05-28 DIAGNOSIS — F41.1 GAD (GENERALIZED ANXIETY DISORDER): ICD-10-CM

## 2024-05-28 PROCEDURE — 90853 GROUP PSYCHOTHERAPY: CPT | Performed by: PSYCHOLOGIST

## 2024-05-28 NOTE — GROUP NOTE
Group Topic: Social Skills   Group Date: 5/21/2024  Start Time:  6:00 PM  End Time:  7:30 PM  Facilitators: Garima Wong PsyD   Department:  [facility]    Number of Participants: 11   Group Focus: social skills  Treatment Modality: Psychoeducation  Interventions utilized were patient education  Purpose: communication skills    This was a video IOP aftercare group on a HIPAA compliant platform. All patients were provided with informed consent.    IOP Aftercare Group: Relationships  Group members continued discussion around managing challenges in relationships and navigating difficult conversations. They helped support each other with recent difficulties in relationship dynamics.   Garima Wong Psy.D.      Name: Kelli Silva YOB: 1985   MR: [id number]      Kelli was present and actively listened to the discussion before leaving due to illness. She discussed family related stressors.

## 2024-05-29 LAB
ATRIAL RATE: 94 BPM
P AXIS: 50 DEGREES
PR INTERVAL: 142 MS
Q ONSET: 249 MS
QRS COUNT: 15 BEATS
QRS DURATION: 78 MS
QT INTERVAL: 332 MS
QTC CALCULATION(BAZETT): 416 MS
QTC FREDERICIA: 385 MS
R AXIS: -2 DEGREES
T AXIS: 25 DEGREES
T OFFSET: 415 MS
VENTRICULAR RATE: 94 BPM

## 2024-05-30 ENCOUNTER — TELEMEDICINE (OUTPATIENT)
Dept: BEHAVIORAL HEALTH | Facility: CLINIC | Age: 39
End: 2024-05-30
Payer: COMMERCIAL

## 2024-05-30 DIAGNOSIS — F31.9 BIPOLAR 1 DISORDER (MULTI): ICD-10-CM

## 2024-05-30 PROCEDURE — 90837 PSYTX W PT 60 MINUTES: CPT | Performed by: PSYCHOLOGIST

## 2024-05-30 NOTE — GROUP NOTE
Group Topic: Community   Group Date: 5/28/2024  Start Time:  6:00 PM  End Time:  7:30 PM  Facilitators: Garima Wong PsyD   Department:  [facility]    Number of Participants: 14   Group Focus: check in  Treatment Modality: Patient-Centered Therapy  Interventions utilized were problem solving and support  Purpose: coping skills and feelings    This was a video IOP recovery group on a HIPAA compliant platform. All patients were provided with informed consent.    IOP Aftercare Group: Check-in  Participants had opportunity to check-in with their peers and discuss recent stressors. They spent time supporting each other with challenges and shared personal experiences of coping.   Garima Wong Psy.D.      Name: Kelli Silva YOB: 1985   MR: [id number]      Kelli was present and actively listened to discussion.

## 2024-05-30 NOTE — PROGRESS NOTES
Insight Oriented Therapy. 50 min.  BP1.  Pt feeling a mild depression likely due physical struggles and sleep issues, but also filters into her work and lack of efficacy.  We spoke about not overthinking the big things, while mastering small steps of self-care.     Chief complaint - Phase of life   Treatment plan - Insight and action consistent with ACT therapy. Providing support, safe space to process their thoughts and feelings, developing therapeutic relationship.  Goals - Self-care, insight, coping skills  Prognosis - Average  Progress - Average  Functional status - 70/100  Focused mental status: Patient was oriented to person, place, time and context  Focused mental exam - alert and oriented  Frequency - Every two weeks

## 2024-06-06 ENCOUNTER — PROCEDURE VISIT (OUTPATIENT)
Dept: BEHAVIORAL HEALTH | Facility: CLINIC | Age: 39
End: 2024-06-06
Payer: COMMERCIAL

## 2024-06-06 ENCOUNTER — TELEMEDICINE (OUTPATIENT)
Dept: BEHAVIORAL HEALTH | Facility: CLINIC | Age: 39
End: 2024-06-06
Payer: COMMERCIAL

## 2024-06-06 VITALS
TEMPERATURE: 98.3 F | HEART RATE: 98 BPM | RESPIRATION RATE: 16 BRPM | SYSTOLIC BLOOD PRESSURE: 134 MMHG | DIASTOLIC BLOOD PRESSURE: 94 MMHG

## 2024-06-06 DIAGNOSIS — F31.9 BIPOLAR 1 DISORDER (MULTI): ICD-10-CM

## 2024-06-06 DIAGNOSIS — F31.0 BIPOLAR AFFECTIVE DISORDER, CURRENT EPISODE HYPOMANIC (MULTI): Primary | ICD-10-CM

## 2024-06-06 PROCEDURE — 90837 PSYTX W PT 60 MINUTES: CPT | Performed by: PSYCHOLOGIST

## 2024-06-06 NOTE — PROGRESS NOTES
Insight Oriented Therapy. 50 min.  BP1.  She was a bit hypomanic so we discussed low stim activities and coping with pacing at work, one hour at a time. We spoke about how she can work on ways to manage stress, stay in her emilia, and do good work.     Chief complaint - Phase of life   Treatment plan - Insight and action consistent with ACT therapy. Providing support, safe space to process their thoughts and feelings, developing therapeutic relationship.  Goals - Self-care, insight, coping skills  Prognosis - Average  Progress - Average  Functional status - 70/100  Focused mental status: Patient was oriented to person, place, time and context  Focused mental exam - alert and oriented  Frequency - Every two weeks

## 2024-06-06 NOTE — PROGRESS NOTES
Patient here at the clinic for her monthly injection of Abilify Maintena for Bipolar affective disorder,current episode hypomanic. Patient identified by full name and , vitals obtained. Patient last seen by Provider on 24, last seen by nurse on 24. Medication verified by providers note and medication order.      Appetite:  good  Sleep:  good  Appearance: clean, age appropriate and well groomed  Build: overweight  Attitude: cooperative, calm, pleasant, friendly, open  Eye Contact: normal  Activity: alert, attentive, appropriate  Speech: appropriate & spontaneous, normal rate & flow, clear  Delusions: denies  Thought Content: logical  Thought Process: logical  Judgement/insight:  Intact/good  Mood: calm   Affect: appropriate  AH/VH/SI/HI patient denies  Access to Firearms/weapons: No  Depression: Patient denies  Thoughts of hopelessness: denies  Anxiety: pt denies  Self-injurious behavior: denies at this time  Cravings/Urges: denies  Last Use: NA  Tobacco Use: Non smoker  Spiritual or cultural practices that may affect your care or impact your health care decisions? No  Living situation:  Lives with boyfriend  Employed: Employed at [employer]      Patient has an appropriate affect,engaged and cooperative. Patient denies SI/HI, VH/AH, self harming thoughts at this time.       Patient received Abilify Maintena 400mg  via 23 gauge needle in right deltoid with no reaction to the injection site, patient tolerated the injection well.  Patient will return to the clinic in 4 weeks.    RN provided education on medication and side effects,  Patient is aware to contact nurse for any adverse reactions or  911/ mobile crisis number for emergent issues.      LOT: [id number]  EXP:  [id number]  ND:  [phone]     Solange Newton, MELECIO,BSN

## 2024-06-12 ENCOUNTER — HOSPITAL ENCOUNTER (OUTPATIENT)
Dept: POSTOP/PACU | Facility: HOSPITAL | Age: 39
Setting detail: OUTPATIENT SURGERY
Discharge: HOME | End: 2024-06-12

## 2024-06-12 VITALS
DIASTOLIC BLOOD PRESSURE: 78 MMHG | WEIGHT: 240 LBS | BODY MASS INDEX: 43.9 KG/M2 | HEART RATE: 74 BPM | RESPIRATION RATE: 16 BRPM | OXYGEN SATURATION: 98 % | TEMPERATURE: 98.2 F | SYSTOLIC BLOOD PRESSURE: 122 MMHG

## 2024-06-12 DIAGNOSIS — F31.9 BIPOLAR I DISORDER WITH DEPRESSION (MULTI): Primary | ICD-10-CM

## 2024-06-12 DIAGNOSIS — F90.0 ATTENTION DEFICIT HYPERACTIVITY DISORDER (ADHD), PREDOMINANTLY INATTENTIVE TYPE: ICD-10-CM

## 2024-06-12 PROCEDURE — 2500000005 HC RX 250 GENERAL PHARMACY W/O HCPCS: Performed by: PSYCHIATRY & NEUROLOGY

## 2024-06-12 PROCEDURE — KETSP HC KETAMINE INFUSION SELF-PAY: Performed by: PSYCHIATRY & NEUROLOGY

## 2024-06-12 PROCEDURE — 2500000004 HC RX 250 GENERAL PHARMACY W/ HCPCS (ALT 636 FOR OP/ED): Performed by: PSYCHIATRY & NEUROLOGY

## 2024-06-12 PROCEDURE — KETSP PR KETAMINE INFUSION SELF-PAY: Performed by: PSYCHIATRY & NEUROLOGY

## 2024-06-12 RX ORDER — HEPARIN 100 UNIT/ML
500 SYRINGE INTRAVENOUS AS NEEDED
Status: DISCONTINUED | OUTPATIENT
Start: 2024-06-12 | End: 2024-06-13 | Stop reason: HOSPADM

## 2024-06-12 RX ORDER — ONDANSETRON HYDROCHLORIDE 2 MG/ML
8 INJECTION, SOLUTION INTRAVENOUS ONCE
Status: COMPLETED | OUTPATIENT
Start: 2024-06-12 | End: 2024-06-12

## 2024-06-12 RX ORDER — HEPARIN SODIUM,PORCINE/PF 10 UNIT/ML
50 SYRINGE (ML) INTRAVENOUS AS NEEDED
Status: DISCONTINUED | OUTPATIENT
Start: 2024-06-12 | End: 2024-06-13 | Stop reason: HOSPADM

## 2024-06-12 RX ORDER — KETAMINE HCL IN 0.9 % NACL 200 MG/200
0.75 PLASTIC BAG, INJECTION (ML) INTRAVENOUS ONCE
Status: COMPLETED | OUTPATIENT
Start: 2024-06-12 | End: 2024-06-12

## 2024-06-12 RX ORDER — ATOMOXETINE 18 MG/1
18 CAPSULE ORAL 2 TIMES DAILY
Qty: 60 CAPSULE | Refills: 2 | Status: SHIPPED | OUTPATIENT
Start: 2024-06-12 | End: 2025-06-12

## 2024-06-12 RX ORDER — HEPARIN 100 UNIT/ML
500 SYRINGE INTRAVENOUS AS NEEDED
OUTPATIENT
Start: 2024-06-12

## 2024-06-12 RX ORDER — LISDEXAMFETAMINE DIMESYLATE 70 MG/1
70 CAPSULE ORAL EVERY MORNING
Qty: 30 CAPSULE | Refills: 0 | Status: SHIPPED | OUTPATIENT
Start: 2024-06-12 | End: 2024-07-12

## 2024-06-12 RX ORDER — KETAMINE HCL IN 0.9 % NACL 200 MG/200
0.75 PLASTIC BAG, INJECTION (ML) INTRAVENOUS ONCE
Start: 2024-06-12 | End: 2024-06-12

## 2024-06-12 RX ORDER — ONDANSETRON HYDROCHLORIDE 2 MG/ML
8 INJECTION, SOLUTION INTRAVENOUS ONCE
Status: CANCELLED
Start: 2024-06-12 | End: 2024-06-12

## 2024-06-12 RX ORDER — HEPARIN SODIUM,PORCINE/PF 10 UNIT/ML
50 SYRINGE (ML) INTRAVENOUS AS NEEDED
OUTPATIENT
Start: 2024-06-12

## 2024-06-12 RX ORDER — KETAMINE HCL IN 0.9 % NACL 200 MG/200
0.75 PLASTIC BAG, INJECTION (ML) INTRAVENOUS ONCE
Status: CANCELLED
Start: 2024-06-12 | End: 2024-06-12

## 2024-06-12 ASSESSMENT — ANXIETY QUESTIONNAIRES
4. TROUBLE RELAXING: NEARLY EVERY DAY
2. NOT BEING ABLE TO STOP OR CONTROL WORRYING: NEARLY EVERY DAY
6. BECOMING EASILY ANNOYED OR IRRITABLE: NOT AT ALL
3. WORRYING TOO MUCH ABOUT DIFFERENT THINGS: NEARLY EVERY DAY
1. FEELING NERVOUS, ANXIOUS, OR ON EDGE: NEARLY EVERY DAY
5. BEING SO RESTLESS THAT IT IS HARD TO SIT STILL: MORE THAN HALF THE DAYS
7. FEELING AFRAID AS IF SOMETHING AWFUL MIGHT HAPPEN: NOT AT ALL
GAD7 TOTAL SCORE: 14
IF YOU CHECKED OFF ANY PROBLEMS ON THIS QUESTIONNAIRE, HOW DIFFICULT HAVE THESE PROBLEMS MADE IT FOR YOU TO DO YOUR WORK, TAKE CARE OF THINGS AT HOME, OR GET ALONG WITH OTHER PEOPLE: EXTREMELY DIFFICULT

## 2024-06-12 ASSESSMENT — PATIENT HEALTH QUESTIONNAIRE - PHQ9
5. POOR APPETITE OR OVEREATING: MORE THAN HALF THE DAYS
3. TROUBLE FALLING OR STAYING ASLEEP OR SLEEPING TOO MUCH: NEARLY EVERY DAY
SUM OF ALL RESPONSES TO PHQ9 QUESTIONS 1 AND 2: 6
2. FEELING DOWN, DEPRESSED OR HOPELESS: NEARLY EVERY DAY
6. FEELING BAD ABOUT YOURSELF - OR THAT YOU ARE A FAILURE OR HAVE LET YOURSELF OR YOUR FAMILY DOWN: NEARLY EVERY DAY
SUM OF ALL RESPONSES TO PHQ QUESTIONS 1-9: 22
8. MOVING OR SPEAKING SO SLOWLY THAT OTHER PEOPLE COULD HAVE NOTICED. OR THE OPPOSITE, BEING SO FIGETY OR RESTLESS THAT YOU HAVE BEEN MOVING AROUND A LOT MORE THAN USUAL: MORE THAN HALF THE DAYS
4. FEELING TIRED OR HAVING LITTLE ENERGY: NEARLY EVERY DAY
9. THOUGHTS THAT YOU WOULD BE BETTER OFF DEAD, OR OF HURTING YOURSELF: NOT AT ALL
1. LITTLE INTEREST OR PLEASURE IN DOING THINGS: NEARLY EVERY DAY
10. IF YOU CHECKED OFF ANY PROBLEMS, HOW DIFFICULT HAVE THESE PROBLEMS MADE IT FOR YOU TO DO YOUR WORK, TAKE CARE OF THINGS AT HOME, OR GET ALONG WITH OTHER PEOPLE: EXTREMELY DIFFICULT
7. TROUBLE CONCENTRATING ON THINGS, SUCH AS READING THE NEWSPAPER OR WATCHING TELEVISION: NEARLY EVERY DAY

## 2024-06-12 ASSESSMENT — COLUMBIA-SUICIDE SEVERITY RATING SCALE - C-SSRS
1. IN THE PAST MONTH, HAVE YOU WISHED YOU WERE DEAD OR WISHED YOU COULD GO TO SLEEP AND NOT WAKE UP?: NO
6. HAVE YOU EVER DONE ANYTHING, STARTED TO DO ANYTHING, OR PREPARED TO DO ANYTHING TO END YOUR LIFE?: NO
2. HAVE YOU ACTUALLY HAD ANY THOUGHTS OF KILLING YOURSELF?: NO

## 2024-06-12 NOTE — PROGRESS NOTES
Ketamine/Esketamine Procedure    Name: Kelli  : 1985  MRN: [id number]    Date: 24    Time out was taken with staff to confirm correct patient and correct procedure to be performed.   Ketamine infusion at 0.75 mg/kg in 40 minutes  Subjective: She said she felt very depressed again for about 2 weeks because she was unable to receive ketamine infusion on time. She said she did not have motivation or energy either. She said she slept too much. Did not eat much, but no weight change. Concentration was not good either. Denied having SI/HI/AVH or paranoia.   She said she had kidney infection again and just completed a course of an antibiotic yesterday. No other change in medical hx  No physical compliant  QIDS-16=22  GAD7=14    Objective: She was awake, alert, cooperative, and pleasant. Normal gait. No abnormal movement. Good eye contact. Oriented x 3. Speech was normal. Affect was appropriate, restricted most of the time. Mood was very depressed. Denied having SI/HI/AVH or paranoia during the interview. No delusional thought. I/J were good. Memory was fair. Attention and concentration were good.     Ketamine infusion observation:  About 20 minutes after starting infusion, she said she did not feel anything. No side effect. Vital signs were stable. HR: 68; Pulse Ox: 95%; BP: 118/64 mmHg.   Shortly after the infusion, she still did not feel anything. No side effect. Vital signs were stable. HR: 67; Pulse Ox: 94%; BP: 134/74 mmHg.   At 1:45 pm, she felt back to normal. Her vital signs were HR: 78; Pulse Ox: 95%; BP: 117/55 mmHg.     Tolerated ketamine infusion well  No complication     Imp: bipolar I depression, severe   ANUP - moderate     Plan: Ketamine infusion in 2 weeks     Ata Nova MD.

## 2024-06-12 NOTE — H&P
Psychiatry Procedure H&P for each visit    Name: Kelli  : 1985  MRN: [id number]    Date: 24     History & Physical Reviewed:  Pregnancy/Lactating:  Are You Pregnant: No  Are You Currently Breastfeeding: No    I have reviewed the History and Physical dated:   History and Physical Reviewed and relevant findings noted. Patient examined to review pertinent physical findings: Yes  Home Medications Reviewed: Yes  Allergies Reviewed: Yes    ERAS (Enhanced Recovery After Surgery):  ERAS Patient: No    Consent:  COVID-19 Consent:   COVID-19 Risk Consent: Yes: Surgeon has reviewed key risks related to the risk of meli COVID-19 and if they contract COVID-19 what the risks are.

## 2024-06-12 NOTE — PATIENT INSTRUCTIONS
Homegoing Instructions    Patient: Kelli Silva  MRN: [id number]  YOB: 1985    Care Providers: Dr. Ata Toscano       Allergies:  Aspirin, Cigarette smoke, Fish containing products, Iodinated contrast media, Ketorolac, Ketorolac tromethamine, Ondansetron, Other, Prochlorperazine, Shellfish containing products, Sumatriptan, Azithromycin, Ceftriaxone, Doxycycline, Dulaglutide, House dust, Liraglutide, Metformin, Metoclopramide, Prednisone, Sitagliptin, [id number] antimigraine agents, and Zolmitriptan    Medications:  Current Outpatient Medications   Medication Sig Dispense Refill    Abilify Maintena 400 mg injection syringe Inject 400 mg into the muscle every 30 (thirty) days. 1.3 mL 11    Accu-Chek Guide test strips strip USE TO CHECK BLOOD SUGAR UP TO 4 TIMES PER DAY AS INSTRUCTED. E11.9      albuterol 2.5 mg /3 mL (0.083 %) nebulizer solution Inhale 3 mL (2.5 mg) every 6 hours if needed for wheezing or shortness of breath.      albuterol 90 mcg/actuation inhaler Inhale 2 puffs. 6 TIMES DAILY.      asenapine (Saphris) SL tablet TAKE 1 - 2 SUBLIQUAL EVERY NIGHT AT BED TIME AS NEEDED FOR SLEEP 180 tablet 1    atomoxetine (Strattera) 18 mg capsule Take 1 capsule (18 mg) by mouth once daily. Swallow capsule whole; do not open. If opened accidentally, do not touch eyes; wash hands immediately (product is an eye irritant). 30 capsule 3    Autolet lancing device USE AS INSTRUCTED TO CHECK BLOOD SUGAR UP TO 4 TIMES DAILY. E11.9      azelastine-fluticasone 137-50 mcg/spray spray,non-aerosol Administer 1 spray into each nostril twice a day.      buPROPion XL (Wellbutrin XL) 300 mg 24 hr tablet Take 1 tablet (300 mg) by mouth once daily in the morning. Do not crush, chew, or split. 90 tablet 3    busPIRone (Buspar) 30 mg tablet Take 1 tablet (30 mg) by mouth 2 times a day.      calcium carbonate-vitamin D3 (Oyster Shell) 250 mg-3.125 mcg (125 unit) tablet Take 1 tablet by mouth once daily.       cariprazine (Vraylar) 6 mg capsule Take 1 capsule (6 mg) by mouth once daily at bedtime. 30 capsule 6    cetirizine (ZyrTEC) 10 mg tablet Take 1 tablet (10 mg) by mouth once daily at bedtime.      CHLORZOXAZONE ORAL Take 500 mg by mouth 2 times a day as needed.      Corlanor 7.5 mg tablet Take 1 tablet (7.5 mg) by mouth 2 times daily (morning and late afternoon).      desvenlafaxine 100 mg 24 hr tablet TAKE 1 TABLET BY MOUTH EVERY DAY IN THE MORNING 90 tablet 1    dextrose 5 % in water (D5W) parenteral solution 250 mL with ketamine 100 mg/mL solution Infuse 80 mg into a venous catheter continuously.      dicyclomine (Bentyl) 20 mg tablet Take 1 tablet (20 mg) by mouth 4 times a day before meals.      ergocalciferol (Vitamin D-2) 1.25 MG (06304 UT) capsule Take 1 capsule (50,000 Units) by mouth. EVERY TUESDAY AND SATURDAY.      famotidine (Pepcid) 20 mg tablet Take 1 tablet (20 mg) by mouth twice a day.      glucagon (Glucagen) 1 mg injection 1 mg.  Only take if BG< 60      glucagon (Gvoke HypoPen 2-Pack) 1 mg/0.2 mL auto-injector Inject 1 Pen under the skin.      glucose 4 gram chewable tablet Chew 4 tablets (16 g).      Ibsrela 50 mg tablet tablet Take 1 tablet (50 mg) by mouth 2 times a day.      ipratropium-albuteroL (Duo-Neb) 0.5-2.5 mg/3 mL nebulizer solution Take 3 mL by nebulization every 4 hours if needed for wheezing or shortness of breath.      ipratropium-albuteroL (Duo-Neb) 0.5-2.5 mg/3 mL nebulizer solution 3 mL every 6 hours.      lactobacillus (Culturelle) 10 billion cell capsule Take 1 capsule by mouth twice a day.      lamoTRIgine (LaMICtal) 200 mg disintegrating tablet Take 1 tablet (200 mg) by mouth twice a day.      levonorgestrel (Mirena) 21 mcg/24 hours (8 yrs) 52 mg IUD 52 mg by intrauterine route.      lisdexamfetamine (Vyvanse) 70 mg capsule Take 1 capsule (70 mg) by mouth once daily in the morning. 30 capsule 0    LORazepam (Ativan) 1 mg tablet Take 1 tablet (1 mg) by mouth 2 times a day  as needed for anxiety or sleep. 60 tablet 2    milnacipran (SavElla) 100 mg tablet 1 tablet (100 mg) 2 times a day.      paliperidone (Invega) 3 mg 24 hr tablet Take 1-2 tablets (3-6 mg) by mouth once daily at bedtime. 60 tablet 1    pregabalin (Lyrica) 150 mg capsule Take 1 capsule (150 mg) by mouth once daily. each day at the same time.      pregabalin (Lyrica) 50 mg capsule Take 1 capsule (50 mg) by mouth 2 times a day.  (am and afternoon) in addition to 150 mg capsule at night      Reyvow 100 mg tablet Take 1 tablet by mouth every 24 (twenty four) hours if needed. AT ONSET OF MIGRAINE NO MORE THAN 1X      tezepelumab-ekko (Tezspire) SubQ syringe Inject 210 mg under the skin every 28 (twenty-eight) days.      Trelegy Ellipta 200-62.5-25 mcg blister with device 1 puff once daily.      zavegepant (Zavzpret) 10 mg/actuation spray,non-aerosol Administer 10 mg into affected nostril(s) if needed.      zolpidem CR (Ambien CR) 12.5 mg ER tablet Take 1 tablet (12.5 mg) by mouth as needed at bedtime for sleep. 30 tablet 2     No current facility-administered medications for this encounter.       Your Providers Instructions:  Do not consume alcoholic beverages for 24 hours.  Do not make important decisions or sign any important documents for the next 24 hours  It is recommended that a responsible adult remain with you for the next 24 hours as you may be light headed/dizzy.    Call Physician:  For persistent nausea and/or vomiting over 24 hours.  Diet:                  Regular diet  May not drive: Do not operate motor vehicles for 24 hours        When to Call Your Doctor:  Call your doctor for questions and/or concerns  Call 911 for an emergency situation     Health Maintenance:  If you currently use tobacco products it is advised that you quit, to improve your health.  If you currently use or have stopped using tobacco products within the last 12 months, the following are resources that can help you:  American Heart  Association (545) 000-3357, (www.americanheart.org)    [place] Tobacco Quit Line 1-477.835.3394 (QUIT-NOW)  (http://www.od.[place].gov/odhPrograms/hprr/tob_risk1.aspx)    Do you or a loved one need help for alcohol or drug use? Call the [employer] at 211 or visit FindTreatment.gov for resources.    To find out where you can safely dispose unused medicines, visit www.rxdrugdropbox.org, or call your local police department.    Please call us at [phone] with any concerns.         Please make certain you have transportation arranged to and from your appointment. This can be a friend, family member or arranged thru your health insurance provider.    What to do before your first appointment:  Stop or reduce smoking if at all possible.  No alcohol for 24 hours before your appointment.  Day of treatment:  You may have clear liquids (water, tea, coffee, no pulp juice, plain Jello, hard candy BUT no milk, cream or non-dairy creamers) up until 30 minutes and solids up to 2 hours before your appointment time.    Please bring headphones to listen to calming music, guided meditation or anything you'd like. You can also bring a journal to write in during the treatment time.   Set an intention for each appointment. Your intention can be qualities you want to cultivate in yourself, mental health goals you would like to achieve or experiences you would like to attract into your life.   Bring any inhalers you may need while at your appointment.  Take all morning medications as prescribed except the followin hours before your appointment, no chlordiazeproxide (Librium), diazepam (Valium), or Flurazepam (Dalmane)   12 hours before your appointment, no lamotrigine.  Try to avoid taking any anti-anxiety medications if at all possible the day of your appointment.. However, if you're anxiety is unbearable and would prevent you from attending you appointment, please take your prescribed dose.   No stimulants prior to your treatment,  you may take them once you are home.  Use any nasal sprays up to 1 hour before your appointment.   Cancellations:   If you need to cancel your appointment, we must have at least a 24 hour notice.  If you cancel with less than 24 hour notice or do not show three times- no additional appointments will be scheduled until the treatment plan is discussed with your outpatient psychiatrist.  Also, if you arrive more than 15 minutes late, we may need to cancel your appointment and reschedule.  Please call [phone] to cancel appointments.  Thank you for your cooperation!

## 2024-06-13 ENCOUNTER — APPOINTMENT (OUTPATIENT)
Dept: BEHAVIORAL HEALTH | Facility: CLINIC | Age: 39
End: 2024-06-13
Payer: COMMERCIAL

## 2024-06-13 DIAGNOSIS — F31.9 BIPOLAR 1 DISORDER (MULTI): ICD-10-CM

## 2024-06-13 PROCEDURE — 90837 PSYTX W PT 60 MINUTES: CPT | Performed by: PSYCHOLOGIST

## 2024-06-13 NOTE — PROGRESS NOTES
Insight Oriented Therapy. 50 min. BP1. We spoke about how she was depressed, but ketimine seemed to help greatly.  We talked about her getting balance with work, notes, her regular life, and things that make her feel good like time spent with family and friends.  Going to pace herself, be behavioral with purpose, small steps to preserve mood.     Chief complaint - Phase of life, work stress  Treatment plan - Insight and action consistent with ACT therapy. Providing support, safe space to process their thoughts and feelings, developing therapeutic relationship.  Goals - Self-care, insight, coping skills  Prognosis - Average  Progress - Average  Functional status - 70/100  Focused mental status: Patient was oriented to person, place, time and context  Focused mental exam - alert and oriented  Frequency - Every two weeks

## 2024-06-18 ENCOUNTER — CLINICAL SUPPORT (OUTPATIENT)
Dept: BEHAVIORAL HEALTH | Facility: CLINIC | Age: 39
End: 2024-06-18
Payer: COMMERCIAL

## 2024-06-18 DIAGNOSIS — F41.1 GAD (GENERALIZED ANXIETY DISORDER): ICD-10-CM

## 2024-06-18 DIAGNOSIS — F31.9 BIPOLAR 1 DISORDER (MULTI): ICD-10-CM

## 2024-06-18 PROCEDURE — 90853 GROUP PSYCHOTHERAPY: CPT | Performed by: PSYCHOLOGIST

## 2024-06-20 ENCOUNTER — APPOINTMENT (OUTPATIENT)
Dept: BEHAVIORAL HEALTH | Facility: CLINIC | Age: 39
End: 2024-06-20
Payer: COMMERCIAL

## 2024-06-20 DIAGNOSIS — F31.9 BIPOLAR 1 DISORDER (MULTI): ICD-10-CM

## 2024-06-20 PROCEDURE — 90837 PSYTX W PT 60 MINUTES: CPT | Performed by: PSYCHOLOGIST

## 2024-06-20 NOTE — PROGRESS NOTES
Insight Oriented Therapy. 50 min. BP1.  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  We spoke about some ways to make sure she's practicing self care during these days.  We spoke about fathers day being a bit difficult and triggering a slight depression.     Chief complaint - Phase of life, fathers day  Treatment plan - Insight and action consistent with ACT therapy. Providing support, safe space to process their thoughts and feelings, developing therapeutic relationship.  Goals - Self-care, insight, coping skills  Prognosis - Average  Progress - Average  Functional status - 70/100  Focused mental status: Patient was oriented to person, place, time and context  Focused mental exam - alert and oriented  Frequency - Every two weeks

## 2024-06-24 NOTE — GROUP NOTE
Group Topic: Feeling Awareness/Expression   Group Date: 6/18/2024  Start Time:  6:00 PM  End Time:  7:30 PM  Facilitators: Garima Wong PsyD   Department:  [facility]    Number of Participants: 13   Group Focus: check in  Treatment Modality: Patient-Centered Therapy  Interventions utilized were exploration and support  Purpose: feelings and insight or knowledge    This was a video IOP aftercare group on a HIPAA compliant platform. All patients were provided with informed consent.    IOP Aftercare Group: [facility]  Participants had opportunity to check-in with their peers and discuss recent stressors. They reviewed their jairo, bud, and kim from the week and supported each other through recent challenges.   Garima Wong Psy.D.      Name: Kelli Silva YOB: 1985   MR: [id number]      Kelli was present and actively engaged in discussion. She reported that she had a very difficult Father's Day but has been coping by trying to stay in her routine. She found spending time with her family to be helpful.

## 2024-06-27 ENCOUNTER — APPOINTMENT (OUTPATIENT)
Dept: BEHAVIORAL HEALTH | Facility: CLINIC | Age: 39
End: 2024-06-27
Payer: COMMERCIAL

## 2024-06-27 DIAGNOSIS — F31.9 BIPOLAR 1 DISORDER (MULTI): ICD-10-CM

## 2024-06-27 PROCEDURE — 90837 PSYTX W PT 60 MINUTES: CPT | Performed by: PSYCHOLOGIST

## 2024-06-27 NOTE — PROGRESS NOTES
Insight Oriented Therapy. 50 min.  BP1.  We spoke about her recent short bout feeling depressed and worried about finances.  We spoke today after the Monday email which occurred after she wasn't able to sleep.  She is feeling better now and it really highlighted for us the importance of her adherence to structure and sleep routine.  Needing a vacation and getting one this week.     Chief complaint - Phase of life, mood issues  Treatment plan - Insight and action consistent with ACT therapy. Providing support, safe space to process their thoughts and feelings, developing therapeutic relationship.  Goals - Self-care, insight, coping skills  Prognosis - Average  Progress - Average  Functional status - 65/100  Focused mental status: Patient was oriented to person, place, time and context  Focused mental exam - alert and oriented  Frequency - Every week

## 2024-06-28 ENCOUNTER — HOSPITAL ENCOUNTER (OUTPATIENT)
Dept: POSTOP/PACU | Facility: HOSPITAL | Age: 39
Setting detail: OUTPATIENT SURGERY
Discharge: HOME | End: 2024-06-28

## 2024-06-28 VITALS
RESPIRATION RATE: 16 BRPM | BODY MASS INDEX: 44.52 KG/M2 | TEMPERATURE: 97.3 F | SYSTOLIC BLOOD PRESSURE: 115 MMHG | OXYGEN SATURATION: 95 % | DIASTOLIC BLOOD PRESSURE: 72 MMHG | HEART RATE: 85 BPM | WEIGHT: 243.39 LBS

## 2024-06-28 DIAGNOSIS — F31.9 BIPOLAR I DISORDER WITH DEPRESSION (MULTI): Primary | ICD-10-CM

## 2024-06-28 PROCEDURE — KETSP HC KETAMINE INFUSION SELF-PAY: Performed by: PSYCHIATRY & NEUROLOGY

## 2024-06-28 PROCEDURE — KETSP PR KETAMINE INFUSION SELF-PAY: Performed by: PSYCHIATRY & NEUROLOGY

## 2024-06-28 PROCEDURE — 2500000004 HC RX 250 GENERAL PHARMACY W/ HCPCS (ALT 636 FOR OP/ED): Performed by: PSYCHIATRY & NEUROLOGY

## 2024-06-28 PROCEDURE — 2500000005 HC RX 250 GENERAL PHARMACY W/O HCPCS: Performed by: PSYCHIATRY & NEUROLOGY

## 2024-06-28 RX ORDER — ONDANSETRON HYDROCHLORIDE 2 MG/ML
8 INJECTION, SOLUTION INTRAVENOUS ONCE
Start: 2024-06-28 | End: 2024-06-28

## 2024-06-28 RX ORDER — HEPARIN SODIUM,PORCINE/PF 10 UNIT/ML
50 SYRINGE (ML) INTRAVENOUS AS NEEDED
OUTPATIENT
Start: 2024-06-28

## 2024-06-28 RX ORDER — KETAMINE HCL IN 0.9 % NACL 200 MG/200
0.75 PLASTIC BAG, INJECTION (ML) INTRAVENOUS ONCE
Start: 2024-06-28 | End: 2024-06-28

## 2024-06-28 RX ORDER — ONDANSETRON HYDROCHLORIDE 2 MG/ML
8 INJECTION, SOLUTION INTRAVENOUS ONCE
Status: COMPLETED | OUTPATIENT
Start: 2024-06-28 | End: 2024-06-28

## 2024-06-28 RX ORDER — KETAMINE HCL IN 0.9 % NACL 200 MG/200
0.75 PLASTIC BAG, INJECTION (ML) INTRAVENOUS ONCE
Status: COMPLETED | OUTPATIENT
Start: 2024-06-28 | End: 2024-06-28

## 2024-06-28 RX ORDER — HEPARIN 100 UNIT/ML
500 SYRINGE INTRAVENOUS AS NEEDED
OUTPATIENT
Start: 2024-06-28

## 2024-06-28 ASSESSMENT — PATIENT HEALTH QUESTIONNAIRE - PHQ9
SUM OF ALL RESPONSES TO PHQ QUESTIONS 1-9: 13
SUM OF ALL RESPONSES TO PHQ9 QUESTIONS 1 AND 2: 2
10. IF YOU CHECKED OFF ANY PROBLEMS, HOW DIFFICULT HAVE THESE PROBLEMS MADE IT FOR YOU TO DO YOUR WORK, TAKE CARE OF THINGS AT HOME, OR GET ALONG WITH OTHER PEOPLE: VERY DIFFICULT
1. LITTLE INTEREST OR PLEASURE IN DOING THINGS: SEVERAL DAYS
7. TROUBLE CONCENTRATING ON THINGS, SUCH AS READING THE NEWSPAPER OR WATCHING TELEVISION: MORE THAN HALF THE DAYS
2. FEELING DOWN, DEPRESSED OR HOPELESS: SEVERAL DAYS
5. POOR APPETITE OR OVEREATING: MORE THAN HALF THE DAYS
4. FEELING TIRED OR HAVING LITTLE ENERGY: SEVERAL DAYS
6. FEELING BAD ABOUT YOURSELF - OR THAT YOU ARE A FAILURE OR HAVE LET YOURSELF OR YOUR FAMILY DOWN: MORE THAN HALF THE DAYS
9. THOUGHTS THAT YOU WOULD BE BETTER OFF DEAD, OR OF HURTING YOURSELF: NOT AT ALL
8. MOVING OR SPEAKING SO SLOWLY THAT OTHER PEOPLE COULD HAVE NOTICED. OR THE OPPOSITE, BEING SO FIGETY OR RESTLESS THAT YOU HAVE BEEN MOVING AROUND A LOT MORE THAN USUAL: SEVERAL DAYS
3. TROUBLE FALLING OR STAYING ASLEEP OR SLEEPING TOO MUCH: NEARLY EVERY DAY

## 2024-06-28 ASSESSMENT — ANXIETY QUESTIONNAIRES
7. FEELING AFRAID AS IF SOMETHING AWFUL MIGHT HAPPEN: NOT AT ALL
1. FEELING NERVOUS, ANXIOUS, OR ON EDGE: MORE THAN HALF THE DAYS
IF YOU CHECKED OFF ANY PROBLEMS ON THIS QUESTIONNAIRE, HOW DIFFICULT HAVE THESE PROBLEMS MADE IT FOR YOU TO DO YOUR WORK, TAKE CARE OF THINGS AT HOME, OR GET ALONG WITH OTHER PEOPLE: VERY DIFFICULT
GAD7 TOTAL SCORE: 10
4. TROUBLE RELAXING: MORE THAN HALF THE DAYS
6. BECOMING EASILY ANNOYED OR IRRITABLE: NOT AT ALL
3. WORRYING TOO MUCH ABOUT DIFFERENT THINGS: MORE THAN HALF THE DAYS
5. BEING SO RESTLESS THAT IT IS HARD TO SIT STILL: MORE THAN HALF THE DAYS
2. NOT BEING ABLE TO STOP OR CONTROL WORRYING: MORE THAN HALF THE DAYS

## 2024-06-28 NOTE — PROGRESS NOTES
Ketamine/Esketamine Procedure    Name: Kelli  : 1985  MRN: [id number]    Date: 24    Time out was taken with staff to confirm correct patient and correct procedure to be performed.   Ketamine infusion at 0.75 mg/kg in 40 minutes  Subjective: She said she felt ok and relatively stable, but still felt depressed again in last few days. Before then, she felt fine. No persistent depression and enjoyed life. Ate well and slept well. Denied having SI/HI/AVH or paranoia. Energy and concentration were fair. No side effect after discharged.   No change in medical hx  No physical complaint   QIDS=13  GAD7=10    Objective: She was awake, alert, cooperative, and pleasant. Normal gait. No abnormal movement. Good eye contact. Oriented x 3. Speech was normal. Affect was appropriate, restricted most of the time.  Mood was ok. Denied having SI/HI/AVH or paranoia during the interview. No delusional thought or paranoia. I/J were good. Memory was fair. Attention and concentration were good.     Vital signs before dosing: HR: 93; Pulse Ox: 95%; BP: 121/58 mmHg.  Ketamine infusion observation:  About 20 minutes after starting infusion, she experienced drowsiness, floaty on her bed, and tingling all over her body. No other side effect. Vital signs were stable. HR: 91; Pulse Ox: 96%; BP: 136/69 mmHg.  At the end of infusion, she still experienced drowsiness, floaty on her bed, and tingling all over her body. No other side effect. Her systolic blood pressure increased, otherwise, vital signs were stable. HR: 96; Pulse Ox: 95%; BP: 147/77 mmHg.  About 20 minutes after stopping infusion, she was sleeping. Her vital signs were stable. HR: 79; Pulse Ox: 95%; BP: 105/75 mmHg.    Tolerated ketamine infusion well  No complication   No immediate risk to self or others     Imp: bipolar I depression, moderate  ANUP - moderate     Plan: ketamine infusion in 2 weeks per patient    Aat Nova MD.

## 2024-06-28 NOTE — H&P
Psychiatry Procedure H&P for each visit    Name: Kelli  : 1985  MRN: [id number]    Date: 24     History & Physical Reviewed:  Pregnancy/Lactating:  Are You Pregnant: No  Are You Currently Breastfeeding: No    I have reviewed the History and Physical dated:   History and Physical Reviewed and relevant findings noted. Patient examined to review pertinent physical findings: Yes  Home Medications Reviewed: Yes  Allergies Reviewed: Yes    ERAS (Enhanced Recovery After Surgery):  ERAS Patient: No    Consent:  COVID-19 Consent:   COVID-19 Risk Consent: Yes: Surgeon has reviewed key risks related to the risk of meli COVID-19 and if they contract COVID-19 what the risks are.

## 2024-07-02 ENCOUNTER — TELEPHONE (OUTPATIENT)
Dept: OTHER | Age: 39
End: 2024-07-02
Payer: COMMERCIAL

## 2024-07-04 ENCOUNTER — APPOINTMENT (OUTPATIENT)
Dept: BEHAVIORAL HEALTH | Facility: CLINIC | Age: 39
End: 2024-07-04
Payer: COMMERCIAL

## 2024-07-04 DIAGNOSIS — F31.9 BIPOLAR 1 DISORDER (MULTI): ICD-10-CM

## 2024-07-04 PROCEDURE — 90837 PSYTX W PT 60 MINUTES: CPT | Performed by: PSYCHOLOGIST

## 2024-07-05 NOTE — PROGRESS NOTES
Insight Oriented Therapy. 50 min.  BP1.  We spoke about how she is realizing the toll work has had on her and while she cannot pull back at this point, she sees it as a viable reality at some point, but she will need to discuss with her partner.     Chief complaint - Phase of life   Treatment plan - Insight and action consistent with ACT therapy. Providing support, safe space to process their thoughts and feelings, developing therapeutic relationship.  Goals - Self-care, insight, coping skills  Prognosis - Average  Progress - Average  Functional status - 70/100  Focused mental status: Patient was oriented to person, place, time and context  Focused mental exam - alert and oriented  Frequency - Every week

## 2024-07-11 ENCOUNTER — APPOINTMENT (OUTPATIENT)
Dept: BEHAVIORAL HEALTH | Facility: CLINIC | Age: 39
End: 2024-07-11
Payer: COMMERCIAL

## 2024-07-11 VITALS
SYSTOLIC BLOOD PRESSURE: 130 MMHG | DIASTOLIC BLOOD PRESSURE: 85 MMHG | BODY MASS INDEX: 44.52 KG/M2 | TEMPERATURE: 98.1 F | HEART RATE: 100 BPM | HEIGHT: 62 IN | RESPIRATION RATE: 16 BRPM

## 2024-07-11 DIAGNOSIS — F31.0 BIPOLAR AFFECTIVE DISORDER, CURRENT EPISODE HYPOMANIC (MULTI): Primary | ICD-10-CM

## 2024-07-11 DIAGNOSIS — F31.9 BIPOLAR 1 DISORDER (MULTI): ICD-10-CM

## 2024-07-11 PROCEDURE — 90837 PSYTX W PT 60 MINUTES: CPT | Performed by: PSYCHOLOGIST

## 2024-07-11 NOTE — PROGRESS NOTES
Insight Oriented Therapy. 50 min.  BP1.  We spoke about how she is feeling better and part of that is due to being on vacation and seeing that she appreciates the balance it provides.  Going to talk about this with partner to see what can happen.    Chief complaint - Phase of life   Treatment plan - Insight and action consistent with ACT therapy. Providing support, safe space to process their thoughts and feelings, developing therapeutic relationship.  Goals - Self-care, insight, coping skills  Prognosis - Average  Progress - Average  Functional status - 70/100  Focused mental status: Patient was oriented to person, place, time and context  Focused mental exam - alert and oriented  Frequency - Every two weeks

## 2024-07-11 NOTE — PROGRESS NOTES
Patient here at the clinic for her monthly injection of Abilify Maintena for Bipolar affective disorder,current episode hypomanic. Patient identified by full name and , vitals obtained. Patient last seen by Provider on 24, last seen by nurse on 24. Medication verified by providers note and medication order.      Appetite:  good  Sleep:  good  Appearance: clean, age appropriate and well groomed  Build: overweight  Attitude: cooperative, calm, pleasant, friendly, open  Eye Contact: normal  Activity: alert, attentive, appropriate  Speech: appropriate & spontaneous, normal rate & flow, clear  Delusions: denies  Thought Content: logical  Thought Process: logical  Judgement/insight:  Intact/good  Mood: calm   Affect: appropriate  AH/VH/SI/HI patient denies  Access to Firearms/weapons: No  Depression: Patient denies  Thoughts of hopelessness: denies  Anxiety: pt denies  Self-injurious behavior: denies at this time  Cravings/Urges: denies  Last Use: NA  Tobacco Use: Non smoker  Spiritual or cultural practices that may affect your care or impact your health care decisions? No  Living situation:  Lives with boyfriend  Employed: Employed at [employer]      Patient appears happy today,engaged and cooperative. Patient stated that she's on vacation and enjoying her time away from work. Patient denies SI/HI, VH/AH, self harming thoughts at this time.       Patient received Abilify Maintena 400mg  via 23 gauge needle in left deltoid with no reaction to the injection site, patient tolerated the injection well.  Patient will return to the clinic in 4 weeks.    RN provided education on medication and side effects,  Patient is aware to contact nurse for any adverse reactions or  911/ [employer] crisis number for emergent issues.      LOT: [id number]  EXP:  [id number]  ND:  [id number]     Solange Newton, MELECIO,BSN

## 2024-07-12 ENCOUNTER — HOSPITAL ENCOUNTER (OUTPATIENT)
Dept: POSTOP/PACU | Facility: HOSPITAL | Age: 39
Setting detail: OUTPATIENT SURGERY
Discharge: HOME | End: 2024-07-12

## 2024-07-12 VITALS
WEIGHT: 245.37 LBS | DIASTOLIC BLOOD PRESSURE: 65 MMHG | OXYGEN SATURATION: 98 % | RESPIRATION RATE: 14 BRPM | TEMPERATURE: 98.1 F | BODY MASS INDEX: 44.88 KG/M2 | SYSTOLIC BLOOD PRESSURE: 131 MMHG | HEART RATE: 83 BPM

## 2024-07-12 DIAGNOSIS — F31.9 BIPOLAR I DISORDER WITH DEPRESSION (MULTI): Primary | ICD-10-CM

## 2024-07-12 PROCEDURE — 2500000004 HC RX 250 GENERAL PHARMACY W/ HCPCS (ALT 636 FOR OP/ED): Performed by: PSYCHIATRY & NEUROLOGY

## 2024-07-12 RX ORDER — KETAMINE HCL IN 0.9 % NACL 200 MG/200
0.75 PLASTIC BAG, INJECTION (ML) INTRAVENOUS ONCE
Start: 2024-07-12 | End: 2024-07-12

## 2024-07-12 RX ORDER — ONDANSETRON HYDROCHLORIDE 2 MG/ML
8 INJECTION, SOLUTION INTRAVENOUS ONCE
Start: 2024-07-12 | End: 2024-07-12

## 2024-07-12 RX ORDER — ONDANSETRON HYDROCHLORIDE 2 MG/ML
8 INJECTION, SOLUTION INTRAVENOUS ONCE
Status: COMPLETED | OUTPATIENT
Start: 2024-07-12 | End: 2024-07-12

## 2024-07-12 RX ORDER — HEPARIN SODIUM,PORCINE/PF 10 UNIT/ML
50 SYRINGE (ML) INTRAVENOUS AS NEEDED
Status: DISCONTINUED | OUTPATIENT
Start: 2024-07-12 | End: 2024-07-13 | Stop reason: HOSPADM

## 2024-07-12 RX ORDER — PANTOPRAZOLE SODIUM 40 MG/1
40 TABLET, DELAYED RELEASE ORAL 2 TIMES DAILY
COMMUNITY

## 2024-07-12 RX ORDER — HEPARIN 100 UNIT/ML
500 SYRINGE INTRAVENOUS AS NEEDED
OUTPATIENT
Start: 2024-07-12

## 2024-07-12 RX ORDER — KETAMINE HCL IN 0.9 % NACL 200 MG/200
0.75 PLASTIC BAG, INJECTION (ML) INTRAVENOUS ONCE
Status: DISCONTINUED | OUTPATIENT
Start: 2024-07-12 | End: 2024-07-13 | Stop reason: HOSPADM

## 2024-07-12 RX ORDER — HEPARIN SODIUM,PORCINE/PF 10 UNIT/ML
50 SYRINGE (ML) INTRAVENOUS AS NEEDED
OUTPATIENT
Start: 2024-07-12

## 2024-07-12 RX ORDER — HEPARIN 100 UNIT/ML
500 SYRINGE INTRAVENOUS AS NEEDED
Status: DISCONTINUED | OUTPATIENT
Start: 2024-07-12 | End: 2024-07-13 | Stop reason: HOSPADM

## 2024-07-12 ASSESSMENT — PATIENT HEALTH QUESTIONNAIRE - PHQ9
9. THOUGHTS THAT YOU WOULD BE BETTER OFF DEAD, OR OF HURTING YOURSELF: NOT AT ALL
10. IF YOU CHECKED OFF ANY PROBLEMS, HOW DIFFICULT HAVE THESE PROBLEMS MADE IT FOR YOU TO DO YOUR WORK, TAKE CARE OF THINGS AT HOME, OR GET ALONG WITH OTHER PEOPLE: SOMEWHAT DIFFICULT
3. TROUBLE FALLING OR STAYING ASLEEP OR SLEEPING TOO MUCH: SEVERAL DAYS
SUM OF ALL RESPONSES TO PHQ9 QUESTIONS 1 AND 2: 2
2. FEELING DOWN, DEPRESSED OR HOPELESS: SEVERAL DAYS
SUM OF ALL RESPONSES TO PHQ QUESTIONS 1-9: 6
5. POOR APPETITE OR OVEREATING: SEVERAL DAYS
6. FEELING BAD ABOUT YOURSELF - OR THAT YOU ARE A FAILURE OR HAVE LET YOURSELF OR YOUR FAMILY DOWN: SEVERAL DAYS
8. MOVING OR SPEAKING SO SLOWLY THAT OTHER PEOPLE COULD HAVE NOTICED. OR THE OPPOSITE, BEING SO FIGETY OR RESTLESS THAT YOU HAVE BEEN MOVING AROUND A LOT MORE THAN USUAL: NOT AT ALL
7. TROUBLE CONCENTRATING ON THINGS, SUCH AS READING THE NEWSPAPER OR WATCHING TELEVISION: NOT AT ALL
4. FEELING TIRED OR HAVING LITTLE ENERGY: SEVERAL DAYS
1. LITTLE INTEREST OR PLEASURE IN DOING THINGS: SEVERAL DAYS

## 2024-07-12 ASSESSMENT — ANXIETY QUESTIONNAIRES
IF YOU CHECKED OFF ANY PROBLEMS ON THIS QUESTIONNAIRE, HOW DIFFICULT HAVE THESE PROBLEMS MADE IT FOR YOU TO DO YOUR WORK, TAKE CARE OF THINGS AT HOME, OR GET ALONG WITH OTHER PEOPLE: SOMEWHAT DIFFICULT
6. BECOMING EASILY ANNOYED OR IRRITABLE: NOT AT ALL
4. TROUBLE RELAXING: SEVERAL DAYS
5. BEING SO RESTLESS THAT IT IS HARD TO SIT STILL: SEVERAL DAYS
2. NOT BEING ABLE TO STOP OR CONTROL WORRYING: SEVERAL DAYS
GAD7 TOTAL SCORE: 6
1. FEELING NERVOUS, ANXIOUS, OR ON EDGE: SEVERAL DAYS
3. WORRYING TOO MUCH ABOUT DIFFERENT THINGS: SEVERAL DAYS
7. FEELING AFRAID AS IF SOMETHING AWFUL MIGHT HAPPEN: SEVERAL DAYS

## 2024-07-12 NOTE — SIGNIFICANT EVENT
Mediport flushed with normal saline before being de-accessed, and states she does not flush her Mediport with heparin. Site was clean and dry with no bleeding. Pt. Refused band-aid or dressing after de-accessed. Pt. Was given a band-aid to use if she needs it.

## 2024-07-12 NOTE — PROGRESS NOTES
Ketamine/Esketamine Procedure    Name: Kelli  : 1985  MRN: [id number]    Date: 24    Time out was taken with staff to confirm correct patient and correct procedure to be performed.     Ketamine infusion at 0.75 mg/kg in 40 minutes  Subjective: She said she felt good after last treatment, but felt a little more depressed in last 2-3 days. She said her depression worsened might be due to the fact that she need return to work next week. No persistent depression and enjoyed life. Ate well and slept well. Energy and concentration were fine. Denied having SI/HI/AVH or paranoia. She said she felt sluggish after she was discharged home. She felt ok on the second day. No other side effect after discharge.   Anxiety was under control  PHQ9=6  GAD7=6    Objective: She was awake, alert, cooperative, and pleasant. Normal gait. No abnormal movement. Causally dressed. Good eye contact. Oriented x 3. Speech was normal. Affect was appropriate, restricted most of the time. Mood was ok. Denied having SI/HI/AVH or paranoia during the interview. No delusional thought or paranoia. I/J were good. Memory was fair. Attention and concentration were good.     Ketamine infusion observation:  About 20 minutes after starting infusion, she experienced drowsiness and floaty on her bed. No other side effect. Vital signs were stable.    Shortly ended the infusion, she experienced drowsiness, floaty on her bed, and a little bit nausea. No other side effect. Vital signs were stable.     Tolerated ketamine infusion well  No complication     Imp: bipolar I depression, mild to partial remission  ANUP - under control   ADHD - manageable   No immediate risk to self or others  Plan: ketamine infusion in 3 weeks     Ata Nova MD.

## 2024-07-12 NOTE — H&P
Psychiatry Procedure H&P for each visit    Name: Kelli  : 1985  MRN: [id number]    Date: 24     History & Physical Reviewed:  Pregnancy/Lactating:  Are You Pregnant: No  Are You Currently Breastfeeding: No    I have reviewed the History and Physical dated:   History and Physical Reviewed and relevant findings noted. Patient examined to review pertinent physical findings: Yes  Home Medications Reviewed: Yes  Allergies Reviewed: Yes    ERAS (Enhanced Recovery After Surgery):  ERAS Patient: No    Consent:  COVID-19 Consent:   COVID-19 Risk Consent: Yes: Surgeon has reviewed key risks related to the risk of meli COVID-19 and if they contract COVID-19 what the risks are.

## 2024-07-12 NOTE — PATIENT INSTRUCTIONS
Homegoing Instructions    Patient: Kelli Silva  MRN: [id number]  YOB: 1985    Care Providers: Dr. Ata Roman, RN       Allergies:  Aspirin, Cigarette smoke, Fish containing products, Iodinated contrast media, Ketorolac, Ketorolac tromethamine, Ondansetron, Other, Prochlorperazine, Shellfish containing products, Sumatriptan, Azithromycin, Ceftriaxone, Doxycycline, Dulaglutide, House dust, Liraglutide, Metformin, Metoclopramide, Prednisone, Sitagliptin, Zvyojyzy-0-[id number] antimigraine agents, and Zolmitriptan    Medications:  Current Outpatient Medications   Medication Sig Dispense Refill    Abilify Maintena 400 mg injection syringe Inject 400 mg into the muscle every 30 (thirty) days. 1.3 mL 11    Accu-Chek Guide test strips strip USE TO CHECK BLOOD SUGAR UP TO 4 TIMES PER DAY AS INSTRUCTED. E11.9      albuterol 2.5 mg /3 mL (0.083 %) nebulizer solution Inhale 3 mL (2.5 mg) every 6 hours if needed for wheezing or shortness of breath.      albuterol 90 mcg/actuation inhaler Inhale 2 puffs. 6 TIMES DAILY.      asenapine (Saphris) SL tablet TAKE 1 - 2 SUBLIQUAL EVERY NIGHT AT BED TIME AS NEEDED FOR SLEEP 180 tablet 1    atomoxetine (Strattera) 18 mg capsule Take 1 capsule (18 mg) by mouth 2 times a day. Swallow capsule whole; do not open. If opened accidentally, do not touch eyes; wash hands immediately (product is an eye irritant). 60 capsule 2    Autolet lancing device USE AS INSTRUCTED TO CHECK BLOOD SUGAR UP TO 4 TIMES DAILY. E11.9      azelastine-fluticasone 137-50 mcg/spray spray,non-aerosol Administer 1 spray into each nostril twice a day.      buPROPion XL (Wellbutrin XL) 300 mg 24 hr tablet Take 1 tablet (300 mg) by mouth once daily in the morning. Do not crush, chew, or split. 90 tablet 3    busPIRone (Buspar) 30 mg tablet Take 1 tablet (30 mg) by mouth 2 times a day.      calcium carbonate-vitamin D3 (Oyster Shell) 250 mg-3.125 mcg (125 unit) tablet Take 1 tablet by mouth once  daily.      cariprazine (Vraylar) 6 mg capsule Take 1 capsule (6 mg) by mouth once daily at bedtime. 30 capsule 6    CHLORZOXAZONE ORAL Take 500 mg by mouth 2 times a day as needed.      Corlanor 7.5 mg tablet Take 1 tablet (7.5 mg) by mouth 2 times daily (morning and late afternoon).      desvenlafaxine 100 mg 24 hr tablet TAKE 1 TABLET BY MOUTH EVERY DAY IN THE MORNING 90 tablet 1    dicyclomine (Bentyl) 20 mg tablet Take 1 tablet (20 mg) by mouth 4 times a day before meals.      famotidine (Pepcid) 20 mg tablet Take 1 tablet (20 mg) by mouth twice a day.      Ibsrela 50 mg tablet tablet Take 1 tablet (50 mg) by mouth 2 times a day.      ipratropium-albuteroL (Duo-Neb) 0.5-2.5 mg/3 mL nebulizer solution Take 3 mL by nebulization every 4 hours if needed for wheezing or shortness of breath.      ipratropium-albuteroL (Duo-Neb) 0.5-2.5 mg/3 mL nebulizer solution 3 mL every 6 hours.      lactobacillus (Culturelle) 10 billion cell capsule Take 1 capsule by mouth twice a day.      lamoTRIgine (LaMICtal) 200 mg disintegrating tablet Take 1 tablet (200 mg) by mouth twice a day.      lisdexamfetamine (Vyvanse) 70 mg capsule Take 1 capsule (70 mg) by mouth once daily in the morning. 30 capsule 0    LORazepam (Ativan) 1 mg tablet Take 1 tablet (1 mg) by mouth 2 times a day as needed for anxiety or sleep. 60 tablet 2    milnacipran (SavElla) 100 mg tablet 1 tablet (100 mg) 2 times a day.      pantoprazole (ProtoNix) 40 mg EC tablet Take 1 tablet (40 mg) by mouth 2 times a day. Do not crush, chew, or split.      pregabalin (Lyrica) 150 mg capsule Take 1 capsule (150 mg) by mouth once daily. each day at the same time.      pregabalin (Lyrica) 50 mg capsule Take 1 capsule (50 mg) by mouth 2 times a day.  (am and afternoon) in addition to 150 mg capsule at night      Reyvow 100 mg tablet Take 1 tablet by mouth every 24 (twenty four) hours if needed. AT ONSET OF MIGRAINE NO MORE THAN 1X      tezepelumab-ekko (Tezspire) SubQ  syringe Inject 210 mg under the skin every 28 (twenty-eight) days.      Trelegy Ellipta 200-62.5-25 mcg blister with device 1 puff once daily.      zavegepant (Zavzpret) 10 mg/actuation spray,non-aerosol Administer 10 mg into affected nostril(s) if needed.      dextrose 5 % in water (D5W) parenteral solution 250 mL with ketamine 100 mg/mL solution Infuse 80 mg into a venous catheter continuously.      glucagon (Glucagen) 1 mg injection 1 mg.  Only take if BG< 60      glucagon (Gvoke HypoPen 2-Pack) 1 mg/0.2 mL auto-injector Inject 1 Pen under the skin.      glucose 4 gram chewable tablet Chew 4 tablets (16 g).      levonorgestrel (Mirena) 21 mcg/24 hours (8 yrs) 52 mg IUD 52 mg by intrauterine route.      paliperidone (Invega) 3 mg 24 hr tablet Take 1-2 tablets (3-6 mg) by mouth once daily at bedtime. 60 tablet 1    zolpidem CR (Ambien CR) 12.5 mg ER tablet Take 1 tablet (12.5 mg) by mouth as needed at bedtime for sleep. 30 tablet 2     Current Facility-Administered Medications   Medication Dose Route Frequency Provider Last Rate Last Admin    heparin flush 10 unit/mL syringe 50 Units  50 Units intra-catheter PRN Ata Nova MD PhD        heparin flush 100 unit/mL syringe 500 Units  500 Units intra-catheter PRN Ata Nova MD PhD        ketamine (Ketalar) 83 mg in sodium chloride 0.9% IV 83 mL  0.75 mg/kg intravenous Once Ata Nova MD PhD           Your Providers Instructions:  Do not consume alcoholic beverages for 24 hours.  Do not make important decisions or sign any important documents for the next 24 hours  It is recommended that a responsible adult remain with you for the next 24 hours as you may be light headed/dizzy.    Call Physician:  For persistent nausea and/or vomiting over 24 hours.  Diet:                  Regular diet  May not drive: Do not operate motor vehicles for 24 hours        When to Call Your Doctor:  Call your doctor for questions and/or concerns  Call 911 for an emergency situation      Health Maintenance:  If you currently use tobacco products it is advised that you quit, to improve your health.  If you currently use or have stopped using tobacco products within the last 12 months, the following are resources that can help you:  American Heart Association ([phone], (www.americanheart.org)    [place] Tobacco Quit Line 1-732.698.1665 (QUIT-NOW)  (http://www.od.[place].[facility]/odhPrograms/hprr/tob_risk1.aspx)    Do you or a loved one need help for alcohol or drug use? Call the [employer] at 770 or visit [employer].gov for resources.    To find out where you can safely dispose unused medicines, visit www.rxdrugdropbox.org, or call your local police department.    Please call us at [phone] with any concerns.         Please make certain you have transportation arranged to and from your appointment. This can be a friend, family member or arranged thru your health insurance provider.    What to do before your first appointment:  Stop or reduce smoking if at all possible.  No alcohol for 24 hours before your appointment.  Day of treatment:  You may have clear liquids (water, tea, coffee, no pulp juice, plain Jello, hard candy BUT no milk, cream or non-dairy creamers) up until 30 minutes and solids up to 2 hours before your appointment time.    Please bring headphones to listen to calming music, guided meditation or anything you'd like. You can also bring a journal to write in during the treatment time.   Set an intention for each appointment. Your intention can be qualities you want to cultivate in yourself, mental health goals you would like to achieve or experiences you would like to attract into your life.   Bring any inhalers you may need while at your appointment.  Take all morning medications as prescribed except the followin hours before your appointment, no chlordiazeproxide (Librium), diazepam (Valium), or Flurazepam (Dalmane)   12 hours before your appointment, no  lamotrigine.  Try to avoid taking any anti-anxiety medications if at all possible the day of your appointment.. However, if you're anxiety is unbearable and would prevent you from attending you appointment, please take your prescribed dose.   No stimulants prior to your treatment, you may take them once you are home.  Use any nasal sprays up to 1 hour before your appointment.   Cancellations:   If you need to cancel your appointment, we must have at least a 24 hour notice.  If you cancel with less than 24 hour notice or do not show three times- no additional appointments will be scheduled until the treatment plan is discussed with your outpatient psychiatrist.  Also, if you arrive more than 15 minutes late, we may need to cancel your appointment and reschedule.  Please call [phone] to cancel appointments.  Thank you for your cooperation!

## 2024-07-16 ENCOUNTER — CLINICAL SUPPORT (OUTPATIENT)
Dept: BEHAVIORAL HEALTH | Facility: CLINIC | Age: 39
End: 2024-07-16
Payer: COMMERCIAL

## 2024-07-16 DIAGNOSIS — F31.9 BIPOLAR 1 DISORDER (MULTI): ICD-10-CM

## 2024-07-16 DIAGNOSIS — F41.1 GAD (GENERALIZED ANXIETY DISORDER): ICD-10-CM

## 2024-07-16 PROCEDURE — 90853 GROUP PSYCHOTHERAPY: CPT | Performed by: PSYCHOLOGIST

## 2024-07-17 NOTE — GROUP NOTE
Group Topic: Social Skills   Group Date: 7/16/2024  Start Time:  6:00 PM  End Time:  7:30 PM  Facilitators: Garima Wong PsyD   Department: [facility]    Number of Participants: 11   Group Focus: social skills  Treatment Modality: Cognitive Behavioral Therapy  Interventions utilized were patient education  Purpose: communication skills    This was a video IOP aftercare group on a HIPAA compliant platform. All patients were provided with informed consent.    IOP Aftercare Group: Interpersonal Effectiveness   Group members reviewed fair fighting rules and then received education on DBT skills of interpersonal effectiveness. They discussed goals in interactions and explored ways to use DEAR MAN, GIVE and FAST skills.   Garima Wong Psy.D.      Name: Kelli Silva YOB: 1985   MR: [id number]      Kelli was present and actively listened to discussion.

## 2024-07-18 ENCOUNTER — APPOINTMENT (OUTPATIENT)
Dept: BEHAVIORAL HEALTH | Facility: CLINIC | Age: 39
End: 2024-07-18
Payer: COMMERCIAL

## 2024-07-18 VITALS
DIASTOLIC BLOOD PRESSURE: 74 MMHG | RESPIRATION RATE: 16 BRPM | BODY MASS INDEX: 44.09 KG/M2 | WEIGHT: 239.6 LBS | HEART RATE: 119 BPM | SYSTOLIC BLOOD PRESSURE: 133 MMHG | TEMPERATURE: 98.1 F | HEIGHT: 62 IN

## 2024-07-18 DIAGNOSIS — F90.0 ATTENTION DEFICIT HYPERACTIVITY DISORDER (ADHD), PREDOMINANTLY INATTENTIVE TYPE: ICD-10-CM

## 2024-07-18 DIAGNOSIS — F31.9 BIPOLAR 1 DISORDER (MULTI): ICD-10-CM

## 2024-07-18 PROCEDURE — 90837 PSYTX W PT 60 MINUTES: CPT | Performed by: PSYCHOLOGIST

## 2024-07-18 PROCEDURE — 3075F SYST BP GE 130 - 139MM HG: CPT | Performed by: PSYCHIATRY & NEUROLOGY

## 2024-07-18 PROCEDURE — 3078F DIAST BP <80 MM HG: CPT | Performed by: PSYCHIATRY & NEUROLOGY

## 2024-07-18 PROCEDURE — 99214 OFFICE O/P EST MOD 30 MIN: CPT | Performed by: PSYCHIATRY & NEUROLOGY

## 2024-07-18 PROCEDURE — 3008F BODY MASS INDEX DOCD: CPT | Performed by: PSYCHIATRY & NEUROLOGY

## 2024-07-18 RX ORDER — ATOMOXETINE 60 MG/1
60 CAPSULE ORAL DAILY
Qty: 30 CAPSULE | Refills: 2 | Status: SHIPPED | OUTPATIENT
Start: 2024-07-18 | End: 2025-07-18

## 2024-07-18 RX ORDER — LISDEXAMFETAMINE DIMESYLATE 70 MG/1
70 CAPSULE ORAL EVERY MORNING
Qty: 30 CAPSULE | Refills: 0 | Status: SHIPPED | OUTPATIENT
Start: 2024-07-18 | End: 2024-08-17

## 2024-07-18 NOTE — PROGRESS NOTES
Follow-up visit  Subjective: She said she felt good and back to normal again after the ketamine infusion last Friday. No depression and enjoyed life. Ate well and slept well. Not feel hopeless and helpless. Denied having SI/HI/AVH or paranoia. Energy was ok. Concentration was still not good even with Vyvanse 70 mg and Strattera 18 mg twice a day. Has been on Strattera 18 mg twice a week for more than a month. Not feel irritable. No other concern.   No side effect from medications  Able to take care of herself.  No problem with at work .     No problem with eyes,ears, nose or throat  No SOB or chest pain  GERD with medications; No GI sx  Kidney cancer - recovered. No other  sx  Had migraine headache about a month ago. No other neurological problem  No problem with bones, joints, or muscles  No problem with blood   No problem with skin    DM - under control     Objective:   Appearance: well-groomed.   Demeanor: average.   Eye Contact: average.   Motor Activity: average.   Speech: clear.   Language: Neurologic language is intact.   Fund of Knowledge: intact fund of knowledge.   Delusions: None Reported.   Hallucinations: not reported  Self Harm: None Reported.   Aggressive: None Reported.   Mood: good   Affect: full with smile   Orientation: alert, oriented x3.   Manner: cooperative.   Thought process: goal-directed.   Thought association: displays rational thought process.   Content of thought: normal  Abstract/ Rational Thought: intact   Memory: grossly intact.   Behavior: normal motor activity, relaxed, good eye contact, calm.   Attention/Concentration: normal.   Cognition: intact.   Intelligence Estimate: average.   Executive Function: intact.   Insight: intact.   Judgement: intact.   Musculoskeletal: normal strength and tone. Normal gait. No abnormal movement.   Impression: bipolar I depression, in remission   ANUP - more manageable since last Friday  ADHD - still problematic   Discussion/Plan:   Increase  Strattera to 50 mg per day  Will continue Vyvanse 70 mg per day  Continue ketamine infusion every 3 weeks  Continue Abilify Maintena injection   Follow-up in 3 months.   Ata Nova MD.

## 2024-07-18 NOTE — PROGRESS NOTES
Insight Oriented Therapy. 50 min.  BP1. 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  We spoke about talking to her partner about whether or not they will adopt a child.     Chief complaint - Phase of life   Treatment plan - Insight and action consistent with ACT therapy. Providing support, safe space to process their thoughts and feelings, developing therapeutic relationship.  Goals - Self-care, insight, coping skills  Prognosis - Average  Progress - Average  Functional status - 70/100  Focused mental status: Patient was oriented to person, place, time and context  Focused mental exam - alert and oriented  Frequency - Every two weeks

## 2024-07-25 ENCOUNTER — APPOINTMENT (OUTPATIENT)
Dept: BEHAVIORAL HEALTH | Facility: CLINIC | Age: 39
End: 2024-07-25
Payer: COMMERCIAL

## 2024-08-01 ENCOUNTER — APPOINTMENT (OUTPATIENT)
Dept: BEHAVIORAL HEALTH | Facility: CLINIC | Age: 39
End: 2024-08-01
Payer: COMMERCIAL

## 2024-08-01 DIAGNOSIS — F31.9 BIPOLAR 1 DISORDER (MULTI): ICD-10-CM

## 2024-08-01 RX ORDER — PALIPERIDONE 3 MG/1
TABLET, EXTENDED RELEASE ORAL
Qty: 120 TABLET | Refills: 1 | Status: SHIPPED | OUTPATIENT
Start: 2024-08-01

## 2024-08-02 ENCOUNTER — HOSPITAL ENCOUNTER (OUTPATIENT)
Dept: POSTOP/PACU | Facility: HOSPITAL | Age: 39
Setting detail: OUTPATIENT SURGERY
Discharge: HOME | End: 2024-08-02

## 2024-08-02 VITALS
RESPIRATION RATE: 16 BRPM | SYSTOLIC BLOOD PRESSURE: 142 MMHG | OXYGEN SATURATION: 97 % | HEART RATE: 85 BPM | TEMPERATURE: 97.9 F | DIASTOLIC BLOOD PRESSURE: 65 MMHG

## 2024-08-02 DIAGNOSIS — F31.9 BIPOLAR I DISORDER WITH DEPRESSION (MULTI): Primary | ICD-10-CM

## 2024-08-02 PROCEDURE — 2500000004 HC RX 250 GENERAL PHARMACY W/ HCPCS (ALT 636 FOR OP/ED): Performed by: PSYCHIATRY & NEUROLOGY

## 2024-08-02 PROCEDURE — KETSP PR KETAMINE INFUSION SELF-PAY: Performed by: PSYCHIATRY & NEUROLOGY

## 2024-08-02 PROCEDURE — 2500000005 HC RX 250 GENERAL PHARMACY W/O HCPCS: Performed by: PSYCHIATRY & NEUROLOGY

## 2024-08-02 PROCEDURE — KETSP HC KETAMINE INFUSION SELF-PAY: Performed by: PSYCHIATRY & NEUROLOGY

## 2024-08-02 RX ORDER — HEPARIN 100 UNIT/ML
500 SYRINGE INTRAVENOUS AS NEEDED
OUTPATIENT
Start: 2024-08-02

## 2024-08-02 RX ORDER — HEPARIN SODIUM,PORCINE/PF 10 UNIT/ML
50 SYRINGE (ML) INTRAVENOUS AS NEEDED
OUTPATIENT
Start: 2024-08-02

## 2024-08-02 RX ORDER — HEPARIN 100 UNIT/ML
500 SYRINGE INTRAVENOUS AS NEEDED
Status: DISCONTINUED | OUTPATIENT
Start: 2024-08-02 | End: 2024-08-03 | Stop reason: HOSPADM

## 2024-08-02 RX ORDER — ONDANSETRON HYDROCHLORIDE 2 MG/ML
8 INJECTION, SOLUTION INTRAVENOUS ONCE
Status: COMPLETED | OUTPATIENT
Start: 2024-08-02 | End: 2024-08-02

## 2024-08-02 RX ORDER — ONDANSETRON HYDROCHLORIDE 2 MG/ML
8 INJECTION, SOLUTION INTRAVENOUS ONCE
Start: 2024-08-02 | End: 2024-08-02

## 2024-08-02 RX ORDER — HEPARIN SODIUM,PORCINE/PF 10 UNIT/ML
50 SYRINGE (ML) INTRAVENOUS AS NEEDED
Status: DISCONTINUED | OUTPATIENT
Start: 2024-08-02 | End: 2024-08-03 | Stop reason: HOSPADM

## 2024-08-02 RX ORDER — KETAMINE HCL IN 0.9 % NACL 200 MG/200
0.75 PLASTIC BAG, INJECTION (ML) INTRAVENOUS ONCE
Start: 2024-08-02 | End: 2024-08-02

## 2024-08-02 RX ORDER — KETAMINE HCL IN 0.9 % NACL 200 MG/200
0.75 PLASTIC BAG, INJECTION (ML) INTRAVENOUS ONCE
Status: COMPLETED | OUTPATIENT
Start: 2024-08-02 | End: 2024-08-02

## 2024-08-02 ASSESSMENT — PATIENT HEALTH QUESTIONNAIRE - PHQ9
2. FEELING DOWN, DEPRESSED OR HOPELESS: NEARLY EVERY DAY
7. TROUBLE CONCENTRATING ON THINGS, SUCH AS READING THE NEWSPAPER OR WATCHING TELEVISION: NEARLY EVERY DAY
SUM OF ALL RESPONSES TO PHQ QUESTIONS 1-9: 20
3. TROUBLE FALLING OR STAYING ASLEEP OR SLEEPING TOO MUCH: NEARLY EVERY DAY
9. THOUGHTS THAT YOU WOULD BE BETTER OFF DEAD, OR OF HURTING YOURSELF: NOT AT ALL
1. LITTLE INTEREST OR PLEASURE IN DOING THINGS: MORE THAN HALF THE DAYS
SUM OF ALL RESPONSES TO PHQ9 QUESTIONS 1 AND 2: 5
8. MOVING OR SPEAKING SO SLOWLY THAT OTHER PEOPLE COULD HAVE NOTICED. OR THE OPPOSITE, BEING SO FIGETY OR RESTLESS THAT YOU HAVE BEEN MOVING AROUND A LOT MORE THAN USUAL: NOT AT ALL
4. FEELING TIRED OR HAVING LITTLE ENERGY: NEARLY EVERY DAY
10. IF YOU CHECKED OFF ANY PROBLEMS, HOW DIFFICULT HAVE THESE PROBLEMS MADE IT FOR YOU TO DO YOUR WORK, TAKE CARE OF THINGS AT HOME, OR GET ALONG WITH OTHER PEOPLE: VERY DIFFICULT
6. FEELING BAD ABOUT YOURSELF - OR THAT YOU ARE A FAILURE OR HAVE LET YOURSELF OR YOUR FAMILY DOWN: NEARLY EVERY DAY
5. POOR APPETITE OR OVEREATING: NEARLY EVERY DAY

## 2024-08-02 ASSESSMENT — ANXIETY QUESTIONNAIRES
7. FEELING AFRAID AS IF SOMETHING AWFUL MIGHT HAPPEN: SEVERAL DAYS
GAD7 TOTAL SCORE: 6
1. FEELING NERVOUS, ANXIOUS, OR ON EDGE: SEVERAL DAYS
5. BEING SO RESTLESS THAT IT IS HARD TO SIT STILL: SEVERAL DAYS
IF YOU CHECKED OFF ANY PROBLEMS ON THIS QUESTIONNAIRE, HOW DIFFICULT HAVE THESE PROBLEMS MADE IT FOR YOU TO DO YOUR WORK, TAKE CARE OF THINGS AT HOME, OR GET ALONG WITH OTHER PEOPLE: SOMEWHAT DIFFICULT
2. NOT BEING ABLE TO STOP OR CONTROL WORRYING: SEVERAL DAYS
3. WORRYING TOO MUCH ABOUT DIFFERENT THINGS: SEVERAL DAYS
4. TROUBLE RELAXING: SEVERAL DAYS
6. BECOMING EASILY ANNOYED OR IRRITABLE: NOT AT ALL

## 2024-08-02 NOTE — PATIENT INSTRUCTIONS
Homegoing Instructions    Patient: Kelli Silva  MRN: [id number]  YOB: 1985    Care Providers: Dr. Ata Encarnacion, RN       Allergies:  Aspirin, Cigarette smoke, Fish containing products, Iodinated contrast media, Ketorolac, Ketorolac tromethamine, Ondansetron, Other, Prochlorperazine, Shellfish containing products, Sumatriptan, Azithromycin, Ceftriaxone, Doxycycline, Dulaglutide, House dust, Liraglutide, Metformin, Metoclopramide, Prednisone, Sitagliptin, [id number]-[id number] antimigraine agents, and Zolmitriptan    Medications:  Current Outpatient Medications   Medication Sig Dispense Refill    atomoxetine (Strattera) 60 mg capsule Take 1 capsule (60 mg) by mouth once daily. Swallow capsule whole; do not open. If opened accidentally, do not touch eyes; wash hands immediately (product is an eye irritant). 30 capsule 2    buPROPion XL (Wellbutrin XL) 300 mg 24 hr tablet Take 1 tablet (300 mg) by mouth once daily in the morning. Do not crush, chew, or split. 90 tablet 3    busPIRone (Buspar) 30 mg tablet Take 1 tablet (30 mg) by mouth 2 times a day.      cariprazine (Vraylar) 6 mg capsule Take 1 capsule (6 mg) by mouth once daily at bedtime. 30 capsule 6    Corlanor 7.5 mg tablet Take 1 tablet (7.5 mg) by mouth 2 times daily (morning and late afternoon).      desvenlafaxine 100 mg 24 hr tablet TAKE 1 TABLET BY MOUTH EVERY DAY IN THE MORNING 90 tablet 1    famotidine (Pepcid) 20 mg tablet Take 1 tablet (20 mg) by mouth twice a day.      lactobacillus (Culturelle) 10 billion cell capsule Take 1 capsule by mouth twice a day.      lamoTRIgine (LaMICtal) 200 mg disintegrating tablet Take 1 tablet (200 mg) by mouth twice a day.      lisdexamfetamine (Vyvanse) 70 mg capsule Take 1 capsule (70 mg) by mouth once daily in the morning. 30 capsule 0    pantoprazole (ProtoNix) 40 mg EC tablet Take 1 tablet (40 mg) by mouth 2 times a day. Do not crush, chew, or split.      pregabalin (Lyrica) 150 mg capsule  Take 1 capsule (150 mg) by mouth once daily. each day at the same time.      pregabalin (Lyrica) 50 mg capsule Take 1 capsule (50 mg) by mouth 2 times a day.  (am and afternoon) in addition to 150 mg capsule at night      Abilify Maintena 400 mg injection syringe Inject 400 mg into the muscle every 30 (thirty) days. 1.3 mL 11    Accu-Chek Guide test strips strip USE TO CHECK BLOOD SUGAR UP TO 4 TIMES PER DAY AS INSTRUCTED. E11.9      albuterol 2.5 mg /3 mL (0.083 %) nebulizer solution Inhale 3 mL (2.5 mg) every 6 hours if needed for wheezing or shortness of breath.      albuterol 90 mcg/actuation inhaler Inhale 2 puffs. 6 TIMES DAILY.      asenapine (Saphris) SL tablet TAKE 1 - 2 SUBLIQUAL EVERY NIGHT AT BED TIME AS NEEDED FOR SLEEP 180 tablet 1    Autolet lancing device USE AS INSTRUCTED TO CHECK BLOOD SUGAR UP TO 4 TIMES DAILY. E11.9      azelastine-fluticasone 137-50 mcg/spray spray,non-aerosol Administer 1 spray into each nostril twice a day.      calcium carbonate-vitamin D3 (Oyster Shell) 250 mg-3.125 mcg (125 unit) tablet Take 1 tablet by mouth once daily.      CHLORZOXAZONE ORAL Take 500 mg by mouth 2 times a day as needed.      dextrose 5 % in water (D5W) parenteral solution 250 mL with ketamine 100 mg/mL solution Infuse 80 mg into a venous catheter continuously.      dicyclomine (Bentyl) 20 mg tablet Take 1 tablet (20 mg) by mouth 4 times a day before meals.      glucagon (Glucagen) 1 mg injection 1 mg.  Only take if BG< 60      glucagon (Gvoke HypoPen 2-Pack) 1 mg/0.2 mL auto-injector Inject 1 Pen under the skin.      glucose 4 gram chewable tablet Chew 4 tablets (16 g).      Ibsrela 50 mg tablet tablet Take 1 tablet (50 mg) by mouth 2 times a day.      ipratropium-albuteroL (Duo-Neb) 0.5-2.5 mg/3 mL nebulizer solution Take 3 mL by nebulization every 4 hours if needed for wheezing or shortness of breath.      ipratropium-albuteroL (Duo-Neb) 0.5-2.5 mg/3 mL nebulizer solution 3 mL every 6 hours.       levonorgestrel (Mirena) 21 mcg/24 hours (8 yrs) 52 mg IUD 52 mg by intrauterine route.      LORazepam (Ativan) 1 mg tablet Take 1 tablet (1 mg) by mouth 2 times a day as needed for anxiety or sleep. 60 tablet 2    milnacipran (SavElla) 100 mg tablet 1 tablet (100 mg) 2 times a day.      paliperidone (Invega) 3 mg 24 hr tablet TAKE 1 - 2 TABLETS BY MOUTH DAILY AT BEDTIME 120 tablet 1    Reyvow 100 mg tablet Take 1 tablet by mouth every 24 (twenty four) hours if needed. AT ONSET OF MIGRAINE NO MORE THAN 1X      tezepelumab-ekko (Tezspire) SubQ syringe Inject 210 mg under the skin every 28 (twenty-eight) days.      Trelegy Ellipta 200-62.5-25 mcg blister with device 1 puff once daily.      zavegepant (Zavzpret) 10 mg/actuation spray,non-aerosol Administer 10 mg into affected nostril(s) if needed.      zolpidem CR (Ambien CR) 12.5 mg ER tablet Take 1 tablet (12.5 mg) by mouth as needed at bedtime for sleep. 30 tablet 2     No current facility-administered medications for this encounter.       Your Providers Instructions:  Do not consume alcoholic beverages for 24 hours.  Do not make important decisions or sign any important documents for the next 24 hours  It is recommended that a responsible adult remain with you for the next 24 hours as you may be light headed/dizzy.    Call Physician:  For persistent nausea and/or vomiting over 24 hours.  Diet:                  Regular diet  May not drive: Do not operate motor vehicles for 24 hours        When to Call Your Doctor:  Call your doctor for questions and/or concerns  Call 911 for an emergency situation     Health Maintenance:  If you currently use tobacco products it is advised that you quit, to improve your health.  If you currently use or have stopped using tobacco products within the last 12 months, the following are resources that can help you:  American Heart Association ([phone] [phone], (www.americanheart.org)    [place] Tobacco Quit Line 1-384.807.5591  (QUIT-NOW)  (http://www.od.[place].gov/odhPrograms/hprr/tob_risk1.aspx)    Do you or a loved one need help for alcohol or drug use? Call the [employer] at 211 or visit FindTreatment.gov for resources.    To find out where you can safely dispose unused medicines, visit www.rxdrugdropbox.org, or call your local police department.    Please call us at [phone] with any concerns.         Please make certain you have transportation arranged to and from your appointment. This can be a friend, family member or arranged thru your health insurance provider.    What to do before your first appointment:  Stop or reduce smoking if at all possible.  No alcohol for 24 hours before your appointment.  Day of treatment:  You may have clear liquids (water, tea, coffee, no pulp juice, plain Jello, hard candy BUT no milk, cream or non-dairy creamers) up until 30 minutes and solids up to 2 hours before your appointment time.    Please bring headphones to listen to calming music, guided meditation or anything you'd like. You can also bring a journal to write in during the treatment time.   Set an intention for each appointment. Your intention can be qualities you want to cultivate in yourself, mental health goals you would like to achieve or experiences you would like to attract into your life.   Bring any inhalers you may need while at your appointment.  Take all morning medications as prescribed except the followin hours before your appointment, no chlordiazeproxide (Librium), diazepam (Valium), or Flurazepam (Dalmane)   12 hours before your appointment, no lamotrigine.  Try to avoid taking any anti-anxiety medications if at all possible the day of your appointment.. However, if you're anxiety is unbearable and would prevent you from attending you appointment, please take your prescribed dose.   No stimulants prior to your treatment, you may take them once you are home.  Use any nasal sprays up to 1 hour before your  appointment.   Cancellations:   If you need to cancel your appointment, we must have at least a 24 hour notice.  If you cancel with less than 24 hour notice or do not show three times- no additional appointments will be scheduled until the treatment plan is discussed with your outpatient psychiatrist.  Also, if you arrive more than 15 minutes late, we may need to cancel your appointment and reschedule.  Please call [phone] to cancel appointments.  Thank you for your cooperation!

## 2024-08-02 NOTE — H&P
Psychiatry Procedure H&P for each visit    Name: Kelli  : 1985  MRN: [id number]    Date: 24     History & Physical Reviewed:  Pregnancy/Lactating:  Are You Pregnant: No  Are You Currently Breastfeeding: No    I have reviewed the History and Physical dated:   History and Physical Reviewed and relevant findings noted. Patient examined to review pertinent physical findings: Yes  Home Medications Reviewed: Yes  Allergies Reviewed: Yes    ERAS (Enhanced Recovery After Surgery):  ERAS Patient: No    Consent:  COVID-19 Consent:   COVID-19 Risk Consent: Yes: Surgeon has reviewed key risks related to the risk of meli COVID-19 and if they contract COVID-19 what the risks are.

## 2024-08-02 NOTE — PROGRESS NOTES
Ketamine/Esketamine Procedure    Name: Kelli  : 1985  MRN: [id number]    Date: 24    Time out was taken with staff to confirm correct patient and correct procedure to be performed.     Ketamine infusion at 0.75 mg/kg in 40 minutes  Subjective: She said she felt more depressed lately because her migraine got worse. She said she has worked with her neurologist to control the headache, but she still had it daily. She said she did not sleep well because of headache. She said she felt more anxious too, which made her more depressed. Energy was not good. Motivation was poor. Denied having SI/HI/AVH or paranoia.   PHQ9=18  GAD7=6    Objective: She was awake, alert, cooperative, and pleasant. Good eye contact. Normal gait. No abnormal movement. Oriented x 3. Speech was normal. Affect was appropriate, restricted most of the time.  Mood was ok. Denied having SI/HI/AVH or paranoia during the interview. No delusional thought. I/J were good. Memory was fair. Attention and concentration were good.     Vital signs before infusion: HR: 81; Pulse 98%; BP: 137/71 mmHg.  Ketamine infusion observation:  About 20 minutes after starting infusion, she felt drowsy and floating on her bed. No other side effect. Vital signs were stable. HR: 79; Pulse 98%; BP: 145/65 mmHg.  About 30 minutes after starting infusion, she still felt drowsy and floating on her bed, but a little stronger. No other side effect. Vital signs were stable. HR: 87; Pulse 97%; BP: 164/74 mmHg.  About 10 minutes after the infusion, she still felt drowsy. No other side effect. She also felt dizzy because of hypoglycemia. Orange juice was given. HR: 90; Pulse 97%; BP: 177/90 mmHg.  At 12:45 pm, she felt better and less dizzy.  Her glucose level was 48. HR: 87; Pulse 96%; BP: 151/69 mmHg.    Tolerated ketamine infusion well  No complication     Imp: bipolar I depression, recurrent, severe   ANUP - under control     Plan: ketamine infusion in 2 weeks     Ata  MD [place].

## 2024-08-08 ENCOUNTER — APPOINTMENT (OUTPATIENT)
Dept: BEHAVIORAL HEALTH | Facility: CLINIC | Age: 39
End: 2024-08-08
Payer: COMMERCIAL

## 2024-08-08 ENCOUNTER — PROCEDURE VISIT (OUTPATIENT)
Dept: BEHAVIORAL HEALTH | Facility: CLINIC | Age: 39
End: 2024-08-08
Payer: COMMERCIAL

## 2024-08-08 VITALS
HEART RATE: 111 BPM | DIASTOLIC BLOOD PRESSURE: 78 MMHG | SYSTOLIC BLOOD PRESSURE: 133 MMHG | RESPIRATION RATE: 16 BRPM | TEMPERATURE: 98.1 F

## 2024-08-08 DIAGNOSIS — F31.9 BIPOLAR 1 DISORDER (MULTI): Primary | ICD-10-CM

## 2024-08-08 DIAGNOSIS — F31.9 BIPOLAR 1 DISORDER (MULTI): ICD-10-CM

## 2024-08-08 PROCEDURE — 90837 PSYTX W PT 60 MINUTES: CPT | Performed by: PSYCHOLOGIST

## 2024-08-08 NOTE — PROGRESS NOTES
Patient here at the clinic for her monthly injection of Abilify Maintena for Bipolar affective disorder,current episode hypomanic. Patient identified by full name and , vitals obtained. Patient last seen by Provider on 24, last seen by nurse on 24. Medication verified by providers note and medication order.      Appetite:  good  Sleep:  good  Appearance: clean, age appropriate and well groomed  Build: overweight  Attitude: cooperative, calm, pleasant, friendly, open  Eye Contact: normal  Activity: alert, attentive, appropriate  Speech: appropriate & spontaneous, normal rate & flow, clear  Delusions: denies  Thought Content: logical  Thought Process: logical  Judgement/insight:  Intact/good  Mood: calm   Affect: appropriate  AH/VH/SI/HI patient denies  Access to Firearms/weapons: No  Depression: Patient denies  Thoughts of hopelessness: denies  Anxiety: pt denies  Self-injurious behavior: denies at this time  Cravings/Urges: denies  Last Use: NA  Tobacco Use: Non smoker  Spiritual or cultural practices that may affect your care or impact your health care decisions? No  Living situation:  Lives with boyfriend  Employed: Employed at [employer]      Patient is engaged and cooperative. Patient stated that she is on prednisone for 5 days prescribed by her pcp due to severe asthma attack. No wheezing or SOB noticed during this visit. Patient denies SI/HI, VH/AH and self harming thoughts at this time.       Patient received Abilify Maintena 400mg  via 23 gauge needle in the right deltoid with no reaction to the injection site. No bleeding or erythema noted, patient tolerated the procedure  well.  Patient will return to the clinic in 4 weeks.    RN provided education on medication and side effects,  Patient is aware to contact nurse for any adverse reactions or  911/ [employer] crisis number for emergent issues.      LOT: [id number]  EXP:  OCT 2026  NDC:  51403-389-23     Solange Newton, MELECIO,BSN

## 2024-08-08 NOTE — PROGRESS NOTES
Insight Oriented Therapy. 50 min. BP1.  We talked about prep for her talking to work about another complaint, how to frame that to work, how it is more about her medical/emotional stability that needs to be locked in than competency at work and that she has a plan.     Chief complaint - Phase of life   Treatment plan - Insight and action consistent with ACT therapy. Providing support, safe space to process their thoughts and feelings, developing therapeutic relationship.  Goals - Self-care, insight, coping skills  Prognosis - Average  Progress - Average  Functional status - 70/100  Start time:   End time:  Focused mental status: Patient was oriented to person, place, time and context  Focused mental exam - alert and oriented  Frequency - Every two weeks

## 2024-08-13 ENCOUNTER — CLINICAL SUPPORT (OUTPATIENT)
Dept: BEHAVIORAL HEALTH | Facility: CLINIC | Age: 39
End: 2024-08-13
Payer: COMMERCIAL

## 2024-08-13 DIAGNOSIS — F31.9 BIPOLAR 1 DISORDER (MULTI): ICD-10-CM

## 2024-08-13 DIAGNOSIS — F41.1 GAD (GENERALIZED ANXIETY DISORDER): ICD-10-CM

## 2024-08-13 PROCEDURE — 90853 GROUP PSYCHOTHERAPY: CPT | Performed by: PSYCHOLOGIST

## 2024-08-15 ENCOUNTER — APPOINTMENT (OUTPATIENT)
Dept: BEHAVIORAL HEALTH | Facility: CLINIC | Age: 39
End: 2024-08-15
Payer: COMMERCIAL

## 2024-08-15 DIAGNOSIS — F31.9 BIPOLAR 1 DISORDER (MULTI): ICD-10-CM

## 2024-08-15 PROCEDURE — 90837 PSYTX W PT 60 MINUTES: CPT | Performed by: PSYCHOLOGIST

## 2024-08-15 NOTE — PROGRESS NOTES
Insight Oriented Therapy. 50 min.  BP1.  We spoke about her feeling well, trying to be opportunic with time where she is free from work to focus on getting ahead, getting things done or getting a break.  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     Chief complaint - Mood and physical issues.    Treatment plan - Insight and action consistent with ACT therapy. Providing support, safe space to process their thoughts and feelings, developing therapeutic relationship.  Goals - Self-care, insight, coping skills  Prognosis - Average  Progress - Average  Functional status - 70/100  Start time:  715am  End time: 805am  Focused mental status: Patient was oriented to person, place, time and context  Focused mental exam - alert and oriented  Frequency - Every week

## 2024-08-16 ENCOUNTER — HOSPITAL ENCOUNTER (OUTPATIENT)
Dept: POSTOP/PACU | Facility: HOSPITAL | Age: 39
Discharge: HOME | End: 2024-08-16

## 2024-08-16 VITALS
TEMPERATURE: 97.7 F | HEART RATE: 77 BPM | RESPIRATION RATE: 16 BRPM | SYSTOLIC BLOOD PRESSURE: 138 MMHG | OXYGEN SATURATION: 96 % | DIASTOLIC BLOOD PRESSURE: 79 MMHG

## 2024-08-16 DIAGNOSIS — F90.0 ATTENTION DEFICIT HYPERACTIVITY DISORDER (ADHD), PREDOMINANTLY INATTENTIVE TYPE: ICD-10-CM

## 2024-08-16 DIAGNOSIS — F31.9 BIPOLAR I DISORDER WITH DEPRESSION (MULTI): Primary | ICD-10-CM

## 2024-08-16 PROCEDURE — 2500000004 HC RX 250 GENERAL PHARMACY W/ HCPCS (ALT 636 FOR OP/ED): Performed by: PSYCHIATRY & NEUROLOGY

## 2024-08-16 PROCEDURE — KETSP HC KETAMINE INFUSION SELF-PAY: Performed by: PSYCHIATRY & NEUROLOGY

## 2024-08-16 PROCEDURE — KETSP PR KETAMINE INFUSION SELF-PAY: Performed by: PSYCHIATRY & NEUROLOGY

## 2024-08-16 PROCEDURE — 2500000005 HC RX 250 GENERAL PHARMACY W/O HCPCS: Performed by: PSYCHIATRY & NEUROLOGY

## 2024-08-16 RX ORDER — HEPARIN 100 UNIT/ML
500 SYRINGE INTRAVENOUS AS NEEDED
Status: DISCONTINUED | OUTPATIENT
Start: 2024-08-16 | End: 2024-08-17 | Stop reason: HOSPADM

## 2024-08-16 RX ORDER — HEPARIN SODIUM,PORCINE/PF 10 UNIT/ML
50 SYRINGE (ML) INTRAVENOUS AS NEEDED
Status: DISCONTINUED | OUTPATIENT
Start: 2024-08-16 | End: 2024-08-17 | Stop reason: HOSPADM

## 2024-08-16 RX ORDER — HEPARIN SODIUM,PORCINE/PF 10 UNIT/ML
50 SYRINGE (ML) INTRAVENOUS AS NEEDED
OUTPATIENT
Start: 2024-08-16

## 2024-08-16 RX ORDER — KETAMINE HCL IN 0.9 % NACL 200 MG/200
0.75 PLASTIC BAG, INJECTION (ML) INTRAVENOUS ONCE
Status: DISCONTINUED | OUTPATIENT
Start: 2024-08-16 | End: 2024-08-16

## 2024-08-16 RX ORDER — LISDEXAMFETAMINE DIMESYLATE 70 MG/1
70 CAPSULE ORAL EVERY MORNING
Qty: 30 CAPSULE | Refills: 0 | Status: SHIPPED | OUTPATIENT
Start: 2024-08-16 | End: 2024-09-15

## 2024-08-16 RX ORDER — ONDANSETRON HYDROCHLORIDE 2 MG/ML
8 INJECTION, SOLUTION INTRAVENOUS ONCE
Start: 2024-08-16 | End: 2024-08-16

## 2024-08-16 RX ORDER — KETAMINE HCL IN 0.9 % NACL 200 MG/200
0.75 PLASTIC BAG, INJECTION (ML) INTRAVENOUS ONCE
Start: 2024-08-16 | End: 2024-08-16

## 2024-08-16 RX ORDER — KETAMINE HCL IN 0.9 % NACL 200 MG/200
80 PLASTIC BAG, INJECTION (ML) INTRAVENOUS ONCE
Status: DISCONTINUED | OUTPATIENT
Start: 2024-08-16 | End: 2024-08-17 | Stop reason: HOSPADM

## 2024-08-16 RX ORDER — HEPARIN 100 UNIT/ML
500 SYRINGE INTRAVENOUS AS NEEDED
OUTPATIENT
Start: 2024-08-16

## 2024-08-16 RX ORDER — ONDANSETRON HYDROCHLORIDE 2 MG/ML
8 INJECTION, SOLUTION INTRAVENOUS ONCE
Status: COMPLETED | OUTPATIENT
Start: 2024-08-16 | End: 2024-08-16

## 2024-08-16 ASSESSMENT — PATIENT HEALTH QUESTIONNAIRE - PHQ9
7. TROUBLE CONCENTRATING ON THINGS, SUCH AS READING THE NEWSPAPER OR WATCHING TELEVISION: MORE THAN HALF THE DAYS
10. IF YOU CHECKED OFF ANY PROBLEMS, HOW DIFFICULT HAVE THESE PROBLEMS MADE IT FOR YOU TO DO YOUR WORK, TAKE CARE OF THINGS AT HOME, OR GET ALONG WITH OTHER PEOPLE: SOMEWHAT DIFFICULT
9. THOUGHTS THAT YOU WOULD BE BETTER OFF DEAD, OR OF HURTING YOURSELF: NOT AT ALL
SUM OF ALL RESPONSES TO PHQ QUESTIONS 1-9: 11
3. TROUBLE FALLING OR STAYING ASLEEP OR SLEEPING TOO MUCH: NEARLY EVERY DAY
4. FEELING TIRED OR HAVING LITTLE ENERGY: SEVERAL DAYS
6. FEELING BAD ABOUT YOURSELF - OR THAT YOU ARE A FAILURE OR HAVE LET YOURSELF OR YOUR FAMILY DOWN: NEARLY EVERY DAY
1. LITTLE INTEREST OR PLEASURE IN DOING THINGS: SEVERAL DAYS
5. POOR APPETITE OR OVEREATING: NOT AT ALL
8. MOVING OR SPEAKING SO SLOWLY THAT OTHER PEOPLE COULD HAVE NOTICED. OR THE OPPOSITE, BEING SO FIGETY OR RESTLESS THAT YOU HAVE BEEN MOVING AROUND A LOT MORE THAN USUAL: NOT AT ALL
SUM OF ALL RESPONSES TO PHQ9 QUESTIONS 1 AND 2: 2
2. FEELING DOWN, DEPRESSED OR HOPELESS: SEVERAL DAYS

## 2024-08-16 ASSESSMENT — ANXIETY QUESTIONNAIRES
5. BEING SO RESTLESS THAT IT IS HARD TO SIT STILL: NEARLY EVERY DAY
6. BECOMING EASILY ANNOYED OR IRRITABLE: NOT AT ALL
3. WORRYING TOO MUCH ABOUT DIFFERENT THINGS: NEARLY EVERY DAY
2. NOT BEING ABLE TO STOP OR CONTROL WORRYING: NEARLY EVERY DAY
GAD7 TOTAL SCORE: 15
7. FEELING AFRAID AS IF SOMETHING AWFUL MIGHT HAPPEN: NOT AT ALL
1. FEELING NERVOUS, ANXIOUS, OR ON EDGE: NEARLY EVERY DAY
4. TROUBLE RELAXING: NEARLY EVERY DAY
IF YOU CHECKED OFF ANY PROBLEMS ON THIS QUESTIONNAIRE, HOW DIFFICULT HAVE THESE PROBLEMS MADE IT FOR YOU TO DO YOUR WORK, TAKE CARE OF THINGS AT HOME, OR GET ALONG WITH OTHER PEOPLE: VERY DIFFICULT

## 2024-08-16 NOTE — H&P
Psychiatry Procedure H&P for each visit    Name: Kelli  : 1985  MRN: [id number]    Date: 24     History & Physical Reviewed:  Pregnancy/Lactating:  Are You Pregnant: No  Are You Currently Breastfeeding: No    I have reviewed the History and Physical dated:   History and Physical Reviewed and relevant findings noted. Patient examined to review pertinent physical findings: Yes  Home Medications Reviewed: Yes  Allergies Reviewed: Yes    ERAS (Enhanced Recovery After Surgery):  ERAS Patient: No    Consent:  COVID-19 Consent:   COVID-19 Risk Consent: Yes: Surgeon has reviewed key risks related to the risk of meli COVID-19 and if they contract COVID-19 what the risks are.

## 2024-08-16 NOTE — PROGRESS NOTES
Ketamine/Esketamine Procedure    Name: Kelli  : 1985  MRN: [id number]    Date: 24    Time out was taken with staff to confirm correct patient and correct procedure to be performed.     Ketamine infusion at 0.75 mg mg/kg in 40 minutes  Subjective: She said she felt ok and relatively stable, but did have 2-3 days of depression in last 2 weeks. No persistent depression and enjoyed life overall. Ate well and slept ok. Energy and concentration were fair. Denied having SI/HI/AVH or paranoia.    Had bronchitis a week ago and took prednisone for a week.  Diabetes was still unstable, glucose levels were from over 300 to 40s.   No other change in medical hx  No physical complaint   PHQ9=11  GAD7=15    Objective: She was awake, alert, cooperative, and pleasant. Normal gait. No abnormal movement. Casually dressed. Good eye contact. Oriented x 3. Speech was normal. Affect was appropriate with smile. Mood was ok. Denied having SI/HI/AVH or paranoia during the interview. No delusional thought or paranoia. I/J were good. Memory was fair. Attention and concentration were good.     Vital signs before dosing: HR: 77; pulse Ox: 97%; BP: 151/79 mmHg.   Ketamine infusion observation:  About 20 minutes after starting infusion, she said she felt drowsy and floaty on her bed.  No other side effect. Vital signs were stable. HR: 92; pulse Ox: 97%; BP: 166/72 mmHg.   Shortly ended the infusion, she experienced floaty on her bed and blurred vision. No other side effect. Vital signs were stable. HR: 86; pulse Ox: 95%; BP: 158/67 mmHg.   At 1:20 pm, she said she felt back to normal. No side effect. HR: 87; pulse Ox: 96%; BP: 136/69 mmHg.     Tolerated ketamine infusion well  No complication     Imp: bipolar I depression, mild to low moderate  ANUP - moderate   ADHD - manageable with Vyvanse     Plan: ketamine infusion in 3 weeks  Continue current psych medications    Ata Nova MD.

## 2024-08-19 NOTE — GROUP NOTE
"Group Topic: Feeling Awareness/Expression   Group Date: 8/13/2024  Start Time:  6:00 PM  End Time:  7:30 PM  Facilitators: Garima Wong PsyD   Department: [facility]    Number of Participants: 13   Group Focus: check in  Treatment Modality: Patient-Centered Therapy  Interventions utilized were exploration and support  Purpose: feelings    This was a video IOP aftercare group on a HIPAA compliant platform. All patients were provided with informed consent.    IOP Aftercare Group: Check-in  Participants had opportunity to check-in with their peers and discuss recent stressors. They identified their \"jairo bud and thorn\" over the last two weeks. They supported each other through recent challenges.   Garima Wong Psy.D.      Name: Kelli Silva YOB: 1985   MR: [id number]      Kelli was present and actively engaged in discussion. She reported that she was able to spend time with her family. However, she has been struggling with recent hypomanic symptoms and some negative thinking. She reported that she is tracking her mood.         "

## 2024-08-20 ENCOUNTER — CLINICAL SUPPORT (OUTPATIENT)
Dept: BEHAVIORAL HEALTH | Facility: CLINIC | Age: 39
End: 2024-08-20
Payer: COMMERCIAL

## 2024-08-20 DIAGNOSIS — F90.0 ATTENTION DEFICIT HYPERACTIVITY DISORDER (ADHD), PREDOMINANTLY INATTENTIVE TYPE: ICD-10-CM

## 2024-08-20 DIAGNOSIS — F41.1 GAD (GENERALIZED ANXIETY DISORDER): ICD-10-CM

## 2024-08-20 DIAGNOSIS — F31.9 BIPOLAR 1 DISORDER (MULTI): ICD-10-CM

## 2024-08-20 PROCEDURE — 90853 GROUP PSYCHOTHERAPY: CPT | Performed by: PSYCHOLOGIST

## 2024-08-22 ENCOUNTER — APPOINTMENT (OUTPATIENT)
Dept: BEHAVIORAL HEALTH | Facility: CLINIC | Age: 39
End: 2024-08-22
Payer: COMMERCIAL

## 2024-08-22 DIAGNOSIS — F31.9 BIPOLAR 1 DISORDER (MULTI): ICD-10-CM

## 2024-08-22 PROCEDURE — 90837 PSYTX W PT 60 MINUTES: CPT | Performed by: PSYCHOLOGIST

## 2024-08-22 NOTE — PROGRESS NOTES
Insight Oriented Therapy. 50 min. 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  We talked about her partner helping her out and her appreciation.  We spoke about her childhood and how she struggled in ways that couple have been more smooth for her and empathy about that young girl.     Chief complaint - Phase of life   Treatment plan - Insight and action consistent with ACT therapy. Providing support, safe space to process their thoughts and feelings, developing therapeutic relationship.  Goals - Self-care, insight, coping skills  Prognosis - Average  Progress - Average  Functional status - 70/100  Start time:   End time:  Focused mental status: Patient was oriented to person, place, time and context  Focused mental exam - alert and oriented  Frequency - Every two weeks

## 2024-08-23 NOTE — GROUP NOTE
Group Topic: Feeling Awareness/Expression   Group Date: 8/20/2024  Start Time:  6:00 PM  End Time:  7:30 PM  Facilitators: Garima Wong PsyD   Department: [facility]    Number of Participants: 13   Group Focus: feeling awareness/expression  Treatment Modality: Psychoeducation  Interventions utilized were exploration and patient education  Purpose: coping skills and feelings    This was a video IOP Recovery group on a HIPAA compliant platform. All patients were provided with informed consent.    IOP Aftercare Group: Happiness  Participants discussed what influences their happiness. They received education on the emotion of happiness and learned strategies that can help them to increase moments of happiness.   Garima Wong Psy.D.      Name: Kelli Silva YOB: 1985   MR: [id number]      Kelli was present and actively listened to discussion.

## 2024-08-29 ENCOUNTER — APPOINTMENT (OUTPATIENT)
Dept: BEHAVIORAL HEALTH | Facility: CLINIC | Age: 39
End: 2024-08-29
Payer: COMMERCIAL

## 2024-08-29 DIAGNOSIS — F31.9 BIPOLAR 1 DISORDER (MULTI): Primary | ICD-10-CM

## 2024-08-29 DIAGNOSIS — F31.9 BIPOLAR 1 DISORDER (MULTI): ICD-10-CM

## 2024-08-29 PROCEDURE — 90832 PSYTX W PT 30 MINUTES: CPT | Performed by: PSYCHOLOGIST

## 2024-08-29 NOTE — PROGRESS NOTES
Insight Oriented Therapy. 30 min.  BP1.  We spoke about how she is doing well, enjoying the less stressful and less intense work schedule.  We spoke about her friend being in town and it's been, for a change, quiet.  We did a half session.     Chief complaint - Phase of life   Treatment plan - Insight and action consistent with ACT therapy. Providing support, safe space to process their thoughts and feelings, developing therapeutic relationship.  Goals - Self-care, insight, coping skills  Prognosis - Average  Progress - Average  Functional status - 70/100  Focused mental status: Patient was oriented to person, place, time and context  Focused mental exam - alert and oriented  Frequency - Every week

## 2024-08-30 ENCOUNTER — TELEPHONE (OUTPATIENT)
Dept: BEHAVIORAL HEALTH | Facility: CLINIC | Age: 39
End: 2024-08-30
Payer: COMMERCIAL

## 2024-09-05 ENCOUNTER — PROCEDURE VISIT (OUTPATIENT)
Dept: BEHAVIORAL HEALTH | Facility: CLINIC | Age: 39
End: 2024-09-05
Payer: COMMERCIAL

## 2024-09-05 ENCOUNTER — APPOINTMENT (OUTPATIENT)
Dept: BEHAVIORAL HEALTH | Facility: CLINIC | Age: 39
End: 2024-09-05
Payer: COMMERCIAL

## 2024-09-05 VITALS
HEIGHT: 62 IN | BODY MASS INDEX: 45.43 KG/M2 | TEMPERATURE: 97.9 F | SYSTOLIC BLOOD PRESSURE: 126 MMHG | DIASTOLIC BLOOD PRESSURE: 77 MMHG | RESPIRATION RATE: 16 BRPM | HEART RATE: 107 BPM | WEIGHT: 246.9 LBS

## 2024-09-05 DIAGNOSIS — F31.0 BIPOLAR AFFECTIVE DISORDER, CURRENT EPISODE HYPOMANIC (MULTI): Primary | ICD-10-CM

## 2024-09-05 DIAGNOSIS — F31.9 BIPOLAR 1 DISORDER (MULTI): ICD-10-CM

## 2024-09-05 PROCEDURE — 90837 PSYTX W PT 60 MINUTES: CPT | Performed by: PSYCHOLOGIST

## 2024-09-05 NOTE — PROGRESS NOTES
Patient here at the clinic for every 2 months injection of Abilify Asimtufii for Bipolar affective disorder,current episode hypomanic. Patient used to be on monthly Abilify 400mg IM injection but it was changed to Abilify Asimtufii every 2 months. Patient will receive the first dose today.Patient identified by full name and , vitals obtained. Patient last seen by Provider on 24, last seen by nurse on 24. Medication verified by providers note and medication order.      Appetite:  good  Sleep:  good  Appearance: clean, age appropriate and well groomed  Build: overweight  Attitude: cooperative, calm, pleasant, friendly, open  Eye Contact: normal  Activity: alert, attentive, appropriate  Speech: appropriate & spontaneous, normal rate & flow, clear  Delusions: denies  Thought Content: logical  Thought Process: logical  Judgement/insight:  Intact/good  Mood: calm   Affect: appropriate  AH/VH/SI/HI patient denies  Access to Firearms/weapons: No  Depression: Patient denies  Thoughts of hopelessness: denies  Anxiety: pt denies  Self-injurious behavior: denies at this time  Cravings/Urges: denies  Last Use: NA  Tobacco Use: Non smoker  Spiritual or cultural practices that may affect your care or impact your health care decisions? No  Living situation:  Lives with boyfriend  Employed: Employed at [employer]      Patient is engaged and cooperative. Patient stated that she's having nausea but taking medications for it. The nausea comes and goes per patient. She has gone for endoscopy and they could not find out what is causing the nausea. Her GI doctor is aware and patient is on 2 medications for that. Patient denies SI/HI, VH/AH and self harming thoughts at this time.       Patient received Abilify Asimtufii 960mg/3.2ml  via 22 gauge,11/2 inch needle in the right gluteal area with no reaction to the injection site. No bleeding or erythema noted, patient tolerated the procedure  well.  Patient will return to  the clinic in 2 months.    RN provided education on medication and side effects,  Patient is aware to contact nurse for any adverse reactions or 911/ mobile crisis number for emergent issues.      LOT: [id number]  EXP:  AUG 2025  NDC:  60854-323-22    Solange Newton RN,BSN

## 2024-09-05 NOTE — PROGRESS NOTES
Insight Oriented Therapy. 50 min.  BP1.  We spoke about how she is trying to find the delicate balance between enough work and not enough personal time.  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  We spoke about how important it is to be balanced.      Chief complaint - Phase of life   Treatment plan - Insight and action consistent with ACT therapy. Providing support, safe space to process their thoughts and feelings, developing therapeutic relationship.  Goals - Self-care, insight, coping skills  Prognosis - Average  Progress - Average  Functional status - 70/100  Focused mental status: Patient was oriented to person, place, time and context  Focused mental exam - alert and oriented  Frequency - Every two weeks

## 2024-09-06 ENCOUNTER — HOSPITAL ENCOUNTER (OUTPATIENT)
Dept: POSTOP/PACU | Facility: HOSPITAL | Age: 39
Discharge: HOME | End: 2024-09-06

## 2024-09-06 VITALS
HEART RATE: 91 BPM | SYSTOLIC BLOOD PRESSURE: 129 MMHG | TEMPERATURE: 95.5 F | OXYGEN SATURATION: 96 % | DIASTOLIC BLOOD PRESSURE: 60 MMHG | RESPIRATION RATE: 16 BRPM

## 2024-09-06 DIAGNOSIS — F31.9 BIPOLAR I DISORDER WITH DEPRESSION (MULTI): Primary | ICD-10-CM

## 2024-09-06 DIAGNOSIS — F51.01 PRIMARY INSOMNIA: Primary | ICD-10-CM

## 2024-09-06 DIAGNOSIS — F31.32 BIPOLAR DISORDER, CURRENT EPISODE DEPRESSED, MODERATE (MULTI): ICD-10-CM

## 2024-09-06 PROCEDURE — KETSP PR KETAMINE INFUSION SELF-PAY: Performed by: PSYCHIATRY & NEUROLOGY

## 2024-09-06 PROCEDURE — 2500000004 HC RX 250 GENERAL PHARMACY W/ HCPCS (ALT 636 FOR OP/ED): Performed by: PSYCHIATRY & NEUROLOGY

## 2024-09-06 PROCEDURE — KETSP HC KETAMINE INFUSION SELF-PAY: Performed by: PSYCHIATRY & NEUROLOGY

## 2024-09-06 PROCEDURE — 2500000005 HC RX 250 GENERAL PHARMACY W/O HCPCS: Performed by: PSYCHIATRY & NEUROLOGY

## 2024-09-06 RX ORDER — KETAMINE HCL IN 0.9 % NACL 200 MG/200
0.75 PLASTIC BAG, INJECTION (ML) INTRAVENOUS ONCE
Start: 2024-09-06 | End: 2024-09-06

## 2024-09-06 RX ORDER — KETAMINE HCL IN 0.9 % NACL 200 MG/200
0.75 PLASTIC BAG, INJECTION (ML) INTRAVENOUS ONCE
Status: COMPLETED | OUTPATIENT
Start: 2024-09-06 | End: 2024-09-06

## 2024-09-06 RX ORDER — ONDANSETRON HYDROCHLORIDE 2 MG/ML
8 INJECTION, SOLUTION INTRAVENOUS ONCE
Status: COMPLETED | OUTPATIENT
Start: 2024-09-06 | End: 2024-09-06

## 2024-09-06 RX ORDER — TRAZODONE HYDROCHLORIDE 100 MG/1
200 TABLET ORAL NIGHTLY
Qty: 60 TABLET | Refills: 1 | Status: SHIPPED | OUTPATIENT
Start: 2024-09-06 | End: 2024-11-05

## 2024-09-06 RX ORDER — ONDANSETRON HYDROCHLORIDE 2 MG/ML
8 INJECTION, SOLUTION INTRAVENOUS ONCE
Start: 2024-09-06 | End: 2024-09-06

## 2024-09-06 RX ORDER — DESVENLAFAXINE 100 MG/1
100 TABLET, EXTENDED RELEASE ORAL
Qty: 90 TABLET | Refills: 3 | Status: SHIPPED | OUTPATIENT
Start: 2024-09-06

## 2024-09-06 ASSESSMENT — ANXIETY QUESTIONNAIRES
7. FEELING AFRAID AS IF SOMETHING AWFUL MIGHT HAPPEN: NOT AT ALL
4. TROUBLE RELAXING: NEARLY EVERY DAY
3. WORRYING TOO MUCH ABOUT DIFFERENT THINGS: NEARLY EVERY DAY
1. FEELING NERVOUS, ANXIOUS, OR ON EDGE: NEARLY EVERY DAY
5. BEING SO RESTLESS THAT IT IS HARD TO SIT STILL: NEARLY EVERY DAY
2. NOT BEING ABLE TO STOP OR CONTROL WORRYING: NEARLY EVERY DAY
6. BECOMING EASILY ANNOYED OR IRRITABLE: NOT AT ALL
IF YOU CHECKED OFF ANY PROBLEMS ON THIS QUESTIONNAIRE, HOW DIFFICULT HAVE THESE PROBLEMS MADE IT FOR YOU TO DO YOUR WORK, TAKE CARE OF THINGS AT HOME, OR GET ALONG WITH OTHER PEOPLE: VERY DIFFICULT
GAD7 TOTAL SCORE: 15

## 2024-09-06 ASSESSMENT — PATIENT HEALTH QUESTIONNAIRE - PHQ9
4. FEELING TIRED OR HAVING LITTLE ENERGY: MORE THAN HALF THE DAYS
10. IF YOU CHECKED OFF ANY PROBLEMS, HOW DIFFICULT HAVE THESE PROBLEMS MADE IT FOR YOU TO DO YOUR WORK, TAKE CARE OF THINGS AT HOME, OR GET ALONG WITH OTHER PEOPLE: VERY DIFFICULT
8. MOVING OR SPEAKING SO SLOWLY THAT OTHER PEOPLE COULD HAVE NOTICED. OR THE OPPOSITE, BEING SO FIGETY OR RESTLESS THAT YOU HAVE BEEN MOVING AROUND A LOT MORE THAN USUAL: SEVERAL DAYS
7. TROUBLE CONCENTRATING ON THINGS, SUCH AS READING THE NEWSPAPER OR WATCHING TELEVISION: SEVERAL DAYS
9. THOUGHTS THAT YOU WOULD BE BETTER OFF DEAD, OR OF HURTING YOURSELF: NOT AT ALL
SUM OF ALL RESPONSES TO PHQ QUESTIONS 1-9: 13
5. POOR APPETITE OR OVEREATING: NEARLY EVERY DAY
2. FEELING DOWN, DEPRESSED OR HOPELESS: SEVERAL DAYS
SUM OF ALL RESPONSES TO PHQ9 QUESTIONS 1 AND 2: 2
3. TROUBLE FALLING OR STAYING ASLEEP OR SLEEPING TOO MUCH: NEARLY EVERY DAY
6. FEELING BAD ABOUT YOURSELF - OR THAT YOU ARE A FAILURE OR HAVE LET YOURSELF OR YOUR FAMILY DOWN: SEVERAL DAYS
1. LITTLE INTEREST OR PLEASURE IN DOING THINGS: SEVERAL DAYS

## 2024-09-06 NOTE — H&P
Psychiatry Procedure H&P for each visit    Name: Kelli  : 1985  MRN: [id number]    Date: 24     History & Physical Reviewed:  Pregnancy/Lactating:  Are You Pregnant: No  Are You Currently Breastfeeding: No    I have reviewed the History and Physical dated:     History and Physical Reviewed and relevant findings noted. Patient examined to review pertinent physical findings: Yes  Home Medications Reviewed: Yes  Allergies Reviewed: Yes    ERAS (Enhanced Recovery After Surgery):  ERAS Patient: No    Consent:  COVID-19 Consent:   COVID-19 Risk Consent: Yes: Surgeon has reviewed key risks related to the risk of meli COVID-19 and if they contract COVID-19 what the risks are.

## 2024-09-06 NOTE — PROGRESS NOTES
Ketamine/Esketamine Procedure    Name: Kelli  : 1985  MRN: [id number]    Date: 24    Time out was taken with staff to confirm correct patient and correct procedure to be performed.     Ketamine infusion at .75 mg/kg in 40 minutes  Subjective: She said she felt ok, but a little more depressed in last 5 days. She could not find a trigger for depression. She did report the she had problem with falling asleep in last 2 weeks. She tried Saphris 10 mg and Invega 6 mg for sleep, but did not help. She said Ambien did not help, and even made worse. She wanted to try trazodone 200 mg again. She was taking trazodone for sleep before. Energy was low. Concentration was ok. Denied having Si/HI/AVH or paranoia. No side effect after discharge.   Received Abilify injection yesterday.   She said DM was not under control. Her blood glucose varied widely. She was hypoglycemic before the treatment.   No other change in medical hx  No other physical complaint  PHQ9=13  GAD7=15    Objective: She was awake, alert, cooperative, and pleasant. Normal gait. Hands were shaking due to hypoglycemic. Good eye contact. Oriented x 3. Speech was normal. Affect was appropriate, restricted most of the time.  Mood was ok. Denied having SI/HI/AVH or paranoia during the interview. No delusional thought or paranoia. I/J were good. Memory was fair. Attention and concentration were good.     Vital signs before infusion: HR: 82 bpm; pulse Ox: 96%; BP: 164/90 mmHg.   Ketamine infusion observation:  About 20 minutes after starting infusion, she said she had mild double vision. No other side effect. Vital signs were stable.  HR: 87 bpm; pulse Ox: 93%; BP: 142/77 mmHg.   Close to the end of infusion, she said she felt good and still had mild double vision.  No other side effect. Vital signs were stable. HR: 89 bpm; pulse Ox: 93%; BP: 143/78 mmHg.     Tolerated ketamine infusion well  No complication     Imp: bipolar I depression, moderate   ANUP -  moderate  Insomnia     Plan: will try trazodone for sleep   Continue other medications  Ketamine infusion in 3 weeks.     Ata Nova MD.

## 2024-09-10 ENCOUNTER — CLINICAL SUPPORT (OUTPATIENT)
Dept: BEHAVIORAL HEALTH | Facility: CLINIC | Age: 39
End: 2024-09-10
Payer: COMMERCIAL

## 2024-09-10 DIAGNOSIS — F31.9 BIPOLAR 1 DISORDER (MULTI): ICD-10-CM

## 2024-09-10 PROCEDURE — 90853 GROUP PSYCHOTHERAPY: CPT | Performed by: PSYCHOLOGIST

## 2024-09-12 ENCOUNTER — APPOINTMENT (OUTPATIENT)
Dept: BEHAVIORAL HEALTH | Facility: CLINIC | Age: 39
End: 2024-09-12
Payer: COMMERCIAL

## 2024-09-12 DIAGNOSIS — F31.9 BIPOLAR 1 DISORDER (MULTI): ICD-10-CM

## 2024-09-12 PROCEDURE — 90837 PSYTX W PT 60 MINUTES: CPT | Performed by: PSYCHOLOGIST

## 2024-09-16 NOTE — GROUP NOTE
Group Topic: Coping Skills   Group Date: 9/10/2024  Start Time:  6:00 PM  End Time:  7:30 PM  Facilitators: Garima Wong PsyD   Department: [facility]    Number of Participants: 10   Group Focus: coping skills  Treatment Modality: Psychoeducation and Skills Training  Interventions utilized were patient education  Purpose: coping skills    This was a video IOP aftercare group on a HIPAA compliant platform. All patients were provided with informed consent.    IOP Aftercare Group: Behavioral activation  Participants had opportunity to check-in with their peers and discuss recent stressors. They received education on behavioral activation and three types of activities that help create meaning and purpose.   Garima Wong Psy.D.      Name: Kelli Silva YOB: 1985   MR: [id number]      Kelli was present and actively engaged in discussion. She discussed both good things and challenges from the week. She reported that she is repairing a relationship and feeling happy about this. She reported that she is practicing radical acceptance with challenges related to work. She reported that crocheting is an activity that she enjoys and would define as an activity of mastery.

## 2024-09-17 ENCOUNTER — CLINICAL SUPPORT (OUTPATIENT)
Dept: BEHAVIORAL HEALTH | Facility: CLINIC | Age: 39
End: 2024-09-17
Payer: COMMERCIAL

## 2024-09-17 ENCOUNTER — APPOINTMENT (OUTPATIENT)
Dept: BEHAVIORAL HEALTH | Facility: CLINIC | Age: 39
End: 2024-09-17
Payer: COMMERCIAL

## 2024-09-17 DIAGNOSIS — F41.1 GAD (GENERALIZED ANXIETY DISORDER): ICD-10-CM

## 2024-09-17 DIAGNOSIS — F31.31 BIPOLAR 1 DISORDER, DEPRESSED, MILD (MULTI): ICD-10-CM

## 2024-09-17 DIAGNOSIS — F90.0 ATTENTION DEFICIT HYPERACTIVITY DISORDER (ADHD), PREDOMINANTLY INATTENTIVE TYPE: ICD-10-CM

## 2024-09-17 PROCEDURE — 1036F TOBACCO NON-USER: CPT | Performed by: PSYCHIATRY & NEUROLOGY

## 2024-09-17 PROCEDURE — 90853 GROUP PSYCHOTHERAPY: CPT | Mod: U2,95

## 2024-09-17 PROCEDURE — 99214 OFFICE O/P EST MOD 30 MIN: CPT | Performed by: PSYCHIATRY & NEUROLOGY

## 2024-09-17 NOTE — PROGRESS NOTES
Follow-up visit  Subjective: She said she felt better after the ketamine infusion 2 weeks ago. She said trazodone helped her sleep, but took up to 2 hours to  fall asleep due to prednisone.  Started taking Prednisone again for asthma, which affected her sleep. Before then, she did not have problem with falling or staying asleep . Still felt depressed daily some time with moderate sx. Not felt hopeless or helpless. Denied having SI/HI/AVH or paranoia. Still enjoyed life overall. Still ate well and no change in weight. Energy was little low. Concentration was a little off. She felt her mind was a little racing and believed it was due to prednisone. Not feel irritable.   No side effect from psychotropics  Able to take of herself    Asthma flared up   DM -not controlled well    No problem with eyes,ears, nose or throat  No SOB or chest pain  No HTN   Chronic nausea with scopalamine patch. No other GI sx  No  sx  No neurological problem  No problem with bones, joints, or muscles  No problem with blood   No problem with skin    No problem with thyroid      Objective:   Appearance: well-groomed.   Build: overweight . 5'2' tall, 240 pounds.   Demeanor: average.   Eye Contact: average.   Motor Activity: average.   Speech: clear.   Language: Neurologic language is intact.   Fund of Knowledge: intact fund of knowledge.   Delusions: None Reported.   Hallucinations: not reported  Self Harm: None Reported.   Aggressive: None Reported.   Mood: ok   Affect: full with smile occasionally  Orientation: alert, oriented x3.   Manner: cooperative.   Thought process: goal-directed.   Thought association: displays rational thought process.   Content of thought: normal  Abstract/ Rational Thought: intact   Memory: grossly intact.   Behavior: normal motor activity, relaxed, good eye contact, calm.   Attention/Concentration: normal.   Cognition: intact.   Intelligence Estimate: average.   Executive Function: intact.   Insight: intact.    Judgement: intact.   Musculoskeletal: normal strength and tone.  No abnormal movement.   Impression: bipolar I depression, recurrent, moderate   ANUP - under control most of the time   ADHD - manageable with Vyvanse and Strattera   Insomnia - related to prednisone  Discussion/Plan:    Continue current medications   Ketamine infusion this Friday as scheduled   Follow-up in 3 months or earlier   Ata Nova MD

## 2024-09-18 NOTE — GROUP NOTE
Group Topic: Feeling Awareness/Expression   Group Date: 9/17/2024  Start Time:  6:00 PM  End Time:  7:30 PM  Facilitators: ROSIE Weber   Department: [facility]    This was a video IOP group on a HIPAA compliant platform. All patients were provided with informed consent.     Date: 9/17/2024, Time: 6:00-7:30pm, Number of Patients: 13, User Name: [email or web address],  Number of Staff: 1  Group topic(s): check in. The group engaged in checking in from the last few weeks by sharing a high point and a low point of the week. As a whole the group offered support to one another.     Name: Kelli Silva YOB: 1985   MR: [id number]      Kelli was attentive and on topic. Patient followed along appropriately and engaged fully throughout. Patient shared a low of her week as struggling with her asthma and having to take medication that often brings on manic symptoms. Patient shared taking steps to help maintain her routine and be aware of her symptoms and monitor them moving forward.   ROSIE Yee

## 2024-09-19 ENCOUNTER — APPOINTMENT (OUTPATIENT)
Dept: BEHAVIORAL HEALTH | Facility: CLINIC | Age: 39
End: 2024-09-19
Payer: COMMERCIAL

## 2024-09-19 DIAGNOSIS — F31.9 BIPOLAR 1 DISORDER (MULTI): ICD-10-CM

## 2024-09-19 PROCEDURE — 90837 PSYTX W PT 60 MINUTES: CPT | Performed by: PSYCHOLOGIST

## 2024-09-19 NOTE — PROGRESS NOTES
Insight Oriented Therapy. 50 min.  BP1.  We spoke about coping skills through the week, trying to stay focused on the day ahead and seeing the benefit of her spaced in breaks through the day/week.  We spoke a bit about her socializing time and how that helps her recuperate.     Chief complaint - Phase of life   Treatment plan - Insight and action consistent with ACT therapy. Providing support, safe space to process their thoughts and feelings, developing therapeutic relationship.  Goals - Self-care, insight, coping skills  Prognosis - Average  Progress - Average  Functional status - 70/100  Focused mental status: Patient was oriented to person, place, time and context  Focused mental exam - alert and oriented  Frequency - Every two weeks

## 2024-09-20 ENCOUNTER — HOSPITAL ENCOUNTER (OUTPATIENT)
Dept: POSTOP/PACU | Facility: HOSPITAL | Age: 39
Discharge: HOME | End: 2024-09-20
Payer: COMMERCIAL

## 2024-09-20 VITALS
WEIGHT: 245 LBS | OXYGEN SATURATION: 98 % | BODY MASS INDEX: 44.81 KG/M2 | DIASTOLIC BLOOD PRESSURE: 71 MMHG | SYSTOLIC BLOOD PRESSURE: 139 MMHG | RESPIRATION RATE: 14 BRPM | HEART RATE: 121 BPM | TEMPERATURE: 96.8 F

## 2024-09-20 DIAGNOSIS — F31.9 BIPOLAR I DISORDER WITH DEPRESSION (MULTI): Primary | ICD-10-CM

## 2024-09-20 PROCEDURE — 2500000004 HC RX 250 GENERAL PHARMACY W/ HCPCS (ALT 636 FOR OP/ED): Performed by: PSYCHIATRY & NEUROLOGY

## 2024-09-20 PROCEDURE — 2500000005 HC RX 250 GENERAL PHARMACY W/O HCPCS: Performed by: PSYCHIATRY & NEUROLOGY

## 2024-09-20 PROCEDURE — KETSP PR KETAMINE INFUSION SELF-PAY: Performed by: PSYCHIATRY & NEUROLOGY

## 2024-09-20 PROCEDURE — KETSP HC KETAMINE INFUSION SELF-PAY: Performed by: PSYCHIATRY & NEUROLOGY

## 2024-09-20 RX ORDER — DEXTROSE 40 %
15 GEL (GRAM) ORAL ONCE
Status: CANCELLED | OUTPATIENT
Start: 2024-09-20 | End: 2024-09-20

## 2024-09-20 RX ORDER — HEPARIN SODIUM,PORCINE/PF 10 UNIT/ML
50 SYRINGE (ML) INTRAVENOUS AS NEEDED
Status: DISCONTINUED | OUTPATIENT
Start: 2024-09-20 | End: 2024-09-21 | Stop reason: HOSPADM

## 2024-09-20 RX ORDER — HEPARIN 100 UNIT/ML
500 SYRINGE INTRAVENOUS AS NEEDED
OUTPATIENT
Start: 2024-09-20

## 2024-09-20 RX ORDER — ONDANSETRON HYDROCHLORIDE 2 MG/ML
8 INJECTION, SOLUTION INTRAVENOUS ONCE
Status: COMPLETED | OUTPATIENT
Start: 2024-09-20 | End: 2024-09-20

## 2024-09-20 RX ORDER — HEPARIN 100 UNIT/ML
500 SYRINGE INTRAVENOUS AS NEEDED
Status: DISCONTINUED | OUTPATIENT
Start: 2024-09-20 | End: 2024-09-21 | Stop reason: HOSPADM

## 2024-09-20 RX ORDER — HEPARIN SODIUM,PORCINE/PF 10 UNIT/ML
50 SYRINGE (ML) INTRAVENOUS AS NEEDED
OUTPATIENT
Start: 2024-09-20

## 2024-09-20 RX ORDER — KETAMINE HCL IN 0.9 % NACL 200 MG/200
0.75 PLASTIC BAG, INJECTION (ML) INTRAVENOUS ONCE
Start: 2024-09-20 | End: 2024-09-20

## 2024-09-20 RX ORDER — DEXTROSE 40 %
15 GEL (GRAM) ORAL ONCE
Status: DISCONTINUED | OUTPATIENT
Start: 2024-09-20 | End: 2024-09-21 | Stop reason: HOSPADM

## 2024-09-20 RX ORDER — ONDANSETRON HYDROCHLORIDE 2 MG/ML
8 INJECTION, SOLUTION INTRAVENOUS ONCE
Start: 2024-09-20 | End: 2024-09-20

## 2024-09-20 RX ORDER — KETAMINE HCL IN 0.9 % NACL 200 MG/200
0.75 PLASTIC BAG, INJECTION (ML) INTRAVENOUS ONCE
Status: COMPLETED | OUTPATIENT
Start: 2024-09-20 | End: 2024-09-20

## 2024-09-20 ASSESSMENT — PATIENT HEALTH QUESTIONNAIRE - PHQ9
8. MOVING OR SPEAKING SO SLOWLY THAT OTHER PEOPLE COULD HAVE NOTICED. OR THE OPPOSITE, BEING SO FIGETY OR RESTLESS THAT YOU HAVE BEEN MOVING AROUND A LOT MORE THAN USUAL: NOT AT ALL
10. IF YOU CHECKED OFF ANY PROBLEMS, HOW DIFFICULT HAVE THESE PROBLEMS MADE IT FOR YOU TO DO YOUR WORK, TAKE CARE OF THINGS AT HOME, OR GET ALONG WITH OTHER PEOPLE: SOMEWHAT DIFFICULT
6. FEELING BAD ABOUT YOURSELF - OR THAT YOU ARE A FAILURE OR HAVE LET YOURSELF OR YOUR FAMILY DOWN: NEARLY EVERY DAY
SUM OF ALL RESPONSES TO PHQ QUESTIONS 1-9: 11
7. TROUBLE CONCENTRATING ON THINGS, SUCH AS READING THE NEWSPAPER OR WATCHING TELEVISION: SEVERAL DAYS
5. POOR APPETITE OR OVEREATING: SEVERAL DAYS
SUM OF ALL RESPONSES TO PHQ9 QUESTIONS 1 AND 2: 2
3. TROUBLE FALLING OR STAYING ASLEEP OR SLEEPING TOO MUCH: NEARLY EVERY DAY
4. FEELING TIRED OR HAVING LITTLE ENERGY: SEVERAL DAYS
2. FEELING DOWN, DEPRESSED OR HOPELESS: SEVERAL DAYS
9. THOUGHTS THAT YOU WOULD BE BETTER OFF DEAD, OR OF HURTING YOURSELF: NOT AT ALL
1. LITTLE INTEREST OR PLEASURE IN DOING THINGS: SEVERAL DAYS

## 2024-09-20 ASSESSMENT — ANXIETY QUESTIONNAIRES
7. FEELING AFRAID AS IF SOMETHING AWFUL MIGHT HAPPEN: MORE THAN HALF THE DAYS
4. TROUBLE RELAXING: MORE THAN HALF THE DAYS
3. WORRYING TOO MUCH ABOUT DIFFERENT THINGS: MORE THAN HALF THE DAYS
2. NOT BEING ABLE TO STOP OR CONTROL WORRYING: MORE THAN HALF THE DAYS
IF YOU CHECKED OFF ANY PROBLEMS ON THIS QUESTIONNAIRE, HOW DIFFICULT HAVE THESE PROBLEMS MADE IT FOR YOU TO DO YOUR WORK, TAKE CARE OF THINGS AT HOME, OR GET ALONG WITH OTHER PEOPLE: VERY DIFFICULT
6. BECOMING EASILY ANNOYED OR IRRITABLE: MORE THAN HALF THE DAYS
1. FEELING NERVOUS, ANXIOUS, OR ON EDGE: MORE THAN HALF THE DAYS
5. BEING SO RESTLESS THAT IT IS HARD TO SIT STILL: MORE THAN HALF THE DAYS
GAD7 TOTAL SCORE: 14

## 2024-09-20 NOTE — H&P
Psychiatry Procedure H&P for each visit    Name: Kelli  : 1985  MRN: [id number]    Date: 24     History & Physical Reviewed:  Pregnancy/Lactating:  Are You Pregnant: No  Are You Currently Breastfeeding: No    I have reviewed the History and Physical dated:   History and Physical Reviewed and relevant findings noted. Patient examined to review pertinent physical findings: Yes  Home Medications Reviewed: Yes  Allergies Reviewed: Yes    ERAS (Enhanced Recovery After Surgery):  ERAS Patient: No    Consent:  COVID-19 Consent:   COVID-19 Risk Consent: Yes: Surgeon has reviewed key risks related to the risk of meli COVID-19 and if they contract COVID-19 what the risks are.

## 2024-09-20 NOTE — PROGRESS NOTES
Ketamine/Esketamine Procedure    Name: Kelli  : 1985  MRN: [id number]    Date: 24    Time out was taken with staff to confirm correct patient and correct procedure to be performed.     Ketamine infusion at 0.75 mg/kg in 40 minutes.   Subjective: She said she felt ok and relatively stable, a little better compared to when she was at my office. No problem of falling asleep or staying asleep, but slept too much with trazodone in last several day. No persistent depression and enjoyed life overall. Anxiety still bothered her. Ate well. Not feel hopeless or helpless.  Denied having SI/HI/AVH or paranoia. No side effect after discharge.  No change in medical hx, but DM has been unstable.   Prior to infusion, her blood sugar was low, at 40s. We had to give her juice and glucose gel to increase the sugar to the normal level. Her rate was high, around 120 to 130 bpm most of the time, she did not have any sx or complaint.  PHQ9=11  GAD7=14    Objective: She was awake, alert, cooperative, and pleasant. Normal gait. No abnormal movement. Good eye contact. Oriented x 3. Speech was normal. Affect was appropriate, restricted most of the time.  Mood was ok. Denied having SI/HI/AVH or paranoia during the interview. No delusional thought. I/J were good. Memory was fair. Attention and concentration were good.     Vital signs before infusion:  bpm; Pulse Ox: 95%; BP: 130/61 mmHg.   SPRAVATO observation:  About 20 minutes after the third dose of SPRAVATO, she said she did not feel anything. o Vital signs were stable.  bpm; Pulse Ox: 95%; BP: 155/67 mmHg.   Close to the end of infusion, she said she still did not feel anything. Vital signs were stable.  bpm; Pulse Ox: 95%; BP: 140/76 mmHg.     Tolerated SPRAVATO well  No complication     Imp: bipolar I depression, mild to low moderate   ANUP - moderate  No immediate risk to self or others     Plan: Ketamine infusion in 3 weeks    Ata Nova MD

## 2024-09-20 NOTE — PATIENT INSTRUCTIONS
Homegoing Instructions    Patient: Kelli Silva  MRN: [id number]  YOB: 1985    Care Providers: Dr. Ata FRANKLIN RN       Allergies:  Aspirin, Cigarette smoke, Fish containing products, Iodinated contrast media, Ketorolac, Ketorolac tromethamine, Ondansetron, Other, Prochlorperazine, Shellfish containing products, Sumatriptan, Azithromycin, Ceftriaxone, Doxycycline, Dulaglutide, House dust, Liraglutide, Metformin, Metoclopramide, Prednisone, Sitagliptin, [id number] antimigraine agents, and Zolmitriptan    Medications:  Current Outpatient Medications   Medication Sig Dispense Refill    Abilify Maintena 400 mg injection syringe Inject 400 mg into the muscle every 30 (thirty) days. 1.3 mL 11    Accu-Chek Guide test strips strip USE TO CHECK BLOOD SUGAR UP TO 4 TIMES PER DAY AS INSTRUCTED. E11.9      albuterol 2.5 mg /3 mL (0.083 %) nebulizer solution Inhale 3 mL (2.5 mg) every 6 hours if needed for wheezing or shortness of breath.      albuterol 90 mcg/actuation inhaler Inhale 2 puffs. 6 TIMES DAILY.      asenapine (Saphris) SL tablet TAKE 1 - 2 SUBLIQUAL EVERY NIGHT AT BED TIME AS NEEDED FOR SLEEP 180 tablet 1    atomoxetine (Strattera) 60 mg capsule Take 1 capsule (60 mg) by mouth once daily. Swallow capsule whole; do not open. If opened accidentally, do not touch eyes; wash hands immediately (product is an eye irritant). 30 capsule 2    Autolet lancing device USE AS INSTRUCTED TO CHECK BLOOD SUGAR UP TO 4 TIMES DAILY. E11.9      azelastine-fluticasone 137-50 mcg/spray spray,non-aerosol Administer 1 spray into each nostril twice a day.      buPROPion XL (Wellbutrin XL) 300 mg 24 hr tablet Take 1 tablet (300 mg) by mouth once daily in the morning. Do not crush, chew, or split. 90 tablet 3    busPIRone (Buspar) 30 mg tablet Take 1 tablet (30 mg) by mouth 2 times a day.      cariprazine (Vraylar) 6 mg capsule Take 1 capsule (6 mg) by mouth once daily at bedtime. 30 capsule 6     Corlanor 7.5 mg tablet Take 1 tablet (7.5 mg) by mouth 2 times daily (morning and late afternoon).      desvenlafaxine 100 mg 24 hr tablet TAKE 1 TABLET BY MOUTH EVERY DAY IN THE MORNING 90 tablet 3    dextrose 5 % in water (D5W) parenteral solution 250 mL with ketamine 100 mg/mL solution Infuse 80 mg into a venous catheter continuously.      dicyclomine (Bentyl) 20 mg tablet Take 1 tablet (20 mg) by mouth 4 times a day before meals.      famotidine (Pepcid) 20 mg tablet Take 1 tablet (20 mg) by mouth twice a day.      glucagon (Glucagen) 1 mg injection 1 mg.  Only take if BG< 60      glucagon (Gvoke HypoPen 2-Pack) 1 mg/0.2 mL auto-injector Inject 1 Pen under the skin.      glucose 4 gram chewable tablet Chew 4 tablets (16 g).      Ibsrela 50 mg tablet tablet Take 1 tablet (50 mg) by mouth 2 times a day.      ipratropium-albuteroL (Duo-Neb) 0.5-2.5 mg/3 mL nebulizer solution Take 3 mL by nebulization every 4 hours if needed for wheezing or shortness of breath.      ipratropium-albuteroL (Duo-Neb) 0.5-2.5 mg/3 mL nebulizer solution 3 mL every 6 hours.      lactobacillus (Culturelle) 10 billion cell capsule Take 1 capsule by mouth twice a day.      lamoTRIgine (LaMICtal) 200 mg disintegrating tablet Take 1 tablet (200 mg) by mouth twice a day.      levonorgestrel (Mirena) 21 mcg/24 hours (8 yrs) 52 mg IUD 52 mg by intrauterine route.      paliperidone (Invega) 3 mg 24 hr tablet TAKE 1 - 2 TABLETS BY MOUTH DAILY AT BEDTIME 120 tablet 1    pantoprazole (ProtoNix) 40 mg EC tablet Take 1 tablet (40 mg) by mouth 2 times a day. Do not crush, chew, or split.      pregabalin (Lyrica) 150 mg capsule Take 1 capsule (150 mg) by mouth once daily. each day at the same time.      pregabalin (Lyrica) 50 mg capsule Take 1 capsule (50 mg) by mouth 2 times a day.  (am and afternoon) in addition to 150 mg capsule at night      tezepelumab-ekko (Tezspire) SubQ syringe Inject 210 mg under the skin every 28 (twenty-eight) days.       traZODone (Desyrel) 100 mg tablet Take 2 tablets (200 mg) by mouth once daily at bedtime. 60 tablet 1    Trelegy Ellipta 200-62.5-25 mcg blister with device 1 puff once daily.      calcium carbonate-vitamin D3 (Oyster Shell) 250 mg-3.125 mcg (125 unit) tablet Take 1 tablet by mouth once daily.      CHLORZOXAZONE ORAL Take 500 mg by mouth 2 times a day as needed.      lisdexamfetamine (Vyvanse) 70 mg capsule Take 1 capsule (70 mg) by mouth once daily in the morning. 30 capsule 0    LORazepam (Ativan) 1 mg tablet Take 1 tablet (1 mg) by mouth 2 times a day as needed for anxiety or sleep. 60 tablet 2    Reyvow 100 mg tablet Take 1 tablet by mouth every 24 (twenty four) hours if needed. AT ONSET OF MIGRAINE NO MORE THAN 1X      zavegepant (Zavzpret) 10 mg/actuation spray,non-aerosol Administer 10 mg into affected nostril(s) if needed.      zolpidem CR (Ambien CR) 12.5 mg ER tablet Take 1 tablet (12.5 mg) by mouth as needed at bedtime for sleep. 30 tablet 2     Current Facility-Administered Medications   Medication Dose Route Frequency Provider Last Rate Last Admin    glucose (Glutose) 40 % oral gel 15 g  15 g oral Once Ata Nova MD PhD        heparin flush 10 unit/mL syringe 50 Units  50 Units intra-catheter PRN Ata Nova MD PhD        heparin flush 100 unit/mL syringe 500 Units  500 Units intra-catheter PRN Ata Nova MD PhD           Your Providers Instructions:  Do not consume alcoholic beverages for 24 hours.  Do not make important decisions or sign any important documents for the next 24 hours  It is recommended that a responsible adult remain with you for the next 24 hours as you may be light headed/dizzy.    Call Physician:  For persistent nausea and/or vomiting over 24 hours.  Diet:                  Regular diet  May not drive: Do not operate motor vehicles for 24 hours        When to Call Your Doctor:  Call your doctor for questions and/or concerns  Call 911 for an emergency situation     Health  Maintenance:  If you currently use tobacco products it is advised that you quit, to improve your health.  If you currently use or have stopped using tobacco products within the last 12 months, the following are resources that can help you:  American Heart Association ([phone] 040-1148, (www.americanheart.org)    [place] Tobacco Quit Line 1-827.333.5439 (QUIT-NOW)  (http://www.od.[place].[facility]/odhPrograms/hprr/tob_risk1.aspx)    Do you or a loved one need help for alcohol or drug use? Call the [employer] at 390 or visit [employer].gov for resources.    To find out where you can safely dispose unused medicines, visit www.rxdrugdropbox.org, or call your local police department.    Please call us at [phone] with any concerns.         Please make certain you have transportation arranged to and from your appointment. This can be a friend, family member or arranged thru your health insurance provider.    What to do before your first appointment:  Stop or reduce smoking if at all possible.  No alcohol for 24 hours before your appointment.  Day of treatment:  You may have clear liquids (water, tea, coffee, no pulp juice, plain Jello, hard candy BUT no milk, cream or non-dairy creamers) up until 30 minutes and solids up to 2 hours before your appointment time.    Please bring headphones to listen to calming music, guided meditation or anything you'd like. You can also bring a journal to write in during the treatment time.   Set an intention for each appointment. Your intention can be qualities you want to cultivate in yourself, mental health goals you would like to achieve or experiences you would like to attract into your life.   Bring any inhalers you may need while at your appointment.  Take all morning medications as prescribed except the followin hours before your appointment, no chlordiazeproxide (Librium), diazepam (Valium), or Flurazepam (Dalmane)   12 hours before your appointment, no lamotrigine.  Try to avoid  taking any anti-anxiety medications if at all possible the day of your appointment.. However, if you're anxiety is unbearable and would prevent you from attending you appointment, please take your prescribed dose.   No stimulants prior to your treatment, you may take them once you are home.  Use any nasal sprays up to 1 hour before your appointment.   Cancellations:   If you need to cancel your appointment, we must have at least a 24 hour notice.  If you cancel with less than 24 hour notice or do not show three times- no additional appointments will be scheduled until the treatment plan is discussed with your outpatient psychiatrist.  Also, if you arrive more than 15 minutes late, we may need to cancel your appointment and reschedule.  Please call [phone] to cancel appointments.  Thank you for your cooperation!

## 2024-09-20 NOTE — PRE-PROCEDURE NOTE
"Patients blood sugar was between 45-55 prior to procedure pre her CGM. Patient drank 4 cups of orange juice and 1 cup of apple juice, ate 1 pack of peanut butter cookies. After 20 mins her blood sugar was back up to 70. Dr. Nova ordered oral glucose PRN but patient did not need it.     Her HR was in the 120s prior to sugar dropping and she stated that it \"always happens before her sugar drops.\" Once BS was maintained patient remained tachycardic and w/o symptoms. Dr. Nova alerted and monitored.   "

## 2024-09-23 ENCOUNTER — PATIENT MESSAGE (OUTPATIENT)
Dept: BEHAVIORAL HEALTH | Facility: CLINIC | Age: 39
End: 2024-09-23
Payer: COMMERCIAL

## 2024-09-23 DIAGNOSIS — F90.0 ATTENTION DEFICIT HYPERACTIVITY DISORDER (ADHD), PREDOMINANTLY INATTENTIVE TYPE: Primary | ICD-10-CM

## 2024-09-23 RX ORDER — LISDEXAMFETAMINE DIMESYLATE 70 MG/1
70 CAPSULE ORAL EVERY MORNING
Qty: 30 CAPSULE | Refills: 0 | Status: SHIPPED | OUTPATIENT
Start: 2024-09-23 | End: 2024-10-23

## 2024-09-23 NOTE — PATIENT COMMUNICATION
I am covering for Dr. Nova while he is out. Ms. Silva requested a refill on her Vyvanse. I reviewed the PDMP. She is due for a refill. Refill submited.     Shanice Campos MD

## 2024-09-24 ENCOUNTER — CLINICAL SUPPORT (OUTPATIENT)
Dept: BEHAVIORAL HEALTH | Facility: CLINIC | Age: 39
End: 2024-09-24
Payer: COMMERCIAL

## 2024-09-24 DIAGNOSIS — F41.1 GAD (GENERALIZED ANXIETY DISORDER): ICD-10-CM

## 2024-09-24 DIAGNOSIS — F31.9 BIPOLAR 1 DISORDER (MULTI): ICD-10-CM

## 2024-09-24 PROCEDURE — 90853 GROUP PSYCHOTHERAPY: CPT | Performed by: PSYCHOLOGIST

## 2024-09-26 ENCOUNTER — APPOINTMENT (OUTPATIENT)
Dept: BEHAVIORAL HEALTH | Facility: CLINIC | Age: 39
End: 2024-09-26
Payer: COMMERCIAL

## 2024-09-26 DIAGNOSIS — F31.9 BIPOLAR 1 DISORDER (MULTI): ICD-10-CM

## 2024-09-26 PROCEDURE — 90834 PSYTX W PT 45 MINUTES: CPT | Performed by: PSYCHOLOGIST

## 2024-09-26 NOTE — PROGRESS NOTES
Insight Oriented Therapy. 45 min.  BP1.  We spoke about the impact of her vaccinations, her struggle maintaining a client load, how to handle that appropriately, thinking about her fathers passing and trying to memorialize him fairly which we will do next week.     Chief complaint - Phase of life and emotional distress   Treatment plan - Insight and action consistent with ACT therapy. Providing support, safe space to process their thoughts and feelings, developing therapeutic relationship.  Goals - Self-care, insight, coping skills  Prognosis - Average  Progress - Average  Functional status - 70/100  Focused mental status: Patient was oriented to person, place, time and context  Focused mental exam - alert and oriented  Frequency - Every week

## 2024-09-30 NOTE — GROUP NOTE
Group Topic: Coping Skills   Group Date: 9/24/2024  Start Time:  6:00 PM  End Time:  7:30 PM  Facilitators: Garima Wong PsyD   Department:  [facility]    Number of Participants: 12   Group Focus: coping skills  Treatment Modality: Psychoeducation and Skills Training  Interventions utilized were patient education  Purpose: coping skills    This was a video IOP aftercare group on a HIPAA compliant platform. All patients were provided with informed consent.    IOP Aftercare Group: Check-in and behavioral activation  Participants had opportunity to check-in with their peers and discuss recent stressors. They reviewed concept of behavioral activation and explored how to create schedule that includes activities of value, pleasure and mastery.   Garima Wong Psy.D.          Name: Kelli Silva YOB: 1985   MR: [id number]      Kelli was present and actively engaged in discussion. She stated that she spent time with her friends recently to celebrate a birthday. She reported that she is continuing to attempt to practice radical acceptance related to her health and career.

## 2024-10-03 ENCOUNTER — APPOINTMENT (OUTPATIENT)
Dept: BEHAVIORAL HEALTH | Facility: CLINIC | Age: 39
End: 2024-10-03
Payer: COMMERCIAL

## 2024-10-03 DIAGNOSIS — F31.9 BIPOLAR 1 DISORDER (MULTI): ICD-10-CM

## 2024-10-03 DIAGNOSIS — F41.9 ANXIETY: ICD-10-CM

## 2024-10-03 PROCEDURE — 90837 PSYTX W PT 60 MINUTES: CPT | Performed by: PSYCHOLOGIST

## 2024-10-03 NOTE — PROGRESS NOTES
Insight Oriented Therapy. 50 min.  BP1 and anxiety.  We spoke about her struggle with a patients suicide attempt, stirring up her own struggles with it from 20 years ago, noticing what she can/cannot do, and taking proper steps for patient and her self.     Chief complaint - Phase of life and emotional distress   Treatment plan - Insight and action consistent with ACT therapy. Providing support, safe space to process their thoughts and feelings, developing therapeutic relationship.  Goals - Self-care, insight, coping skills  Prognosis - Average  Progress - Average  Functional status - 70/100  Focused mental status: Patient was oriented to person, place, time and context  Focused mental exam - alert and oriented  Frequency - Every two weeks

## 2024-10-08 ENCOUNTER — CLINICAL SUPPORT (OUTPATIENT)
Dept: BEHAVIORAL HEALTH | Facility: CLINIC | Age: 39
End: 2024-10-08
Payer: COMMERCIAL

## 2024-10-08 DIAGNOSIS — F41.9 ANXIETY: ICD-10-CM

## 2024-10-08 DIAGNOSIS — F31.9 BIPOLAR 1 DISORDER (MULTI): ICD-10-CM

## 2024-10-08 PROCEDURE — 90853 GROUP PSYCHOTHERAPY: CPT | Performed by: PSYCHOLOGIST

## 2024-10-10 ENCOUNTER — APPOINTMENT (OUTPATIENT)
Dept: BEHAVIORAL HEALTH | Facility: CLINIC | Age: 39
End: 2024-10-10
Payer: COMMERCIAL

## 2024-10-10 DIAGNOSIS — F31.9 BIPOLAR 1 DISORDER (MULTI): Primary | ICD-10-CM

## 2024-10-10 DIAGNOSIS — F31.9 BIPOLAR 1 DISORDER (MULTI): ICD-10-CM

## 2024-10-10 PROCEDURE — 90837 PSYTX W PT 60 MINUTES: CPT | Performed by: PSYCHOLOGIST

## 2024-10-10 NOTE — PROGRESS NOTES
Insight Oriented Therapy. 50 min.  BP1.  Pt still feeling mild to moderate depression stemming from her flu and covid shots where her energy is poor, not caring to 'do more' than show up to work, and relax in the evening.  We talked about ketimine tomorrow, but has had a bit of a fever so we will see if that works.  There's been a lot of 'to dos' to manage so we spoke about some of that bogging her down.     Chief complaint - Phase of life and emotional distress   Treatment plan - Insight and action consistent with ACT therapy. Providing support, safe space to process their thoughts and feelings, developing therapeutic relationship.  Goals - Self-care, insight, coping skills  Prognosis - Average  Progress - Average  Functional status - 60/100  Focused mental status: Patient was oriented to person, place, time and context  Focused mental exam - alert and oriented  Frequency - Every week

## 2024-10-11 ENCOUNTER — APPOINTMENT (OUTPATIENT)
Dept: POSTOP/PACU | Facility: HOSPITAL | Age: 39
End: 2024-10-11

## 2024-10-14 NOTE — GROUP NOTE
"Group Topic: Feeling Awareness/Expression   Group Date: 10/8/2024  Start Time:  6:00 PM  End Time:  7:30 PM  Facilitators: Garima Wong PsyD   Department: [facility]    Number of Participants: 14   Group Focus: check in  Treatment Modality: Patient-Centered Therapy  Interventions utilized were exploration and support  Purpose: feelings and insight or knowledge    This was a video IOP aftercare group on a HIPAA compliant platform. All patients were provided with informed consent.    IOP Aftercare Group: Check-in  Participants had opportunity to check-in with their peers and discuss recent stressors. Group members spent time sharing coping they have engaged in and set weekly intention.   Garima Wong Psy.D.      Name: Kelli Silva YOB: 1985   MR: [id number]      Kelli was present and actively engaged in discussion. She processed feeling \"very depressed\" due to the anniversary of the passing of her father and grandfather. She has been struggling with eating but is working on recognizing her warning signs to depression. She stated that she has skills but finds them difficult to use in the moment.         " Left LE/weight-bearing as tolerated

## 2024-10-15 ENCOUNTER — CLINICAL SUPPORT (OUTPATIENT)
Dept: BEHAVIORAL HEALTH | Facility: CLINIC | Age: 39
End: 2024-10-15
Payer: COMMERCIAL

## 2024-10-15 DIAGNOSIS — F41.9 ANXIETY: ICD-10-CM

## 2024-10-15 DIAGNOSIS — F31.9 BIPOLAR 1 DISORDER (MULTI): ICD-10-CM

## 2024-10-15 PROCEDURE — 90853 GROUP PSYCHOTHERAPY: CPT | Performed by: PSYCHOLOGIST

## 2024-10-17 ENCOUNTER — APPOINTMENT (OUTPATIENT)
Dept: BEHAVIORAL HEALTH | Facility: CLINIC | Age: 39
End: 2024-10-17
Payer: COMMERCIAL

## 2024-10-18 NOTE — GROUP NOTE
"Group Topic: Feeling Awareness/Expression   Group Date: 10/15/2024  Start Time:  6:00 PM  End Time:  7:30 PM  Facilitators: Garima Wong PsyD   Department: [facility]    Number of Participants: 12   Group Focus: check in  Treatment Modality: Patient-Centered Therapy  Interventions utilized were support  Purpose: insight or knowledge    This was a video IOP aftercare group on a HIPAA compliant platform. All patients were provided with informed consent.    IOP Aftercare Group: Check-in  Participants had opportunity to check-in with their peers and discuss recent stressors. They shared coping that they have found useful and spent time supporting each other with challenges and symptoms.   Garima Wong Psy.D.      Name: Kelli Silva YOB: 1985   MR: [id number]      Kelli was present and actively engaged in discussion. She described feeling \"very depressed.\" She noted that she is not doing much besides working but finds this to be a helpful distraction. She described October being a very difficult month.         "

## 2024-10-19 ENCOUNTER — TELEMEDICINE (OUTPATIENT)
Dept: BEHAVIORAL HEALTH | Facility: CLINIC | Age: 39
End: 2024-10-19
Payer: COMMERCIAL

## 2024-10-19 DIAGNOSIS — F31.9 BIPOLAR 1 DISORDER (MULTI): ICD-10-CM

## 2024-10-19 PROCEDURE — 90837 PSYTX W PT 60 MINUTES: CPT | Performed by: PSYCHOLOGIST

## 2024-10-21 NOTE — PROGRESS NOTES
Insight Oriented Therapy. 50 min.   We talked about her depression lasting, trying to cope day to day, get through her work, talked about ways to connect with work, seeking out some coping skills, met to augment normal schedule as she overslept out appointment last Thursday.    Chief complaint - Phase of life and emotional distress   Treatment plan - Insight and action consistent with ACT therapy. Providing support, safe space to process their thoughts and feelings, developing therapeutic relationship.  Goals - Self-care, insight, coping skills  Prognosis - Average  Progress - Average  Functional status - 70/100  Focused mental status: Patient was oriented to person, place, time and context  Focused mental exam - alert and oriented  Frequency - Every two weeks

## 2024-10-24 ENCOUNTER — APPOINTMENT (OUTPATIENT)
Dept: BEHAVIORAL HEALTH | Facility: CLINIC | Age: 39
End: 2024-10-24
Payer: COMMERCIAL

## 2024-10-24 DIAGNOSIS — F31.9 BIPOLAR 1 DISORDER (MULTI): ICD-10-CM

## 2024-10-24 PROCEDURE — 90837 PSYTX W PT 60 MINUTES: CPT | Performed by: PSYCHOLOGIST

## 2024-10-24 NOTE — PROGRESS NOTES
Insight Oriented Therapy. 50 min.   BP1. Pt still feeling depressed, blank, I feel her revolving responsibilities over an expanse of schwab is impacting this.  We spoke about the holidays, and subtle, but important ways to cope around her work time.   Chief complaint - Phase of life and emotional distress   Treatment plan - Insight and action consistent with ACT therapy. Providing support, safe space to process their thoughts and feelings, developing therapeutic relationship.  Goals - Self-care, insight, coping skills  Prognosis - Average  Progress - Average  Functional status - 70/100  Focused mental status: Patient was oriented to person, place, time and context  Focused mental exam - alert and oriented  Frequency - Every two week

## 2024-10-25 ENCOUNTER — HOSPITAL ENCOUNTER (OUTPATIENT)
Dept: POSTOP/PACU | Facility: HOSPITAL | Age: 39
Setting detail: OUTPATIENT SURGERY
Discharge: HOME | End: 2024-10-25
Payer: COMMERCIAL

## 2024-10-25 VITALS
SYSTOLIC BLOOD PRESSURE: 105 MMHG | DIASTOLIC BLOOD PRESSURE: 66 MMHG | HEART RATE: 83 BPM | BODY MASS INDEX: 43.15 KG/M2 | OXYGEN SATURATION: 94 % | RESPIRATION RATE: 14 BRPM | WEIGHT: 235.89 LBS | TEMPERATURE: 96.6 F

## 2024-10-25 DIAGNOSIS — F31.9 BIPOLAR I DISORDER WITH DEPRESSION (MULTI): Primary | ICD-10-CM

## 2024-10-25 DIAGNOSIS — F90.0 ATTENTION DEFICIT HYPERACTIVITY DISORDER (ADHD), PREDOMINANTLY INATTENTIVE TYPE: ICD-10-CM

## 2024-10-25 DIAGNOSIS — F51.01 PRIMARY INSOMNIA: ICD-10-CM

## 2024-10-25 PROCEDURE — 2500000005 HC RX 250 GENERAL PHARMACY W/O HCPCS: Performed by: PSYCHIATRY & NEUROLOGY

## 2024-10-25 PROCEDURE — KETSP PR KETAMINE INFUSION SELF-PAY: Performed by: PSYCHIATRY & NEUROLOGY

## 2024-10-25 PROCEDURE — KETSP HC KETAMINE INFUSION SELF-PAY: Performed by: PSYCHIATRY & NEUROLOGY

## 2024-10-25 PROCEDURE — 2500000004 HC RX 250 GENERAL PHARMACY W/ HCPCS (ALT 636 FOR OP/ED): Performed by: PSYCHIATRY & NEUROLOGY

## 2024-10-25 RX ORDER — ONDANSETRON HYDROCHLORIDE 2 MG/ML
8 INJECTION, SOLUTION INTRAVENOUS ONCE
Start: 2024-10-25 | End: 2024-10-25

## 2024-10-25 RX ORDER — LISDEXAMFETAMINE DIMESYLATE 70 MG/1
70 CAPSULE ORAL EVERY MORNING
Qty: 30 CAPSULE | Refills: 0 | Status: SHIPPED | OUTPATIENT
Start: 2024-10-25 | End: 2024-11-24

## 2024-10-25 RX ORDER — TRAZODONE HYDROCHLORIDE 100 MG/1
200 TABLET ORAL NIGHTLY
Qty: 180 TABLET | Refills: 3 | Status: SHIPPED | OUTPATIENT
Start: 2024-10-25 | End: 2025-10-25

## 2024-10-25 RX ORDER — ONDANSETRON HYDROCHLORIDE 2 MG/ML
8 INJECTION, SOLUTION INTRAVENOUS ONCE
Status: COMPLETED | OUTPATIENT
Start: 2024-10-25 | End: 2024-10-25

## 2024-10-25 RX ORDER — KETAMINE HCL IN 0.9 % NACL 200 MG/200
0.75 PLASTIC BAG, INJECTION (ML) INTRAVENOUS ONCE
Status: COMPLETED | OUTPATIENT
Start: 2024-10-25 | End: 2024-10-25

## 2024-10-25 RX ORDER — KETAMINE HCL IN 0.9 % NACL 200 MG/200
0.75 PLASTIC BAG, INJECTION (ML) INTRAVENOUS ONCE
Start: 2024-10-25 | End: 2024-10-25

## 2024-10-25 ASSESSMENT — ANXIETY QUESTIONNAIRES
6. BECOMING EASILY ANNOYED OR IRRITABLE: NOT AT ALL
1. FEELING NERVOUS, ANXIOUS, OR ON EDGE: SEVERAL DAYS
7. FEELING AFRAID AS IF SOMETHING AWFUL MIGHT HAPPEN: SEVERAL DAYS
2. NOT BEING ABLE TO STOP OR CONTROL WORRYING: SEVERAL DAYS
IF YOU CHECKED OFF ANY PROBLEMS ON THIS QUESTIONNAIRE, HOW DIFFICULT HAVE THESE PROBLEMS MADE IT FOR YOU TO DO YOUR WORK, TAKE CARE OF THINGS AT HOME, OR GET ALONG WITH OTHER PEOPLE: VERY DIFFICULT
4. TROUBLE RELAXING: SEVERAL DAYS
GAD7 TOTAL SCORE: 6
3. WORRYING TOO MUCH ABOUT DIFFERENT THINGS: SEVERAL DAYS
5. BEING SO RESTLESS THAT IT IS HARD TO SIT STILL: SEVERAL DAYS

## 2024-10-25 ASSESSMENT — PATIENT HEALTH QUESTIONNAIRE - PHQ9
5. POOR APPETITE OR OVEREATING: NEARLY EVERY DAY
7. TROUBLE CONCENTRATING ON THINGS, SUCH AS READING THE NEWSPAPER OR WATCHING TELEVISION: NEARLY EVERY DAY
1. LITTLE INTEREST OR PLEASURE IN DOING THINGS: NEARLY EVERY DAY
SUM OF ALL RESPONSES TO PHQ9 QUESTIONS 1 AND 2: 6
SUM OF ALL RESPONSES TO PHQ QUESTIONS 1-9: 24
9. THOUGHTS THAT YOU WOULD BE BETTER OFF DEAD, OR OF HURTING YOURSELF: NOT AT ALL
4. FEELING TIRED OR HAVING LITTLE ENERGY: NEARLY EVERY DAY
10. IF YOU CHECKED OFF ANY PROBLEMS, HOW DIFFICULT HAVE THESE PROBLEMS MADE IT FOR YOU TO DO YOUR WORK, TAKE CARE OF THINGS AT HOME, OR GET ALONG WITH OTHER PEOPLE: EXTREMELY DIFFICULT
8. MOVING OR SPEAKING SO SLOWLY THAT OTHER PEOPLE COULD HAVE NOTICED. OR THE OPPOSITE, BEING SO FIGETY OR RESTLESS THAT YOU HAVE BEEN MOVING AROUND A LOT MORE THAN USUAL: NEARLY EVERY DAY
2. FEELING DOWN, DEPRESSED OR HOPELESS: NEARLY EVERY DAY
6. FEELING BAD ABOUT YOURSELF - OR THAT YOU ARE A FAILURE OR HAVE LET YOURSELF OR YOUR FAMILY DOWN: NEARLY EVERY DAY
3. TROUBLE FALLING OR STAYING ASLEEP OR SLEEPING TOO MUCH: NEARLY EVERY DAY

## 2024-10-25 NOTE — PATIENT INSTRUCTIONS
Homegoing Instructions    Patient: Kelli Silva  MRN: [id number]  YOB: 1985    Care Providers: Dr. Ata Roman, RN    Allergies:  Aspirin, Cigarette smoke, Fish containing products, Iodinated contrast media, Ketorolac, Ketorolac tromethamine, Ondansetron, Other, Prochlorperazine, Shellfish containing products, Sumatriptan, Azithromycin, Ceftriaxone, Doxycycline, Dulaglutide, House dust, Liraglutide, Metformin, Metoclopramide, Prednisone, Sitagliptin, Owelhfrv-3-an1 antimigraine agents, and Zolmitriptan    Medications:  Current Outpatient Medications   Medication Sig Dispense Refill    Accu-Chek Guide test strips strip USE TO CHECK BLOOD SUGAR UP TO 4 TIMES PER DAY AS INSTRUCTED. E11.9      albuterol 2.5 mg /3 mL (0.083 %) nebulizer solution Inhale 3 mL (2.5 mg) every 6 hours if needed for wheezing or shortness of breath.      albuterol 90 mcg/actuation inhaler Inhale 2 puffs. 6 TIMES DAILY.      asenapine (Saphris) SL tablet TAKE 1 - 2 SUBLIQUAL EVERY NIGHT AT BED TIME AS NEEDED FOR SLEEP 180 tablet 1    atomoxetine (Strattera) 60 mg capsule Take 1 capsule (60 mg) by mouth once daily. Swallow capsule whole; do not open. If opened accidentally, do not touch eyes; wash hands immediately (product is an eye irritant). 30 capsule 2    Autolet lancing device USE AS INSTRUCTED TO CHECK BLOOD SUGAR UP TO 4 TIMES DAILY. E11.9      azelastine-fluticasone 137-50 mcg/spray spray,non-aerosol Administer 1 spray into each nostril twice a day.      buPROPion XL (Wellbutrin XL) 300 mg 24 hr tablet Take 1 tablet (300 mg) by mouth once daily in the morning. Do not crush, chew, or split. 90 tablet 3    busPIRone (Buspar) 30 mg tablet Take 1 tablet (30 mg) by mouth 2 times a day.      calcium carbonate-vitamin D3 (Oyster Shell) 250 mg-3.125 mcg (125 unit) tablet Take 1 tablet by mouth once daily.      cariprazine (Vraylar) 6 mg capsule Take 1 capsule (6 mg) by mouth once daily at bedtime. 30 capsule 6     Corlanor 7.5 mg tablet Take 1 tablet (7.5 mg) by mouth 2 times daily (morning and late afternoon).      desvenlafaxine 100 mg 24 hr tablet TAKE 1 TABLET BY MOUTH EVERY DAY IN THE MORNING 90 tablet 3    dicyclomine (Bentyl) 20 mg tablet Take 1 tablet (20 mg) by mouth 4 times a day before meals.      lactobacillus (Culturelle) 10 billion cell capsule Take 1 capsule by mouth twice a day.      lamoTRIgine (LaMICtal) 200 mg disintegrating tablet Take 1 tablet (200 mg) by mouth twice a day.      LORazepam (Ativan) 1 mg tablet Take 1 tablet (1 mg) by mouth 2 times a day as needed for anxiety or sleep. 60 tablet 2    pantoprazole (ProtoNix) 40 mg EC tablet Take 1 tablet (40 mg) by mouth 2 times a day. Do not crush, chew, or split.      pregabalin (Lyrica) 150 mg capsule Take 1 capsule (150 mg) by mouth once daily. each day at the same time.      pregabalin (Lyrica) 50 mg capsule Take 1 capsule (50 mg) by mouth 2 times a day.  (am and afternoon) in addition to 150 mg capsule at night      tezepelumab-ekko (Tezspire) SubQ syringe Inject 210 mg under the skin every 28 (twenty-eight) days.      Trelegy Ellipta 200-62.5-25 mcg blister with device 1 puff once daily.      CHLORZOXAZONE ORAL Take 500 mg by mouth 2 times a day as needed. (Patient not taking: Reported on 10/25/2024)      dextrose 5 % in water (D5W) parenteral solution 250 mL with ketamine 100 mg/mL solution Infuse 80 mg into a venous catheter continuously.      glucagon (Glucagen) 1 mg injection 1 mg.  Only take if BG< 60 (Patient not taking: Reported on 10/25/2024)      glucagon (Gvoke HypoPen 2-Pack) 1 mg/0.2 mL auto-injector Inject 1 Pen under the skin. (Patient not taking: Reported on 10/25/2024)      glucose 4 gram chewable tablet Chew 4 tablets (16 g). (Patient not taking: Reported on 10/25/2024)      ipratropium-albuteroL (Duo-Neb) 0.5-2.5 mg/3 mL nebulizer solution Take 3 mL by nebulization every 4 hours if needed for wheezing or shortness of breath. (Patient  not taking: Reported on 10/25/2024)      ipratropium-albuteroL (Duo-Neb) 0.5-2.5 mg/3 mL nebulizer solution 3 mL every 6 hours. (Patient not taking: Reported on 10/25/2024)      levonorgestrel (Mirena) 21 mcg/24 hours (8 yrs) 52 mg IUD 52 mg by intrauterine route.      lisdexamfetamine (Vyvanse) 70 mg capsule Take 1 capsule (70 mg) by mouth once daily in the morning. 30 capsule 0    paliperidone (Invega) 3 mg 24 hr tablet TAKE 1 - 2 TABLETS BY MOUTH DAILY AT BEDTIME (Patient not taking: Reported on 10/25/2024) 120 tablet 1    Reyvow 100 mg tablet Take 1 tablet by mouth every 24 (twenty four) hours if needed. AT ONSET OF MIGRAINE NO MORE THAN 1X (Patient not taking: Reported on 10/25/2024)      traZODone (Desyrel) 100 mg tablet Take 2 tablets (200 mg) by mouth once daily at bedtime. 180 tablet 3    zavegepant (Zavzpret) 10 mg/actuation spray,non-aerosol Administer 10 mg into affected nostril(s) if needed. (Patient not taking: Reported on 10/25/2024)      zolpidem CR (Ambien CR) 12.5 mg ER tablet Take 1 tablet (12.5 mg) by mouth as needed at bedtime for sleep. 30 tablet 2     Current Facility-Administered Medications   Medication Dose Route Frequency Provider Last Rate Last Admin    ARIPiprazole ER (2 month) (Abilify Asimtufii) IM injection 960 mg  960 mg intramuscular Once Ata Nova MD PhD           Your Provider's Instructions:  Do not consume alcoholic beverages for 24 hours.  Do not make important decisions or sign any important documents for the next 24 hours.  It is recommended that a responsible adult remain with you for the next 24 hours as you may be lightheaded or dizzy.  Do not drive or operate machinery until the day after a treatment session or after a restful sleep.  Resume your normal diet.     When to Call Your Doctor:  Call your doctor for questions and/or concerns.  Call 911 for an emergency situation.    Health Maintenance:  If you currently use tobacco products, it is advised that you quit to  improve your health.  If you currently use or have stopped using tobacco products within the last 12 months, the following are resources that can help you:  American Heart Association ([phone] 127-5830, (www.americanheart.org)  [place] Tobacco Quit Line 1-770.460.1309 (QUIT-NOW)  http://www.od.[place].[facility]/odhPrograms/hprr/tob_risk1.aspx    Do you or a loved one need help for alcohol or drug use? Call the [employer] at 211 or visit FindTreatment.gov for resources.    To find out where you can safely dispose unused medicines, visit www.rxdrugdropbox.org, or call your local police department.    Please call us at [phone] with any concerns.    Prior to your next appointment for Spravato/Ketamine, you will be sent the PHQ-9 as well as the ANUP-7 assessments via [employer]. In the past, you have typically completed these on paper during your appointment. We ask that you check your MyChart the day before your appointment and complete these online. This will assist us in streamlining your check-in during your appointment.     Please make certain you have transportation arranged to and from your appointment. This can be a friend, family member, or arranged through your health insurance provider.    Before your next appointment:  Stop or reduce smoking, vaping, and any nicotine use if at all possible.  No alcohol for 24 hours before your appointment.    Day of treatment:  You may have clear liquids (water, tea, coffee, no-pulp juice, plain Jello, hard candy, BUT no milk, cream or non-dairy creamers) up until 30 minutes before your appointment time. You may have solids up to 2 hours before your appointment time.  Please bring headphones to listen to calming music, guided meditation, or anything you'd like. You can also bring a journal to write in during the treatment time.  Set an intention for each appointment. Your intention can be qualities you want to cultivate in yourself, mental health goals you would like to achieve, or  experiences you would like to attract into your life.   Bring any inhalers you may need while at your appointment.  Take all morning medications as prescribed, except the followin hours before your appointment, no chlordiazepoxide (Librium), diazepam (Valium), or Flurazepam (Dalmane)   12 hours before your appointment, no lamotrigine (Lamictal).  Try to avoid taking any anti-anxiety medications if at all possible the day of your appointment. However, if your anxiety is unbearable and would prevent you from attending your appointment, please take your prescribed dose.   No stimulants prior to your treatment; you may take them once you are home.  Use any nasal sprays up to 1 hour before your appointment (for SPRAVATO only).     Cancellations: If you need to cancel your appointment, we must have at least a 24-hour notice. If you cancel with less than 24-hour notice or do not show three times, no additional appointments will be scheduled until the treatment plan is discussed with your outpatient psychiatrist. Also, if you arrive more than 15 minutes late, we may need to cancel your appointment and reschedule. Please call [phone] to cancel appointments. Thank you for your cooperation!

## 2024-10-25 NOTE — H&P
Psychiatry Procedure H&P for each visit    Name: Kelli  : 1985  MRN: [id number]    Date: 10/25/24     History & Physical Reviewed:  Pregnancy/Lactating:  Are You Pregnant: No  Are You Currently Breastfeeding: No    I have reviewed the History and Physical dated:   History and Physical Reviewed and relevant findings noted. Patient examined to review pertinent physical findings: Yes  Home Medications Reviewed: Yes  Allergies Reviewed: Yes    ERAS (Enhanced Recovery After Surgery):  ERAS Patient: No    Consent:  COVID-19 Consent:   COVID-19 Risk Consent: Yes: Surgeon has reviewed key risks related to the risk of meli COVID-19 and if they contract COVID-19 what the risks are.

## 2024-10-25 NOTE — PROGRESS NOTES
Ketamine/Esketamine Procedure    Name: Kelli  : 1985  MRN: [id number]    Date: 10/25/24    Time out was taken with staff to confirm correct patient and correct procedure to be performed.     Ketamine infusion at 0.75 mg/kg in 40 minutes  Subjective: She said she felt very depressed for a couple of weeks because she was unable to have a ketamine infusion earlier. The last time of ketamine infusion was about 5 weeks ago. She used to receive ketamine every 2-3 weeks. She said she was very depressed and lack of motivation and pleasure for last 2-3 weeks. She said she just wanted to sleep, but still able to work 5 days a week for more than 30 hours per week. Denied having SI/HI/AVH or paranoia.   No change in medical hx  No physical complaint   PHQ9=21  GAD7 =6    Objective: She was awake, alert, cooperative, and pleasant. Normal gait. No abnormal movement. Casually dressed. Good eye contact. Oriented x 3. Speech was normal. Affect was appropriate, restricted most of the time. Mood was not good.  Denied having SI/HI/AVH or paranoia during the interview. No delusional thought or paranoia. I/J were good. Memory was fair. Attention and concentration were good.     Vital signs before infusion: HR: 105 bpm; pulse Ox: 92%; BP: 125/62 mmHg.   Ketamine infusion observation:  About 20 minutes after starting infusion, she felt a little floaty on bed.  No other side effect. Vital signs were stable.  HR: 90 bpm; pulse Ox: 95%; BP: 150/87 mmHg.   About 35 minutes after starting infusion, she felt drowsy and floaty on bed.  No other side effect. Vital signs were stable.  HR: 84 bpm; pulse Ox: 95%; BP: 151/88 mmHg.   15 minutes after the end of infusion, she felt close to normal with the exception of tiny floaty feeling on right side the head. No other side effect. Vital signs were stable. HR: 89 bpm; pulse Ox: 97%; BP: 150/81 mmHg.     Tolerated ketamine infusion well  No complication     Imp: Bipolar I depression, recurrent,  severe   ANUP - mild   No immediate risk to self or others    Plan: Ketamine infusion in 2 weeks     Ata Nova MD.

## 2024-10-26 ENCOUNTER — OFFICE VISIT (OUTPATIENT)
Dept: URGENT CARE | Age: 39
End: 2024-10-26
Payer: COMMERCIAL

## 2024-10-26 VITALS
HEIGHT: 62 IN | SYSTOLIC BLOOD PRESSURE: 117 MMHG | HEART RATE: 97 BPM | TEMPERATURE: 98.3 F | RESPIRATION RATE: 17 BRPM | OXYGEN SATURATION: 97 % | BODY MASS INDEX: 43.24 KG/M2 | WEIGHT: 235 LBS | DIASTOLIC BLOOD PRESSURE: 90 MMHG

## 2024-10-26 DIAGNOSIS — B02.9 HERPES ZOSTER WITHOUT COMPLICATION: Primary | ICD-10-CM

## 2024-10-26 RX ORDER — LIDOCAINE 50 MG/G
1 PATCH TOPICAL DAILY
Qty: 30 PATCH | Refills: 0 | Status: SHIPPED | OUTPATIENT
Start: 2024-10-26 | End: 2024-11-25

## 2024-10-26 RX ORDER — VALACYCLOVIR HYDROCHLORIDE 1 G/1
1000 TABLET, FILM COATED ORAL 3 TIMES DAILY
Qty: 21 TABLET | Refills: 0 | Status: SHIPPED | OUTPATIENT
Start: 2024-10-26 | End: 2024-11-02

## 2024-10-26 ASSESSMENT — ENCOUNTER SYMPTOMS
FATIGUE: 1
COUGH: 0
HEADACHES: 1
RHINORRHEA: 1
FEVER: 0

## 2024-10-26 NOTE — PROGRESS NOTES
Subjective   Patient ID: Kelli Silva is a 39 y.o. female. They present today with a chief complaint of Rash (Started yestarday on stomach).    History of Present Illness  Patient presents with concern for suspected shingles rash. She states she's previously had shinges in the same location and symptoms felt similar. She describes a sharp pain, skin very sensitive to touch centrally on her abdomen. She states she has been fatigued, with a headache. States her allergies have been worse.       Rash  Associated symptoms include fatigue and rhinorrhea. Pertinent negatives include no cough or fever.       Past Medical History  Allergies as of 10/26/2024 - Reviewed 10/26/2024   Allergen Reaction Noted    Aspirin Shortness of breath 10/09/2023    Cigarette smoke Shortness of breath 01/16/2007    Fish containing products Hives, Rash, and Shortness of breath 12/31/2013    Iodinated contrast media Shortness of breath 10/07/2023    Ketorolac Shortness of breath 10/07/2023    Ketorolac tromethamine Shortness of breath and Other 04/25/2008    Ondansetron Other 10/11/2022    Other Shortness of breath 01/16/2007    Prochlorperazine Shortness of breath and Other 08/09/2005    Shellfish containing products Hives, Rash, and Shortness of breath 12/31/2013    Sumatriptan Runny nose 04/27/2017    Azithromycin Nausea/vomiting 08/10/2021    Ceftriaxone Other 10/09/2023    Doxycycline Diarrhea 04/27/2017    Dulaglutide Other 08/30/2022    House dust Other 04/27/2017    Liraglutide Nausea Only 10/11/2022    Metformin Other 09/23/2019    Metoclopramide Itching 05/08/2013    Prednisone Other 07/17/2018    Sitagliptin Other 08/30/2022    Bpbzleat-9-jn3 antimigraine agents Other 10/09/2023    Zolmitriptan Other 04/27/2017       (Not in a hospital admission)       Past Medical History:   Diagnosis Date    Normal routine history and physical examination     Normal routine history and physical examination     Other specified health status      "No pertinent past medical history       Past Surgical History:   Procedure Laterality Date    OTHER SURGICAL HISTORY  05/08/2020    Venous access port placement        reports that she has never smoked. She has never used smokeless tobacco. She reports that she does not drink alcohol and does not use drugs.    Review of Systems  Review of Systems   Constitutional:  Positive for fatigue. Negative for fever.   HENT:  Positive for rhinorrhea.    Respiratory:  Negative for cough.    Cardiovascular:  Negative for chest pain.   Skin:  Positive for rash.   Neurological:  Positive for headaches.                                  Objective    Vitals:    10/26/24 1527   BP: 117/90   BP Location: Left arm   Patient Position: Sitting   BP Cuff Size: Adult   Pulse: 97   Resp: 17   Temp: 36.8 °C (98.3 °F)   TempSrc: Oral   SpO2: 97%   Weight: 107 kg (235 lb)   Height: 1.575 m (5' 2\")     No LMP recorded (approximate). (Menstrual status: IUD).    Physical Exam  Vitals reviewed.   Constitutional:       Appearance: Normal appearance.   Cardiovascular:      Rate and Rhythm: Normal rate.   Pulmonary:      Effort: Pulmonary effort is normal.   Skin:     General: Skin is warm and dry.      Comments: Flat erythematous rash centrally located on her abdomen, no linear pattern noted. No raised erythema, no vesicles.    Neurological:      Mental Status: She is alert.         Procedures    Point of Care Test & Imaging Results from this visit  No results found for this visit on 10/26/24.   No results found.    Diagnostic study results (if any) were reviewed by EDIN Knox.    Assessment/Plan   Allergies, medications, history, and pertinent labs/EKGs/Imaging reviewed by EDIN Knox. Advised shingles typically is slightly raised, and vesicles form over days. Advised I am willing to send an anti-viral as she's describing prodromal symptoms and symptoms are so similar to previous experience. Advise f/up with PCP if symptoms " fail to improve, or get worse.     Medical Decision Making  At time of discharge patient was clinically well-appearing and HDS for outpatient management. The patient and/or family was educated regarding diagnosis, supportive care, OTC and Rx medications. The patient and/or family was given the opportunity to ask questions prior to discharge.  They verbalized understanding of my discussion of the plans for treatment, expected course, indications to return to  or seek further evaluation in ED, and the need for timely follow up as directed.   They were provided with a work/school excuse if requested.      Orders and Diagnoses  Diagnoses and all orders for this visit:  Herpes zoster without complication  -     valACYclovir (Valtrex) 1 gram tablet; Take 1 tablet (1,000 mg) by mouth 3 times a day for 7 days.  -     lidocaine (Lidoderm) 5 % patch; Place 1 patch over 12 hours on the skin once daily. Remove & discard patch within 12 hours.      Medical Admin Record      Patient disposition: Home    Electronically signed by EDIN Knox  3:54 PM

## 2024-10-28 ENCOUNTER — HOSPITAL ENCOUNTER (INPATIENT)
Facility: HOSPITAL | Age: 39
LOS: 1 days | Discharge: HOME | End: 2024-10-30
Attending: EMERGENCY MEDICINE | Admitting: INTERNAL MEDICINE
Payer: COMMERCIAL

## 2024-10-28 ENCOUNTER — APPOINTMENT (OUTPATIENT)
Dept: CARDIOLOGY | Facility: HOSPITAL | Age: 39
End: 2024-10-28
Payer: COMMERCIAL

## 2024-10-28 ENCOUNTER — APPOINTMENT (OUTPATIENT)
Dept: RADIOLOGY | Facility: HOSPITAL | Age: 39
End: 2024-10-28
Payer: COMMERCIAL

## 2024-10-28 DIAGNOSIS — E16.2 HYPOGLYCEMIA: Primary | ICD-10-CM

## 2024-10-28 LAB
ALBUMIN SERPL BCP-MCNC: 4.2 G/DL (ref 3.4–5)
ALP SERPL-CCNC: 62 U/L (ref 33–110)
ALT SERPL W P-5'-P-CCNC: 36 U/L (ref 7–45)
ANION GAP SERPL CALC-SCNC: 14 MMOL/L (ref 10–20)
AST SERPL W P-5'-P-CCNC: 19 U/L (ref 9–39)
B-HCG SERPL-ACNC: <2 MIU/ML
BASOPHILS # BLD AUTO: 0.03 X10*3/UL (ref 0–0.1)
BASOPHILS NFR BLD AUTO: 0.2 %
BILIRUB SERPL-MCNC: 0.3 MG/DL (ref 0–1.2)
BUN SERPL-MCNC: 6 MG/DL (ref 6–23)
CALCIUM SERPL-MCNC: 9.2 MG/DL (ref 8.6–10.3)
CHLORIDE SERPL-SCNC: 105 MMOL/L (ref 98–107)
CO2 SERPL-SCNC: 24 MMOL/L (ref 21–32)
CREAT SERPL-MCNC: 0.96 MG/DL (ref 0.5–1.05)
EGFRCR SERPLBLD CKD-EPI 2021: 76 ML/MIN/1.73M*2
EOSINOPHIL # BLD AUTO: 0.02 X10*3/UL (ref 0–0.7)
EOSINOPHIL NFR BLD AUTO: 0.1 %
ERYTHROCYTE [DISTWIDTH] IN BLOOD BY AUTOMATED COUNT: 12.4 % (ref 11.5–14.5)
GLUCOSE BLD MANUAL STRIP-MCNC: 35 MG/DL (ref 74–99)
GLUCOSE BLD MANUAL STRIP-MCNC: 62 MG/DL (ref 74–99)
GLUCOSE BLD MANUAL STRIP-MCNC: 62 MG/DL (ref 74–99)
GLUCOSE BLD MANUAL STRIP-MCNC: 66 MG/DL (ref 74–99)
GLUCOSE BLD MANUAL STRIP-MCNC: 68 MG/DL (ref 74–99)
GLUCOSE SERPL-MCNC: 60 MG/DL (ref 74–99)
HCT VFR BLD AUTO: 42.1 % (ref 36–46)
HGB BLD-MCNC: 14.5 G/DL (ref 12–16)
IMM GRANULOCYTES # BLD AUTO: 0.06 X10*3/UL (ref 0–0.7)
IMM GRANULOCYTES NFR BLD AUTO: 0.4 % (ref 0–0.9)
LYMPHOCYTES # BLD AUTO: 2.16 X10*3/UL (ref 1.2–4.8)
LYMPHOCYTES NFR BLD AUTO: 15.8 %
MCH RBC QN AUTO: 30.8 PG (ref 26–34)
MCHC RBC AUTO-ENTMCNC: 34.4 G/DL (ref 32–36)
MCV RBC AUTO: 89 FL (ref 80–100)
MONOCYTES # BLD AUTO: 0.89 X10*3/UL (ref 0.1–1)
MONOCYTES NFR BLD AUTO: 6.5 %
NEUTROPHILS # BLD AUTO: 10.49 X10*3/UL (ref 1.2–7.7)
NEUTROPHILS NFR BLD AUTO: 77 %
NRBC BLD-RTO: 0 /100 WBCS (ref 0–0)
PLATELET # BLD AUTO: 227 X10*3/UL (ref 150–450)
POTASSIUM SERPL-SCNC: 3 MMOL/L (ref 3.5–5.3)
PROT SERPL-MCNC: 6.4 G/DL (ref 6.4–8.2)
RBC # BLD AUTO: 4.71 X10*6/UL (ref 4–5.2)
SODIUM SERPL-SCNC: 140 MMOL/L (ref 136–145)
WBC # BLD AUTO: 13.7 X10*3/UL (ref 4.4–11.3)

## 2024-10-28 PROCEDURE — 96361 HYDRATE IV INFUSION ADD-ON: CPT

## 2024-10-28 PROCEDURE — 99291 CRITICAL CARE FIRST HOUR: CPT | Performed by: EMERGENCY MEDICINE

## 2024-10-28 PROCEDURE — 70450 CT HEAD/BRAIN W/O DYE: CPT | Performed by: SURGERY

## 2024-10-28 PROCEDURE — 93005 ELECTROCARDIOGRAM TRACING: CPT

## 2024-10-28 PROCEDURE — 2500000005 HC RX 250 GENERAL PHARMACY W/O HCPCS: Performed by: EMERGENCY MEDICINE

## 2024-10-28 PROCEDURE — 96374 THER/PROPH/DIAG INJ IV PUSH: CPT

## 2024-10-28 PROCEDURE — 84702 CHORIONIC GONADOTROPIN TEST: CPT | Performed by: EMERGENCY MEDICINE

## 2024-10-28 PROCEDURE — 2500000004 HC RX 250 GENERAL PHARMACY W/ HCPCS (ALT 636 FOR OP/ED): Performed by: EMERGENCY MEDICINE

## 2024-10-28 PROCEDURE — 82947 ASSAY GLUCOSE BLOOD QUANT: CPT

## 2024-10-28 PROCEDURE — 80053 COMPREHEN METABOLIC PANEL: CPT | Performed by: EMERGENCY MEDICINE

## 2024-10-28 PROCEDURE — 85025 COMPLETE CBC W/AUTO DIFF WBC: CPT | Performed by: EMERGENCY MEDICINE

## 2024-10-28 PROCEDURE — 70450 CT HEAD/BRAIN W/O DYE: CPT

## 2024-10-28 PROCEDURE — 99285 EMERGENCY DEPT VISIT HI MDM: CPT | Mod: 25

## 2024-10-28 PROCEDURE — 2500000002 HC RX 250 W HCPCS SELF ADMINISTERED DRUGS (ALT 637 FOR MEDICARE OP, ALT 636 FOR OP/ED): Performed by: EMERGENCY MEDICINE

## 2024-10-28 RX ORDER — POTASSIUM CHLORIDE 20 MEQ/1
40 TABLET, EXTENDED RELEASE ORAL ONCE
Status: COMPLETED | OUTPATIENT
Start: 2024-10-28 | End: 2024-10-28

## 2024-10-28 RX ORDER — DEXTROSE 50 % IN WATER (D50W) INTRAVENOUS SYRINGE
25 ONCE
Status: COMPLETED | OUTPATIENT
Start: 2024-10-28 | End: 2024-10-28

## 2024-10-28 NOTE — ED TRIAGE NOTES
Pt BIBA from home for hypoglycemia. Pt states she had a syncopal episode at work. Upon waking pt checked her sugar earlier and read 54.  Pt took PO fluids . Ems blood sugar read 94. Pt arrived to ed a/o/4. POC glu read 62 during triage. Pt given orange juice and turkey sandwich. Pt states she is on antibiotics for shingles. Affected area is scabbed over

## 2024-10-28 NOTE — ED PROVIDER NOTES
HPI   Chief Complaint   Patient presents with    Hypoglycemia    Syncope       This is a 39yF DM on insulin who presents with hypoglycemia and syncope.  She has been taking her usual preprandial novolog with her usual sugars 130-210 (including morning fasting and preprandial) and ate her usual dinner.  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  FS was 54.  She drank some ice tea but then stood up and passed out, hitting her head.  She is now back to baseline, with no headache or neck pain but still feels a little shaky like her sugar is low.  No fever, n/v/d, CP, SOB, abd pain, dysuria or diarrhea, but says she just finished a course of antivirals for shingles.              Patient History   Past Medical History:   Diagnosis Date    Normal routine history and physical examination     Normal routine history and physical examination     Other specified health status     No pertinent past medical history     Past Surgical History:   Procedure Laterality Date    OTHER SURGICAL HISTORY  05/08/2020    Venous access port placement     Family History   Problem Relation Name Age of Onset    Brain cancer Father      Kidney cancer Maternal Grandfather      Kidney cancer Sibling       Social History     Tobacco Use    Smoking status: Never    Smokeless tobacco: Never   Vaping Use    Vaping status: Never Used   Substance Use Topics    Alcohol use: Never    Drug use: Never       Physical Exam   ED Triage Vitals [10/28/24 1945]   Temperature Heart Rate Respirations BP   36.8 °C (98.2 °F) (!) 110 18 140/84      Pulse Ox Temp src Heart Rate Source Patient Position   96 % -- -- --      BP Location FiO2 (%)     -- --       Physical Exam  Vitals and nursing note reviewed.   Constitutional:       General: She is not in acute distress.     Appearance: She is not ill-appearing, toxic-appearing or diaphoretic.   HENT:      Head: Normocephalic and atraumatic.      Nose: Nose  normal. No congestion or rhinorrhea.      Mouth/Throat:      Mouth: Mucous membranes are moist.   Eyes:      General: No scleral icterus.        Right eye: No discharge.         Left eye: No discharge.      Extraocular Movements: Extraocular movements intact.      Conjunctiva/sclera: Conjunctivae normal.      Pupils: Pupils are equal, round, and reactive to light.   Cardiovascular:      Rate and Rhythm: Regular rhythm. Tachycardia present.      Heart sounds: No murmur heard.     No friction rub. No gallop.   Pulmonary:      Effort: Pulmonary effort is normal. No respiratory distress.      Breath sounds: Normal breath sounds. No stridor. No wheezing, rhonchi or rales.   Abdominal:      General: There is no distension.      Palpations: Abdomen is soft.      Tenderness: There is no abdominal tenderness. There is no guarding or rebound.   Musculoskeletal:         General: No tenderness, deformity or signs of injury. Normal range of motion.      Cervical back: Normal range of motion and neck supple. No rigidity or tenderness.   Skin:     General: Skin is warm and dry.      Coloration: Skin is not jaundiced or pale.      Findings: No bruising, erythema, lesion or rash.   Neurological:      General: No focal deficit present.      Mental Status: She is alert and oriented to person, place, and time.      Cranial Nerves: No cranial nerve deficit.      Sensory: No sensory deficit.      Motor: No weakness.   Psychiatric:         Mood and Affect: Mood normal.         Behavior: Behavior normal.           ED Course & MDM   ED Course as of 11/05/24 2033   Mon Oct 28, 2024   1956 FS 62.  Pt given food and will reassess [EK]   2044 CTH interpreted by me without ICH or MLS [EK]   e Oct 29, 2024   0049 EKG obtained at 2242 and read by me shows sinus tachycardia, 128, with a normal WV interval, normal QRS, QTc is prolonged at 578, potentially driven by rate.  No acute injury pattern [AS]      ED Course User Index  [AS] Summer Zelaya,  DO  [EK] Elyse H Klerman, MD         Diagnoses as of 11/05/24 2033   Hypoglycemia                 No data recorded     Luanne Coma Scale Score: 15 (10/28/24 1946 : Angel Deras, MELECIO)                           Medical Decision Making  39yF presents with hypoglycemia and syncope.  Pt initially tachycardic but otherwise stable and without focal neurologic deficits or resp distress.  No acute traumatic injury.  Labs with mild leukocytosis, hypokalemia (repleted) and recurrent hypoglycemia despite D50 and oral intake.   Patient started on D10 drip, discussed with hospitalist Dr. Green and admitted to SDU.  She remains awake and alert, though still tachycardic and feeling shaky.    Amount and/or Complexity of Data Reviewed  Labs: ordered. Decision-making details documented in ED Course.  Radiology: ordered and independent interpretation performed. Decision-making details documented in ED Course.  ECG/medicine tests: ordered and independent interpretation performed. Decision-making details documented in ED Course.    Risk  Drug therapy requiring intensive monitoring for toxicity.  Decision regarding hospitalization.        Procedure  Critical Care    Performed by: Elyse H Klerman, MD  Authorized by: Elyse H Klerman, MD    Critical care provider statement:     Critical care time (minutes):  45    Critical care time was exclusive of:  Separately billable procedures and treating other patients    Critical care was necessary to treat or prevent imminent or life-threatening deterioration of the following conditions:  Endocrine crisis    Critical care was time spent personally by me on the following activities:  Development of treatment plan with patient or surrogate, evaluation of patient's response to treatment, examination of patient, obtaining history from patient or surrogate, ordering and performing treatments and interventions, ordering and review of laboratory studies, ordering and review of radiographic studies,  re-evaluation of patient's condition and review of old charts    Care discussed with: admitting provider    Comments:      Recurrent hypoglycemia requiring D10 infusion and q1-2 hour glucose checks       Elyse H Klerman, MD  11/05/24 2037

## 2024-10-28 NOTE — LETTER
October 30, 2024     Patient: Kelli Silva   YOB: 1985   Date of Visit: 10/28/2024       To Whom It May Concern:    Kelli Silva was hospitalized from 10/28/24 to 10/30/24. She is cleared to return to work on Friday, November 1, 2024. Please excuse Kelli for her absence from work during this time period.     If you have any questions or concerns, please don't hesitate to call.         Sincerely,   DO JEREMY Darden [facility]  Hospitalist

## 2024-10-29 ENCOUNTER — APPOINTMENT (OUTPATIENT)
Dept: RADIOLOGY | Facility: HOSPITAL | Age: 39
End: 2024-10-29
Payer: COMMERCIAL

## 2024-10-29 LAB
ATRIAL RATE: 128 BPM
GLUCOSE BLD MANUAL STRIP-MCNC: 100 MG/DL (ref 74–99)
GLUCOSE BLD MANUAL STRIP-MCNC: 109 MG/DL (ref 74–99)
GLUCOSE BLD MANUAL STRIP-MCNC: 114 MG/DL (ref 74–99)
GLUCOSE BLD MANUAL STRIP-MCNC: 121 MG/DL (ref 74–99)
GLUCOSE BLD MANUAL STRIP-MCNC: 60 MG/DL (ref 74–99)
GLUCOSE BLD MANUAL STRIP-MCNC: 65 MG/DL (ref 74–99)
GLUCOSE BLD MANUAL STRIP-MCNC: 72 MG/DL (ref 74–99)
GLUCOSE BLD MANUAL STRIP-MCNC: 98 MG/DL (ref 74–99)
PR INTERVAL: 112 MS
Q ONSET: 224 MS
QRS COUNT: 21 BEATS
QRS DURATION: 74 MS
QT INTERVAL: 396 MS
QTC CALCULATION(BAZETT): 578 MS
QTC FREDERICIA: 510 MS
R AXIS: 24 DEGREES
T AXIS: 71 DEGREES
T OFFSET: 422 MS
VENTRICULAR RATE: 128 BPM

## 2024-10-29 PROCEDURE — 99223 1ST HOSP IP/OBS HIGH 75: CPT | Performed by: INTERNAL MEDICINE

## 2024-10-29 PROCEDURE — 2500000004 HC RX 250 GENERAL PHARMACY W/ HCPCS (ALT 636 FOR OP/ED): Performed by: INTERNAL MEDICINE

## 2024-10-29 PROCEDURE — 2500000004 HC RX 250 GENERAL PHARMACY W/ HCPCS (ALT 636 FOR OP/ED): Performed by: EMERGENCY MEDICINE

## 2024-10-29 PROCEDURE — 2500000002 HC RX 250 W HCPCS SELF ADMINISTERED DRUGS (ALT 637 FOR MEDICARE OP, ALT 636 FOR OP/ED): Performed by: INTERNAL MEDICINE

## 2024-10-29 PROCEDURE — 96372 THER/PROPH/DIAG INJ SC/IM: CPT | Performed by: INTERNAL MEDICINE

## 2024-10-29 PROCEDURE — 2500000001 HC RX 250 WO HCPCS SELF ADMINISTERED DRUGS (ALT 637 FOR MEDICARE OP): Performed by: INTERNAL MEDICINE

## 2024-10-29 PROCEDURE — G0378 HOSPITAL OBSERVATION PER HR: HCPCS

## 2024-10-29 PROCEDURE — 73564 X-RAY EXAM KNEE 4 OR MORE: CPT | Mod: LT

## 2024-10-29 PROCEDURE — 2500000005 HC RX 250 GENERAL PHARMACY W/O HCPCS: Performed by: INTERNAL MEDICINE

## 2024-10-29 PROCEDURE — 73630 X-RAY EXAM OF FOOT: CPT | Mod: LT

## 2024-10-29 PROCEDURE — 1210000001 HC SEMI-PRIVATE ROOM DAILY

## 2024-10-29 PROCEDURE — 73564 X-RAY EXAM KNEE 4 OR MORE: CPT | Mod: LEFT SIDE | Performed by: RADIOLOGY

## 2024-10-29 PROCEDURE — 96375 TX/PRO/DX INJ NEW DRUG ADDON: CPT

## 2024-10-29 PROCEDURE — 82947 ASSAY GLUCOSE BLOOD QUANT: CPT

## 2024-10-29 PROCEDURE — 73590 X-RAY EXAM OF LOWER LEG: CPT | Mod: LEFT SIDE | Performed by: RADIOLOGY

## 2024-10-29 PROCEDURE — 94640 AIRWAY INHALATION TREATMENT: CPT

## 2024-10-29 PROCEDURE — 73590 X-RAY EXAM OF LOWER LEG: CPT | Mod: LT

## 2024-10-29 PROCEDURE — 96361 HYDRATE IV INFUSION ADD-ON: CPT

## 2024-10-29 PROCEDURE — 96376 TX/PRO/DX INJ SAME DRUG ADON: CPT

## 2024-10-29 PROCEDURE — 73630 X-RAY EXAM OF FOOT: CPT | Mod: LEFT SIDE | Performed by: RADIOLOGY

## 2024-10-29 RX ORDER — MORPHINE SULFATE 2 MG/ML
2 INJECTION, SOLUTION INTRAMUSCULAR; INTRAVENOUS ONCE
Status: COMPLETED | OUTPATIENT
Start: 2024-10-29 | End: 2024-10-29

## 2024-10-29 RX ORDER — BUPROPION HYDROCHLORIDE 150 MG/1
300 TABLET ORAL EVERY MORNING
Status: DISCONTINUED | OUTPATIENT
Start: 2024-10-29 | End: 2024-10-30 | Stop reason: HOSPADM

## 2024-10-29 RX ORDER — PREGABALIN 75 MG/1
150 CAPSULE ORAL NIGHTLY
Status: DISCONTINUED | OUTPATIENT
Start: 2024-10-29 | End: 2024-10-30 | Stop reason: HOSPADM

## 2024-10-29 RX ORDER — DEXTROSE MONOHYDRATE 100 MG/ML
100 INJECTION, SOLUTION INTRAVENOUS CONTINUOUS
Status: DISCONTINUED | OUTPATIENT
Start: 2024-10-29 | End: 2024-10-30

## 2024-10-29 RX ORDER — LAMOTRIGINE 100 MG/1
200 TABLET ORAL 2 TIMES DAILY
Status: DISCONTINUED | OUTPATIENT
Start: 2024-10-29 | End: 2024-10-30 | Stop reason: HOSPADM

## 2024-10-29 RX ORDER — ACETAMINOPHEN 160 MG/5ML
650 SOLUTION ORAL EVERY 4 HOURS PRN
Status: DISCONTINUED | OUTPATIENT
Start: 2024-10-29 | End: 2024-10-29

## 2024-10-29 RX ORDER — DEXTROSE 50 % IN WATER (D50W) INTRAVENOUS SYRINGE
12.5
Status: DISCONTINUED | OUTPATIENT
Start: 2024-10-29 | End: 2024-10-30 | Stop reason: HOSPADM

## 2024-10-29 RX ORDER — IVABRADINE 5 MG/1
7.5 TABLET, FILM COATED ORAL
Status: DISCONTINUED | OUTPATIENT
Start: 2024-10-29 | End: 2024-10-30 | Stop reason: HOSPADM

## 2024-10-29 RX ORDER — BUSPIRONE HYDROCHLORIDE 10 MG/1
30 TABLET ORAL 2 TIMES DAILY
Status: DISCONTINUED | OUTPATIENT
Start: 2024-10-29 | End: 2024-10-30 | Stop reason: HOSPADM

## 2024-10-29 RX ORDER — VALACYCLOVIR HYDROCHLORIDE 500 MG/1
1000 TABLET, FILM COATED ORAL 3 TIMES DAILY
Status: DISCONTINUED | OUTPATIENT
Start: 2024-10-29 | End: 2024-10-30 | Stop reason: HOSPADM

## 2024-10-29 RX ORDER — FLUTICASONE FUROATE AND VILANTEROL 200; 25 UG/1; UG/1
1 POWDER RESPIRATORY (INHALATION)
Status: DISCONTINUED | OUTPATIENT
Start: 2024-10-29 | End: 2024-10-30 | Stop reason: HOSPADM

## 2024-10-29 RX ORDER — DICYCLOMINE HYDROCHLORIDE 20 MG/1
20 TABLET ORAL
Status: DISCONTINUED | OUTPATIENT
Start: 2024-10-29 | End: 2024-10-30 | Stop reason: HOSPADM

## 2024-10-29 RX ORDER — ENOXAPARIN SODIUM 100 MG/ML
40 INJECTION SUBCUTANEOUS EVERY 12 HOURS SCHEDULED
Status: DISCONTINUED | OUTPATIENT
Start: 2024-10-29 | End: 2024-10-30 | Stop reason: HOSPADM

## 2024-10-29 RX ORDER — TRAZODONE HYDROCHLORIDE 50 MG/1
200 TABLET ORAL NIGHTLY
Status: DISCONTINUED | OUTPATIENT
Start: 2024-10-29 | End: 2024-10-30 | Stop reason: HOSPADM

## 2024-10-29 RX ORDER — ALBUTEROL SULFATE 0.83 MG/ML
2.5 SOLUTION RESPIRATORY (INHALATION) EVERY 2 HOUR PRN
Status: DISCONTINUED | OUTPATIENT
Start: 2024-10-29 | End: 2024-10-30 | Stop reason: HOSPADM

## 2024-10-29 RX ORDER — DEXTROSE MONOHYDRATE 100 MG/ML
100 INJECTION, SOLUTION INTRAVENOUS CONTINUOUS
Status: DISCONTINUED | OUTPATIENT
Start: 2024-10-29 | End: 2024-10-29

## 2024-10-29 RX ORDER — LISDEXAMFETAMINE DIMESYLATE 70 MG/1
70 CAPSULE ORAL EVERY MORNING
Status: DISCONTINUED | OUTPATIENT
Start: 2024-10-29 | End: 2024-10-30 | Stop reason: HOSPADM

## 2024-10-29 RX ORDER — DEXTROSE 50 % IN WATER (D50W) INTRAVENOUS SYRINGE
25
Status: DISCONTINUED | OUTPATIENT
Start: 2024-10-29 | End: 2024-10-30 | Stop reason: HOSPADM

## 2024-10-29 RX ORDER — PANTOPRAZOLE SODIUM 40 MG/1
40 TABLET, DELAYED RELEASE ORAL 2 TIMES DAILY
Status: DISCONTINUED | OUTPATIENT
Start: 2024-10-29 | End: 2024-10-30 | Stop reason: HOSPADM

## 2024-10-29 RX ORDER — LIDOCAINE 560 MG/1
1 PATCH PERCUTANEOUS; TOPICAL; TRANSDERMAL DAILY
Status: DISCONTINUED | OUTPATIENT
Start: 2024-10-29 | End: 2024-10-30 | Stop reason: HOSPADM

## 2024-10-29 RX ORDER — ACETAMINOPHEN 650 MG/1
650 SUPPOSITORY RECTAL EVERY 4 HOURS PRN
Status: DISCONTINUED | OUTPATIENT
Start: 2024-10-29 | End: 2024-10-29

## 2024-10-29 RX ORDER — ALBUTEROL SULFATE 0.83 MG/ML
2.5 SOLUTION RESPIRATORY (INHALATION) EVERY 6 HOURS PRN
Status: DISCONTINUED | OUTPATIENT
Start: 2024-10-29 | End: 2024-10-29

## 2024-10-29 RX ORDER — DESVENLAFAXINE SUCCINATE 25 MG/1
100 TABLET, EXTENDED RELEASE ORAL
Status: DISCONTINUED | OUTPATIENT
Start: 2024-10-29 | End: 2024-10-30 | Stop reason: HOSPADM

## 2024-10-29 RX ORDER — CETIRIZINE HYDROCHLORIDE 10 MG/1
10 TABLET ORAL DAILY
Status: DISCONTINUED | OUTPATIENT
Start: 2024-10-29 | End: 2024-10-30 | Stop reason: HOSPADM

## 2024-10-29 RX ORDER — L. ACIDOPHILUS/L.BULGARICUS 1MM CELL
1 TABLET ORAL DAILY
Status: DISCONTINUED | OUTPATIENT
Start: 2024-10-29 | End: 2024-10-30 | Stop reason: HOSPADM

## 2024-10-29 RX ORDER — ACETAMINOPHEN 325 MG/1
650 TABLET ORAL EVERY 4 HOURS PRN
Status: DISCONTINUED | OUTPATIENT
Start: 2024-10-29 | End: 2024-10-30 | Stop reason: HOSPADM

## 2024-10-29 RX ORDER — BUTALBITAL, ACETAMINOPHEN AND CAFFEINE 50; 325; 40 MG/1; MG/1; MG/1
1 TABLET ORAL EVERY 6 HOURS PRN
Status: DISCONTINUED | OUTPATIENT
Start: 2024-10-29 | End: 2024-10-30 | Stop reason: HOSPADM

## 2024-10-29 RX ORDER — INSULIN ASPART 100 [IU]/ML
INJECTION, SOLUTION INTRAVENOUS; SUBCUTANEOUS
COMMUNITY

## 2024-10-29 SDOH — SOCIAL STABILITY: SOCIAL INSECURITY: DO YOU FEEL ANYONE HAS EXPLOITED OR TAKEN ADVANTAGE OF YOU FINANCIALLY OR OF YOUR PERSONAL PROPERTY?: NO

## 2024-10-29 SDOH — SOCIAL STABILITY: SOCIAL INSECURITY: WITHIN THE LAST YEAR, HAVE YOU BEEN AFRAID OF YOUR PARTNER OR EX-PARTNER?: NO

## 2024-10-29 SDOH — ECONOMIC STABILITY: FOOD INSECURITY: WITHIN THE PAST 12 MONTHS, YOU WORRIED THAT YOUR FOOD WOULD RUN OUT BEFORE YOU GOT THE MONEY TO BUY MORE.: NEVER TRUE

## 2024-10-29 SDOH — ECONOMIC STABILITY: HOUSING INSECURITY

## 2024-10-29 SDOH — SOCIAL STABILITY: SOCIAL INSECURITY: ARE THERE ANY APPARENT SIGNS OF INJURIES/BEHAVIORS THAT COULD BE RELATED TO ABUSE/NEGLECT?: NO

## 2024-10-29 SDOH — SOCIAL STABILITY: SOCIAL INSECURITY
WITHIN THE LAST YEAR, HAVE YOU BEEN RAPED OR FORCED TO HAVE ANY KIND OF SEXUAL ACTIVITY BY YOUR PARTNER OR EX-PARTNER?: NO

## 2024-10-29 SDOH — HEALTH STABILITY: MENTAL HEALTH: HOW MANY DRINKS CONTAINING ALCOHOL DO YOU HAVE ON A TYPICAL DAY WHEN YOU ARE DRINKING?: PATIENT DOES NOT DRINK

## 2024-10-29 SDOH — SOCIAL STABILITY: SOCIAL INSECURITY: HAS ANYONE EVER THREATENED TO HURT YOUR FAMILY OR YOUR PETS?: NO

## 2024-10-29 SDOH — ECONOMIC STABILITY: FOOD INSECURITY: WITHIN THE PAST 12 MONTHS, THE FOOD YOU BOUGHT JUST DIDN'T LAST AND YOU DIDN'T HAVE MONEY TO GET MORE.: NEVER TRUE

## 2024-10-29 SDOH — SOCIAL STABILITY: SOCIAL INSECURITY
WITHIN THE LAST YEAR, HAVE YOU BEEN KICKED, HIT, SLAPPED, OR OTHERWISE PHYSICALLY HURT BY YOUR PARTNER OR EX-PARTNER?: NO

## 2024-10-29 SDOH — ECONOMIC STABILITY: INCOME INSECURITY: IN THE PAST 12 MONTHS HAS THE ELECTRIC, GAS, OIL, OR WATER COMPANY THREATENED TO SHUT OFF SERVICES IN YOUR HOME?: NO

## 2024-10-29 SDOH — ECONOMIC STABILITY: TRANSPORTATION INSECURITY

## 2024-10-29 SDOH — SOCIAL STABILITY: SOCIAL INSECURITY: ARE YOU OR HAVE YOU BEEN THREATENED OR ABUSED PHYSICALLY, EMOTIONALLY, OR SEXUALLY BY ANYONE?: NO

## 2024-10-29 SDOH — ECONOMIC STABILITY: FOOD INSECURITY: WITHIN THE PAST 12 MONTHS, YOU WORRIED THAT YOUR FOOD WOULD RUN OUT BEFORE YOU GOT MONEY TO BUY MORE.: NEVER TRUE

## 2024-10-29 SDOH — SOCIAL STABILITY: SOCIAL INSECURITY: WITHIN THE LAST YEAR, HAVE YOU BEEN HUMILIATED OR EMOTIONALLY ABUSED IN OTHER WAYS BY YOUR PARTNER OR EX-PARTNER?: NO

## 2024-10-29 SDOH — SOCIAL STABILITY: SOCIAL INSECURITY: ABUSE: ADULT

## 2024-10-29 SDOH — ECONOMIC STABILITY: INCOME INSECURITY: IN THE LAST 12 MONTHS, WAS THERE A TIME WHEN YOU WERE NOT ABLE TO PAY THE MORTGAGE OR RENT ON TIME?: NO

## 2024-10-29 SDOH — HEALTH STABILITY: MENTAL HEALTH: HOW OFTEN DO YOU HAVE SIX OR MORE DRINKS ON ONE OCCASION?: NEVER

## 2024-10-29 SDOH — HEALTH STABILITY: MENTAL HEALTH: HOW OFTEN DO YOU HAVE A DRINK CONTAINING ALCOHOL?: NEVER

## 2024-10-29 SDOH — SOCIAL STABILITY: SOCIAL INSECURITY: DO YOU FEEL UNSAFE GOING BACK TO THE PLACE WHERE YOU ARE LIVING?: NO

## 2024-10-29 SDOH — ECONOMIC STABILITY: TRANSPORTATION INSECURITY: IN THE PAST 12 MONTHS, HAS LACK OF TRANSPORTATION KEPT YOU FROM MEDICAL APPOINTMENTS OR FROM GETTING MEDICATIONS?: NO

## 2024-10-29 SDOH — SOCIAL STABILITY: SOCIAL INSECURITY: DOES ANYONE TRY TO KEEP YOU FROM HAVING/CONTACTING OTHER FRIENDS OR DOING THINGS OUTSIDE YOUR HOME?: NO

## 2024-10-29 SDOH — ECONOMIC STABILITY: GENERAL

## 2024-10-29 SDOH — SOCIAL STABILITY: SOCIAL INSECURITY: HAVE YOU HAD ANY THOUGHTS OF HARMING ANYONE ELSE?: NO

## 2024-10-29 SDOH — ECONOMIC STABILITY: HOUSING INSECURITY: IN THE PAST 12 MONTHS HAS THE ELECTRIC, GAS, OIL, OR WATER COMPANY THREATENED TO SHUT OFF SERVICES IN YOUR HOME?: NO

## 2024-10-29 SDOH — ECONOMIC STABILITY: HOUSING INSECURITY: IN THE LAST 12 MONTHS, WAS THERE A TIME WHEN YOU WERE NOT ABLE TO PAY THE MORTGAGE OR RENT ON TIME?: NO

## 2024-10-29 SDOH — SOCIAL STABILITY: SOCIAL INSECURITY: WERE YOU ABLE TO COMPLETE ALL THE BEHAVIORAL HEALTH SCREENINGS?: YES

## 2024-10-29 SDOH — ECONOMIC STABILITY: FOOD INSECURITY

## 2024-10-29 SDOH — ECONOMIC STABILITY: HOUSING INSECURITY: IN THE LAST 12 MONTHS, HOW MANY PLACES HAVE YOU LIVED?: 1

## 2024-10-29 SDOH — SOCIAL STABILITY: SOCIAL INSECURITY: HAVE YOU HAD THOUGHTS OF HARMING ANYONE ELSE?: NO

## 2024-10-29 ASSESSMENT — COGNITIVE AND FUNCTIONAL STATUS - GENERAL
DAILY ACTIVITIY SCORE: 24
MOBILITY SCORE: 24
MOBILITY SCORE: 24
DAILY ACTIVITIY SCORE: 24
PATIENT BASELINE BEDBOUND: NO

## 2024-10-29 ASSESSMENT — ACTIVITIES OF DAILY LIVING (ADL)
DRESSING YOURSELF: INDEPENDENT
FEEDING YOURSELF: INDEPENDENT
LACK_OF_TRANSPORTATION: NO
PATIENT'S MEMORY ADEQUATE TO SAFELY COMPLETE DAILY ACTIVITIES?: YES
TOILETING: INDEPENDENT
ADEQUATE_TO_COMPLETE_ADL: YES
HEARING - RIGHT EAR: FUNCTIONAL
BATHING: INDEPENDENT
GROOMING: INDEPENDENT
LACK_OF_TRANSPORTATION: NO
LACK_OF_TRANSPORTATION: NO
JUDGMENT_ADEQUATE_SAFELY_COMPLETE_DAILY_ACTIVITIES: YES
HEARING - LEFT EAR: FUNCTIONAL
WALKS IN HOME: INDEPENDENT
ASSISTIVE_DEVICE: EYEGLASSES

## 2024-10-29 ASSESSMENT — PAIN SCALES - GENERAL
PAINLEVEL_OUTOF10: 3
PAINLEVEL_OUTOF10: 0 - NO PAIN

## 2024-10-29 ASSESSMENT — ENCOUNTER SYMPTOMS
HEMATURIA: 0
NAUSEA: 1
WOUND: 0
HEADACHES: 0
ABDOMINAL PAIN: 0
DIARRHEA: 0
PALPITATIONS: 0
CONFUSION: 0
SORE THROAT: 0
DYSURIA: 0
VOMITING: 0
FEVER: 0
NECK PAIN: 0
CHILLS: 0
DIZZINESS: 1
DIFFICULTY URINATING: 0
HALLUCINATIONS: 0
CONSTIPATION: 0
FREQUENCY: 0
MYALGIAS: 0
WHEEZING: 0
APPETITE CHANGE: 0
EYE PAIN: 0
SHORTNESS OF BREATH: 0
COUGH: 0

## 2024-10-29 ASSESSMENT — LIFESTYLE VARIABLES
AUDIT-C TOTAL SCORE: 0
HOW OFTEN DO YOU HAVE A DRINK CONTAINING ALCOHOL: NEVER
AUDIT-C TOTAL SCORE: 0
HOW MANY STANDARD DRINKS CONTAINING ALCOHOL DO YOU HAVE ON A TYPICAL DAY: PATIENT DOES NOT DRINK
HOW OFTEN DO YOU HAVE 6 OR MORE DRINKS ON ONE OCCASION: NEVER
SKIP TO QUESTIONS 9-10: 1
SKIP TO QUESTIONS 9-10: 1
AUDIT-C TOTAL SCORE: 0

## 2024-10-29 ASSESSMENT — PATIENT HEALTH QUESTIONNAIRE - PHQ9
2. FEELING DOWN, DEPRESSED OR HOPELESS: NOT AT ALL
SUM OF ALL RESPONSES TO PHQ9 QUESTIONS 1 & 2: 0
1. LITTLE INTEREST OR PLEASURE IN DOING THINGS: NOT AT ALL

## 2024-10-29 ASSESSMENT — PAIN DESCRIPTION - LOCATION: LOCATION: HEAD

## 2024-10-29 ASSESSMENT — SOCIAL DETERMINANTS OF HEALTH (SDOH): IN THE PAST 12 MONTHS, HAS THE ELECTRIC, GAS, OIL, OR WATER COMPANY THREATENED TO SHUT OFF SERVICE IN YOUR HOME?: NO

## 2024-10-29 ASSESSMENT — PAIN - FUNCTIONAL ASSESSMENT
PAIN_FUNCTIONAL_ASSESSMENT: 0-10
PAIN_FUNCTIONAL_ASSESSMENT: 0-10

## 2024-10-29 NOTE — PROGRESS NOTES
10/29/24 1150   Discharge Planning   Living Arrangements Spouse/significant other   Support Systems Spouse/significant other;Parent;Friends/neighbors   Assistance Needed None   Type of Residence Private residence   Home or Post Acute Services None   Expected Discharge Disposition Home   Patient Choice   Patient / Family choosing to utilize agency / facility established prior to hospitalization No     Care transitions assessment completed via bedside with patient. TCC introduced self and explained role. Demographics verified. Patient from home with S.O.  Independent PTA. Denies use of assistive devices. Denies SW needs at this time. Patient anticipates no skilled needs at discharge. Dispo pending hospital course. Patient's family to transport home at time of discharge. TCC to continue to follow for discharge planning needs.      PCP: MARINE ÁLVAREZ  Last seen: 09/2024  Pharmacy: [facility]   Insurance: [employer]   Dispo: Home, declined needs     ASHANTI PIMENTEL, RN TCC

## 2024-10-29 NOTE — H&P
History Of Present Illness  Kelli Silva is a 39 y.o. female presenting with IDDM2, asthma, GERD, bipolar disorder, fibromyalgia, gastroparesis presents to [facility] ED for hypoglycemia and syncope.  Patient uses NovoLog sliding scale, give herself 4 units around 5 PM and ate dinner.  She was at work when she felt her sugar was low.  This has happened to patient before.  She tried to drink iced tea and when she stood up she passed out.  Currently patient still admits to feeling shaky and nauseated.  She does note when she gets acute infections her sugar tends to drop.  She recently had an outbreak of shingles across her abdomen this past Friday, currently taking valacyclovir.  Workup in the ED showed that her glucose was low.  She got amp of D50, started on D10 water infusion.     Past Medical History  Past Medical History:   Diagnosis Date    Normal routine history and physical examination     Normal routine history and physical examination     Other specified health status     No pertinent past medical history       Surgical History  Past Surgical History:   Procedure Laterality Date    OTHER SURGICAL HISTORY  05/08/2020    Venous access port placement        Social History  She reports that she has never smoked. She has never used smokeless tobacco. She reports that she does not drink alcohol and does not use drugs.    Family History  Family History   Problem Relation Name Age of Onset    Brain cancer Father      Kidney cancer Maternal Grandfather      Kidney cancer Sibling          Allergies  Aspirin, Cigarette smoke, Fish containing products, Iodinated contrast media, Ketorolac, Ketorolac tromethamine, Ondansetron, Other, Prochlorperazine, Shellfish containing products, Sumatriptan, Azithromycin, Ceftriaxone, Doxycycline, Dulaglutide, House dust, Liraglutide, Metformin, Metoclopramide, Prednisone, Sitagliptin, [id number] antimigraine agents, and Zolmitriptan    Review of Systems   Constitutional:  Negative  "for appetite change, chills and fever.   HENT:  Negative for congestion, ear pain and sore throat.    Eyes:  Negative for pain and visual disturbance.   Respiratory:  Negative for cough, shortness of breath and wheezing.    Cardiovascular:  Negative for chest pain, palpitations and leg swelling.   Gastrointestinal:  Positive for nausea. Negative for abdominal pain, constipation, diarrhea and vomiting.   Genitourinary:  Negative for difficulty urinating, dysuria, frequency and hematuria.   Musculoskeletal:  Negative for myalgias and neck pain.   Skin:  Positive for rash. Negative for wound.   Neurological:  Positive for dizziness. Negative for syncope and headaches.   Psychiatric/Behavioral:  Negative for confusion and hallucinations.    All other systems reviewed and are negative.       Physical Exam     GEN:  awake, alert, not in acute distress  HEENT:  normocephalic, atraumatic, PERRL, EOM intact, nares patent  Cardio:  RRR, no murmurs  Resp:  CTAB, no wheezing  Abd:  +BS, soft, nontender  :  deferred  Neuro:  A&Ox3, CN2-12 grossly intact, no focal deficits  Msk:  no gross deformities, no joint effusions  Skin:  warm, area of mild erythema across mid abdomen  Psych:  appropriate in mood and behavior     Last Recorded Vitals  Blood pressure 136/66, pulse (!) 110, temperature 36.8 °C (98.2 °F), resp. rate 18, height 1.6 m (5' 2.99\"), weight 104 kg (230 lb), SpO2 95%.    Relevant Results      Results for orders placed or performed during the hospital encounter of 10/28/24 (from the past 24 hours)   POCT GLUCOSE   Result Value Ref Range    POCT Glucose 62 (L) 74 - 99 mg/dL   POCT GLUCOSE   Result Value Ref Range    POCT Glucose 66 (L) 74 - 99 mg/dL   CBC and Auto Differential   Result Value Ref Range    WBC 13.7 (H) 4.4 - 11.3 x10*3/uL    nRBC 0.0 0.0 - 0.0 /100 WBCs    RBC 4.71 4.00 - 5.20 x10*6/uL    Hemoglobin 14.5 12.0 - 16.0 g/dL    Hematocrit 42.1 36.0 - 46.0 %    MCV 89 80 - 100 fL    MCH 30.8 26.0 - 34.0 pg "    MCHC 34.4 32.0 - 36.0 g/dL    RDW 12.4 11.5 - 14.5 %    Platelets 227 150 - 450 x10*3/uL    Neutrophils % 77.0 40.0 - 80.0 %    Immature Granulocytes %, Automated 0.4 0.0 - 0.9 %    Lymphocytes % 15.8 13.0 - 44.0 %    Monocytes % 6.5 2.0 - 10.0 %    Eosinophils % 0.1 0.0 - 6.0 %    Basophils % 0.2 0.0 - 2.0 %    Neutrophils Absolute 10.49 (H) 1.20 - 7.70 x10*3/uL    Immature Granulocytes Absolute, Automated 0.06 0.00 - 0.70 x10*3/uL    Lymphocytes Absolute 2.16 1.20 - 4.80 x10*3/uL    Monocytes Absolute 0.89 0.10 - 1.00 x10*3/uL    Eosinophils Absolute 0.02 0.00 - 0.70 x10*3/uL    Basophils Absolute 0.03 0.00 - 0.10 x10*3/uL   Comprehensive metabolic panel   Result Value Ref Range    Glucose 60 (L) 74 - 99 mg/dL    Sodium 140 136 - 145 mmol/L    Potassium 3.0 (L) 3.5 - 5.3 mmol/L    Chloride 105 98 - 107 mmol/L    Bicarbonate 24 21 - 32 mmol/L    Anion Gap 14 10 - 20 mmol/L    Urea Nitrogen 6 6 - 23 mg/dL    Creatinine 0.96 0.50 - 1.05 mg/dL    eGFR 76 >60 mL/min/1.73m*2    Calcium 9.2 8.6 - 10.3 mg/dL    Albumin 4.2 3.4 - 5.0 g/dL    Alkaline Phosphatase 62 33 - 110 U/L    Total Protein 6.4 6.4 - 8.2 g/dL    AST 19 9 - 39 U/L    Bilirubin, Total 0.3 0.0 - 1.2 mg/dL    ALT 36 7 - 45 U/L   hCG, quantitative, pregnancy   Result Value Ref Range    HCG, Beta-Quantitative <2 <5 mIU/mL   POCT GLUCOSE   Result Value Ref Range    POCT Glucose 35 (L) 74 - 99 mg/dL   POCT GLUCOSE   Result Value Ref Range    POCT Glucose 62 (L) 74 - 99 mg/dL   POCT GLUCOSE   Result Value Ref Range    POCT Glucose 68 (L) 74 - 99 mg/dL     *Note: Due to a large number of results and/or encounters for the requested time period, some results have not been displayed. A complete set of results can be found in Results Review.          Assessment/Plan   Assessment & Plan  Hypoglycemia    Herpes zoster without complication      # Hypoglycemia  # Syncope 2/2 hypoglycemia  # Shingles, uncomplicated  # IDDM2  # Asthma  # GERD  # Bipolar disorder   #  Fibromyalgia    Continue D10 water infusion overnight, encourage patient to eat and drink.  Hypoglycemia protocol ordered.  Holding her NovoLog for now.  She feels almost back to baseline, she has had some nausea.  Tigan ordered as needed for nausea.  Continue valacyclovir for uncomplicated shingles.  Resume her home medications.  DVT prophylaxis with Lovenox subcutaneous.       I spent 60 minutes in the professional and overall care of this patient.    Boy Bowman,   Senior Attending Physician  [facility]  Clinical Instructor

## 2024-10-29 NOTE — CARE PLAN
Problem: Pain - Adult  Goal: Verbalizes/displays adequate comfort level or baseline comfort level  Outcome: Progressing     Problem: Safety - Adult  Goal: Free from fall injury  Outcome: Progressing     Problem: Discharge Planning  Goal: Discharge to home or other facility with appropriate resources  Outcome: Progressing     Problem: Chronic Conditions and Co-morbidities  Goal: Patient's chronic conditions and co-morbidity symptoms are monitored and maintained or improved  Outcome: Progressing     Problem: Skin  Goal: Decreased wound size/increased tissue granulation at next dressing change  Outcome: Progressing  Goal: Participates in plan/prevention/treatment measures  Outcome: Progressing  Goal: Prevent/manage excess moisture  Outcome: Progressing  Goal: Prevent/minimize sheer/friction injuries  Outcome: Progressing  Goal: Promote/optimize nutrition  Outcome: Progressing  Goal: Promote skin healing  Outcome: Progressing     Problem: Diabetes  Goal: Achieve decreasing blood glucose levels by end of shift  Outcome: Progressing  Goal: Increase stability of blood glucose readings by end of shift  Outcome: Progressing  Goal: Decrease in ketones present in urine by end of shift  Outcome: Progressing  Goal: Maintain electrolyte levels within acceptable range throughout shift  Outcome: Progressing  Goal: Maintain glucose levels >70mg/dl to <250mg/dl throughout shift  Outcome: Progressing  Goal: No changes in neurological exam by end of shift  Outcome: Progressing  Goal: Learn about and adhere to nutrition recommendations by end of shift  Outcome: Progressing  Goal: Vital signs within normal range for age by end of shift  Outcome: Progressing  Goal: Increase self care and/or family involovement by end of shift  Outcome: Progressing  Goal: Receive DSME education by end of shift  Outcome: Progressing   The patient's goals for the shift include      The clinical goals for the shift include maintain glucose within normal  parameters

## 2024-10-30 VITALS
SYSTOLIC BLOOD PRESSURE: 101 MMHG | WEIGHT: 230 LBS | OXYGEN SATURATION: 96 % | RESPIRATION RATE: 18 BRPM | BODY MASS INDEX: 40.75 KG/M2 | TEMPERATURE: 96.8 F | HEIGHT: 63 IN | DIASTOLIC BLOOD PRESSURE: 58 MMHG | HEART RATE: 75 BPM

## 2024-10-30 LAB
ANION GAP SERPL CALC-SCNC: 11 MMOL/L (ref 10–20)
BUN SERPL-MCNC: 2 MG/DL (ref 6–23)
CALCIUM SERPL-MCNC: 8.2 MG/DL (ref 8.6–10.3)
CHLORIDE SERPL-SCNC: 106 MMOL/L (ref 98–107)
CO2 SERPL-SCNC: 26 MMOL/L (ref 21–32)
CREAT SERPL-MCNC: 0.94 MG/DL (ref 0.5–1.05)
EGFRCR SERPLBLD CKD-EPI 2021: 78 ML/MIN/1.73M*2
ERYTHROCYTE [DISTWIDTH] IN BLOOD BY AUTOMATED COUNT: 12.6 % (ref 11.5–14.5)
GLUCOSE BLD MANUAL STRIP-MCNC: 107 MG/DL (ref 74–99)
GLUCOSE BLD MANUAL STRIP-MCNC: 118 MG/DL (ref 74–99)
GLUCOSE BLD MANUAL STRIP-MCNC: 118 MG/DL (ref 74–99)
GLUCOSE BLD MANUAL STRIP-MCNC: 99 MG/DL (ref 74–99)
GLUCOSE SERPL-MCNC: 105 MG/DL (ref 74–99)
HCT VFR BLD AUTO: 37.5 % (ref 36–46)
HGB BLD-MCNC: 12.6 G/DL (ref 12–16)
MCH RBC QN AUTO: 30.8 PG (ref 26–34)
MCHC RBC AUTO-ENTMCNC: 33.6 G/DL (ref 32–36)
MCV RBC AUTO: 92 FL (ref 80–100)
NRBC BLD-RTO: 0 /100 WBCS (ref 0–0)
PLATELET # BLD AUTO: 227 X10*3/UL (ref 150–450)
POTASSIUM SERPL-SCNC: 3.7 MMOL/L (ref 3.5–5.3)
RBC # BLD AUTO: 4.09 X10*6/UL (ref 4–5.2)
SODIUM SERPL-SCNC: 139 MMOL/L (ref 136–145)
WBC # BLD AUTO: 7.2 X10*3/UL (ref 4.4–11.3)

## 2024-10-30 PROCEDURE — 2500000001 HC RX 250 WO HCPCS SELF ADMINISTERED DRUGS (ALT 637 FOR MEDICARE OP): Performed by: INTERNAL MEDICINE

## 2024-10-30 PROCEDURE — 2500000004 HC RX 250 GENERAL PHARMACY W/ HCPCS (ALT 636 FOR OP/ED): Performed by: INTERNAL MEDICINE

## 2024-10-30 PROCEDURE — G0378 HOSPITAL OBSERVATION PER HR: HCPCS

## 2024-10-30 PROCEDURE — 85027 COMPLETE CBC AUTOMATED: CPT | Performed by: INTERNAL MEDICINE

## 2024-10-30 PROCEDURE — 99238 HOSP IP/OBS DSCHRG MGMT 30/<: CPT | Performed by: INTERNAL MEDICINE

## 2024-10-30 PROCEDURE — 94640 AIRWAY INHALATION TREATMENT: CPT

## 2024-10-30 PROCEDURE — 80048 BASIC METABOLIC PNL TOTAL CA: CPT | Performed by: INTERNAL MEDICINE

## 2024-10-30 PROCEDURE — 82947 ASSAY GLUCOSE BLOOD QUANT: CPT

## 2024-10-30 PROCEDURE — 2500000002 HC RX 250 W HCPCS SELF ADMINISTERED DRUGS (ALT 637 FOR MEDICARE OP, ALT 636 FOR OP/ED): Performed by: INTERNAL MEDICINE

## 2024-10-30 ASSESSMENT — PAIN SCALES - GENERAL: PAINLEVEL_OUTOF10: 0 - NO PAIN

## 2024-10-30 NOTE — NURSING NOTE
Met with patient at the bedside. Discharge Planning discussed. AVS updated with follow-ups, education, and medication information. Verified/ entered a PCP and pharmacy. New medications and side effects discussed. All questions answered.  Updated nurse on this info. AVS printed and hi-lighted. De-accessed port. Waiting on ride.

## 2024-10-30 NOTE — CARE PLAN
The patient's goals for the shift include  maintain stable glucose    The clinical goals for the shift include maintain stable glucose

## 2024-10-30 NOTE — DISCHARGE SUMMARY
Discharge Diagnosis  Hypoglycemia    Issues Requiring Follow-Up  As above    Discharge Meds     Medication List      CONTINUE taking these medications     Accu-Chek Guide test strips strip; Generic drug: blood sugar diagnostic   * albuterol 2.5 mg /3 mL (0.083 %) nebulizer solution   * albuterol 90 mcg/actuation inhaler   atomoxetine 60 mg capsule; Commonly known as: Strattera; Take 1 capsule   (60 mg) by mouth once daily. Swallow capsule whole; do not open. If opened   accidentally, do not touch eyes; wash hands immediately (product is an eye   irritant).   Autolet lancing device   azelastine-fluticasone 137-50 mcg/spray nasal spray; Commonly known as:   Dymista   buPROPion  mg 24 hr tablet; Commonly known as: Wellbutrin XL; Take   1 tablet (300 mg) by mouth once daily in the morning. Do not crush, chew,   or split.   busPIRone 30 mg tablet; Commonly known as: Buspar   calcium carbonate-vitamin D3 250 mg-3.125 mcg (125 unit) tablet;   Commonly known as: Oyster Shell   cariprazine 6 mg capsule; Commonly known as: Vraylar; Take 1 capsule (6   mg) by mouth once daily at bedtime.   CHLORZOXAZONE ORAL   Corlanor 7.5 mg tablet; Generic drug: ivabradine   desvenlafaxine 100 mg 24 hr tablet; Commonly known as: Pristiq; TAKE 1   TABLET BY MOUTH EVERY DAY IN THE MORNING   dicyclomine 20 mg tablet; Commonly known as: Bentyl   glucose 4 gram chewable tablet   * glucagon 1 mg injection; Commonly known as: Glucagen   * Gvoke HypoPen 2-Pack 1 mg/0.2 mL auto-injector; Generic drug: glucagon   * ipratropium-albuteroL 0.5-2.5 mg/3 mL nebulizer solution; Commonly   known as: Duo-Neb   * ipratropium-albuteroL 0.5-2.5 mg/3 mL nebulizer solution; Commonly   known as: Duo-Neb   lactobacillus 10 billion cell capsule; Commonly known as: Culturelle   lamoTRIgine 200 mg disintegrating tablet; Commonly known as: LaMICtal   lidocaine 5 % patch; Commonly known as: Lidoderm; Place 1 patch over 12   hours on the skin once daily. Remove &  discard patch within 12 hours.   lisdexamfetamine 70 mg capsule; Commonly known as: Vyvanse; Take 1   capsule (70 mg) by mouth once daily in the morning.   LORazepam 1 mg tablet; Commonly known as: Ativan; Take 1 tablet (1 mg)   by mouth 2 times a day as needed for anxiety or sleep.   Mirena 21 mcg/24hr (up to 8 yrs) 52 mg IUD; Generic drug: levonorgestrel   NovoLOG U-100 Insulin aspart 100 unit/mL injection; Generic drug:   insulin aspart   paliperidone 3 mg 24 hr tablet; Commonly known as: Invega; TAKE 1 - 2   TABLETS BY MOUTH DAILY AT BEDTIME   pantoprazole 40 mg EC tablet; Commonly known as: ProtoNix   * pregabalin 150 mg capsule; Commonly known as: Lyrica   * pregabalin 50 mg capsule; Commonly known as: Lyrica   Reyvow 100 mg tablet; Generic drug: lasmiditan   Tezspire SubQ syringe; Generic drug: tezepelumab-ekko   traZODone 100 mg tablet; Commonly known as: Desyrel; Take 2 tablets (200   mg) by mouth once daily at bedtime.   Trelegy Ellipta 200-62.5-25 mcg blister with device; Generic drug:   fluticasone-umeclidin-vilanter   valACYclovir 1 gram tablet; Commonly known as: Valtrex; Take 1 tablet   (1,000 mg) by mouth 3 times a day for 7 days.   Zavzpret 10 mg/actuation spray,non-aerosol; Generic drug: zavegepant   zolpidem CR 12.5 mg ER tablet; Commonly known as: Ambien CR; Take 1   tablet (12.5 mg) by mouth as needed at bedtime for sleep.  * This list has 8 medication(s) that are the same as other medications   prescribed for you. Read the directions carefully, and ask your doctor or   other care provider to review them with you.     STOP taking these medications     dextrose 5 % in water (D5W) parenteral solution 250 mL with ketamine 100   mg/mL solution       Test Results Pending At Discharge  Pending Labs       No current pending labs.            Hospital Course   Kelli Silva is a 39 y.o. female with IDDM2, asthma, GERD, bipolar disorder, fibromyalgia, gastroparesis, who presented with hypoglycemia &  syncope. Patient gave herself 4 units off her sliding scale around 5 PM and ate dinner. She was at work when she felt her sugar was low, similar to prior times. She tried to drink iced tea and when she stood up she passed out. Pt notes she tends to become hypoglycemic with acute infections & she recently had an outbreak of shingles across her abdomen & being tx with valacyclovir. Workup in the ED with hypoglycemia, pt got amp of D50 & was started on D10W infusion. Her BG improved with infusion and she did not develop further hypoglycemia when IVFs were discontinued. Pt stable for dc home, she is to follow up with her PCP, work note provided.        Pertinent Physical Exam At Time of Discharge  Physical Exam  Physical Exam:  General: A&Ox3, no distress, cooperative  HEENT: NC/AT, EOMI, clear sclera, MMM  NECK: Supple  Cardiovascular: RRR, no murmurs. S1/S2  Respiratory: CTAB, no RRW or crackles. No distress  Abdomen: Soft, ND, non-tender. BS+  Extremities: No peripheral edema  Neurological: A&Ox3. CN II-XII grossly intact. No focal deficits  Skin: Warm and dry, no rashes  Psych: appropriate mood and affect    Outpatient Follow-Up  Future Appointments   Date Time Provider Department Center   10/31/2024  7:30 AM Chester Gabriel PhD [id number] [place]   10/31/2024  9:00 AM DO [id number], INJECTION NURSE [id number] [facility]   10/31/2024  4:00 PM Ata Nova MD PhD [id number] [facility]   11/7/2024  7:30 AM Chester Gabriel PhD [id number] [place]   11/8/2024 12:00 PM [id number]   11/14/2024  7:30 AM Chester Gabriel PhD [id number] [place]   11/21/2024  7:30 AM Chester Gabriel PhD [id number] [place]   12/5/2024  7:30 AM Chester Gabriel PhD [id number] [place]   12/12/2024  7:30 AM Chester Gabriel PhD [id number] [place]   12/19/2024  7:30 AM Chester Gabriel, PhD [id number] [place]   12/26/2024  7:30 AM Chester Gabriel,  [id number] [place]         Dulce Green DO

## 2024-10-31 ENCOUNTER — TELEMEDICINE (OUTPATIENT)
Dept: BEHAVIORAL HEALTH | Facility: CLINIC | Age: 39
End: 2024-10-31
Payer: COMMERCIAL

## 2024-10-31 ENCOUNTER — PROCEDURE VISIT (OUTPATIENT)
Dept: BEHAVIORAL HEALTH | Facility: CLINIC | Age: 39
End: 2024-10-31
Payer: COMMERCIAL

## 2024-10-31 ENCOUNTER — APPOINTMENT (OUTPATIENT)
Dept: BEHAVIORAL HEALTH | Facility: CLINIC | Age: 39
End: 2024-10-31
Payer: COMMERCIAL

## 2024-10-31 VITALS
HEART RATE: 86 BPM | TEMPERATURE: 98.3 F | RESPIRATION RATE: 16 BRPM | DIASTOLIC BLOOD PRESSURE: 83 MMHG | SYSTOLIC BLOOD PRESSURE: 126 MMHG

## 2024-10-31 DIAGNOSIS — F90.0 ATTENTION DEFICIT HYPERACTIVITY DISORDER (ADHD), PREDOMINANTLY INATTENTIVE TYPE: ICD-10-CM

## 2024-10-31 DIAGNOSIS — F31.9 BIPOLAR 1 DISORDER (MULTI): ICD-10-CM

## 2024-10-31 DIAGNOSIS — F41.1 GAD (GENERALIZED ANXIETY DISORDER): ICD-10-CM

## 2024-10-31 DIAGNOSIS — F31.0 BIPOLAR AFFECTIVE DISORDER, CURRENT EPISODE HYPOMANIC (MULTI): Primary | ICD-10-CM

## 2024-10-31 PROCEDURE — 99214 OFFICE O/P EST MOD 30 MIN: CPT | Performed by: PSYCHIATRY & NEUROLOGY

## 2024-10-31 PROCEDURE — 90837 PSYTX W PT 60 MINUTES: CPT | Performed by: PSYCHOLOGIST

## 2024-10-31 RX ORDER — ATOMOXETINE 60 MG/1
60 CAPSULE ORAL DAILY
Qty: 90 CAPSULE | Refills: 3 | Status: SHIPPED | OUTPATIENT
Start: 2024-10-31 | End: 2025-10-31

## 2024-10-31 RX ORDER — LORAZEPAM 1 MG/1
1 TABLET ORAL 2 TIMES DAILY PRN
Qty: 60 TABLET | Refills: 2 | Status: SHIPPED | OUTPATIENT
Start: 2024-10-31 | End: 2025-01-29

## 2024-11-01 DIAGNOSIS — F31.9 BIPOLAR I DISORDER WITH DEPRESSION (MULTI): Primary | ICD-10-CM

## 2024-11-01 RX ORDER — BUSPIRONE HYDROCHLORIDE 30 MG/1
30 TABLET ORAL 2 TIMES DAILY
Qty: 180 TABLET | Refills: 3 | Status: SHIPPED | OUTPATIENT
Start: 2024-11-01 | End: 2025-11-01

## 2024-11-01 RX ORDER — LAMOTRIGINE 200 MG/1
200 TABLET, ORALLY DISINTEGRATING ORAL 2 TIMES DAILY
Qty: 180 TABLET | Refills: 3 | Status: SHIPPED | OUTPATIENT
Start: 2024-11-01 | End: 2025-11-01

## 2024-11-07 ENCOUNTER — APPOINTMENT (OUTPATIENT)
Dept: BEHAVIORAL HEALTH | Facility: CLINIC | Age: 39
End: 2024-11-07
Payer: COMMERCIAL

## 2024-11-08 ENCOUNTER — HOSPITAL ENCOUNTER (OUTPATIENT)
Dept: POSTOP/PACU | Facility: HOSPITAL | Age: 39
Setting detail: OUTPATIENT SURGERY
Discharge: HOME | End: 2024-11-08
Payer: COMMERCIAL

## 2024-11-08 VITALS
OXYGEN SATURATION: 95 % | RESPIRATION RATE: 14 BRPM | DIASTOLIC BLOOD PRESSURE: 59 MMHG | HEART RATE: 122 BPM | TEMPERATURE: 97.7 F | BODY MASS INDEX: 40.63 KG/M2 | WEIGHT: 229.28 LBS | SYSTOLIC BLOOD PRESSURE: 113 MMHG

## 2024-11-08 DIAGNOSIS — F31.9 BIPOLAR I DISORDER WITH DEPRESSION (MULTI): Primary | ICD-10-CM

## 2024-11-08 PROCEDURE — KETSP PR KETAMINE INFUSION SELF-PAY: Performed by: PSYCHIATRY & NEUROLOGY

## 2024-11-08 PROCEDURE — 2500000004 HC RX 250 GENERAL PHARMACY W/ HCPCS (ALT 636 FOR OP/ED): Performed by: PSYCHIATRY & NEUROLOGY

## 2024-11-08 PROCEDURE — 2500000005 HC RX 250 GENERAL PHARMACY W/O HCPCS: Performed by: PSYCHIATRY & NEUROLOGY

## 2024-11-08 PROCEDURE — KETSP HC KETAMINE INFUSION SELF-PAY: Performed by: PSYCHIATRY & NEUROLOGY

## 2024-11-08 RX ORDER — ONDANSETRON HYDROCHLORIDE 2 MG/ML
8 INJECTION, SOLUTION INTRAVENOUS ONCE
Start: 2024-11-08 | End: 2024-11-08

## 2024-11-08 RX ORDER — KETAMINE HCL IN 0.9 % NACL 200 MG/200
0.75 PLASTIC BAG, INJECTION (ML) INTRAVENOUS ONCE
Start: 2024-11-08 | End: 2024-11-08

## 2024-11-08 RX ORDER — ONDANSETRON HYDROCHLORIDE 2 MG/ML
8 INJECTION, SOLUTION INTRAVENOUS ONCE
Status: COMPLETED | OUTPATIENT
Start: 2024-11-08 | End: 2024-11-08

## 2024-11-08 RX ORDER — KETAMINE HCL IN 0.9 % NACL 200 MG/200
78 PLASTIC BAG, INJECTION (ML) INTRAVENOUS ONCE
Status: COMPLETED | OUTPATIENT
Start: 2024-11-08 | End: 2024-11-08

## 2024-11-08 ASSESSMENT — PATIENT HEALTH QUESTIONNAIRE - PHQ9
1. LITTLE INTEREST OR PLEASURE IN DOING THINGS: SEVERAL DAYS
9. THOUGHTS THAT YOU WOULD BE BETTER OFF DEAD, OR OF HURTING YOURSELF: NOT AT ALL
5. POOR APPETITE OR OVEREATING: NOT AT ALL
3. TROUBLE FALLING OR STAYING ASLEEP OR SLEEPING TOO MUCH: NEARLY EVERY DAY
10. IF YOU CHECKED OFF ANY PROBLEMS, HOW DIFFICULT HAVE THESE PROBLEMS MADE IT FOR YOU TO DO YOUR WORK, TAKE CARE OF THINGS AT HOME, OR GET ALONG WITH OTHER PEOPLE: SOMEWHAT DIFFICULT
SUM OF ALL RESPONSES TO PHQ QUESTIONS 1-9: 11
6. FEELING BAD ABOUT YOURSELF - OR THAT YOU ARE A FAILURE OR HAVE LET YOURSELF OR YOUR FAMILY DOWN: SEVERAL DAYS
8. MOVING OR SPEAKING SO SLOWLY THAT OTHER PEOPLE COULD HAVE NOTICED. OR THE OPPOSITE, BEING SO FIGETY OR RESTLESS THAT YOU HAVE BEEN MOVING AROUND A LOT MORE THAN USUAL: SEVERAL DAYS
7. TROUBLE CONCENTRATING ON THINGS, SUCH AS READING THE NEWSPAPER OR WATCHING TELEVISION: SEVERAL DAYS
2. FEELING DOWN, DEPRESSED OR HOPELESS: SEVERAL DAYS
4. FEELING TIRED OR HAVING LITTLE ENERGY: NEARLY EVERY DAY
SUM OF ALL RESPONSES TO PHQ9 QUESTIONS 1 AND 2: 2

## 2024-11-08 ASSESSMENT — ANXIETY QUESTIONNAIRES
7. FEELING AFRAID AS IF SOMETHING AWFUL MIGHT HAPPEN: SEVERAL DAYS
5. BEING SO RESTLESS THAT IT IS HARD TO SIT STILL: SEVERAL DAYS
3. WORRYING TOO MUCH ABOUT DIFFERENT THINGS: SEVERAL DAYS
1. FEELING NERVOUS, ANXIOUS, OR ON EDGE: SEVERAL DAYS
4. TROUBLE RELAXING: SEVERAL DAYS
2. NOT BEING ABLE TO STOP OR CONTROL WORRYING: SEVERAL DAYS
IF YOU CHECKED OFF ANY PROBLEMS ON THIS QUESTIONNAIRE, HOW DIFFICULT HAVE THESE PROBLEMS MADE IT FOR YOU TO DO YOUR WORK, TAKE CARE OF THINGS AT HOME, OR GET ALONG WITH OTHER PEOPLE: VERY DIFFICULT
GAD7 TOTAL SCORE: 7
6. BECOMING EASILY ANNOYED OR IRRITABLE: SEVERAL DAYS

## 2024-11-08 ASSESSMENT — PAIN SCALES - GENERAL: PAINLEVEL_OUTOF10: 0 - NO PAIN

## 2024-11-08 ASSESSMENT — PAIN - FUNCTIONAL ASSESSMENT: PAIN_FUNCTIONAL_ASSESSMENT: 0-10

## 2024-11-08 NOTE — H&P
Psychiatry Procedure H&P for each visit    Name: Kelli  : 1985  MRN: [id number]    Date: 24     History & Physical Reviewed:  Pregnancy/Lactating:  Are You Pregnant: No  Are You Currently Breastfeeding: No    I have reviewed the History and Physical dated:   History and Physical Reviewed and relevant findings noted. Patient examined to review pertinent physical findings: Yes  Home Medications Reviewed: Yes  Allergies Reviewed: Yes    ERAS (Enhanced Recovery After Surgery):  ERAS Patient: No    Consent:  COVID-19 Consent:   COVID-19 Risk Consent: Yes: Surgeon has reviewed key risks related to the risk of meli COVID-19 and if they contract COVID-19 what the risks are.

## 2024-11-08 NOTE — PROGRESS NOTES
Ketamine/Esketamine Procedure    Name: Kelli  : 1985  MRN: [id number]    Date: 24    Time out was taken with staff to confirm correct patient and correct procedure to be performed.     Ketamine infusion at .75 mg/kg in 40 minutes  Subjective: She said she felt a little bit depressed again because she got a virus for a week. She believed she was infected by a virus when she was hospitalized a week ago. She said she had cough and running nose, but did not have fever or aches. She said she did not have COVID. She said because of the viral infection, her blood sugar was unstable.  She was tachycardia before the infusion and she believed it was due to hypoglycemia. Before starting infusion, her sugar was at 60s. She did not have sx of tachycardia. She said her mood was ok overall. Still ate well, but had problem with sleep. Energy and concentration were fine. Denied having SI/HI/AVH or paranoia. No side effect after discharge.   No other change in medical hx  No physical complaint  The benefit from receiving ketamine infusion today over the risk from the infection related to hypoglycemia and tachycardia and without ketamine infusion. She agreed to have ketamine infusion today as planned.     PHQ9=11  GAD7=7    Objective: She was awake, alert, cooperative, and pleasant.  Normal gait. No abnormal movement. She was wearing a mask during the interview and treatment. Good eye contact. Oriented x 3. Speech was normal. Affect was appropriate, restricted most of the time. Mood was ok. Denied having SI/HI/AVH or paranoia during the interview. No delusional thought or paranoia. I/J were good. Memory was fair. Attention and concentration were good.     Vital signs before the infusion: HR: 119 bpm; pulse Ox: 97%; BP: 136/104 mmHg.   Ketamine infusion observation:  20 minutes after starting infusion, she experienced floaty on her bed.  No other side effect. Heart rate increased, blood pressure was stable.  HR: 131 bpm;  pulse Ox: 97%; BP: 142/101 mmHg.    Shortly ended the infusion, she felt sweaty due to hypoglycemia and a little floaty.  No other side effect. HR: 130 bpm; pulse Ox: 96%; BP: 159/79 mmHg (measured with a wider cuff).     At 1:05 pm, her HR ran from 125 to 132 bpm. Pulse Ox: 96%; BP: 148/71 mmHg; glucose (finger stick) = 79. She said her HR was around 115 bpm before she came for the treatment. She did not have any symptoms of tachycardia. She said she did not do anything at home when she was tachy.   At about 1:20 pm, her HR was around low 120s. Her sugar was 74.   At 1:30 pm, she said she felt ok and back to normal. Her HR was 121 bpm; Pulse Ox: 95%; BP: 113/59 mmHg.    Tolerated ketamine infusion well  No complication     Imp: bipolar I depression, mild   ANUP - under control     Plan: ketamine infusion in 2 weeks    Ata Nova MD.

## 2024-11-08 NOTE — PATIENT INSTRUCTIONS
Homegoing Instructions    Patient: Kelli Silva  MRN: [id number]  YOB: 1985    Care Providers: Dr. Ata Roman, RN    Allergies:  Aspirin, Cigarette smoke, Fish containing products, Iodinated contrast media, Ketorolac, Ketorolac tromethamine, Ondansetron, Other, Prochlorperazine, Shellfish containing products, Sumatriptan, Azithromycin, Ceftriaxone, Doxycycline, Dulaglutide, House dust, Liraglutide, Metformin, Metoclopramide, Prednisone, Sitagliptin, [id number] antimigraine agents, and Zolmitriptan    Medications:  Current Outpatient Medications   Medication Sig Dispense Refill    Accu-Chek Guide test strips strip USE TO CHECK BLOOD SUGAR UP TO 4 TIMES PER DAY AS INSTRUCTED. E11.9      albuterol 2.5 mg /3 mL (0.083 %) nebulizer solution Inhale 3 mL (2.5 mg) every 6 hours if needed for wheezing or shortness of breath.      albuterol 90 mcg/actuation inhaler Inhale 2 puffs. 6 TIMES DAILY.      atomoxetine (Strattera) 60 mg capsule Take 1 capsule (60 mg) by mouth once daily. Swallow capsule whole; do not open. If opened accidentally, do not touch eyes; wash hands immediately (product is an eye irritant). 90 capsule 3    Autolet lancing device USE AS INSTRUCTED TO CHECK BLOOD SUGAR UP TO 4 TIMES DAILY. E11.9      buPROPion XL (Wellbutrin XL) 300 mg 24 hr tablet Take 1 tablet (300 mg) by mouth once daily in the morning. Do not crush, chew, or split. 90 tablet 3    busPIRone (Buspar) 30 mg tablet Take 1 tablet (30 mg) by mouth 2 times a day. 180 tablet 3    calcium carbonate-vitamin D3 (Oyster Shell) 250 mg-3.125 mcg (125 unit) tablet Take 1 tablet by mouth once daily.      cariprazine (Vraylar) 6 mg capsule Take 1 capsule (6 mg) by mouth once daily at bedtime. 30 capsule 6    CHLORZOXAZONE ORAL Take 500 mg by mouth 2 times a day as needed.      Corlanor 7.5 mg tablet Take 1 tablet (7.5 mg) by mouth 2 times daily (morning and late afternoon).      desvenlafaxine 100 mg 24 hr tablet TAKE 1  TABLET BY MOUTH EVERY DAY IN THE MORNING 90 tablet 3    dicyclomine (Bentyl) 20 mg tablet Take 1 tablet (20 mg) by mouth 4 times a day before meals.      glucagon (Glucagen) 1 mg injection 1 mg.  Only take if BG< 60      glucagon (Gvoke HypoPen 2-Pack) 1 mg/0.2 mL auto-injector Inject 1 Pen under the skin.      glucose 4 gram chewable tablet Chew 4 tablets (16 g).      insulin aspart (NovoLOG U-100 Insulin aspart) 100 unit/mL injection Inject under the skin 3 times a day before meals. Take as directed per insulin instructions.      ipratropium-albuteroL (Duo-Neb) 0.5-2.5 mg/3 mL nebulizer solution Take 3 mL by nebulization every 4 hours if needed for wheezing or shortness of breath.      ipratropium-albuteroL (Duo-Neb) 0.5-2.5 mg/3 mL nebulizer solution 3 mL every 6 hours.      lactobacillus (Culturelle) 10 billion cell capsule Take 1 capsule by mouth twice a day.      lamoTRIgine (LaMICtal) 200 mg disintegrating tablet Take 1 tablet (200 mg) by mouth 2 times a day. 180 tablet 3    levonorgestrel (Mirena) 21 mcg/24 hours (8 yrs) 52 mg IUD 52 mg by intrauterine route.      lidocaine (Lidoderm) 5 % patch Place 1 patch over 12 hours on the skin once daily. Remove & discard patch within 12 hours. 30 patch 0    lisdexamfetamine (Vyvanse) 70 mg capsule Take 1 capsule (70 mg) by mouth once daily in the morning. 30 capsule 0    LORazepam (Ativan) 1 mg tablet Take 1 tablet (1 mg) by mouth 2 times a day as needed for anxiety or sleep. 60 tablet 2    paliperidone (Invega) 3 mg 24 hr tablet TAKE 1 - 2 TABLETS BY MOUTH DAILY AT BEDTIME 120 tablet 1    pantoprazole (ProtoNix) 40 mg EC tablet Take 1 tablet (40 mg) by mouth 2 times a day. Do not crush, chew, or split.      pregabalin (Lyrica) 150 mg capsule Take 1 capsule (150 mg) by mouth once daily. each day at the same time.      pregabalin (Lyrica) 50 mg capsule Take 1 capsule (50 mg) by mouth 2 times a day.  (am and afternoon) in addition to 150 mg capsule at night       tezepelumab-ekko (Tezspire) SubQ syringe Inject 210 mg under the skin every 28 (twenty-eight) days.      traZODone (Desyrel) 100 mg tablet Take 2 tablets (200 mg) by mouth once daily at bedtime. 180 tablet 3    Trelegy Ellipta 200-62.5-25 mcg blister with device 1 puff once daily.      zavegepant (Zavzpret) 10 mg/actuation spray,non-aerosol Administer 10 mg into affected nostril(s) if needed.      azelastine-fluticasone 137-50 mcg/spray spray,non-aerosol Administer 1 spray into each nostril twice a day.      Reyvow 100 mg tablet Take 1 tablet by mouth every 24 (twenty four) hours if needed. AT ONSET OF MIGRAINE NO MORE THAN 1X (Patient not taking: Reported on 11/8/2024)      zolpidem CR (Ambien CR) 12.5 mg ER tablet Take 1 tablet (12.5 mg) by mouth as needed at bedtime for sleep. 30 tablet 2     Current Facility-Administered Medications   Medication Dose Route Frequency Provider Last Rate Last Admin    ARIPiprazole ER (2 month) (Abilify Asimtufii) IM injection 960 mg  960 mg intramuscular Once Ata Nova MD PhD           Your Provider's Instructions:  Do not consume alcoholic beverages for 24 hours.  Do not make important decisions or sign any important documents for the next 24 hours.  It is recommended that a responsible adult remain with you for the next 24 hours as you may be lightheaded or dizzy.  Do not drive or operate machinery until the day after a treatment session or after a restful sleep.  Resume your normal diet.     When to Call Your Doctor:  Call your doctor for questions and/or concerns.  Call 911 for an emergency situation.    Health Maintenance:  If you currently use tobacco products, it is advised that you quit to improve your health.  If you currently use or have stopped using tobacco products within the last 12 months, the following are resources that can help you:  American Heart Association [phone], (www.americanheart.org)  [place] Tobacco Quit Line 1-133.409.8549  (QUIT-NOW)  http://www.od.[place].gov/odhPrograms/hprr/tob_risk1.aspx    Do you or a loved one need help for alcohol or drug use? Call the [employer] at 211 or visit FindTreatment.gov for resources.    To find out where you can safely dispose unused medicines, visit www.rxdrugdropbox.org, or call your local police department.    Please call us at [phone] with any concerns.    Prior to your next appointment for Spravato/Ketamine, you will be sent the PHQ-9 as well as the ANUP-7 assessments via [employer]. In the past, you have typically completed these on paper during your appointment. We ask that you check your MyChart the day before your appointment and complete these online. This will assist us in streamlining your check-in during your appointment.     Please make certain you have transportation arranged to and from your appointment. This can be a friend, family member, or arranged through your health insurance provider.    Before your next appointment:  Stop or reduce smoking, vaping, and any nicotine use if at all possible.  No alcohol for 24 hours before your appointment.    Day of treatment:  You may have clear liquids (water, tea, coffee, no-pulp juice, plain Jello, hard candy, BUT no milk, cream or non-dairy creamers) up until 30 minutes before your appointment time. You may have solids up to 2 hours before your appointment time.  Please bring headphones to listen to calming music, guided meditation, or anything you'd like. You can also bring a journal to write in during the treatment time.  Set an intention for each appointment. Your intention can be qualities you want to cultivate in yourself, mental health goals you would like to achieve, or experiences you would like to attract into your life.   Bring any inhalers you may need while at your appointment.  Take all morning medications as prescribed, except the followin hours before your appointment, no chlordiazepoxide (Librium), diazepam (Valium),  or Flurazepam (Dalmane)   12 hours before your appointment, no lamotrigine (Lamictal).  Try to avoid taking any anti-anxiety medications if at all possible the day of your appointment. However, if your anxiety is unbearable and would prevent you from attending your appointment, please take your prescribed dose.   No stimulants prior to your treatment; you may take them once you are home.  Use any nasal sprays up to 1 hour before your appointment (for SPRAVATO only).     Cancellations: If you need to cancel your appointment, we must have at least a 24-hour notice. If you cancel with less than 24-hour notice or do not show three times, no additional appointments will be scheduled until the treatment plan is discussed with your outpatient psychiatrist. Also, if you arrive more than 15 minutes late, we may need to cancel your appointment and reschedule. Please call [phone] to cancel appointments. Thank you for your cooperation!

## 2024-11-09 ENCOUNTER — TELEMEDICINE (OUTPATIENT)
Dept: BEHAVIORAL HEALTH | Facility: CLINIC | Age: 39
End: 2024-11-09
Payer: COMMERCIAL

## 2024-11-09 DIAGNOSIS — F31.9 BIPOLAR 1 DISORDER (MULTI): ICD-10-CM

## 2024-11-09 PROCEDURE — 90837 PSYTX W PT 60 MINUTES: CPT | Performed by: PSYCHOLOGIST

## 2024-11-09 NOTE — PROGRESS NOTES
Insight Oriented Therapy. 50 min.  Bipolar 1.  We spoke about her physical illness this week, feeling better, but not there completely this week. We spoke about additional steps she can take to feel better and work on her self-care.    Teletherapy session  Chief complaint - Phase of life and emotional distress   Treatment plan - Insight and action consistent with ACT therapy. Providing support, safe space to process their thoughts and feelings, developing therapeutic relationship.  Goals - Self-care, insight, coping skills  Prognosis - Average  Progress - Average  Functional status - 70/100  Focused mental status: Patient was oriented to person, place, time and context  Focused mental exam - alert and oriented  Frequency - Every two weeks

## 2024-11-10 LAB
ATRIAL RATE: 128 BPM
PR INTERVAL: 112 MS
Q ONSET: 224 MS
QRS COUNT: 21 BEATS
QRS DURATION: 74 MS
QT INTERVAL: 396 MS
QTC CALCULATION(BAZETT): 578 MS
QTC FREDERICIA: 510 MS
R AXIS: 24 DEGREES
T AXIS: 71 DEGREES
T OFFSET: 422 MS
VENTRICULAR RATE: 128 BPM

## 2024-11-12 ENCOUNTER — CLINICAL SUPPORT (OUTPATIENT)
Dept: BEHAVIORAL HEALTH | Facility: CLINIC | Age: 39
End: 2024-11-12
Payer: COMMERCIAL

## 2024-11-12 DIAGNOSIS — F41.1 GAD (GENERALIZED ANXIETY DISORDER): ICD-10-CM

## 2024-11-12 DIAGNOSIS — F31.9 BIPOLAR 1 DISORDER (MULTI): ICD-10-CM

## 2024-11-12 PROCEDURE — 90853 GROUP PSYCHOTHERAPY: CPT | Mod: AH,95 | Performed by: PSYCHOLOGIST

## 2024-11-12 PROCEDURE — 90853 GROUP PSYCHOTHERAPY: CPT | Performed by: PSYCHOLOGIST

## 2024-11-14 ENCOUNTER — APPOINTMENT (OUTPATIENT)
Dept: BEHAVIORAL HEALTH | Facility: CLINIC | Age: 39
End: 2024-11-14
Payer: COMMERCIAL

## 2024-11-14 DIAGNOSIS — F31.9 BIPOLAR 1 DISORDER (MULTI): ICD-10-CM

## 2024-11-14 PROCEDURE — 90837 PSYTX W PT 60 MINUTES: CPT | Performed by: PSYCHOLOGIST

## 2024-11-14 NOTE — PROGRESS NOTES
Insight Oriented Therapy. 50 min.  BP1.  We spoke about her feeling ok post-bone marrow procedure.  We talked about her looking forward to the holidays.  A bit elevated as she can't sleep well, partically restless leg she is getting checked out.  We spoke about small, but impactful steps towards stability.  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     Chief complaint - Phase of life and emotional distress   Treatment plan - Insight and action consistent with ACT therapy. Providing support, safe space to process their thoughts and feelings, developing therapeutic relationship.  Goals - Self-care, insight, coping skills  Prognosis - Average  Progress - Average  Functional status - 70/100  Focused mental status: Patient was oriented to person, place, time and context  Focused mental exam - alert and oriented  Frequency - Every two weeks

## 2024-11-17 NOTE — GROUP NOTE
Group Topic: Feeling Awareness/Expression   Group Date: 11/12/2024  Start Time:  6:00 PM  End Time:  7:30 PM  Facilitators: Garima Wong PsyD   Department: [facility]    Number of Participants: 13   Group Focus: other self-compassion  Treatment Modality: Psychoeducation  Interventions utilized were exploration and patient education  Purpose: insight or knowledge and self-worth    This was a video IOP aftercare group on a HIPAA compliant   IOP Aftercare Group: Self-compassion  Participants engaged in compassion meditation and discussed reactions. They received education on the components of self-compassion and explored how they engaged in self-kindness. They supported a peer with a recent challenge and set intention for where they will find hope during the week.   Garima Wong Psy.D.      Name: Kelli Silva YOB: 1985   MR: [id number]      Kelli was present and actively engaged in discussion. She stated that she draws hope from the support of her loved ones. She noted feeling a sense of calm during the meditation.

## 2024-11-19 ENCOUNTER — CLINICAL SUPPORT (OUTPATIENT)
Dept: BEHAVIORAL HEALTH | Facility: CLINIC | Age: 39
End: 2024-11-19
Payer: COMMERCIAL

## 2024-11-19 DIAGNOSIS — F31.9 BIPOLAR 1 DISORDER (MULTI): ICD-10-CM

## 2024-11-19 DIAGNOSIS — F41.1 GAD (GENERALIZED ANXIETY DISORDER): ICD-10-CM

## 2024-11-19 PROCEDURE — 90853 GROUP PSYCHOTHERAPY: CPT | Mod: AH,95 | Performed by: PSYCHOLOGIST

## 2024-11-19 PROCEDURE — 90853 GROUP PSYCHOTHERAPY: CPT | Performed by: PSYCHOLOGIST

## 2024-11-21 ENCOUNTER — APPOINTMENT (OUTPATIENT)
Dept: BEHAVIORAL HEALTH | Facility: CLINIC | Age: 39
End: 2024-11-21
Payer: COMMERCIAL

## 2024-11-21 DIAGNOSIS — F31.9 BIPOLAR 1 DISORDER (MULTI): ICD-10-CM

## 2024-11-21 PROCEDURE — 90837 PSYTX W PT 60 MINUTES: CPT | Performed by: PSYCHOLOGIST

## 2024-11-21 NOTE — PROGRESS NOTES
Insight Oriented Therapy. 50 min.  BP1.  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  We spoke about her physical health.      Chief complaint - Phase of life and emotional distress   Treatment plan - Insight and action consistent with ACT therapy. Providing support, safe space to process their thoughts and feelings, developing therapeutic relationship.  Goals - Self-care, insight, coping skills  Prognosis - Average  Progress - Average  Functional status - 70/100  Focused mental status: Patient was oriented to person, place, time and context  Focused mental exam - alert and oriented  Frequency - Every two weeks

## 2024-11-21 NOTE — GROUP NOTE
Group Topic: Feeling Awareness/Expression   Group Date: 11/19/2024  Start Time:  6:00 PM  End Time:  7:30 PM  Facilitators: Garima Wong PsyD   Department: [facility]    Number of Participants: 12   Group Focus: check in  Treatment Modality: Patient-Centered Therapy  Interventions utilized were exploration and support  Purpose: feelings    This was a video IOP aftercare group on a HIPAA compliant platform. All patients were provided with informed consent.    IOP Aftercare Group: Check-in  Participants had opportunity to check-in with their peers and discuss recent stressors. They shared coping with their peers and provided support to one another with recent challenges.   Garima Wong Psy.D.      Name: Kelli Silva YOB: 1985   MR: [id number]      Kelli was present and actively engaged in discussion. She stated that she asked for help when feeling unwell this week. She noted that self-compassion looked like allowing things to not be perfect. She stated that she used a thought record to help with feelings of guilt associated with her recent hospitalization for diabetes.

## 2024-11-22 ENCOUNTER — HOSPITAL ENCOUNTER (OUTPATIENT)
Dept: POSTOP/PACU | Facility: HOSPITAL | Age: 39
Setting detail: OUTPATIENT SURGERY
Discharge: HOME | End: 2024-11-22
Payer: COMMERCIAL

## 2024-11-22 VITALS
BODY MASS INDEX: 41.37 KG/M2 | RESPIRATION RATE: 16 BRPM | TEMPERATURE: 97.7 F | DIASTOLIC BLOOD PRESSURE: 99 MMHG | SYSTOLIC BLOOD PRESSURE: 121 MMHG | HEART RATE: 99 BPM | OXYGEN SATURATION: 96 % | WEIGHT: 233.47 LBS

## 2024-11-22 DIAGNOSIS — F90.0 ATTENTION DEFICIT HYPERACTIVITY DISORDER (ADHD), PREDOMINANTLY INATTENTIVE TYPE: ICD-10-CM

## 2024-11-22 DIAGNOSIS — F31.9 BIPOLAR I DISORDER WITH DEPRESSION (MULTI): Primary | ICD-10-CM

## 2024-11-22 PROCEDURE — KETSP PR KETAMINE INFUSION SELF-PAY: Performed by: PSYCHIATRY & NEUROLOGY

## 2024-11-22 PROCEDURE — KETSP HC KETAMINE INFUSION SELF-PAY: Performed by: PSYCHIATRY & NEUROLOGY

## 2024-11-22 PROCEDURE — 2500000004 HC RX 250 GENERAL PHARMACY W/ HCPCS (ALT 636 FOR OP/ED): Performed by: PSYCHIATRY & NEUROLOGY

## 2024-11-22 RX ORDER — KETAMINE HCL IN 0.9 % NACL 200 MG/200
0.75 PLASTIC BAG, INJECTION (ML) INTRAVENOUS ONCE
Start: 2024-11-22 | End: 2024-11-22

## 2024-11-22 RX ORDER — KETAMINE HCL IN 0.9 % NACL 200 MG/200
0.75 PLASTIC BAG, INJECTION (ML) INTRAVENOUS ONCE
Status: COMPLETED | OUTPATIENT
Start: 2024-11-22 | End: 2024-11-22

## 2024-11-22 RX ORDER — LISDEXAMFETAMINE DIMESYLATE 70 MG/1
70 CAPSULE ORAL EVERY MORNING
Qty: 30 CAPSULE | Refills: 0 | Status: SHIPPED | OUTPATIENT
Start: 2024-11-22 | End: 2024-12-22

## 2024-11-22 RX ORDER — ONDANSETRON HYDROCHLORIDE 2 MG/ML
8 INJECTION, SOLUTION INTRAVENOUS ONCE
Start: 2024-11-22 | End: 2024-11-22

## 2024-11-22 RX ORDER — ONDANSETRON HYDROCHLORIDE 2 MG/ML
8 INJECTION, SOLUTION INTRAVENOUS ONCE
Status: COMPLETED | OUTPATIENT
Start: 2024-11-22 | End: 2024-11-22

## 2024-11-22 RX ORDER — LISDEXAMFETAMINE DIMESYLATE 70 MG/1
70 CAPSULE ORAL EVERY MORNING
Qty: 30 CAPSULE | Refills: 0 | Status: SHIPPED | OUTPATIENT
Start: 2024-11-22 | End: 2024-11-22 | Stop reason: SDUPTHER

## 2024-11-22 ASSESSMENT — PATIENT HEALTH QUESTIONNAIRE - PHQ9
8. MOVING OR SPEAKING SO SLOWLY THAT OTHER PEOPLE COULD HAVE NOTICED. OR THE OPPOSITE, BEING SO FIGETY OR RESTLESS THAT YOU HAVE BEEN MOVING AROUND A LOT MORE THAN USUAL: NOT AT ALL
3. TROUBLE FALLING OR STAYING ASLEEP OR SLEEPING TOO MUCH: NEARLY EVERY DAY
6. FEELING BAD ABOUT YOURSELF - OR THAT YOU ARE A FAILURE OR HAVE LET YOURSELF OR YOUR FAMILY DOWN: SEVERAL DAYS
5. POOR APPETITE OR OVEREATING: NEARLY EVERY DAY
7. TROUBLE CONCENTRATING ON THINGS, SUCH AS READING THE NEWSPAPER OR WATCHING TELEVISION: SEVERAL DAYS
SUM OF ALL RESPONSES TO PHQ QUESTIONS 1-9: 11
10. IF YOU CHECKED OFF ANY PROBLEMS, HOW DIFFICULT HAVE THESE PROBLEMS MADE IT FOR YOU TO DO YOUR WORK, TAKE CARE OF THINGS AT HOME, OR GET ALONG WITH OTHER PEOPLE: SOMEWHAT DIFFICULT
4. FEELING TIRED OR HAVING LITTLE ENERGY: SEVERAL DAYS
9. THOUGHTS THAT YOU WOULD BE BETTER OFF DEAD, OR OF HURTING YOURSELF: NOT AT ALL
2. FEELING DOWN, DEPRESSED OR HOPELESS: SEVERAL DAYS
SUM OF ALL RESPONSES TO PHQ9 QUESTIONS 1 AND 2: 2
1. LITTLE INTEREST OR PLEASURE IN DOING THINGS: SEVERAL DAYS

## 2024-11-22 ASSESSMENT — ANXIETY QUESTIONNAIRES
1. FEELING NERVOUS, ANXIOUS, OR ON EDGE: SEVERAL DAYS
5. BEING SO RESTLESS THAT IT IS HARD TO SIT STILL: SEVERAL DAYS
GAD7 TOTAL SCORE: 7
3. WORRYING TOO MUCH ABOUT DIFFERENT THINGS: SEVERAL DAYS
7. FEELING AFRAID AS IF SOMETHING AWFUL MIGHT HAPPEN: SEVERAL DAYS
IF YOU CHECKED OFF ANY PROBLEMS ON THIS QUESTIONNAIRE, HOW DIFFICULT HAVE THESE PROBLEMS MADE IT FOR YOU TO DO YOUR WORK, TAKE CARE OF THINGS AT HOME, OR GET ALONG WITH OTHER PEOPLE: SOMEWHAT DIFFICULT
6. BECOMING EASILY ANNOYED OR IRRITABLE: SEVERAL DAYS
2. NOT BEING ABLE TO STOP OR CONTROL WORRYING: SEVERAL DAYS
4. TROUBLE RELAXING: SEVERAL DAYS

## 2024-11-22 NOTE — PATIENT INSTRUCTIONS
Homegoing Instructions    Patient: Kelli Silva  MRN: [id number]  YOB: 1985    Care Providers: Dr. Ata Roman, RN    Allergies:  Aspirin, Cigarette smoke, Fish containing products, Iodinated contrast media, Ketorolac, Ketorolac tromethamine, Ondansetron, Other, Prochlorperazine, Shellfish containing products, Sumatriptan, Azithromycin, Ceftriaxone, Doxycycline, Dulaglutide, House dust, Liraglutide, Metformin, Metoclopramide, Prednisone, Sitagliptin, [id number] antimigraine agents, and Zolmitriptan    Medications:  Current Outpatient Medications   Medication Sig Dispense Refill    Accu-Chek Guide test strips strip USE TO CHECK BLOOD SUGAR UP TO 4 TIMES PER DAY AS INSTRUCTED. E11.9      albuterol 2.5 mg /3 mL (0.083 %) nebulizer solution Inhale 3 mL (2.5 mg) every 6 hours if needed for wheezing or shortness of breath.      albuterol 90 mcg/actuation inhaler Inhale 2 puffs. 6 TIMES DAILY.      atomoxetine (Strattera) 60 mg capsule Take 1 capsule (60 mg) by mouth once daily. Swallow capsule whole; do not open. If opened accidentally, do not touch eyes; wash hands immediately (product is an eye irritant). 90 capsule 3    Autolet lancing device USE AS INSTRUCTED TO CHECK BLOOD SUGAR UP TO 4 TIMES DAILY. E11.9      buPROPion XL (Wellbutrin XL) 300 mg 24 hr tablet Take 1 tablet (300 mg) by mouth once daily in the morning. Do not crush, chew, or split. 90 tablet 3    busPIRone (Buspar) 30 mg tablet Take 1 tablet (30 mg) by mouth 2 times a day. 180 tablet 3    calcium carbonate-vitamin D3 (Oyster Shell) 250 mg-3.125 mcg (125 unit) tablet Take 1 tablet by mouth once daily.      cariprazine (Vraylar) 6 mg capsule Take 1 capsule (6 mg) by mouth once daily at bedtime. 30 capsule 6    Corlanor 7.5 mg tablet Take 1 tablet (7.5 mg) by mouth 2 times daily (morning and late afternoon).      desvenlafaxine 100 mg 24 hr tablet TAKE 1 TABLET BY MOUTH EVERY DAY IN THE MORNING 90 tablet 3    dicyclomine  (Bentyl) 20 mg tablet Take 1 tablet (20 mg) by mouth 4 times a day before meals.      glucagon (Glucagen) 1 mg injection 1 mg.  Only take if BG< 60      glucagon (Gvoke HypoPen 2-Pack) 1 mg/0.2 mL auto-injector Inject 1 Pen under the skin.      glucose 4 gram chewable tablet Chew 4 tablets (16 g).      insulin aspart (NovoLOG U-100 Insulin aspart) 100 unit/mL injection Inject under the skin 3 times a day before meals. Take as directed per insulin instructions.      ipratropium-albuteroL (Duo-Neb) 0.5-2.5 mg/3 mL nebulizer solution Take 3 mL by nebulization every 4 hours if needed for wheezing or shortness of breath.      ipratropium-albuteroL (Duo-Neb) 0.5-2.5 mg/3 mL nebulizer solution 3 mL every 6 hours.      lactobacillus (Culturelle) 10 billion cell capsule Take 1 capsule by mouth twice a day.      lamoTRIgine (LaMICtal) 200 mg disintegrating tablet Take 1 tablet (200 mg) by mouth 2 times a day. 180 tablet 3    levonorgestrel (Mirena) 21 mcg/24 hours (8 yrs) 52 mg IUD 52 mg by intrauterine route.      lidocaine (Lidoderm) 5 % patch Place 1 patch over 12 hours on the skin once daily. Remove & discard patch within 12 hours. 30 patch 0    LORazepam (Ativan) 1 mg tablet Take 1 tablet (1 mg) by mouth 2 times a day as needed for anxiety or sleep. 60 tablet 2    pantoprazole (ProtoNix) 40 mg EC tablet Take 1 tablet (40 mg) by mouth 2 times a day. Do not crush, chew, or split.      pregabalin (Lyrica) 150 mg capsule Take 1 capsule (150 mg) by mouth once daily. each day at the same time.      pregabalin (Lyrica) 50 mg capsule Take 1 capsule (50 mg) by mouth 2 times a day.  (am and afternoon) in addition to 150 mg capsule at night      Reyvow 100 mg tablet Take 1 tablet by mouth every 24 (twenty four) hours if needed. AT ONSET OF MIGRAINE NO MORE THAN 1X      tezepelumab-ekko (Tezspire) SubQ syringe Inject 210 mg under the skin every 28 (twenty-eight) days.      traZODone (Desyrel) 100 mg tablet Take 2 tablets (200 mg) by  mouth once daily at bedtime. 180 tablet 3    Trelegy Ellipta 200-62.5-25 mcg blister with device 1 puff once daily.      azelastine-fluticasone 137-50 mcg/spray spray,non-aerosol Administer 1 spray into each nostril twice a day.      CHLORZOXAZONE ORAL Take 500 mg by mouth 2 times a day as needed. (Patient not taking: Reported on 11/22/2024)      lisdexamfetamine (Vyvanse) 70 mg capsule Take 1 capsule (70 mg) by mouth once daily in the morning. 30 capsule 0    paliperidone (Invega) 3 mg 24 hr tablet TAKE 1 - 2 TABLETS BY MOUTH DAILY AT BEDTIME (Patient not taking: Reported on 11/22/2024) 120 tablet 1    zavegepant (Zavzpret) 10 mg/actuation spray,non-aerosol Administer 10 mg into affected nostril(s) if needed. (Patient not taking: Reported on 11/22/2024)      zolpidem CR (Ambien CR) 12.5 mg ER tablet Take 1 tablet (12.5 mg) by mouth as needed at bedtime for sleep. 30 tablet 2     Current Facility-Administered Medications   Medication Dose Route Frequency Provider Last Rate Last Admin    ARIPiprazole ER (2 month) (Abilify Asimtufii) IM injection 960 mg  960 mg intramuscular Once Ata Nova MD PhD           Your Provider's Instructions:  Do not consume alcoholic beverages for 24 hours.  Do not make important decisions or sign any important documents for the next 24 hours.  It is recommended that a responsible adult remain with you for the next 24 hours as you may be lightheaded or dizzy.  Do not drive or operate machinery until the day after a treatment session or after a restful sleep.  Resume your normal diet.     When to Call Your Doctor:  Call your doctor for questions and/or concerns.  Call 911 for an emergency situation.    Health Maintenance:  If you currently use tobacco products, it is advised that you quit to improve your health.  If you currently use or have stopped using tobacco products within the last 12 months, the following are resources that can help you:  American Heart Association [phone],  (www.americanheart.org)  [place] Tobacco Quit Line 0-025-591-4075 (QUIT-NOW)  http://www.od.[place].gov/odhPrograms/hprr/tob_risk1.aspx    Do you or a loved one need help for alcohol or drug use? Call the [employer] at 211 or visit FindTreatment.gov for resources.    To find out where you can safely dispose unused medicines, visit www.rxdrugdropbox.org, or call your local police department.    Please call us at [phone] with any concerns.    Prior to your next appointment for Spravato/Ketamine, you will be sent the PHQ-9 as well as the ANUP-7 assessments via [employer]. In the past, you have typically completed these on paper during your appointment. We ask that you check your MyChart the day before your appointment and complete these online. This will assist us in streamlining your check-in during your appointment.     Please make certain you have transportation arranged to and from your appointment. This can be a friend, family member, or arranged through your health insurance provider.    Before your next appointment:  Stop or reduce smoking, vaping, and any nicotine use if at all possible.  No alcohol for 24 hours before your appointment.    Day of treatment:  You may have clear liquids (water, tea, coffee, no-pulp juice, plain Jello, hard candy, BUT no milk, cream or non-dairy creamers) up until 30 minutes before your appointment time. You may have solids up to 2 hours before your appointment time.  Please bring headphones to listen to calming music, guided meditation, or anything you'd like. You can also bring a journal to write in during the treatment time.  Set an intention for each appointment. Your intention can be qualities you want to cultivate in yourself, mental health goals you would like to achieve, or experiences you would like to attract into your life.   Bring any inhalers you may need while at your appointment.  Take all morning medications as prescribed, except the followin hours before your  appointment, no chlordiazepoxide (Librium), diazepam (Valium), or Flurazepam (Dalmane)   12 hours before your appointment, no lamotrigine (Lamictal).  Try to avoid taking any anti-anxiety medications if at all possible the day of your appointment. However, if your anxiety is unbearable and would prevent you from attending your appointment, please take your prescribed dose.   No stimulants prior to your treatment; you may take them once you are home.  Use any nasal sprays up to 1 hour before your appointment (for SPRAVATO only).     Cancellations: If you need to cancel your appointment, we must have at least a 24-hour notice. If you cancel with less than 24-hour notice or do not show three times, no additional appointments will be scheduled until the treatment plan is discussed with your outpatient psychiatrist. Also, if you arrive more than 15 minutes late, we may need to cancel your appointment and reschedule. Please call [phone] to cancel appointments. Thank you for your cooperation!

## 2024-11-22 NOTE — PROGRESS NOTES
Ketamine/Esketamine Procedure    Name: eKlli  : 1985  MRN: [id number]    Date: 24    Time out was taken with staff to confirm correct patient and correct procedure to be performed.     Ketamine infusion at .75 mg/kg in 40 minutes  Subjective: She said she felt good after last infusion. No persistent depression and enjoyed life. No problem of falling asleep and staying asleep, but did not feel rested in the morning. Energy was low during daytime. No change in appetite or weight. Concentration was ok. Denied having SI/HI/AVH or paranoia.    Able to work 5 days a week   No change in medical hx  No physical complaint  PHQ9=8  GAD7=7    Objective: She was awake, alert, cooperative, and pleasant. Normal gait. No abnormal movement. Fairly groomed. Good eye contact. Oriented x 3. Speech was normal. Affect was appropriate.  Mood was good. Denied having SI/HI/AVH or paranoia during the interview. No delusional thought or paranoia. I/J were good. Memory was fair. Attention and concentration were good.     Vital signs before infusion: HR: 99 bpm; pulse Ox: 99%; BP: 138/76mmHg.   Ketamine infusion observation:  About 20 minutes after starting infusion, she said she felt floaty on bed. No other side effect. Vital signs were stable. HR: 92 bpm; pulse Ox: 97%; BP: 140/96 mmHg.   Close to the end of infusion, she felt floaty on bed and drowsy.  No other side effect. Vital signs were stable. HR: 93 bpm; pulse Ox: 96%; BP: 145/99 mmHg.     Tolerated ketamine infusion well  No complication     Imp: bipolar I depression, mild   ANUP - mild   ADHD - manageable     Plan: ketamine infusion in 3 weeks  Continue other medications for now.    Ata Nova MD.

## 2024-11-22 NOTE — H&P
Psychiatry Procedure H&P for each visit    Name: Kelli  : 1985  MRN: [id number]    Date: 24     History & Physical Reviewed:  Pregnancy/Lactating:  Are You Pregnant: No  Are You Currently Breastfeeding: No    I have reviewed the History and Physical dated:   History and Physical Reviewed and relevant findings noted. Patient examined to review pertinent physical findings: Yes  Home Medications Reviewed: Yes  Allergies Reviewed: Yes    ERAS (Enhanced Recovery After Surgery):  ERAS Patient: No    Consent:  COVID-19 Consent:   COVID-19 Risk Consent: Yes: Surgeon has reviewed key risks related to the risk of meli COVID-19 and if they contract COVID-19 what the risks are.

## 2024-11-24 ENCOUNTER — OFFICE VISIT (OUTPATIENT)
Dept: URGENT CARE | Age: 39
End: 2024-11-24
Payer: COMMERCIAL

## 2024-11-24 ENCOUNTER — ANCILLARY PROCEDURE (OUTPATIENT)
Dept: URGENT CARE | Age: 39
End: 2024-11-24
Payer: COMMERCIAL

## 2024-11-24 VITALS
HEART RATE: 88 BPM | TEMPERATURE: 99 F | BODY MASS INDEX: 43.24 KG/M2 | RESPIRATION RATE: 20 BRPM | OXYGEN SATURATION: 96 % | DIASTOLIC BLOOD PRESSURE: 84 MMHG | HEIGHT: 62 IN | WEIGHT: 235 LBS | SYSTOLIC BLOOD PRESSURE: 134 MMHG

## 2024-11-24 DIAGNOSIS — S99.911A INJURY OF RIGHT ANKLE, INITIAL ENCOUNTER: Primary | ICD-10-CM

## 2024-11-24 DIAGNOSIS — S99.911A INJURY OF RIGHT ANKLE, INITIAL ENCOUNTER: ICD-10-CM

## 2024-11-24 PROCEDURE — 73610 X-RAY EXAM OF ANKLE: CPT | Mod: RIGHT SIDE | Performed by: PHYSICIAN ASSISTANT

## 2024-11-24 NOTE — PROGRESS NOTES
Subjective   Patient ID: Kelli Silva is a 39 y.o. female. They present today with a chief complaint of Ankle Pain (Right ankle pain that is going up leg. Stepped over curb wrong last night.).    History of Present Illness  39-year-old patient presents to clinic with complaints of right circumferential ankle pain since last night after patient stepped wrong over a curb which caused an inverted injury of the right ankle.  Reports the pain shoots up the leg anteriorly and rates the pain at 8 out of 10.  Reports the pain is sharp.  Reports heard an audible crack during injury.  Reports had a similar injury on the other side and waited too long to get an x-ray and then needed surgery for a fracture that did not heal well.  Reports has tried NSAIDs over-the-counter without relief.  Reports pain is worse upon dorsiflexion of the foot. Denies instability, crepitus, pocking/clicking, numbness, tingling, buckling.        Past Medical History  Allergies as of 11/24/2024 - Reviewed 11/24/2024   Allergen Reaction Noted    Aspirin Shortness of breath 10/09/2023    Cigarette smoke Shortness of breath 01/16/2007    Fish containing products Hives, Rash, and Shortness of breath 12/31/2013    Iodinated contrast media Shortness of breath 10/07/2023    Ketorolac Shortness of breath 10/07/2023    Ketorolac tromethamine Shortness of breath and Other 04/25/2008    Ondansetron Other 10/11/2022    Other Shortness of breath 01/16/2007    Prochlorperazine Shortness of breath and Other 08/09/2005    Shellfish containing products Hives, Rash, and Shortness of breath 12/31/2013    Sumatriptan Runny nose 04/27/2017    Azithromycin Nausea/vomiting 08/10/2021    Ceftriaxone Other 10/09/2023    Doxycycline Diarrhea 04/27/2017    Dulaglutide Other 08/30/2022    House dust Other 04/27/2017    Liraglutide Nausea Only 10/11/2022    Metformin Other 09/23/2019    Metoclopramide Itching 05/08/2013    Prednisone Other 07/17/2018    Sitagliptin Other  "08/30/2022    [id number] antimigraine agents Other 10/09/2023    Zolmitriptan Other 04/27/2017       (Not in a hospital admission)       Past Medical History:   Diagnosis Date    Normal routine history and physical examination     Normal routine history and physical examination     Other specified health status     No pertinent past medical history       Past Surgical History:   Procedure Laterality Date    OTHER SURGICAL HISTORY  05/08/2020    Venous access port placement        reports that she has never smoked. She has never used smokeless tobacco. She reports that she does not drink alcohol and does not use drugs.    Review of Systems  Review of Systems  ROS negative with the exception as noted on HPI                               Objective    Vitals:    11/24/24 0923   BP: 134/84   BP Location: Right arm   Patient Position: Sitting   BP Cuff Size: Adult   Pulse: 88   Resp: 20   Temp: 37.2 °C (99 °F)   TempSrc: Oral   SpO2: 96%   Weight: 107 kg (235 lb)   Height: 1.575 m (5' 2\")     No LMP recorded. (Menstrual status: IUD).    Physical Exam  Constitutional:       Appearance: Normal appearance.   HENT:      Head: Normocephalic and atraumatic.   Cardiovascular:      Pulses: Normal pulses.      Heart sounds: Normal heart sounds.   Pulmonary:      Effort: Pulmonary effort is normal.      Breath sounds: Normal breath sounds.   Musculoskeletal:      Right ankle: No swelling or ecchymosis. Tenderness present over the medial malleolus. No lateral malleolus tenderness. Normal range of motion. Normal pulse.      Right Achilles Tendon: No tenderness or defects.      Left ankle: No swelling or ecchymosis. No tenderness. No lateral malleolus or medial malleolus tenderness. Normal range of motion. Normal pulse.      Left Achilles Tendon: No tenderness or defects.      Right foot: Normal.      Left foot: Normal.      Comments: Tender to palpation of anterior ankle on the right  Strength intact to B/L lower " extremities  Sensations intact to light touch to B/L lower extremities  Pain noted upon inversion and dorsiflexion of the right ankle   Neurological:      Mental Status: She is alert.         Procedures    Point of Care Test & Imaging Results from this visit  No results found for this visit on 11/24/24.   XR ankle right 3+ views    Result Date: 11/24/2024  Interpreted By:  Jose R Mcgrath, STUDY: Right ankle dated  11/24/2024.   INDICATION: Signs/Symptoms:stepped off a curb last night and noted a crack. tender at medial malleolus   COMPARISON: None.   ACCESSION NUMBER(S): [id number]   ORDERING CLINICIAN: ISA PASCAL   TECHNIQUE: Three views radiographs of the right ankle.   FINDINGS: No fracture or dislocation is evident.  There is calcaneal enthesophyte formation.   No ankle joint effusion is evident. No soft tissue gas or radiopaque foreign body is evident.       Calcaneal enthesophyte formation without osseous injury evident.   MACRO: None   Signed by: Jose R Mcgrath 11/24/2024 10:24 AM Dictation workstation:   [id number]     Diagnostic study results (if any) were reviewed by Isa Pascal PA-C.    Assessment/Plan   Allergies, medications, history, and pertinent labs/EKGs/Imaging reviewed by Isa Pascal PA-C.   right circumferential ankle pain since last night after patient stepped wrong over a curb which caused an inverted injury of the right ankle. Patient was instructed to restrict activity, with goal to reduce swelling and pain. Injury to be treated at home with pain medication and PRICE for the first 24-48 hours after injury (Protect from further injury, Rest, Ice for 15-20 min every 60-90 min, Compression with elastic bandage, Elevation to prevent swelling.) After swelling and pain are reduced, patient may begin rehabilitation exercises to prevent stiffness, improve ROM, and restore tissue/joint flexibility and strength. Patient was encouraged to follow up if pain persists or if  it is difficult to put weight on the injury after 2 weeks. Risk, benefits, and potential side effects of medication(s) discussed with pt. Discussed disease/illness presentation, treatment options, progression, complications, and outcomes with patient. Pt. Has expressed understanding and is an agreement of plan of care.     Medical Decision Making    Orders and Diagnoses  Diagnoses and all orders for this visit:  Injury of right ankle, initial encounter  -     XR ankle right 3+ views; Future      Medical Admin Record      Patient disposition: Home    Electronically signed by Isa Alva PA-C  11:04 AM

## 2024-11-24 NOTE — PATIENT INSTRUCTIONS
Patient was instructed to restrict activity, with goal to reduce swelling and pain. Mild injury to be treated at home with anti-inflammatory and/or pain medication and PRICE for the first 24-48 hours after injury (Protect from further injury, Rest, Ice for 15-20 min every 60-90 min, Compression with elastic bandage, Elevation to prevent swelling.) After swelling and pain are reduced, patient may begin rehabilitation exercises to prevent stiffness, improve ROM, and restore tissue/joint flexibility and strength. Patient was encouraged to follow up if pain persists or if it is difficult to put weight on the injury after 2 weeks.

## 2024-11-28 ENCOUNTER — TELEMEDICINE (OUTPATIENT)
Dept: BEHAVIORAL HEALTH | Facility: CLINIC | Age: 39
End: 2024-11-28
Payer: COMMERCIAL

## 2024-11-28 DIAGNOSIS — F31.9 BIPOLAR 1 DISORDER (MULTI): ICD-10-CM

## 2024-11-28 PROCEDURE — 90837 PSYTX W PT 60 MINUTES: CPT | Performed by: PSYCHOLOGIST

## 2024-11-28 NOTE — PROGRESS NOTES
Insight Oriented Therapy. 50 min.  50 min.  BP1.  Pt was seen Wednesday at 7am to touch base over holiday MP time.  We spoke about a good week she had and how she is going to use that to propel her towards an even better weekend holiday with family.     Chief complaint - Phase of life and emotional distress   Treatment plan - Insight and action consistent with ACT therapy. Providing support, safe space to process their thoughts and feelings, developing therapeutic relationship.  Goals - Self-care, insight, coping skills  Prognosis - Average  Progress - Average  Functional status - 70/100  Focused mental status: Patient was oriented to person, place, time and context  Focused mental exam - alert and oriented  Frequency - Every week

## 2024-11-30 ENCOUNTER — APPOINTMENT (OUTPATIENT)
Dept: BEHAVIORAL HEALTH | Facility: CLINIC | Age: 39
End: 2024-11-30
Payer: COMMERCIAL

## 2024-12-03 ENCOUNTER — CLINICAL SUPPORT (OUTPATIENT)
Dept: BEHAVIORAL HEALTH | Facility: CLINIC | Age: 39
End: 2024-12-03
Payer: COMMERCIAL

## 2024-12-03 DIAGNOSIS — F31.9 BIPOLAR 1 DISORDER (MULTI): ICD-10-CM

## 2024-12-03 DIAGNOSIS — F41.1 GAD (GENERALIZED ANXIETY DISORDER): ICD-10-CM

## 2024-12-03 PROCEDURE — 90853 GROUP PSYCHOTHERAPY: CPT | Mod: AH,95 | Performed by: PSYCHOLOGIST

## 2024-12-03 PROCEDURE — 90853 GROUP PSYCHOTHERAPY: CPT | Performed by: PSYCHOLOGIST

## 2024-12-05 ENCOUNTER — APPOINTMENT (OUTPATIENT)
Dept: BEHAVIORAL HEALTH | Facility: CLINIC | Age: 39
End: 2024-12-05
Payer: COMMERCIAL

## 2024-12-05 DIAGNOSIS — F31.9 BIPOLAR 1 DISORDER (MULTI): ICD-10-CM

## 2024-12-05 DIAGNOSIS — F41.9 ANXIETY: ICD-10-CM

## 2024-12-05 PROCEDURE — 90837 PSYTX W PT 60 MINUTES: CPT | Performed by: PSYCHOLOGIST

## 2024-12-05 NOTE — PROGRESS NOTES
Insight Oriented Therapy. 50 min.  Anxiety and BP1.  We spoke about how she is feeling better, work going well, trying to manage downtime appropriately.  Talked about staying with mom for ene, making the most of a snow day and work.     Chief complaint - Phase of life and emotional distress   Treatment plan - Insight and action consistent with ACT therapy. Providing support, safe space to process their thoughts and feelings, developing therapeutic relationship.  Goals - Self-care, insight, coping skills  Prognosis - Average  Progress - Average  Functional status - 70/100  Focused mental status: Patient was oriented to person, place, time and context  Focused mental exam - alert and oriented  Frequency - Every two weeks

## 2024-12-09 ENCOUNTER — HOSPITAL ENCOUNTER (OUTPATIENT)
Facility: HOSPITAL | Age: 39
Setting detail: OBSERVATION
LOS: 1 days | Discharge: HOME | End: 2024-12-11
Attending: EMERGENCY MEDICINE | Admitting: INTERNAL MEDICINE
Payer: COMMERCIAL

## 2024-12-09 ENCOUNTER — APPOINTMENT (OUTPATIENT)
Dept: RADIOLOGY | Facility: HOSPITAL | Age: 39
End: 2024-12-09
Payer: COMMERCIAL

## 2024-12-09 ENCOUNTER — APPOINTMENT (OUTPATIENT)
Dept: CARDIOLOGY | Facility: HOSPITAL | Age: 39
End: 2024-12-09
Payer: COMMERCIAL

## 2024-12-09 DIAGNOSIS — E16.2 HYPOGLYCEMIA: Primary | ICD-10-CM

## 2024-12-09 LAB
ALBUMIN SERPL BCP-MCNC: 4.1 G/DL (ref 3.4–5)
ALP SERPL-CCNC: 90 U/L (ref 33–110)
ALT SERPL W P-5'-P-CCNC: 31 U/L (ref 7–45)
ANION GAP BLDV CALCULATED.4IONS-SCNC: 13 MMOL/L (ref 10–25)
ANION GAP SERPL CALC-SCNC: 11 MMOL/L (ref 10–20)
AST SERPL W P-5'-P-CCNC: 19 U/L (ref 9–39)
BASE EXCESS BLDV CALC-SCNC: -2.2 MMOL/L (ref -2–3)
BASOPHILS # BLD AUTO: 0.02 X10*3/UL (ref 0–0.1)
BASOPHILS NFR BLD AUTO: 0.2 %
BILIRUB SERPL-MCNC: 0.2 MG/DL (ref 0–1.2)
BNP SERPL-MCNC: 18 PG/ML (ref 0–99)
BODY TEMPERATURE: 37 DEGREES CELSIUS
BUN SERPL-MCNC: 5 MG/DL (ref 6–23)
CA-I BLDV-SCNC: 1.18 MMOL/L (ref 1.1–1.33)
CALCIUM SERPL-MCNC: 8.9 MG/DL (ref 8.6–10.3)
CARDIAC TROPONIN I PNL SERPL HS: <3 NG/L (ref 0–13)
CHLORIDE BLDV-SCNC: 104 MMOL/L (ref 98–107)
CHLORIDE SERPL-SCNC: 106 MMOL/L (ref 98–107)
CO2 SERPL-SCNC: 23 MMOL/L (ref 21–32)
CREAT SERPL-MCNC: 0.9 MG/DL (ref 0.5–1.05)
D DIMER PPP FEU-MCNC: 339 NG/ML FEU
EGFRCR SERPLBLD CKD-EPI 2021: 83 ML/MIN/1.73M*2
EOSINOPHIL # BLD AUTO: 0.01 X10*3/UL (ref 0–0.7)
EOSINOPHIL NFR BLD AUTO: 0.1 %
ERYTHROCYTE [DISTWIDTH] IN BLOOD BY AUTOMATED COUNT: 12.6 % (ref 11.5–14.5)
FLUAV RNA RESP QL NAA+PROBE: NOT DETECTED
FLUBV RNA RESP QL NAA+PROBE: NOT DETECTED
GLUCOSE BLD MANUAL STRIP-MCNC: 194 MG/DL (ref 74–99)
GLUCOSE BLD MANUAL STRIP-MCNC: 54 MG/DL (ref 74–99)
GLUCOSE BLD MANUAL STRIP-MCNC: 65 MG/DL (ref 74–99)
GLUCOSE BLD MANUAL STRIP-MCNC: 76 MG/DL (ref 74–99)
GLUCOSE BLDV-MCNC: 61 MG/DL (ref 74–99)
GLUCOSE SERPL-MCNC: 64 MG/DL (ref 74–99)
HCO3 BLDV-SCNC: 23.7 MMOL/L (ref 22–26)
HCT VFR BLD AUTO: 40.2 % (ref 36–46)
HCT VFR BLD EST: 43 % (ref 36–46)
HGB BLD-MCNC: 13.8 G/DL (ref 12–16)
HGB BLDV-MCNC: 14.3 G/DL (ref 12–16)
IMM GRANULOCYTES # BLD AUTO: 0.07 X10*3/UL (ref 0–0.7)
IMM GRANULOCYTES NFR BLD AUTO: 0.6 % (ref 0–0.9)
INHALED O2 CONCENTRATION: 21 %
LACTATE BLDV-SCNC: 1.5 MMOL/L (ref 0.4–2)
LYMPHOCYTES # BLD AUTO: 2.28 X10*3/UL (ref 1.2–4.8)
LYMPHOCYTES NFR BLD AUTO: 18.1 %
MAGNESIUM SERPL-MCNC: 1.55 MG/DL (ref 1.6–2.4)
MCH RBC QN AUTO: 30.8 PG (ref 26–34)
MCHC RBC AUTO-ENTMCNC: 34.3 G/DL (ref 32–36)
MCV RBC AUTO: 90 FL (ref 80–100)
MONOCYTES # BLD AUTO: 0.66 X10*3/UL (ref 0.1–1)
MONOCYTES NFR BLD AUTO: 5.2 %
NEUTROPHILS # BLD AUTO: 9.56 X10*3/UL (ref 1.2–7.7)
NEUTROPHILS NFR BLD AUTO: 75.8 %
NRBC BLD-RTO: 0 /100 WBCS (ref 0–0)
OXYHGB MFR BLDV: 70.6 % (ref 45–75)
PCO2 BLDV: 44 MM HG (ref 41–51)
PH BLDV: 7.34 PH (ref 7.33–7.43)
PLATELET # BLD AUTO: 267 X10*3/UL (ref 150–450)
PO2 BLDV: 42 MM HG (ref 35–45)
POTASSIUM BLDV-SCNC: 3.3 MMOL/L (ref 3.5–5.3)
POTASSIUM SERPL-SCNC: 3.4 MMOL/L (ref 3.5–5.3)
PROT SERPL-MCNC: 6.6 G/DL (ref 6.4–8.2)
RBC # BLD AUTO: 4.48 X10*6/UL (ref 4–5.2)
SAO2 % BLDV: 72 % (ref 45–75)
SARS-COV-2 RNA RESP QL NAA+PROBE: NOT DETECTED
SODIUM BLDV-SCNC: 137 MMOL/L (ref 136–145)
SODIUM SERPL-SCNC: 137 MMOL/L (ref 136–145)
WBC # BLD AUTO: 12.6 X10*3/UL (ref 4.4–11.3)

## 2024-12-09 PROCEDURE — 2500000004 HC RX 250 GENERAL PHARMACY W/ HCPCS (ALT 636 FOR OP/ED)

## 2024-12-09 PROCEDURE — 83880 ASSAY OF NATRIURETIC PEPTIDE: CPT | Performed by: NURSE PRACTITIONER

## 2024-12-09 PROCEDURE — 71046 X-RAY EXAM CHEST 2 VIEWS: CPT | Mod: FOREIGN READ | Performed by: RADIOLOGY

## 2024-12-09 PROCEDURE — 85025 COMPLETE CBC W/AUTO DIFF WBC: CPT | Performed by: NURSE PRACTITIONER

## 2024-12-09 PROCEDURE — 99285 EMERGENCY DEPT VISIT HI MDM: CPT | Mod: 25 | Performed by: EMERGENCY MEDICINE

## 2024-12-09 PROCEDURE — 2500000005 HC RX 250 GENERAL PHARMACY W/O HCPCS: Performed by: EMERGENCY MEDICINE

## 2024-12-09 PROCEDURE — 96372 THER/PROPH/DIAG INJ SC/IM: CPT | Performed by: EMERGENCY MEDICINE

## 2024-12-09 PROCEDURE — 2500000004 HC RX 250 GENERAL PHARMACY W/ HCPCS (ALT 636 FOR OP/ED): Performed by: EMERGENCY MEDICINE

## 2024-12-09 PROCEDURE — 96376 TX/PRO/DX INJ SAME DRUG ADON: CPT

## 2024-12-09 PROCEDURE — 82947 ASSAY GLUCOSE BLOOD QUANT: CPT

## 2024-12-09 PROCEDURE — 36415 COLL VENOUS BLD VENIPUNCTURE: CPT | Performed by: NURSE PRACTITIONER

## 2024-12-09 PROCEDURE — 85379 FIBRIN DEGRADATION QUANT: CPT | Performed by: NURSE PRACTITIONER

## 2024-12-09 PROCEDURE — 84484 ASSAY OF TROPONIN QUANT: CPT | Performed by: NURSE PRACTITIONER

## 2024-12-09 PROCEDURE — 96366 THER/PROPH/DIAG IV INF ADDON: CPT

## 2024-12-09 PROCEDURE — 71046 X-RAY EXAM CHEST 2 VIEWS: CPT

## 2024-12-09 PROCEDURE — 87636 SARSCOV2 & INF A&B AMP PRB: CPT | Performed by: NURSE PRACTITIONER

## 2024-12-09 PROCEDURE — 96375 TX/PRO/DX INJ NEW DRUG ADDON: CPT

## 2024-12-09 PROCEDURE — 84132 ASSAY OF SERUM POTASSIUM: CPT | Performed by: NURSE PRACTITIONER

## 2024-12-09 PROCEDURE — 83735 ASSAY OF MAGNESIUM: CPT | Performed by: NURSE PRACTITIONER

## 2024-12-09 PROCEDURE — 82947 ASSAY GLUCOSE BLOOD QUANT: CPT | Mod: 59

## 2024-12-09 PROCEDURE — 93005 ELECTROCARDIOGRAM TRACING: CPT

## 2024-12-09 PROCEDURE — 2500000002 HC RX 250 W HCPCS SELF ADMINISTERED DRUGS (ALT 637 FOR MEDICARE OP, ALT 636 FOR OP/ED)

## 2024-12-09 PROCEDURE — 96365 THER/PROPH/DIAG IV INF INIT: CPT | Mod: 59

## 2024-12-09 RX ORDER — DEXTROSE 50 % IN WATER (D50W) INTRAVENOUS SYRINGE
25 ONCE
Status: COMPLETED | OUTPATIENT
Start: 2024-12-09 | End: 2024-12-09

## 2024-12-09 RX ORDER — POTASSIUM CHLORIDE 20 MEQ/1
20 TABLET, EXTENDED RELEASE ORAL ONCE
Status: COMPLETED | OUTPATIENT
Start: 2024-12-09 | End: 2024-12-09

## 2024-12-09 RX ORDER — MAGNESIUM SULFATE HEPTAHYDRATE 40 MG/ML
2 INJECTION, SOLUTION INTRAVENOUS ONCE
Status: COMPLETED | OUTPATIENT
Start: 2024-12-09 | End: 2024-12-09

## 2024-12-09 RX ORDER — DEXTROSE MONOHYDRATE AND SODIUM CHLORIDE 5; .9 G/100ML; G/100ML
75 INJECTION, SOLUTION INTRAVENOUS CONTINUOUS
Status: ACTIVE | OUTPATIENT
Start: 2024-12-09 | End: 2024-12-10

## 2024-12-09 ASSESSMENT — LIFESTYLE VARIABLES
HAVE PEOPLE ANNOYED YOU BY CRITICIZING YOUR DRINKING: NO
HAVE YOU EVER FELT YOU SHOULD CUT DOWN ON YOUR DRINKING: NO
TOTAL SCORE: 0
EVER FELT BAD OR GUILTY ABOUT YOUR DRINKING: NO
EVER HAD A DRINK FIRST THING IN THE MORNING TO STEADY YOUR NERVES TO GET RID OF A HANGOVER: NO

## 2024-12-09 ASSESSMENT — PAIN SCALES - GENERAL: PAINLEVEL_OUTOF10: 0 - NO PAIN

## 2024-12-09 NOTE — ED TRIAGE NOTES
Pt has been experiencing drops in blood glucose throughout the day today. She began feeling dizzy/lightheaded at work and her blood glucose was 50. She is currently at 76. She does take novolog.

## 2024-12-09 NOTE — GROUP NOTE
Group Topic: Feeling Awareness/Expression   Group Date: 12/3/2024  Start Time:  6:00 PM  End Time:  7:30 PM  Facilitators: Garima Wong PsyD   Department: [facility]    Number of Participants: 18   Group Focus: check in  Treatment Modality: Patient-Centered Therapy  Interventions utilized were support  Purpose: feelings and other: Check-in    This was a video IOP aftercare group on a HIPAA compliant platform. All patients were provided with informed consent.    IOP Aftercare Group: Check-in  Participants had opportunity to check-in with their peers and discuss recent stressors. They shared coping they have used recently and provided support to each other.   Garima Wong Psy.D.      Name: Kelli Silva YOB: 1985   MR: [id number]      Kelli was present and actively engaged in discussion. She stated that she visited with family over the weekend. She noted that she has been feeling more hopeful than she has in the past.

## 2024-12-09 NOTE — ED TRIAGE NOTES
Secondary to patient volumes and overcrowding, I performed a brief medical screening exam of the patient in triage, as the patient awaits space in the main ED.    History of Present Illness:  Kelli Silva presents with   Chief Complaint   Patient presents with    Hypoglycemia   Patient uses only NovoLog and has been following her sliding scale but has had several episodes of hypoglycemia where she feels extremity to pass out despite eating lots of food.  She has noted some shortness of breath and sweatiness in the face.    Physical Exam:  General - In no acute distress  Respiratory - Breathing comfortably  Cardiac - Normal S1, S2, no m/g/r  Neurologic - Moving all extremities  Abdomen -bowel sounds present, no CVAT, nontender    Medical Decision Making:  Patient will require further evaluation in the main ED.    Initial diagnostic tests were ordered from triage.      The patient demonstrates understanding that this initial evaluation is a brief medical screening exam and the expectation is that they await for space in the main ED to be further evaluated.  The patient understands that, if they leave prior to further evaluation in the main ED after this initial evaluation in triage, they are doing so under their own accord knowing that their evaluation/work-up is not yet complete. The patient also understands that any preliminary diagnostic results, including abnormalities, may not be shared with them, if they choose to leave prior to further evaluation in the main ED.

## 2024-12-10 ENCOUNTER — APPOINTMENT (OUTPATIENT)
Dept: RADIOLOGY | Facility: HOSPITAL | Age: 39
End: 2024-12-10
Payer: COMMERCIAL

## 2024-12-10 LAB
ANION GAP SERPL CALC-SCNC: 12 MMOL/L (ref 10–20)
APPEARANCE UR: CLEAR
BASOPHILS # BLD AUTO: 0.04 X10*3/UL (ref 0–0.1)
BASOPHILS NFR BLD AUTO: 0.3 %
BILIRUB UR STRIP.AUTO-MCNC: NEGATIVE MG/DL
BUN SERPL-MCNC: 6 MG/DL (ref 6–23)
CALCIUM SERPL-MCNC: 8.4 MG/DL (ref 8.6–10.3)
CHLORIDE SERPL-SCNC: 105 MMOL/L (ref 98–107)
CO2 SERPL-SCNC: 23 MMOL/L (ref 21–32)
COLOR UR: YELLOW
CREAT SERPL-MCNC: 0.86 MG/DL (ref 0.5–1.05)
EGFRCR SERPLBLD CKD-EPI 2021: 87 ML/MIN/1.73M*2
EOSINOPHIL # BLD AUTO: 0.04 X10*3/UL (ref 0–0.7)
EOSINOPHIL NFR BLD AUTO: 0.3 %
ERYTHROCYTE [DISTWIDTH] IN BLOOD BY AUTOMATED COUNT: 13 % (ref 11.5–14.5)
GLUCOSE BLD MANUAL STRIP-MCNC: 100 MG/DL (ref 74–99)
GLUCOSE BLD MANUAL STRIP-MCNC: 80 MG/DL (ref 74–99)
GLUCOSE BLD MANUAL STRIP-MCNC: 86 MG/DL (ref 74–99)
GLUCOSE BLD MANUAL STRIP-MCNC: 87 MG/DL (ref 74–99)
GLUCOSE BLD MANUAL STRIP-MCNC: 92 MG/DL (ref 74–99)
GLUCOSE BLD MANUAL STRIP-MCNC: 92 MG/DL (ref 74–99)
GLUCOSE SERPL-MCNC: 75 MG/DL (ref 74–99)
GLUCOSE UR STRIP.AUTO-MCNC: ABNORMAL MG/DL
HCG UR QL IA.RAPID: NEGATIVE
HCT VFR BLD AUTO: 41.5 % (ref 36–46)
HGB BLD-MCNC: 14.1 G/DL (ref 12–16)
HOLD SPECIMEN: NORMAL
IMM GRANULOCYTES # BLD AUTO: 0.04 X10*3/UL (ref 0–0.7)
IMM GRANULOCYTES NFR BLD AUTO: 0.3 % (ref 0–0.9)
KETONES UR STRIP.AUTO-MCNC: NEGATIVE MG/DL
LEUKOCYTE ESTERASE UR QL STRIP.AUTO: NEGATIVE
LYMPHOCYTES # BLD AUTO: 3.07 X10*3/UL (ref 1.2–4.8)
LYMPHOCYTES NFR BLD AUTO: 23.5 %
MAGNESIUM SERPL-MCNC: 2.3 MG/DL (ref 1.6–2.4)
MCH RBC QN AUTO: 30.7 PG (ref 26–34)
MCHC RBC AUTO-ENTMCNC: 34 G/DL (ref 32–36)
MCV RBC AUTO: 90 FL (ref 80–100)
MONOCYTES # BLD AUTO: 0.7 X10*3/UL (ref 0.1–1)
MONOCYTES NFR BLD AUTO: 5.4 %
NEUTROPHILS # BLD AUTO: 9.16 X10*3/UL (ref 1.2–7.7)
NEUTROPHILS NFR BLD AUTO: 70.2 %
NITRITE UR QL STRIP.AUTO: NEGATIVE
NRBC BLD-RTO: 0 /100 WBCS (ref 0–0)
PH UR STRIP.AUTO: 5.5 [PH]
PLATELET # BLD AUTO: 266 X10*3/UL (ref 150–450)
POTASSIUM SERPL-SCNC: 4.1 MMOL/L (ref 3.5–5.3)
PROT UR STRIP.AUTO-MCNC: NEGATIVE MG/DL
RBC # BLD AUTO: 4.59 X10*6/UL (ref 4–5.2)
RBC # UR STRIP.AUTO: NEGATIVE /UL
SODIUM SERPL-SCNC: 136 MMOL/L (ref 136–145)
SP GR UR STRIP.AUTO: 1.01
UROBILINOGEN UR STRIP.AUTO-MCNC: NORMAL MG/DL
WBC # BLD AUTO: 13.1 X10*3/UL (ref 4.4–11.3)

## 2024-12-10 PROCEDURE — 87040 BLOOD CULTURE FOR BACTERIA: CPT | Mod: PARLAB

## 2024-12-10 PROCEDURE — 36415 COLL VENOUS BLD VENIPUNCTURE: CPT

## 2024-12-10 PROCEDURE — 2500000002 HC RX 250 W HCPCS SELF ADMINISTERED DRUGS (ALT 637 FOR MEDICARE OP, ALT 636 FOR OP/ED): Performed by: STUDENT IN AN ORGANIZED HEALTH CARE EDUCATION/TRAINING PROGRAM

## 2024-12-10 PROCEDURE — 94640 AIRWAY INHALATION TREATMENT: CPT

## 2024-12-10 PROCEDURE — 2500000004 HC RX 250 GENERAL PHARMACY W/ HCPCS (ALT 636 FOR OP/ED): Performed by: STUDENT IN AN ORGANIZED HEALTH CARE EDUCATION/TRAINING PROGRAM

## 2024-12-10 PROCEDURE — 83036 HEMOGLOBIN GLYCOSYLATED A1C: CPT

## 2024-12-10 PROCEDURE — 85025 COMPLETE CBC W/AUTO DIFF WBC: CPT | Performed by: STUDENT IN AN ORGANIZED HEALTH CARE EDUCATION/TRAINING PROGRAM

## 2024-12-10 PROCEDURE — 80048 BASIC METABOLIC PNL TOTAL CA: CPT | Performed by: STUDENT IN AN ORGANIZED HEALTH CARE EDUCATION/TRAINING PROGRAM

## 2024-12-10 PROCEDURE — 83735 ASSAY OF MAGNESIUM: CPT | Performed by: STUDENT IN AN ORGANIZED HEALTH CARE EDUCATION/TRAINING PROGRAM

## 2024-12-10 PROCEDURE — G0378 HOSPITAL OBSERVATION PER HR: HCPCS

## 2024-12-10 PROCEDURE — 2500000001 HC RX 250 WO HCPCS SELF ADMINISTERED DRUGS (ALT 637 FOR MEDICARE OP)

## 2024-12-10 PROCEDURE — 74176 CT ABD & PELVIS W/O CONTRAST: CPT | Performed by: RADIOLOGY

## 2024-12-10 PROCEDURE — 1210000001 HC SEMI-PRIVATE ROOM DAILY

## 2024-12-10 PROCEDURE — 82947 ASSAY GLUCOSE BLOOD QUANT: CPT

## 2024-12-10 PROCEDURE — 2500000001 HC RX 250 WO HCPCS SELF ADMINISTERED DRUGS (ALT 637 FOR MEDICARE OP): Performed by: STUDENT IN AN ORGANIZED HEALTH CARE EDUCATION/TRAINING PROGRAM

## 2024-12-10 PROCEDURE — 81025 URINE PREGNANCY TEST: CPT | Performed by: STUDENT IN AN ORGANIZED HEALTH CARE EDUCATION/TRAINING PROGRAM

## 2024-12-10 PROCEDURE — 74176 CT ABD & PELVIS W/O CONTRAST: CPT

## 2024-12-10 PROCEDURE — 81003 URINALYSIS AUTO W/O SCOPE: CPT | Performed by: NURSE PRACTITIONER

## 2024-12-10 RX ORDER — FLUTICASONE FUROATE AND VILANTEROL 200; 25 UG/1; UG/1
1 POWDER RESPIRATORY (INHALATION)
Status: DISCONTINUED | OUTPATIENT
Start: 2024-12-10 | End: 2024-12-11 | Stop reason: HOSPADM

## 2024-12-10 RX ORDER — BUPROPION HYDROCHLORIDE 150 MG/1
300 TABLET ORAL EVERY MORNING
Status: DISCONTINUED | OUTPATIENT
Start: 2024-12-10 | End: 2024-12-11 | Stop reason: HOSPADM

## 2024-12-10 RX ORDER — OXYCODONE AND ACETAMINOPHEN 5; 325 MG/1; MG/1
1 TABLET ORAL EVERY 4 HOURS PRN
Status: DISCONTINUED | OUTPATIENT
Start: 2024-12-10 | End: 2024-12-11 | Stop reason: HOSPADM

## 2024-12-10 RX ORDER — DESVENLAFAXINE SUCCINATE 25 MG/1
100 TABLET, EXTENDED RELEASE ORAL
Status: DISCONTINUED | OUTPATIENT
Start: 2024-12-10 | End: 2024-12-11 | Stop reason: HOSPADM

## 2024-12-10 RX ORDER — ACETAMINOPHEN 325 MG/1
650 TABLET ORAL EVERY 4 HOURS PRN
Status: DISCONTINUED | OUTPATIENT
Start: 2024-12-10 | End: 2024-12-11 | Stop reason: HOSPADM

## 2024-12-10 RX ORDER — TRAZODONE HYDROCHLORIDE 50 MG/1
200 TABLET ORAL NIGHTLY
Status: DISCONTINUED | OUTPATIENT
Start: 2024-12-10 | End: 2024-12-11 | Stop reason: HOSPADM

## 2024-12-10 RX ORDER — PREGABALIN 75 MG/1
150 CAPSULE ORAL NIGHTLY
Status: DISCONTINUED | OUTPATIENT
Start: 2024-12-10 | End: 2024-12-11 | Stop reason: HOSPADM

## 2024-12-10 RX ORDER — IPRATROPIUM BROMIDE AND ALBUTEROL SULFATE 2.5; .5 MG/3ML; MG/3ML
3 SOLUTION RESPIRATORY (INHALATION) EVERY 4 HOURS PRN
Status: DISCONTINUED | OUTPATIENT
Start: 2024-12-10 | End: 2024-12-10

## 2024-12-10 RX ORDER — PANTOPRAZOLE SODIUM 40 MG/1
40 TABLET, DELAYED RELEASE ORAL 2 TIMES DAILY
Status: DISCONTINUED | OUTPATIENT
Start: 2024-12-10 | End: 2024-12-11 | Stop reason: HOSPADM

## 2024-12-10 RX ORDER — DEXTROSE 50 % IN WATER (D50W) INTRAVENOUS SYRINGE
25
Status: DISCONTINUED | OUTPATIENT
Start: 2024-12-10 | End: 2024-12-11 | Stop reason: HOSPADM

## 2024-12-10 RX ORDER — INSULIN LISPRO 100 [IU]/ML
0-5 INJECTION, SOLUTION INTRAVENOUS; SUBCUTANEOUS EVERY 6 HOURS
Status: DISCONTINUED | OUTPATIENT
Start: 2024-12-10 | End: 2024-12-11

## 2024-12-10 RX ORDER — LANOLIN ALCOHOL/MO/W.PET/CERES
400 CREAM (GRAM) TOPICAL 2 TIMES DAILY
Status: DISCONTINUED | OUTPATIENT
Start: 2024-12-10 | End: 2024-12-11

## 2024-12-10 RX ORDER — DICYCLOMINE HYDROCHLORIDE 20 MG/1
20 TABLET ORAL
Status: DISCONTINUED | OUTPATIENT
Start: 2024-12-10 | End: 2024-12-11 | Stop reason: HOSPADM

## 2024-12-10 RX ORDER — LISDEXAMFETAMINE DIMESYLATE 70 MG/1
70 CAPSULE ORAL EVERY MORNING
Status: DISCONTINUED | OUTPATIENT
Start: 2024-12-10 | End: 2024-12-11 | Stop reason: HOSPADM

## 2024-12-10 RX ORDER — POTASSIUM CHLORIDE 20 MEQ/1
40 TABLET, EXTENDED RELEASE ORAL ONCE
Status: COMPLETED | OUTPATIENT
Start: 2024-12-10 | End: 2024-12-10

## 2024-12-10 RX ORDER — DEXTROSE MONOHYDRATE AND SODIUM CHLORIDE 5; .9 G/100ML; G/100ML
75 INJECTION, SOLUTION INTRAVENOUS CONTINUOUS
Status: ACTIVE | OUTPATIENT
Start: 2024-12-10 | End: 2024-12-11

## 2024-12-10 RX ORDER — LAMOTRIGINE 100 MG/1
200 TABLET ORAL 2 TIMES DAILY
Status: DISCONTINUED | OUTPATIENT
Start: 2024-12-10 | End: 2024-12-11 | Stop reason: HOSPADM

## 2024-12-10 RX ORDER — PREGABALIN 50 MG/1
50 CAPSULE ORAL
Status: DISCONTINUED | OUTPATIENT
Start: 2024-12-10 | End: 2024-12-11 | Stop reason: HOSPADM

## 2024-12-10 RX ORDER — ENOXAPARIN SODIUM 100 MG/ML
40 INJECTION SUBCUTANEOUS EVERY 12 HOURS
Status: DISCONTINUED | OUTPATIENT
Start: 2024-12-10 | End: 2024-12-11 | Stop reason: HOSPADM

## 2024-12-10 RX ORDER — ALBUTEROL SULFATE 0.83 MG/ML
2.5 SOLUTION RESPIRATORY (INHALATION) EVERY 6 HOURS PRN
Status: DISCONTINUED | OUTPATIENT
Start: 2024-12-10 | End: 2024-12-10

## 2024-12-10 RX ORDER — ATOMOXETINE 60 MG/1
60 CAPSULE ORAL DAILY
Status: DISCONTINUED | OUTPATIENT
Start: 2024-12-10 | End: 2024-12-11 | Stop reason: HOSPADM

## 2024-12-10 RX ORDER — ALBUTEROL SULFATE 0.83 MG/ML
2.5 SOLUTION RESPIRATORY (INHALATION) EVERY 2 HOUR PRN
Status: DISCONTINUED | OUTPATIENT
Start: 2024-12-10 | End: 2024-12-11 | Stop reason: HOSPADM

## 2024-12-10 RX ORDER — IPRATROPIUM BROMIDE AND ALBUTEROL SULFATE 2.5; .5 MG/3ML; MG/3ML
3 SOLUTION RESPIRATORY (INHALATION) EVERY 2 HOUR PRN
Status: DISCONTINUED | OUTPATIENT
Start: 2024-12-10 | End: 2024-12-11 | Stop reason: HOSPADM

## 2024-12-10 RX ORDER — TRAMADOL HYDROCHLORIDE 50 MG/1
25 TABLET ORAL EVERY 6 HOURS PRN
Status: DISCONTINUED | OUTPATIENT
Start: 2024-12-10 | End: 2024-12-10

## 2024-12-10 RX ORDER — DEXTROSE 50 % IN WATER (D50W) INTRAVENOUS SYRINGE
12.5
Status: DISCONTINUED | OUTPATIENT
Start: 2024-12-10 | End: 2024-12-11 | Stop reason: HOSPADM

## 2024-12-10 RX ORDER — BUSPIRONE HYDROCHLORIDE 10 MG/1
30 TABLET ORAL 2 TIMES DAILY
Status: DISCONTINUED | OUTPATIENT
Start: 2024-12-10 | End: 2024-12-11 | Stop reason: HOSPADM

## 2024-12-10 RX ORDER — IVABRADINE 5 MG/1
7.5 TABLET, FILM COATED ORAL
Status: DISCONTINUED | OUTPATIENT
Start: 2024-12-10 | End: 2024-12-11 | Stop reason: HOSPADM

## 2024-12-10 SDOH — SOCIAL STABILITY: SOCIAL INSECURITY: WITHIN THE LAST YEAR, HAVE YOU BEEN HUMILIATED OR EMOTIONALLY ABUSED IN OTHER WAYS BY YOUR PARTNER OR EX-PARTNER?: NO

## 2024-12-10 SDOH — SOCIAL STABILITY: SOCIAL INSECURITY: WITHIN THE LAST YEAR, HAVE YOU BEEN AFRAID OF YOUR PARTNER OR EX-PARTNER?: NO

## 2024-12-10 SDOH — SOCIAL STABILITY: SOCIAL INSECURITY: HAVE YOU HAD THOUGHTS OF HARMING ANYONE ELSE?: NO

## 2024-12-10 SDOH — SOCIAL STABILITY: SOCIAL INSECURITY: HAVE YOU HAD ANY THOUGHTS OF HARMING ANYONE ELSE?: NO

## 2024-12-10 SDOH — ECONOMIC STABILITY: FOOD INSECURITY: WITHIN THE PAST 12 MONTHS, YOU WORRIED THAT YOUR FOOD WOULD RUN OUT BEFORE YOU GOT THE MONEY TO BUY MORE.: NEVER TRUE

## 2024-12-10 SDOH — ECONOMIC STABILITY: INCOME INSECURITY: IN THE PAST 12 MONTHS HAS THE ELECTRIC, GAS, OIL, OR WATER COMPANY THREATENED TO SHUT OFF SERVICES IN YOUR HOME?: NO

## 2024-12-10 SDOH — SOCIAL STABILITY: SOCIAL INSECURITY: ABUSE: ADULT

## 2024-12-10 SDOH — SOCIAL STABILITY: SOCIAL INSECURITY: ARE YOU OR HAVE YOU BEEN THREATENED OR ABUSED PHYSICALLY, EMOTIONALLY, OR SEXUALLY BY ANYONE?: NO

## 2024-12-10 SDOH — ECONOMIC STABILITY: FOOD INSECURITY: WITHIN THE PAST 12 MONTHS, THE FOOD YOU BOUGHT JUST DIDN'T LAST AND YOU DIDN'T HAVE MONEY TO GET MORE.: NEVER TRUE

## 2024-12-10 SDOH — SOCIAL STABILITY: SOCIAL INSECURITY: HAS ANYONE EVER THREATENED TO HURT YOUR FAMILY OR YOUR PETS?: NO

## 2024-12-10 SDOH — SOCIAL STABILITY: SOCIAL INSECURITY: DO YOU FEEL ANYONE HAS EXPLOITED OR TAKEN ADVANTAGE OF YOU FINANCIALLY OR OF YOUR PERSONAL PROPERTY?: NO

## 2024-12-10 SDOH — SOCIAL STABILITY: SOCIAL INSECURITY: DOES ANYONE TRY TO KEEP YOU FROM HAVING/CONTACTING OTHER FRIENDS OR DOING THINGS OUTSIDE YOUR HOME?: NO

## 2024-12-10 SDOH — SOCIAL STABILITY: SOCIAL INSECURITY: WERE YOU ABLE TO COMPLETE ALL THE BEHAVIORAL HEALTH SCREENINGS?: YES

## 2024-12-10 SDOH — SOCIAL STABILITY: SOCIAL INSECURITY: DO YOU FEEL UNSAFE GOING BACK TO THE PLACE WHERE YOU ARE LIVING?: NO

## 2024-12-10 SDOH — SOCIAL STABILITY: SOCIAL INSECURITY: ARE THERE ANY APPARENT SIGNS OF INJURIES/BEHAVIORS THAT COULD BE RELATED TO ABUSE/NEGLECT?: NO

## 2024-12-10 ASSESSMENT — COGNITIVE AND FUNCTIONAL STATUS - GENERAL
PATIENT BASELINE BEDBOUND: NO
MOBILITY SCORE: 24
DAILY ACTIVITIY SCORE: 24
MOBILITY SCORE: 24
DAILY ACTIVITIY SCORE: 24

## 2024-12-10 ASSESSMENT — LIFESTYLE VARIABLES
HOW MANY STANDARD DRINKS CONTAINING ALCOHOL DO YOU HAVE ON A TYPICAL DAY: PATIENT DOES NOT DRINK
HOW OFTEN DO YOU HAVE A DRINK CONTAINING ALCOHOL: NEVER
SKIP TO QUESTIONS 9-10: 1
HOW OFTEN DO YOU HAVE 6 OR MORE DRINKS ON ONE OCCASION: NEVER
AUDIT-C TOTAL SCORE: 0
AUDIT-C TOTAL SCORE: 0

## 2024-12-10 ASSESSMENT — PAIN SCALES - GENERAL
PAINLEVEL_OUTOF10: 0 - NO PAIN
PAINLEVEL_OUTOF10: 7
PAINLEVEL_OUTOF10: 0 - NO PAIN
PAINLEVEL_OUTOF10: 3
PAINLEVEL_OUTOF10: 7

## 2024-12-10 ASSESSMENT — ACTIVITIES OF DAILY LIVING (ADL)
LACK_OF_TRANSPORTATION: NO
DRESSING YOURSELF: INDEPENDENT
ASSISTIVE_DEVICE: EYEGLASSES
HEARING - RIGHT EAR: FUNCTIONAL
FEEDING YOURSELF: INDEPENDENT
GROOMING: INDEPENDENT
ADEQUATE_TO_COMPLETE_ADL: YES
JUDGMENT_ADEQUATE_SAFELY_COMPLETE_DAILY_ACTIVITIES: YES
HEARING - LEFT EAR: FUNCTIONAL
TOILETING: INDEPENDENT
PATIENT'S MEMORY ADEQUATE TO SAFELY COMPLETE DAILY ACTIVITIES?: YES
BATHING: INDEPENDENT
WALKS IN HOME: INDEPENDENT

## 2024-12-10 ASSESSMENT — PATIENT HEALTH QUESTIONNAIRE - PHQ9
2. FEELING DOWN, DEPRESSED OR HOPELESS: NOT AT ALL
1. LITTLE INTEREST OR PLEASURE IN DOING THINGS: NOT AT ALL
SUM OF ALL RESPONSES TO PHQ9 QUESTIONS 1 & 2: 0

## 2024-12-10 ASSESSMENT — PAIN - FUNCTIONAL ASSESSMENT
PAIN_FUNCTIONAL_ASSESSMENT: 0-10

## 2024-12-10 ASSESSMENT — PAIN DESCRIPTION - LOCATION
LOCATION: ABDOMEN
LOCATION: ABDOMEN

## 2024-12-10 NOTE — CARE PLAN
Problem: Pain - Adult  Goal: Verbalizes/displays adequate comfort level or baseline comfort level  Outcome: Progressing     Problem: Safety - Adult  Goal: Free from fall injury  Outcome: Progressing     Problem: Discharge Planning  Goal: Discharge to home or other facility with appropriate resources  Outcome: Progressing     Problem: Chronic Conditions and Co-morbidities  Goal: Patient's chronic conditions and co-morbidity symptoms are monitored and maintained or improved  Outcome: Progressing     Problem: Diabetes  Goal: Achieve decreasing blood glucose levels by end of shift  Outcome: Progressing  Goal: Increase stability of blood glucose readings by end of shift  Outcome: Progressing  Goal: Decrease in ketones present in urine by end of shift  Outcome: Progressing  Goal: Maintain electrolyte levels within acceptable range throughout shift  Outcome: Progressing  Goal: Maintain glucose levels >70mg/dl to <250mg/dl throughout shift  Outcome: Progressing  Goal: No changes in neurological exam by end of shift  Outcome: Progressing  Goal: Learn about and adhere to nutrition recommendations by end of shift  Outcome: Progressing  Goal: Vital signs within normal range for age by end of shift  Outcome: Progressing  Goal: Increase self care and/or family involovement by end of shift  Outcome: Progressing  Goal: Receive DSME education by end of shift  Outcome: Progressing   The patient's goals for the shift include      The clinical goals for the shift include comfort and safety

## 2024-12-10 NOTE — PROGRESS NOTES
12/10/24 1522   Discharge Planning   Living Arrangements Spouse/significant other   Support Systems Spouse/significant other   Assistance Needed independent   Type of Residence Private residence   Number of Stairs to Enter Residence 3   Number of Stairs Within Residence   (Basement)   Do you have animals or pets at home? No   Who is requesting discharge planning? Provider   Home or Post Acute Services None   Expected Discharge Disposition Home   Does the patient need discharge transport arranged? No     Met with patient at bedside. Introduced self and role as care coordinator. Demographics verified. Patient PCP is Chuck. Patient insurance is [employer]. Patient has a shower chair. Patient had C in the past for iv infusions. Patient is denying any home going needs at this time.

## 2024-12-10 NOTE — DISCHARGE INSTRUCTIONS
Your home insulin was discontinued. Continue to follow up with your primary care provider for HbA1c tests every 3 months.  You were prescribed a course of Augmentin, take 1 tablet every 12 hours for 10 doses.

## 2024-12-10 NOTE — H&P
Subjective/History     Kelli Silva is a 39 year old female with a past medical history of T2DM, asthma, GERD, Bipolar I mood disorder, ADHD, fibromyalgia, Hx of RCC s/p cryoablation (2020), horseshoe kidney who presents for hypoglycemia. Patient said she took 4 units of insulin when she saw that her blood glucose was in the 250s sometime early yesterday afternoon. Patient stated that she started feeling dizzy, diaphoretic, and nauseous despite eating consistently throughout the day. Patient also endorsed right-sided abdominal pain and flank pain during this time. Patient says she regularly manually checks her glucose levels at home which tends to fluctuate from the high 200s to low 100s. Patient was recently admitted and treated for a hypoglycemic episode from 10/28 to 10/30. Patient says that she hasn't experienced these set of symptoms since then. Patient denies fevers, chills, palpitations, chest pain, dysuria, hematuria. Endorses diarrhea since August. Patient says at times she has seen dark stools. Patient says she had an EGD done in August of this year that showed some polyps. Patient says that she is scheduled for colonoscopy at the end of December. Aside from this, patient says she has not noticed significant changes in health prior to yesterday. Patient says she has not had any new changes made to her currently insulin regimen for the past 6 months.    ED Course:   In the ED vitals were stable. POCT glucose was as low as 54. Patient was given 2 Amps of D50 and was started on continuous D5. CXR showed no findings of an acute cardiopulmonary process. EKG showed normal sinus rhythm with Qtc 595.     Medical history: Bipolar I disorder, fibromyalgia, gastroparesis, migraines, horseshoe kidney, renal cell carcinoma of left kidney, HLD, Hypogammaglobulinemia, Iron deficiency anemia, PCOS, POTS, ADHD, GERD  Surgical history: Cholecystectomy  Family history: Father had glioblastoma passed away at 47. Mother  "had HTN, HLD. Maternal grandfather had kidney cancer.  Risk/Social factors: Patient says she has never smoked. Says she has never used alcohol. Denies illicit drug use.     Past Medical History:   Diagnosis Date    Normal routine history and physical examination     Normal routine history and physical examination     Other specified health status     No pertinent past medical history       Past Surgical History:   Procedure Laterality Date    OTHER SURGICAL HISTORY  05/08/2020    Venous access port placement       Objective     Physical Exam:  General:  Pleasant and cooperative. No apparent distress.  HEENT:  Normocephalic, atraumatic  Chest:  Clear to auscultation bilaterally. No wheezes, rales, or rhonchi. Right-sided port catheter in place.  CV:  Regular rate and rhythm. No murmurs    Abdomen: Abdomen is soft, right-sided abdominal and flank tenderness, non-distended. BS +   Extremities:  No lower extremity edema or cyanosis.   Neurological:  AAOx3. No focal deficits.  Skin:  Warm and dry.    Last Recorded Vitals  Blood pressure 133/61, pulse 93, temperature 36.6 °C (97.9 °F), temperature source Temporal, resp. rate 18, height 1.575 m (5' 2\"), weight 107 kg (235 lb 14.3 oz), SpO2 99%.  Intake/Output last 3 Shifts:  No intake/output data recorded.    Last CBC & BMP  Lab Results   Component Value Date    GLUCOSE 64 (L) 12/09/2024    CALCIUM 8.9 12/09/2024     12/09/2024    K 3.4 (L) 12/09/2024    CO2 23 12/09/2024     12/09/2024    BUN 5 (L) 12/09/2024    CREATININE 0.90 12/09/2024     Lab Results   Component Value Date    WBC 12.6 (H) 12/09/2024    HGB 13.8 12/09/2024    HCT 40.2 12/09/2024    MCV 90 12/09/2024     12/09/2024         Assessment / Plan        Kelli Silva is a 39 year old female with T2DM, asthma, GERD, bipolar I mood disorder, ADHD, fibromyalgia, Hx of RCC s/p cryoablation 2020, horseshoe kidney presents for hypoglycemia.     Acute Medical conditions  # Hypoglycemia   # " T2DM  - Patient endorsing nausea, lightheadedness, diaphoresis, right-sided abdominal & flank pain throughout the day on 12/9.   - Patient says blood sugars were in the 60s yesterday morning and went as high at 250 later in the day before continuing to drop. Patient says she was eating a lot throughout the day.   - Patient uses NovoLog sliding scale at home. She says she gave herself 4 units of NovoLog while at work when she noticed her sugars was around 250.  - Patient manually checks her blood glucose daily and says it fluctuates from high 200s to low 100s.   - POCT glucose was as low as 54 in the ED.   Plan:  - Patient started on D5 75 mL/hr   - Hold home Novolog  - Patient provided Tigan 200 mg PRN for nausea/vomiting     # Horseshoe kidney   # History of renal cell carcinoma of left kidney s/p cryoablation (2020)  - Patient says she has been experiencing right-sided abdominal and flank pain since yesterday.   Plan:  - Will follow UA due to increase risk of UTI from horseshoe kidney.    # Diarrhea  - Patient says that she has been have diarrhea since August. She has had intermittent bouts of dark stools.   - Patient says EGD was performed in August 2024 and showed some polyps. Colonoscopy is scheduled for Dec 31st.     Chronic Medical conditions  #Asthma - Continue breathing treatments.  #ADHD - Continue home strattera and vyvanse.   #Bipolar mood disorder - continue home Lamictal, Bupropion, Buspar, Vraylar.    #Depression/anxiety - Continue home Pristiq and Trazodone.  #GERD - Continue home Protonix.  #Fibromyalgia - continue pregabalin.    DVT: Lovenox  IVF: D5 75 mL/hr   Diet: Regular diet  Code: Full code  Consults: --       Seun Gomez, DO   PGY-1, Internal Medicine  This is a preliminary note, please await attending attestation for final A/P

## 2024-12-10 NOTE — PROGRESS NOTES
Internal Medicine Progress Note         Kelli Silva is a 39 y.o. female on day 0 of admission presenting with Hypoglycemia.    SUBJECTIVE  Patient is sitting up in bed on examination.  She reports an improvement in dizziness and nausea following dextrose administration.    OBJECTIVE    Vitals:    12/10/24 0408 12/10/24 0709 12/10/24 0710 12/10/24 0749   BP: 115/56   128/60   BP Location:    Right arm   Patient Position:    Sitting   Pulse: 84   99   Resp:    18   Temp: 35.8 °C (96.4 °F)   36.2 °C (97.2 °F)   TempSrc:    Temporal   SpO2: 96% 97% 97% 98%   Weight:       Height:          Results from last 7 days   Lab Units 12/10/24  0346   WBC AUTO x10*3/uL 13.1*   HEMOGLOBIN g/dL 14.1   HEMATOCRIT % 41.5   PLATELETS AUTO x10*3/uL 266   NEUTROS PCT AUTO % 70.2   LYMPHS PCT AUTO % 23.5   MONOS PCT AUTO % 5.4   EOS PCT AUTO % 0.3     Results from last 7 days   Lab Units 12/10/24  0346 12/09/24  1733   SODIUM mmol/L 136 137   POTASSIUM mmol/L 4.1 3.4*   CHLORIDE mmol/L 105 106   CO2 mmol/L 23 23   BUN mg/dL 6 5*   CREATININE mg/dL 0.86 0.90   CALCIUM mg/dL 8.4* 8.9   PROTEIN TOTAL g/dL  --  6.6   BILIRUBIN TOTAL mg/dL  --  0.2   ALK PHOS U/L  --  90   ALT U/L  --  31   AST U/L  --  19   GLUCOSE mg/dL 75 64*       Scheduled Medications  atomoxetine, 60 mg, oral, Daily  buPROPion XL, 300 mg, oral, q AM  busPIRone, 30 mg, oral, BID  cariprazine, 6 mg, oral, Nightly  desvenlafaxine, 100 mg, oral, Daily before breakfast  dicyclomine, 20 mg, oral, Before meals & nightly  enoxaparin, 40 mg, subcutaneous, q12h  tiotropium, 2 puff, inhalation, Daily   And  fluticasone furoate-vilanteroL, 1 puff, inhalation, Daily  insulin lispro, 0-5 Units, subcutaneous, q6h  ivabradine, 7.5 mg, oral, BID  lamoTRIgine, 200 mg, oral, BID  lisdexamfetamine, 70 mg, oral, q AM  magnesium oxide, 400 mg, oral, BID  pantoprazole, 40 mg, oral, BID  pregabalin, 150 mg, oral,  Nightly  pregabalin, 50 mg, oral, BID  psyllium, 1 packet, oral, Daily  traZODone, 200 mg, oral, Nightly           Physical Exam  General: alert and oriented. No acute distress.  HEENT: oral mucosa moist, EOMI. No JVD.   CHEST: clear breath sounds, no crackles, no wheeze, no tachypnea.  CVS: regular rate and rhythm, no murmurs.   ABD: Soft, right-sided abdominal and flank tenderness, BS present.  EXT: no edema.  SKIN: no rash.  NEURO: Nonfocal grossly.                 ASSESSMENT & PLAN  Kelli Silva is a 39-year-old woman who presented to hypoglycemia.  He is receiving treatment with dextrose, and her symptoms have improved significantly.    Daily Progress  12/10: Patient glucose trending around , reports significant improvement of symptoms.    #Hypoglycemia   #T2DM  -Last HbA1c on 8/30/2024 was 5.3.  - Latest glucose measurement 100 as of today.  PLAN:  -Patient is receiving D5 75 mL/h infusion for 24 hours.  - Continue to hold home insulin, started on SSI 1 lispro.  - Continue to monitor glucose.  - Continue Tigan 200 mg as needed for nausea/vomiting.  - Repeat HbA1c ordered.  Primary team discussed possibility of decreasing or discontinuing antidiabetic regimen in the setting of normal HbA1c.    #Horseshoe kidney   #Left RCC s/p cryoablation  -Patient reports persistent right-sided abdominal and flank pain.  PLAN:  -UA shows 1+ glucose, otherwise unremarkable.  - CT abdomen pelvis without contrast ordered.      Chronic Conditions  #Asthma  #ADHD  #Bipolar type I  #Depression  #Anxiety  #GERD   #Fibromyalgia  #Diarrhea  PLAN:  -Continue breathing treatments.  -Continue Strattera and Vyvanse.  - Continue Lamictal, bupropion, BuSpar, and Vraylar.  - Continue Pristiq and trazodone.  - Continue Protonix.  - Continue pregabalin.    DVT ppx: Lovenox  GI ppx: Protonix  IVF: D5 75 mL/h  Diet: Regular diet  Consults: -  CODE STATUS: Full code    Kolby Staley MD   Please SecureChat for any further  questions  This is a preliminary note, please await attending attestation for final A/P

## 2024-12-10 NOTE — ED PROVIDER NOTES
HPI   Chief Complaint   Patient presents with    Hypoglycemia       Presents for        38-year-old female with a past medical history of type 2 diabetes, asthma presents for hypoglycemia.  Patient states that she took her blood sugar today at work and it was over 215 so she gave herself 4 units of insulin per sliding scale regimen and started to feel sweaty and weak.  Patient has had a similar episode at the end of October and states that usually if she is sick she gets hypoglycemic and started to feel weak, sweaty with blurry vision.  Her current regimen is only sliding scale insulin, no basal.  Patient used to wear a CGM but said it did not work appropriate with her phone so she takes her blood glucose manually.  She said her glucose fluctuates from high 200s to the low 100s or even below such as today.      Patient History   Past Medical History:   Diagnosis Date    Normal routine history and physical examination     Normal routine history and physical examination     Other specified health status     No pertinent past medical history     Past Surgical History:   Procedure Laterality Date    OTHER SURGICAL HISTORY  05/08/2020    Venous access port placement     Family History   Problem Relation Name Age of Onset    Brain cancer Father      Kidney cancer Maternal Grandfather      Kidney cancer Sibling       Social History     Tobacco Use    Smoking status: Never    Smokeless tobacco: Never   Vaping Use    Vaping status: Never Used   Substance Use Topics    Alcohol use: Never    Drug use: Never       Physical Exam   ED Triage Vitals   Temperature Heart Rate Respirations BP   12/09/24 1705 12/09/24 1705 12/09/24 1705 12/09/24 1705   36.6 °C (97.9 °F) 85 16 136/63      Pulse Ox Temp Source Heart Rate Source Patient Position   12/09/24 1705 12/09/24 2033 12/09/24 2033 12/09/24 2033   99 % Temporal Monitor Sitting      BP Location FiO2 (%)     12/09/24 2033 --     Right arm        Physical Exam  Vitals reviewed.    Constitutional:       Appearance: Normal appearance.   HENT:      Head: Normocephalic and atraumatic.   Cardiovascular:      Rate and Rhythm: Normal rate and regular rhythm.   Pulmonary:      Effort: Pulmonary effort is normal.      Breath sounds: Normal breath sounds.   Abdominal:      General: Abdomen is flat.      Palpations: Abdomen is soft.   Musculoskeletal:      Right lower leg: No edema.      Left lower leg: No edema.   Skin:     General: Skin is warm and dry.   Neurological:      General: No focal deficit present.      Mental Status: She is alert and oriented to person, place, and time.           ED Course & MDM   ED Course as of 12/09/24 2100   Mon Dec 09, 2024   1717 EKG interpreted by myself independently, EKG shows a normal sinus rhythm, rate 86 bpm, AL interval 148, QRS 68, , QTc 595, patient has no ST elevations or depressions, negative for acute MI. [EUGENE]      ED Course User Index  [EUGENE] Rohit Oakley, DO         Diagnoses as of 12/09/24 2100   Hypoglycemia                 No data recorded     Luanne Coma Scale Score: 15 (12/09/24 2033 : Danni Juares RN)                           Medical Decision Making  39-year-old female with a past medical history of type 2 diabetes presents after hypoglycemic event today.  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  Patient had recently taken her blood sugar and it was over 250 so she gave herself 4 units of insulin per sliding scale regimen.  Currently still feels weak, has some blurry vision.  Workup significant for slight leukocytosis at 12, magnesium 1.55, potassium 3.4 and glucose 64. Troponin and BNP both negative, VBG unremarkable, D-dimer not elevated, negative for COVID and flu.    Vital signs show blood pressure 125/59, temperature 97.9, heart rate 80, pulse ox 99% on room air.  EKG showing normal sinus rhythm at a rate of 86 bpm, QTc 595, no ischemic signs.   Chest x-ray without acute abnormality  Advised the patient to follow-up with her PCP for titration of her insulin as an outpatient.  Will replete her potassium and magnesium and feed her to combat her insulin overdose.  This patient was signed out to the ED attending Dr. Pineda, at the end of my shift.    Procedure  Procedures     Cayden Serna MD  Resident  12/09/24 9496

## 2024-12-11 ENCOUNTER — PHARMACY VISIT (OUTPATIENT)
Dept: PHARMACY | Facility: CLINIC | Age: 39
End: 2024-12-11
Payer: MEDICARE

## 2024-12-11 VITALS
DIASTOLIC BLOOD PRESSURE: 65 MMHG | TEMPERATURE: 97.9 F | SYSTOLIC BLOOD PRESSURE: 119 MMHG | BODY MASS INDEX: 43.41 KG/M2 | RESPIRATION RATE: 18 BRPM | WEIGHT: 235.89 LBS | HEART RATE: 71 BPM | HEIGHT: 62 IN | OXYGEN SATURATION: 95 %

## 2024-12-11 PROBLEM — E16.2 HYPOGLYCEMIA: Status: ACTIVE | Noted: 2024-12-11

## 2024-12-11 PROBLEM — E16.2 HYPOGLYCEMIA: Status: RESOLVED | Noted: 2020-11-03 | Resolved: 2024-12-11

## 2024-12-11 LAB
ANION GAP SERPL CALC-SCNC: 8 MMOL/L (ref 10–20)
BASOPHILS # BLD AUTO: 0.02 X10*3/UL (ref 0–0.1)
BASOPHILS NFR BLD AUTO: 0.3 %
BUN SERPL-MCNC: 4 MG/DL (ref 6–23)
CALCIUM SERPL-MCNC: 8.1 MG/DL (ref 8.6–10.3)
CHLORIDE SERPL-SCNC: 108 MMOL/L (ref 98–107)
CO2 SERPL-SCNC: 27 MMOL/L (ref 21–32)
CREAT SERPL-MCNC: 0.94 MG/DL (ref 0.5–1.05)
EGFRCR SERPLBLD CKD-EPI 2021: 78 ML/MIN/1.73M*2
EOSINOPHIL # BLD AUTO: 0.08 X10*3/UL (ref 0–0.7)
EOSINOPHIL NFR BLD AUTO: 1 %
ERYTHROCYTE [DISTWIDTH] IN BLOOD BY AUTOMATED COUNT: 12.9 % (ref 11.5–14.5)
EST. AVERAGE GLUCOSE BLD GHB EST-MCNC: 91 MG/DL
GLUCOSE BLD MANUAL STRIP-MCNC: 100 MG/DL (ref 74–99)
GLUCOSE BLD MANUAL STRIP-MCNC: 104 MG/DL (ref 74–99)
GLUCOSE BLD MANUAL STRIP-MCNC: 94 MG/DL (ref 74–99)
GLUCOSE SERPL-MCNC: 95 MG/DL (ref 74–99)
HBA1C MFR BLD: 4.8 %
HCT VFR BLD AUTO: 37.8 % (ref 36–46)
HGB BLD-MCNC: 12.4 G/DL (ref 12–16)
IMM GRANULOCYTES # BLD AUTO: 0.02 X10*3/UL (ref 0–0.7)
IMM GRANULOCYTES NFR BLD AUTO: 0.3 % (ref 0–0.9)
LYMPHOCYTES # BLD AUTO: 3.46 X10*3/UL (ref 1.2–4.8)
LYMPHOCYTES NFR BLD AUTO: 44.8 %
MAGNESIUM SERPL-MCNC: 1.89 MG/DL (ref 1.6–2.4)
MCH RBC QN AUTO: 30.2 PG (ref 26–34)
MCHC RBC AUTO-ENTMCNC: 32.8 G/DL (ref 32–36)
MCV RBC AUTO: 92 FL (ref 80–100)
MONOCYTES # BLD AUTO: 0.51 X10*3/UL (ref 0.1–1)
MONOCYTES NFR BLD AUTO: 6.6 %
NEUTROPHILS # BLD AUTO: 3.63 X10*3/UL (ref 1.2–7.7)
NEUTROPHILS NFR BLD AUTO: 47 %
NRBC BLD-RTO: 0 /100 WBCS (ref 0–0)
PLATELET # BLD AUTO: 235 X10*3/UL (ref 150–450)
POTASSIUM SERPL-SCNC: 3.8 MMOL/L (ref 3.5–5.3)
RBC # BLD AUTO: 4.1 X10*6/UL (ref 4–5.2)
SODIUM SERPL-SCNC: 139 MMOL/L (ref 136–145)
WBC # BLD AUTO: 7.7 X10*3/UL (ref 4.4–11.3)

## 2024-12-11 PROCEDURE — 82947 ASSAY GLUCOSE BLOOD QUANT: CPT

## 2024-12-11 PROCEDURE — 80048 BASIC METABOLIC PNL TOTAL CA: CPT | Performed by: STUDENT IN AN ORGANIZED HEALTH CARE EDUCATION/TRAINING PROGRAM

## 2024-12-11 PROCEDURE — RXMED WILLOW AMBULATORY MEDICATION CHARGE

## 2024-12-11 PROCEDURE — 94640 AIRWAY INHALATION TREATMENT: CPT

## 2024-12-11 PROCEDURE — 85025 COMPLETE CBC W/AUTO DIFF WBC: CPT | Performed by: STUDENT IN AN ORGANIZED HEALTH CARE EDUCATION/TRAINING PROGRAM

## 2024-12-11 PROCEDURE — 2500000004 HC RX 250 GENERAL PHARMACY W/ HCPCS (ALT 636 FOR OP/ED)

## 2024-12-11 PROCEDURE — 83735 ASSAY OF MAGNESIUM: CPT | Performed by: STUDENT IN AN ORGANIZED HEALTH CARE EDUCATION/TRAINING PROGRAM

## 2024-12-11 PROCEDURE — 2500000004 HC RX 250 GENERAL PHARMACY W/ HCPCS (ALT 636 FOR OP/ED): Performed by: STUDENT IN AN ORGANIZED HEALTH CARE EDUCATION/TRAINING PROGRAM

## 2024-12-11 PROCEDURE — 2500000001 HC RX 250 WO HCPCS SELF ADMINISTERED DRUGS (ALT 637 FOR MEDICARE OP): Performed by: STUDENT IN AN ORGANIZED HEALTH CARE EDUCATION/TRAINING PROGRAM

## 2024-12-11 PROCEDURE — G0378 HOSPITAL OBSERVATION PER HR: HCPCS

## 2024-12-11 RX ORDER — AZITHROMYCIN 500 MG/1
500 TABLET, FILM COATED ORAL DAILY
Qty: 5 TABLET | Refills: 0 | Status: SHIPPED | OUTPATIENT
Start: 2024-12-11 | End: 2024-12-11 | Stop reason: HOSPADM

## 2024-12-11 RX ORDER — ALBUTEROL SULFATE 90 UG/1
2 INHALANT RESPIRATORY (INHALATION) EVERY 6 HOURS PRN
Status: CANCELLED | OUTPATIENT
Start: 2024-12-11

## 2024-12-11 RX ORDER — HEPARIN 100 UNIT/ML
5 SYRINGE INTRAVENOUS AS NEEDED
Status: DISCONTINUED | OUTPATIENT
Start: 2024-12-11 | End: 2024-12-11 | Stop reason: HOSPADM

## 2024-12-11 RX ORDER — AMOXICILLIN AND CLAVULANATE POTASSIUM 875; 125 MG/1; MG/1
875 TABLET, FILM COATED ORAL 2 TIMES DAILY
Qty: 10 TABLET | Refills: 0 | Status: SHIPPED | OUTPATIENT
Start: 2024-12-11 | End: 2024-12-16

## 2024-12-11 ASSESSMENT — PAIN SCALES - GENERAL: PAINLEVEL_OUTOF10: 7

## 2024-12-11 NOTE — CARE PLAN
The clinical goals for the shift include control blood sugar      Problem: Pain - Adult  Goal: Verbalizes/displays adequate comfort level or baseline comfort level  Outcome: Progressing     Problem: Safety - Adult  Goal: Free from fall injury  Outcome: Progressing

## 2024-12-11 NOTE — CARE PLAN
The patient's goals for the shift include      The clinical goals for the shift include safety and comfort    Problem: Pain - Adult  Goal: Verbalizes/displays adequate comfort level or baseline comfort level  Outcome: Progressing     Problem: Safety - Adult  Goal: Free from fall injury  Outcome: Progressing     Problem: Discharge Planning  Goal: Discharge to home or other facility with appropriate resources  Outcome: Progressing     Problem: Chronic Conditions and Co-morbidities  Goal: Patient's chronic conditions and co-morbidity symptoms are monitored and maintained or improved  Outcome: Progressing     Problem: Diabetes  Goal: Achieve decreasing blood glucose levels by end of shift  Outcome: Progressing  Goal: Increase stability of blood glucose readings by end of shift  Outcome: Progressing  Goal: Decrease in ketones present in urine by end of shift  Outcome: Progressing  Goal: Maintain electrolyte levels within acceptable range throughout shift  Outcome: Progressing  Goal: Maintain glucose levels >70mg/dl to <250mg/dl throughout shift  Outcome: Progressing  Goal: No changes in neurological exam by end of shift  Outcome: Progressing  Goal: Learn about and adhere to nutrition recommendations by end of shift  Outcome: Progressing  Goal: Vital signs within normal range for age by end of shift  Outcome: Progressing  Goal: Increase self care and/or family involovement by end of shift  Outcome: Progressing  Goal: Receive DSME education by end of shift  Outcome: Progressing     Problem: Pain  Goal: Takes deep breaths with improved pain control throughout the shift  Outcome: Progressing  Goal: Turns in bed with improved pain control throughout the shift  Outcome: Progressing  Goal: Walks with improved pain control throughout the shift  Outcome: Progressing  Goal: Performs ADL's with improved pain control throughout shift  Outcome: Progressing  Goal: Participates in PT with improved pain control throughout the  shift  Outcome: Progressing  Goal: Free from opioid side effects throughout the shift  Outcome: Progressing  Goal: Free from acute confusion related to pain meds throughout the shift  Outcome: Progressing

## 2024-12-11 NOTE — DISCHARGE SUMMARY
Discharge Diagnosis  Hypoglycemia  Bipolar disorder  RCC s/p cryoablation 2020  ADHD  Asthma    Issues Requiring Follow-Up  None    Discharge Meds     Medication List      START taking these medications     azithromycin 500 mg tablet; Commonly known as: Zithromax; Take 1 tablet   (500 mg) by mouth once daily for 5 days.     CHANGE how you take these medications     ipratropium-albuteroL 0.5-2.5 mg/3 mL nebulizer solution; Commonly known   as: Duo-Neb; What changed: Another medication with the same name was   removed. Continue taking this medication, and follow the directions you   see here.     CONTINUE taking these medications     Accu-Chek Guide test strips strip; Generic drug: blood sugar diagnostic   * albuterol 2.5 mg /3 mL (0.083 %) nebulizer solution   * albuterol 90 mcg/actuation inhaler   atomoxetine 60 mg capsule; Commonly known as: Strattera; Take 1 capsule   (60 mg) by mouth once daily. Swallow capsule whole; do not open. If opened   accidentally, do not touch eyes; wash hands immediately (product is an eye   irritant).   Autolet lancing device   buPROPion  mg 24 hr tablet; Commonly known as: Wellbutrin XL; Take   1 tablet (300 mg) by mouth once daily in the morning. Do not crush, chew,   or split.   busPIRone 30 mg tablet; Commonly known as: Buspar; Take 1 tablet (30 mg)   by mouth 2 times a day.   calcium carbonate-vitamin D3 250 mg-3.125 mcg (125 unit) tablet;   Commonly known as: Oyster Shell   cariprazine 6 mg capsule; Commonly known as: Vraylar; Take 1 capsule (6   mg) by mouth once daily at bedtime.   Corlanor 7.5 mg tablet; Generic drug: ivabradine   desvenlafaxine 100 mg 24 hr tablet; Commonly known as: Pristiq; TAKE 1   TABLET BY MOUTH EVERY DAY IN THE MORNING   dicyclomine 20 mg tablet; Commonly known as: Bentyl   glucose 4 gram chewable tablet   * glucagon 1 mg injection; Commonly known as: Glucagen   * Gvoke HypoPen 2-Pack 1 mg/0.2 mL auto-injector; Generic drug: glucagon    lactobacillus 10 billion cell capsule; Commonly known as: Culturelle   lamoTRIgine 200 mg disintegrating tablet; Commonly known as: LaMICtal;   Take 1 tablet (200 mg) by mouth 2 times a day.   lisdexamfetamine 70 mg capsule; Commonly known as: Vyvanse; Take 1   capsule (70 mg) by mouth once daily in the morning.   LORazepam 1 mg tablet; Commonly known as: Ativan; Take 1 tablet (1 mg)   by mouth 2 times a day as needed for anxiety or sleep.   Mirena 20 mcg/24hr IUD; Generic drug: levonorgestrel   pantoprazole 40 mg EC tablet; Commonly known as: ProtoNix   * pregabalin 150 mg capsule; Commonly known as: Lyrica   * pregabalin 50 mg capsule; Commonly known as: Lyrica   Reyvow 100 mg tablet; Generic drug: lasmiditan   Tezspire SubQ syringe; Generic drug: tezepelumab-ekko   traZODone 100 mg tablet; Commonly known as: Desyrel; Take 2 tablets (200   mg) by mouth once daily at bedtime.   Trelegy Ellipta 200-62.5-25 mcg blister with device; Generic drug:   fluticasone-umeclidin-vilanter  * This list has 6 medication(s) that are the same as other medications   prescribed for you. Read the directions carefully, and ask your doctor or   other care provider to review them with you.     STOP taking these medications     azelastine-fluticasone 137-50 mcg/spray nasal spray; Commonly known as:   Dymista   CHLORZOXAZONE ORAL   NovoLOG U-100 Insulin aspart 100 unit/mL injection; Generic drug:   insulin aspart   paliperidone 3 mg 24 hr tablet; Commonly known as: Invega   Zavzpret 10 mg/actuation spray,non-aerosol; Generic drug: zavegepant   zolpidem CR 12.5 mg ER tablet; Commonly known as: Ambien CR       Test Results Pending At Discharge  Pending Labs       Order Current Status    Blood Culture Preliminary result    Blood Culture Preliminary result            Hospital Course  Kelli Silva is a 39-year-old female with past medical history of type II DM, asthma, GERD, bipolar 1, ADHD, fibromyalgia, history of RCC s/p cryoablation  "2020, \"horseshoe kidney, initially presented to hospital with hypoglycemia.  She states that she took about 4 units of insulin when she saw that her blood glucose was elevated in the 250s.  She stated that after taking the insulin she started feeling dizzy, diaphoretic, and nauseous.  She also had some right-sided abdominal pain and flank pain.  She states that she regularly manually checks her glucose levels at home which tend to fluctuate from the high 200s to the low 100 units.  She denies any other symptoms.  No dysuria or hematuria.  She has also had some loose stools consistent with diarrhea.  She was started on D5 continuous improved her sugar levels and her symptoms.  A1c was done which was 4.8%.  Endocrinology team reviewed patient and they determined that she did not need to be on any other insulin anymore.  Sugar levels are stable.  She is now medically stable for discharge home.    Pertinent Physical Exam At Time of Discharge  Physical Exam  General: alert and oriented. No acute distress.  HEENT: oral mucosa moist, EOMI. No JVD.   CHEST: clear breath sounds, no crackles, no wheeze, no tachypnea.  CVS: regular rate and rhythm, no murmurs.   ABD: Soft, right-sided abdominal and flank tenderness, BS present.  EXT: no edema.  SKIN: no rash.  NEURO: Nonfocal grossly.  Outpatient Follow-Up  Future Appointments   Date Time Provider Department [place]   12/12/2024  7:30 AM Chester Gabriel PhD [id number] [place]   12/13/2024 11:45 AM [facility]   12/19/2024  7:30 AM Chester Gabriel PhD [id number] [place]   12/26/2024  7:30 AM Chester Gabriel PhD [id number] [place]   12/26/2024  9:00 AM DO [id number], INJECTION NURSE [id number] [facility]   1/9/2025  7:30 AM Chester Gabriel PhD [id number] [place]   1/16/2025  7:30 AM Chester Gabriel PhD [id number] [place]   1/16/2025  4:00 PM Ata Nova MD PhD [id number] [facility]   1/23/2025  7:30 AM Chester Gabriel, PhD [id number] [place]   1/30/2025  " 7:30 AM Chester Gabriel PhD [id number] [place]   2/6/2025  7:30 AM Chester Gabriel PhD [id number] [place]   2/13/2025  7:30 AM Chester Gabriel PhD [id number] [place]   2/20/2025  7:30 AM Chester Gabriel PhD [id number] [place]   2/27/2025  7:30 AM Chester Gabriel PhD [id number] [place]   3/6/2025  7:30 AM Chester Gabriel PhD [id number] [place]   3/13/2025  7:30 AM Chester Gabriel PhD [id number] [place]   3/20/2025  7:30 AM Chester Gabriel PhD [id number] [place]   3/27/2025  7:30 AM Chester Gabriel PhD [id number] [place]         Cullen Hopson MD  PGY2 internal medicine

## 2024-12-11 NOTE — CONSULTS
Endocrinology Initial Inpatient Consult Note     PATIENT NAME: Kelli Silva  MRN: [id number]  DATE: 12/11/2024    CONSULTING PHYSICIAN: Dr. HUSSAIN Shepard  REASON FOR CONSULT: Hypoglycemia      History of Presenting Illness     39y F w. PMHx of:      # Bipolar Disorder      # RCC s/p Cryoablation (2020)      # ADHD       # Asthma    Patient presented to [facility] on 12/9 complaining of hypoglycemia.  Reports that her sugars were in the 250s before presentation.  She took 4 units as per sliding scale.  The patient reports that she started have worsening dizziness, diaphoresis, as well as nausea.  Patient reports that she has been eating throughout the day.  Reports that she also was having some right-sided abdominal pain.  She also some flank pain.  She denies any dysuria.  No fevers.  No chills.  Denies being on an SGLT2 inhibitor.  Reports that she has frequent hypoglycemia when she administers insulin.  Is associated with symptoms.    In the ER point-of-care glucose 176 mg/dL.  SARS-CoV-2 was negative.  D-dimer was 439.  pH was 7.34.  BNP was normal at 18.  Troponin was less than 3.  CMP showed glucose of 64 mg/dL.  CBC was with a leukocytosis to 12.6.  Hemoglobin A1c is pending.  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    In regards to diabetes, the patient reports that she was diagnosed with diabetes many years ago at [facility].  She states that she saw endocrinology at that time.  They titrated her medications.  She reports she has an allergy to many antihyperglycemic medications namely GLP-1 receptor analogs, metformin, and DPP 4 inhibitors.  Patient reports that she was left on insulin.  She is a sliding scale.  She checks her sugars throughout the day.  She reports that she takes 2 units for every  "50 mg above 150.  She reports that she has had multiple low sugars when giving herself insulin.  The patient reports that she has neuroglycopenic and adrenergic symptoms.      Review of Systems (Bold if Positive)     General: Fevers, Chills, Weight Loss, or Night Sweats  HEENT: Headaches or Blurry Vision  Cardiovascular: CP, Palpitations, Diaphoresis, or Peripheral Edema  Respiratory: Cough, Shortness of Breath, or Hemoptysis  Gastrointestinal: N/V, Abdominal Pain, Constipation, Diarrhea, Melena, Hematochezia  Genitourinary: Polyruia, Dysuria, Urgency   Endo: Polydipsia, Heat/Cold Intolerance, Hair/Skin/Nail Changes  Rheum: Joint Pain   MSK: LBP, Muscle Pain  Psych: Anxiety, Depression      Physical Examination     /71   Pulse 73   Temp 36.2 °C (97.2 °F)   Resp 19   Ht 1.575 m (5' 2\")   Wt 107 kg (235 lb 14.3 oz)   SpO2 96%   BMI 43.15 kg/m²   General: No acute distress. A&Ox3.   HEENT: PERRLA. EOMi. Ø Exophthalmos   Neck: No LAD.  Thyroid: 20 g.  No palpable nodularities.  Ø Bruit  CV: Normal rate and rhythm. No murmurs or rubs auscultated.   Resp: CTA b/l. No wheezing or crackles.   Abdomen: Soft, nontender, nondistended abdomen. BSx4.   Extremities: No pedal edema b/l.  Neuro: CN II-XII Grossly Intact. Ø Tremor. Brisk Reflexes.   Psych: Appropriate affect  Skin: No apparent rashes      Past Medical / Surgical / Family / Social History     Past Medical History:   Diagnosis Date    Normal routine history and physical examination     Normal routine history and physical examination     Other specified health status     No pertinent past medical history     Past Surgical History:   Procedure Laterality Date    OTHER SURGICAL HISTORY  05/08/2020    Venous access port placement     Family History   Problem Relation Name Age of Onset    Brain cancer Father      Kidney cancer Maternal Grandfather      Kidney cancer Sibling       Social History     Socioeconomic History    Marital status: Significant Other     " Spouse name: Not on file    Number of children: Not on file    Years of education: Not on file    Highest education level: Not on file   Occupational History    Not on file   Tobacco Use    Smoking status: Never    Smokeless tobacco: Never   Vaping Use    Vaping status: Never Used   Substance and Sexual Activity    Alcohol use: Never    Drug use: Never    Sexual activity: Not on file   Other Topics Concern    Not on file   Social History Narrative    Not on file     Social Drivers of Health     Financial Resource Strain: Low Risk  (12/10/2024)    Overall Financial Resource Strain (CARDIA)     Difficulty of Paying Living Expenses: Not very hard   Food Insecurity: No Food Insecurity (12/10/2024)    Hunger Vital Sign     Worried About Running Out of Food in the Last Year: Never true     Ran Out of Food in the Last Year: Never true   Transportation Needs: No Transportation Needs (12/10/2024)    PRAPARE - Transportation     Lack of Transportation (Medical): No     Lack of Transportation (Non-Medical): No   Physical Activity: Inactive (4/10/2024)    Received from [employer]    Exercise Vital Sign     Days of Exercise per Week: 0 days     Minutes of Exercise per Session: 0 min   Stress: No Stress Concern Present (4/10/2024)    Received from [employer]    [place] [employer] - Occupational Stress Questionnaire     Feeling of Stress : Only a little   Social Connections: Moderately Isolated (4/10/2024)    Received from [employer]    Social Connection and Isolation Panel [NHANES]     Frequency of Communication with Friends and Family: More than three times a week     Frequency of Social Gatherings with Friends and Family: Three times a week     Attends Yazdanism Services: Never     Active Member of Clubs or Organizations: No     Attends Club or Organization Meetings: Never     Marital Status: Living with partner   Intimate Partner Violence: Not At Risk (12/10/2024)  "   Humiliation, Afraid, Rape, and Kick questionnaire     Fear of Current or Ex-Partner: No     Emotionally Abused: No     Physically Abused: No     Sexually Abused: No   Housing Stability: Low Risk  (12/10/2024)    Housing Stability Vital Sign     Unable to Pay for Housing in the Last Year: No     Number of Times Moved in the Last Year: 0     Homeless in the Last Year: No       Medications & Allergies     MAR and PTA Meds Reviewed     Allergies   Allergen Reactions    Aspirin Shortness of breath    Cigarette Smoke Shortness of breath    Fish Containing Products Hives, Rash and Shortness of breath     All seafood    Iodinated Contrast Media Shortness of breath     \"Asthma attack\" per pt Bronchospasm    Ketorolac Shortness of breath     \"Asthma attack\" per pt    Ketorolac Tromethamine Shortness of breath and Other     asthma    Ondansetron Other     Prolonged QT interval    Other Shortness of breath     Nicotiana Tabacum    Prochlorperazine Shortness of breath and Other     \"Asthma attack\" per pt    agitation    Other Reaction(s): Unknown  Mental state changes    Shellfish Containing Products Hives, Rash and Shortness of breath     All seafood    Sumatriptan Runny nose     Aphasia, weakness    Azithromycin Nausea/vomiting     nausea/vomiting    Ceftriaxone Other    Doxycycline Diarrhea    Dulaglutide Other    House Dust Other     Itchy watery eyes    Liraglutide Nausea Only    Metformin Other     Per patient, lactic acidosis    Metoclopramide Itching    Prednisone Other     Becomes suicidal   Tolerated 1/10/2020*  Confusion  Mental state change    Sitagliptin Other    Rzjaykho-7-Ol4 Antimigraine Agents Other    Zolmitriptan Other     Aphasia, and weakness,  no more triptans       Data     Recent Labs and Imaging Reviewed    Date  PreBreakfast Pre Lunch Pre Dinner Bedtime 3AM                                        Assessment / Plan       # Hypoglycemia  Reviewed her biochemistries in the chart.    Her most recent A1c " was found to be 5.3%.  Review of other A1c's in the chart show that the patient does not have a single A1c above 5.5%. I am unclear why she is on insulin.  I do not think she needs to be on it.  I think this is the cause of the hypoglycemia.The patient agrees.  She did states that she does not have hypoglycemia other than when she takes insulin.    I also reviewed the chart and saw that the patient has been admitted for hypoglycemia, namely back in June 2021.  She was following with endocrinology at the time at [facility].  The patient was reportedly not on insulin at that time.  She was started on acarbose without improvement.  Psychiatry was involved in the case at that time.  Patient also seen by endocrinology at [facility] in April 2021 who felt that insulin was the cause.    As it stands, I think this patient should be off of insulin.  There are no indications for oral hypoglycemics either.  I think this will solve her issue with hypoglycemia.    Given the chronicity of this issue, malingering does remain on the differential, but of course this is a diagnosis of exclusion and I had no stigmata to suspect the same during my interview of the patient.       I spent a total of 80 minutes on the date of the service which included preparing to see the patient, face-to-face patient care, completing clinical documentation, obtaining and/or reviewing separately obtained history, performing a medically appropriate examination, counseling and educating the patient/family/caregiver, ordering medications, tests, or procedures, communicating with other HCPs (not separately reported), independently interpreting results (not separately reported), communicating results to the patient/family/caregiver, and care coordination (not separately reported).     Patrice Wilburn, DO  Endocrinology, Diabetes, and Metabolism    Available via [employer] Messenger    Please excuse any typographical or unwanted errors within this documentation as voice  recognition software was used to dictate this note.

## 2024-12-12 ENCOUNTER — APPOINTMENT (OUTPATIENT)
Dept: BEHAVIORAL HEALTH | Facility: CLINIC | Age: 39
End: 2024-12-12
Payer: COMMERCIAL

## 2024-12-12 DIAGNOSIS — F31.9 BIPOLAR 1 DISORDER (MULTI): ICD-10-CM

## 2024-12-12 PROCEDURE — 3044F HG A1C LEVEL LT 7.0%: CPT | Performed by: PSYCHOLOGIST

## 2024-12-12 PROCEDURE — 90837 PSYTX W PT 60 MINUTES: CPT | Performed by: PSYCHOLOGIST

## 2024-12-12 NOTE — PROGRESS NOTES
Humanistic/Insight Oriented Therapy. 50 min.  BP1. We had a good talk about being hospitalized for low blood sugar, but mood stable and kept it going.  We spoke about hitting the snooze button at night, getting herself preparred for sleep and sticking to the plan.     Chief complaint - Phase of life and emotional distress   Treatment plan - Insight and action consistent with ACT therapy. Providing support, safe space to process their thoughts and feelings, developing therapeutic relationship.  Goals - Self-care, insight, coping skills  Prognosis - Average  Progress - Average  Functional status - 70/100  Focused mental status: Patient was oriented to person, place, time and context  Focused mental exam - alert and oriented  Frequency - Every two weeks

## 2024-12-13 ENCOUNTER — HOSPITAL ENCOUNTER (OUTPATIENT)
Dept: POSTOP/PACU | Facility: HOSPITAL | Age: 39
Setting detail: OUTPATIENT SURGERY
Discharge: HOME | End: 2024-12-13
Payer: COMMERCIAL

## 2024-12-13 VITALS
BODY MASS INDEX: 43.75 KG/M2 | TEMPERATURE: 98.4 F | SYSTOLIC BLOOD PRESSURE: 116 MMHG | HEART RATE: 71 BPM | OXYGEN SATURATION: 97 % | WEIGHT: 239.2 LBS | RESPIRATION RATE: 14 BRPM | DIASTOLIC BLOOD PRESSURE: 62 MMHG

## 2024-12-13 DIAGNOSIS — F31.9 BIPOLAR I DISORDER WITH DEPRESSION (MULTI): Primary | ICD-10-CM

## 2024-12-13 DIAGNOSIS — F90.0 ATTENTION DEFICIT HYPERACTIVITY DISORDER (ADHD), PREDOMINANTLY INATTENTIVE TYPE: ICD-10-CM

## 2024-12-13 PROCEDURE — KETSP PR KETAMINE INFUSION SELF-PAY: Performed by: PSYCHIATRY & NEUROLOGY

## 2024-12-13 PROCEDURE — KETSP HC KETAMINE INFUSION SELF-PAY: Performed by: PSYCHIATRY & NEUROLOGY

## 2024-12-13 PROCEDURE — 2500000004 HC RX 250 GENERAL PHARMACY W/ HCPCS (ALT 636 FOR OP/ED): Performed by: PSYCHIATRY & NEUROLOGY

## 2024-12-13 PROCEDURE — 2500000005 HC RX 250 GENERAL PHARMACY W/O HCPCS: Performed by: PSYCHIATRY & NEUROLOGY

## 2024-12-13 RX ORDER — ONDANSETRON HYDROCHLORIDE 2 MG/ML
8 INJECTION, SOLUTION INTRAVENOUS ONCE
Start: 2024-12-13 | End: 2024-12-13

## 2024-12-13 RX ORDER — KETAMINE HCL IN 0.9 % NACL 200 MG/200
0.75 PLASTIC BAG, INJECTION (ML) INTRAVENOUS ONCE
Start: 2024-12-13 | End: 2024-12-13

## 2024-12-13 RX ORDER — ONDANSETRON HYDROCHLORIDE 2 MG/ML
8 INJECTION, SOLUTION INTRAVENOUS ONCE
Status: COMPLETED | OUTPATIENT
Start: 2024-12-13 | End: 2024-12-13

## 2024-12-13 RX ORDER — LISDEXAMFETAMINE DIMESYLATE 70 MG/1
70 CAPSULE ORAL EVERY MORNING
Qty: 30 CAPSULE | Refills: 0 | Status: SHIPPED | OUTPATIENT
Start: 2024-12-13 | End: 2025-01-12

## 2024-12-13 RX ORDER — KETAMINE HCL IN 0.9 % NACL 200 MG/200
0.75 PLASTIC BAG, INJECTION (ML) INTRAVENOUS ONCE
Status: COMPLETED | OUTPATIENT
Start: 2024-12-13 | End: 2024-12-13

## 2024-12-13 ASSESSMENT — ANXIETY QUESTIONNAIRES
6. BECOMING EASILY ANNOYED OR IRRITABLE: NOT AT ALL
5. BEING SO RESTLESS THAT IT IS HARD TO SIT STILL: NOT AT ALL
IF YOU CHECKED OFF ANY PROBLEMS ON THIS QUESTIONNAIRE, HOW DIFFICULT HAVE THESE PROBLEMS MADE IT FOR YOU TO DO YOUR WORK, TAKE CARE OF THINGS AT HOME, OR GET ALONG WITH OTHER PEOPLE: NOT DIFFICULT AT ALL
1. FEELING NERVOUS, ANXIOUS, OR ON EDGE: NOT AT ALL
GAD7 TOTAL SCORE: 0
4. TROUBLE RELAXING: NOT AT ALL
2. NOT BEING ABLE TO STOP OR CONTROL WORRYING: NOT AT ALL
7. FEELING AFRAID AS IF SOMETHING AWFUL MIGHT HAPPEN: NOT AT ALL
3. WORRYING TOO MUCH ABOUT DIFFERENT THINGS: NOT AT ALL

## 2024-12-13 ASSESSMENT — PATIENT HEALTH QUESTIONNAIRE - PHQ9
10. IF YOU CHECKED OFF ANY PROBLEMS, HOW DIFFICULT HAVE THESE PROBLEMS MADE IT FOR YOU TO DO YOUR WORK, TAKE CARE OF THINGS AT HOME, OR GET ALONG WITH OTHER PEOPLE: SOMEWHAT DIFFICULT
SUM OF ALL RESPONSES TO PHQ QUESTIONS 1-9: 8
7. TROUBLE CONCENTRATING ON THINGS, SUCH AS READING THE NEWSPAPER OR WATCHING TELEVISION: NOT AT ALL
9. THOUGHTS THAT YOU WOULD BE BETTER OFF DEAD, OR OF HURTING YOURSELF: NOT AT ALL
6. FEELING BAD ABOUT YOURSELF - OR THAT YOU ARE A FAILURE OR HAVE LET YOURSELF OR YOUR FAMILY DOWN: SEVERAL DAYS
5. POOR APPETITE OR OVEREATING: NOT AT ALL
2. FEELING DOWN, DEPRESSED OR HOPELESS: SEVERAL DAYS
8. MOVING OR SPEAKING SO SLOWLY THAT OTHER PEOPLE COULD HAVE NOTICED. OR THE OPPOSITE, BEING SO FIGETY OR RESTLESS THAT YOU HAVE BEEN MOVING AROUND A LOT MORE THAN USUAL: SEVERAL DAYS
4. FEELING TIRED OR HAVING LITTLE ENERGY: SEVERAL DAYS
1. LITTLE INTEREST OR PLEASURE IN DOING THINGS: SEVERAL DAYS
SUM OF ALL RESPONSES TO PHQ9 QUESTIONS 1 AND 2: 2
3. TROUBLE FALLING OR STAYING ASLEEP OR SLEEPING TOO MUCH: NEARLY EVERY DAY

## 2024-12-13 NOTE — NURSING NOTE
Mediport needle removed whole and intact. No bleeding. Pt given Band-Aid. Pt. Is A&O x4 and denies feeling floaty, dizzy or unsteady. Pt reports no pain at this time. Pt. being discharged to home with a ride.

## 2024-12-13 NOTE — PROGRESS NOTES
Ketamine/Esketamine Procedure    Name: Kelli  : 1985  MRN: [id number]    Date: 24    Time out was taken with staff to confirm correct patient and correct procedure to be performed.     Ketamine infusion at .75 mg/kg in 40 minutes  Subjective: She said she felt good and stable although she was hospitalized for 2 days due to hypoglycemia. She said she had problem with falling asleep for 3-4 days in last week or two, and then slept to much. She said trazodone did not help her to fall asleep, but did not try anything else. Her energy and concentration were ok. No change in appetite. Denied having SI/HI/AVH or paranoia. No manic sx. No side effect from medication.  No other change in medical hx   No physical complaint   PHQ9=8  GAD7=0    Objective: She was awake, alert, cooperative, and pleasant. Normal gait. No abnormal movement. Fairly groomed. Good eye contact. Oriented x 3. Speech was normal. Affect was appropriate with smile. Mood was good. Denied having SI/HI/AVH or paranoia during the interview. No delusional thought or paranoia. I/J were good. Memory was fair. Attention and concentration were good. Fund of knowledge was good.     Vital signs before infusion: HR: 63 bpm; pulse Ox: 97%; BP: 114/63 mmHg.  Ketamine infusion observation:  About 20 minutes after starting infusion, she said she felt drowsy and things around her moving. No other side effect. Vital signs were stable. HR: 66 bpm; pulse Ox: 95%; BP: 120/67 mmHg.  Close to the end of infusion, she said she felt drowsy, things around her moving,and a little bit nauseous. No other side effect. Vital signs were stable. HR: 68 bpm; pulse Ox: 94%; BP: 127/68 mmHg.    Tolerated ketamine infusion well  No complication     Imp: bipolar I depression, currently mild to partial remission   ANUP - under control   No immediate risk to self or others     Plan: Ketamine infusion in 2 weeks     Ata Nova MD.

## 2024-12-13 NOTE — PATIENT INSTRUCTIONS
Homegoing Instructions    Patient: Kelli Silva  MRN: [id number]  YOB: 1985    Care Providers: Dr. Ata Roman, RN    Allergies:  Aspirin, Cigarette smoke, Fish containing products, Iodinated contrast media, Ketorolac, Ketorolac tromethamine, Ondansetron, Other, Prochlorperazine, Shellfish containing products, Sumatriptan, Azithromycin, Ceftriaxone, Doxycycline, Dulaglutide, House dust, Liraglutide, Metformin, Metoclopramide, Prednisone, Sitagliptin, Ojuamjxa-7-pd6 antimigraine agents, and Zolmitriptan    Medications:  Current Outpatient Medications   Medication Sig Dispense Refill    Accu-Chek Guide test strips strip USE TO CHECK BLOOD SUGAR UP TO 4 TIMES PER DAY AS INSTRUCTED. E11.9      albuterol 2.5 mg /3 mL (0.083 %) nebulizer solution Inhale 3 mL (2.5 mg) every 6 hours if needed for wheezing or shortness of breath.      albuterol 90 mcg/actuation inhaler Inhale 2 puffs. 6 TIMES DAILY.      amoxicillin-pot clavulanate (Augmentin) 875-125 mg tablet Take 1 tablet (875 mg) by mouth 2 times a day for 5 days. 10 tablet 0    atomoxetine (Strattera) 60 mg capsule Take 1 capsule (60 mg) by mouth once daily. Swallow capsule whole; do not open. If opened accidentally, do not touch eyes; wash hands immediately (product is an eye irritant). 90 capsule 3    Autolet lancing device USE AS INSTRUCTED TO CHECK BLOOD SUGAR UP TO 4 TIMES DAILY. E11.9      buPROPion XL (Wellbutrin XL) 300 mg 24 hr tablet Take 1 tablet (300 mg) by mouth once daily in the morning. Do not crush, chew, or split. 90 tablet 3    busPIRone (Buspar) 30 mg tablet Take 1 tablet (30 mg) by mouth 2 times a day. 180 tablet 3    calcium carbonate-vitamin D3 (Oyster Shell) 250 mg-3.125 mcg (125 unit) tablet Take 1 tablet by mouth once daily.      cariprazine (Vraylar) 6 mg capsule Take 1 capsule (6 mg) by mouth once daily at bedtime. 30 capsule 6    Corlanor 7.5 mg tablet Take 1 tablet (7.5 mg) by mouth 2 times daily (morning and  late afternoon).      desvenlafaxine 100 mg 24 hr tablet TAKE 1 TABLET BY MOUTH EVERY DAY IN THE MORNING 90 tablet 3    dicyclomine (Bentyl) 20 mg tablet Take 1 tablet (20 mg) by mouth 4 times a day before meals.      glucagon (Glucagen) 1 mg injection 1 mg.  Only take if BG< 60      glucagon (Gvoke HypoPen 2-Pack) 1 mg/0.2 mL auto-injector Inject 1 Pen under the skin.      lactobacillus (Culturelle) 10 billion cell capsule Take 1 capsule by mouth twice a day.      lamoTRIgine (LaMICtal) 200 mg disintegrating tablet Take 1 tablet (200 mg) by mouth 2 times a day. 180 tablet 3    lisdexamfetamine (Vyvanse) 70 mg capsule Take 1 capsule (70 mg) by mouth once daily in the morning. 30 capsule 0    LORazepam (Ativan) 1 mg tablet Take 1 tablet (1 mg) by mouth 2 times a day as needed for anxiety or sleep. 60 tablet 2    pantoprazole (ProtoNix) 40 mg EC tablet Take 1 tablet (40 mg) by mouth 2 times a day. Do not crush, chew, or split.      pregabalin (Lyrica) 150 mg capsule Take 1 capsule (150 mg) by mouth once daily. each day at the same time.      pregabalin (Lyrica) 50 mg capsule Take 1 capsule (50 mg) by mouth 2 times a day.  (am and afternoon) in addition to 150 mg capsule at night      tezepelumab-ekko (Tezspire) SubQ syringe Inject 210 mg under the skin every 28 (twenty-eight) days.      traZODone (Desyrel) 100 mg tablet Take 2 tablets (200 mg) by mouth once daily at bedtime. 180 tablet 3    Trelegy Ellipta 200-62.5-25 mcg blister with device 1 puff once daily.      zavegepant (Zavzpret) 10 mg/actuation spray,non-aerosol Administer 10 mg into affected nostril(s) if needed.      azelastine-fluticasone 137-50 mcg/spray spray,non-aerosol Administer 1 spray into each nostril twice a day.      glucose 4 gram chewable tablet Chew 4 tablets (16 g). (Patient not taking: Reported on 12/13/2024)      ipratropium-albuteroL (Duo-Neb) 0.5-2.5 mg/3 mL nebulizer solution Take 3 mL by nebulization every 4 hours if needed for wheezing  or shortness of breath. (Patient not taking: Reported on 12/13/2024)      levonorgestrel (Mirena) 21 mcg/24 hours (8 yrs) 52 mg IUD 52 mg by intrauterine route.      Reyvow 100 mg tablet Take 1 tablet by mouth every 24 (twenty four) hours if needed. AT ONSET OF MIGRAINE NO MORE THAN 1X (Patient not taking: Reported on 12/13/2024)       Current Facility-Administered Medications   Medication Dose Route Frequency Provider Last Rate Last Admin    ARIPiprazole ER (2 month) (Abilify Asimtufii) IM injection 960 mg  960 mg intramuscular Once Ata Nova MD PhD           Your Provider's Instructions:  Do not consume alcoholic beverages for 24 hours.  Do not make important decisions or sign any important documents for the next 24 hours.  It is recommended that a responsible adult remain with you for the next 24 hours as you may be lightheaded or dizzy.  Do not drive or operate machinery until the day after a treatment session or after a restful sleep.  Resume your normal diet.     When to Call Your Doctor:  Call your doctor for questions and/or concerns.  Call 911 for an emergency situation.    Health Maintenance:  If you currently use tobacco products, it is advised that you quit to improve your health.  If you currently use or have stopped using tobacco products within the last 12 months, the following are resources that can help you:  American Heart Association ([phone] [phone], (www.americanheart.org)  [place] Tobacco Quit Line 1-207.818.4613 (QUIT-NOW)  http://www.[facility].[place].[facility]/odhPrograms/hprr/tob_risk1.aspx    Do you or a loved one need help for alcohol or drug use? Call the WorkMeIn at 211 or visit FindTreatment.gov for resources.    To find out where you can safely dispose unused medicines, visit www.rxdrugdropbox.org, or call your local police department.    Please call us at [phone] with any concerns.    Prior to your next appointment for Spravato/Ketamine, you will be sent the PHQ-9 as well as the ANUP-7  assessments via [facility]. In the past, you have typically completed these on paper during your appointment. We ask that you check your MyChart the day before your appointment and complete these online. This will assist us in streamlining your check-in during your appointment.     Please make certain you have transportation arranged to and from your appointment. This can be a friend, family member, or arranged through your health insurance provider.    Before your next appointment:  Stop or reduce smoking, vaping, and any nicotine use if at all possible.  No alcohol for 24 hours before your appointment.    Day of treatment:  You may have clear liquids (water, tea, coffee, no-pulp juice, plain Jello, hard candy, BUT no milk, cream or non-dairy creamers) up until 30 minutes before your appointment time. You may have solids up to 2 hours before your appointment time.  Please bring headphones to listen to calming music, guided meditation, or anything you'd like. You can also bring a journal to write in during the treatment time.  Set an intention for each appointment. Your intention can be qualities you want to cultivate in yourself, mental health goals you would like to achieve, or experiences you would like to attract into your life.   Bring any inhalers you may need while at your appointment.  Take all morning medications as prescribed, except the followin hours before your appointment, no chlordiazepoxide (Librium), diazepam (Valium), or Flurazepam (Dalmane)   12 hours before your appointment, no lamotrigine (Lamictal).  Try to avoid taking any anti-anxiety medications if at all possible the day of your appointment. However, if your anxiety is unbearable and would prevent you from attending your appointment, please take your prescribed dose.   No stimulants prior to your treatment; you may take them once you are home.  Use any nasal sprays up to 1 hour before your appointment (for SPRAVATO only).      Cancellations: If you need to cancel your appointment, we must have at least a 24-hour notice. If you cancel with less than 24-hour notice or do not show three times, no additional appointments will be scheduled until the treatment plan is discussed with your outpatient psychiatrist. Also, if you arrive more than 15 minutes late, we may need to cancel your appointment and reschedule. Please call [phone] to cancel appointments. Thank you for your cooperation!

## 2024-12-13 NOTE — H&P
Psychiatry Procedure H&P for each visit    Name: Kelli  : 1985  MRN: [id number]    Date: 24     History & Physical Reviewed:  Pregnancy/Lactating:  Are You Pregnant: No  Are You Currently Breastfeeding: No    I have reviewed the History and Physical dated:   History and Physical Reviewed and relevant findings noted. Patient examined to review pertinent physical findings: Yes  Home Medications Reviewed: Yes  Allergies Reviewed: Yes    ERAS (Enhanced Recovery After Surgery):  ERAS Patient: No    Consent:  COVID-19 Consent:   COVID-19 Risk Consent: Yes: Surgeon has reviewed key risks related to the risk of meli COVID-19 and if they contract COVID-19 what the risks are.

## 2024-12-14 LAB
BACTERIA BLD CULT: NORMAL
BACTERIA BLD CULT: NORMAL

## 2024-12-15 LAB
ATRIAL RATE: 86 BPM
P AXIS: 53 DEGREES
P OFFSET: 205 MS
P ONSET: 155 MS
PR INTERVAL: 148 MS
Q ONSET: 229 MS
QRS COUNT: 14 BEATS
QRS DURATION: 68 MS
QT INTERVAL: 498 MS
QTC CALCULATION(BAZETT): 595 MS
QTC FREDERICIA: 561 MS
R AXIS: -1 DEGREES
T AXIS: 45 DEGREES
T OFFSET: 478 MS
VENTRICULAR RATE: 86 BPM

## 2024-12-17 ENCOUNTER — CLINICAL SUPPORT (OUTPATIENT)
Dept: BEHAVIORAL HEALTH | Facility: CLINIC | Age: 39
End: 2024-12-17
Payer: COMMERCIAL

## 2024-12-17 DIAGNOSIS — F41.9 ANXIETY: ICD-10-CM

## 2024-12-17 DIAGNOSIS — F31.9 BIPOLAR 1 DISORDER (MULTI): ICD-10-CM

## 2024-12-17 PROCEDURE — 90853 GROUP PSYCHOTHERAPY: CPT | Mod: AH,95 | Performed by: PSYCHOLOGIST

## 2024-12-17 PROCEDURE — 90853 GROUP PSYCHOTHERAPY: CPT | Performed by: PSYCHOLOGIST

## 2024-12-19 ENCOUNTER — APPOINTMENT (OUTPATIENT)
Dept: BEHAVIORAL HEALTH | Facility: CLINIC | Age: 39
End: 2024-12-19
Payer: COMMERCIAL

## 2024-12-19 DIAGNOSIS — F31.9 BIPOLAR 1 DISORDER (MULTI): ICD-10-CM

## 2024-12-19 PROCEDURE — 90837 PSYTX W PT 60 MINUTES: CPT | Performed by: PSYCHOLOGIST

## 2024-12-19 PROCEDURE — 3044F HG A1C LEVEL LT 7.0%: CPT | Performed by: PSYCHOLOGIST

## 2024-12-19 NOTE — PROGRESS NOTES
Humanistic/Insight Oriented Therapy. 50 min.  BP1.  We spoke about her mood stable around minor physical issues and how it's been like this for weeks, which is a good thing.  We spoke about how she is approaching the holidays, her finances, and trying to renegotiate her goals and desires to work for her relationship with self, not against it.      Chief complaint - Phase of life and emotional distress   Treatment plan - Insight and action consistent with ACT therapy. Providing support, safe space to process their thoughts and feelings, developing therapeutic relationship.  Goals - Self-care, insight, coping skills  Prognosis - Average  Progress - Average  Functional status - 70/100  Focused mental status: Patient was oriented to person, place, time and context  Focused mental exam - alert and oriented  Frequency - Every two weeks

## 2024-12-20 NOTE — GROUP NOTE
Group Topic: Feeling Awareness/Expression   Group Date: 12/17/2024  Start Time:  6:00 PM  End Time:  7:30 PM  Facilitators: Garima Wong PsyD   Department: [facility]    Number of Participants: 16   Group Focus: check in  Treatment Modality: Other: Support and check-in  Interventions utilized were support  Purpose: feelings    This was a video IOP aftercare group on a HIPAA compliant platform. All patients were provided with informed consent.    IOP Aftercare Group: Check-in  Participants had opportunity to check-in with their peers and discuss recent stressors. They shared coping that has been helpful to them and supported peers through challenges. They set an intention for the holidays.   Garima Wong Psy.D.      Name: Kelli Silva YOB: 1985   MR: [id number]      Kelli was present and actively engaged in discussion. She stated that she has been struggling with lower mood and being engaged at work. She stated that she feels this is possibly related to pain from migraines. She processed shame she is feeling from debt she incurred during a manic episode.

## 2024-12-23 ENCOUNTER — APPOINTMENT (OUTPATIENT)
Dept: BEHAVIORAL HEALTH | Facility: CLINIC | Age: 39
End: 2024-12-23
Payer: COMMERCIAL

## 2024-12-23 DIAGNOSIS — F31.0 BIPOLAR AFFECTIVE DISORDER, CURRENT EPISODE HYPOMANIC (MULTI): Primary | ICD-10-CM

## 2024-12-23 NOTE — PROGRESS NOTES
Patient here at the clinic for every 2 months injection of Abilify Asimtufii for Bipolar affective disorder,current episode hypomanic.    Patient identified by full name and , vitals obtained. Patient last seen by Provider on 24 last seen by nurse on 10/31/24. Medication verified by providers note and medication order.      Appetite:  good  Sleep:  good  Appearance: clean, age appropriate and well groomed  Build: overweight  Attitude: cooperative, calm, pleasant, friendly, open  Eye Contact: normal  Activity: alert, attentive, appropriate  Speech: appropriate & spontaneous, normal rate & flow, clear  Delusions: denies  Thought Content: logical  Thought Process: logical  Judgement/insight:  Intact/good  Mood: calm   Affect: appropriate  AH/VH/SI/HI patient denies  Access to Firearms/weapons: No  Depression: Patient denies  Thoughts of hopelessness: denies  Anxiety: pt denies  Self-injurious behavior: denies at this time  Cravings/Urges: denies  Last Use: NA  Tobacco Use: Non smoker  Spiritual or cultural practices that may affect your care or impact your health care decisions? No  Living situation:  Lives with boyfriend  Employed: Employed at [employer]      Patient has an appropriate affect, engaged during visit today, with no issues to report with the previous injection.   Patient denies SI/HI, VH/AH and self harming thoughts at this time.       Patient received Abilify Asimtufii 960mg/3.2ml  via 22 gauge,1 and 1/2 inch needle in the left gluteal area with no reaction to the injection site. No bleeding or erythema noted, patient tolerated the procedure  well. Nurse informed patient to use warm compress for comfort if needed. Patient will return to the clinic in 2 months.    RN provided education on medication and side effects,  Patient is aware to contact nurse for any issues or concerns or call 911/ mobile crisis number for emergent situations.      LOT: [id number]  EXP:  MAR 2025  NDC:   95641-074-59    Apolonia Ahumada RN, BSN.

## 2024-12-26 ENCOUNTER — APPOINTMENT (OUTPATIENT)
Dept: BEHAVIORAL HEALTH | Facility: CLINIC | Age: 39
End: 2024-12-26
Payer: COMMERCIAL

## 2024-12-26 DIAGNOSIS — F31.9 BIPOLAR 1 DISORDER (MULTI): ICD-10-CM

## 2024-12-26 PROCEDURE — 3044F HG A1C LEVEL LT 7.0%: CPT | Performed by: PSYCHOLOGIST

## 2024-12-26 PROCEDURE — 90837 PSYTX W PT 60 MINUTES: CPT | Performed by: PSYCHOLOGIST

## 2024-12-26 NOTE — PROGRESS NOTES
Humanistic/Insight Oriented Therapy. 50 min.  BP1.  We spoke about her feeling a bit disoriented, delusional from lack of sleep, losing site of what day it was and other thought issues, but took her antipsychotic and felt better.  Talked about some 2025 goals. We spoke about the value and purpose for her social/relational goals.      Chief complaint - Phase of life and emotional distress   Treatment plan - Insight and action consistent with ACT therapy. Providing support, safe space to process their thoughts and feelings, developing therapeutic relationship.  Goals - Self-care, insight, coping skills  Prognosis - Average  Progress - Average  Functional status - 70/100  Focused mental status: Patient was oriented to person, place, time and context  Focused mental exam - alert and oriented  Frequency - Every two weeks

## 2025-01-02 ENCOUNTER — APPOINTMENT (OUTPATIENT)
Dept: BEHAVIORAL HEALTH | Facility: CLINIC | Age: 40
End: 2025-01-02
Payer: COMMERCIAL

## 2025-01-02 DIAGNOSIS — F31.9 BIPOLAR 1 DISORDER (MULTI): ICD-10-CM

## 2025-01-02 PROCEDURE — 90837 PSYTX W PT 60 MINUTES: CPT | Performed by: PSYCHOLOGIST

## 2025-01-02 NOTE — PROGRESS NOTES
Humanistic/Insight Oriented Therapy. 50 min.  BP1.  We spoke about how she missed a bit of work, but is prioritizing getting back to her routine going forward.  She reflected how important her relationship is as he was gone for a week, the life they share, and the future she would like with him.     Chief complaint - Phase of life and emotional distress   Treatment plan - Insight and action consistent with ACT therapy. Providing support, safe space to process their thoughts and feelings, developing therapeutic relationship.  Goals - Self-care, insight, coping skills  Prognosis - Average  Progress - Average  Functional status - 70/100  Focused mental status: Patient was oriented to person, place, time and context  Focused mental exam - alert and oriented  Frequency - Every two weeks

## 2025-01-03 ENCOUNTER — APPOINTMENT (OUTPATIENT)
Dept: POSTOP/PACU | Facility: HOSPITAL | Age: 40
End: 2025-01-03
Payer: COMMERCIAL

## 2025-01-06 ENCOUNTER — OFFICE VISIT (OUTPATIENT)
Dept: URGENT CARE | Age: 40
End: 2025-01-06
Payer: COMMERCIAL

## 2025-01-06 VITALS
WEIGHT: 230 LBS | BODY MASS INDEX: 42.33 KG/M2 | SYSTOLIC BLOOD PRESSURE: 132 MMHG | HEIGHT: 62 IN | HEART RATE: 118 BPM | TEMPERATURE: 98.2 F | DIASTOLIC BLOOD PRESSURE: 87 MMHG | RESPIRATION RATE: 18 BRPM | OXYGEN SATURATION: 97 %

## 2025-01-06 DIAGNOSIS — R05.9 COUGH, UNSPECIFIED TYPE: ICD-10-CM

## 2025-01-06 DIAGNOSIS — J01.10 ACUTE FRONTAL SINUSITIS, RECURRENCE NOT SPECIFIED: Primary | ICD-10-CM

## 2025-01-06 LAB
POC RAPID INFLUENZA A: NEGATIVE
POC RAPID INFLUENZA B: NEGATIVE
POC RAPID STREP: NEGATIVE
POC SARS-COV-2 AG BINAX: NORMAL

## 2025-01-06 PROCEDURE — 3075F SYST BP GE 130 - 139MM HG: CPT | Performed by: FAMILY MEDICINE

## 2025-01-06 PROCEDURE — 99214 OFFICE O/P EST MOD 30 MIN: CPT | Performed by: FAMILY MEDICINE

## 2025-01-06 PROCEDURE — 87811 SARS-COV-2 COVID19 W/OPTIC: CPT | Performed by: PHYSICIAN ASSISTANT

## 2025-01-06 PROCEDURE — 3008F BODY MASS INDEX DOCD: CPT | Performed by: FAMILY MEDICINE

## 2025-01-06 PROCEDURE — 87804 INFLUENZA ASSAY W/OPTIC: CPT | Performed by: PHYSICIAN ASSISTANT

## 2025-01-06 PROCEDURE — 87880 STREP A ASSAY W/OPTIC: CPT | Performed by: PHYSICIAN ASSISTANT

## 2025-01-06 PROCEDURE — 1036F TOBACCO NON-USER: CPT | Performed by: FAMILY MEDICINE

## 2025-01-06 PROCEDURE — 3079F DIAST BP 80-89 MM HG: CPT | Performed by: FAMILY MEDICINE

## 2025-01-06 RX ORDER — MOXIFLOXACIN HYDROCHLORIDE 400 MG/1
400 TABLET ORAL DAILY
Qty: 10 TABLET | Refills: 0 | Status: SHIPPED | OUTPATIENT
Start: 2025-01-06 | End: 2025-01-16

## 2025-01-06 ASSESSMENT — PATIENT HEALTH QUESTIONNAIRE - PHQ9
SUM OF ALL RESPONSES TO PHQ9 QUESTIONS 1 AND 2: 0
2. FEELING DOWN, DEPRESSED OR HOPELESS: NOT AT ALL
1. LITTLE INTEREST OR PLEASURE IN DOING THINGS: NOT AT ALL

## 2025-01-06 ASSESSMENT — ENCOUNTER SYMPTOMS
EYE PAIN: 0
SINUS PAIN: 0
SORE THROAT: 1
EYE REDNESS: 0
SINUS PRESSURE: 0
CHILLS: 1
WHEEZING: 1
EYE DISCHARGE: 0
SHORTNESS OF BREATH: 1
HEADACHES: 1
COUGH: 1
CHEST TIGHTNESS: 1
FEVER: 0

## 2025-01-06 NOTE — PROGRESS NOTES
Subjective   Patient ID: Kelli Silva is a 39 y.o. female. They present today with a chief complaint of Cough, Sore Throat, Nasal Congestion, and Generalized Body Aches (X 6 days).    History of Present Illness    History provided by:  Patient   used: No    Cough  This is a new problem. The current episode started 1 to 4 weeks ago (1 week). The problem has been gradually worsening. The problem occurs every few minutes. The cough is Non-productive. Associated symptoms include chills, ear pain, headaches, a sore throat, shortness of breath and wheezing. Pertinent negatives include no chest pain, eye redness or fever. Treatments tried: Tylenol and Mucinex. The treatment provided no relief.   Sore Throat   This is a new problem. Associated symptoms include coughing, ear pain, headaches and shortness of breath. She has had no exposure to strep.       Past Medical History  Allergies as of 01/06/2025 - Reviewed 01/06/2025   Allergen Reaction Noted    Aspirin Shortness of breath 10/09/2023    Cigarette smoke Shortness of breath 01/16/2007    Fish containing products Hives, Rash, and Shortness of breath 12/31/2013    Iodinated contrast media Shortness of breath 10/07/2023    Ketorolac Shortness of breath 10/07/2023    Ketorolac tromethamine Shortness of breath and Other 04/25/2008    Ondansetron Other 10/11/2022    Other Shortness of breath 01/16/2007    Prochlorperazine Shortness of breath and Other 08/09/2005    Shellfish containing products Hives, Rash, and Shortness of breath 12/31/2013    Sumatriptan Runny nose 04/27/2017    Azithromycin Nausea/vomiting 08/10/2021    Ceftriaxone Other 10/09/2023    Doxycycline Diarrhea 04/27/2017    Dulaglutide Other 08/30/2022    House dust Other 04/27/2017    Liraglutide Nausea Only 10/11/2022    Metformin Other 09/23/2019    Metoclopramide Itching 05/08/2013    Prednisone Other 07/17/2018    Sitagliptin Other 08/30/2022    [id number] antimigraine agents  "Other 10/09/2023    Zolmitriptan Other 04/27/2017       (Not in a hospital admission)       Past Medical History:   Diagnosis Date    Normal routine history and physical examination     Normal routine history and physical examination     Other specified health status     No pertinent past medical history       Past Surgical History:   Procedure Laterality Date    OTHER SURGICAL HISTORY  05/08/2020    Venous access port placement        reports that she has never smoked. She has never used smokeless tobacco. She reports that she does not drink alcohol and does not use drugs.    Review of Systems  Review of Systems   Constitutional:  Positive for chills. Negative for fever.   HENT:  Positive for ear pain and sore throat. Negative for sinus pressure and sinus pain.    Eyes:  Negative for pain, discharge and redness.   Respiratory:  Positive for cough, chest tightness, shortness of breath and wheezing.    Cardiovascular:  Negative for chest pain.   Neurological:  Positive for headaches.                                  Objective    Vitals:    01/06/25 1820   BP: 132/87   BP Location: Left arm   Pulse: (!) 118   Resp: 18   Temp: 36.8 °C (98.2 °F)   SpO2: 97%   Weight: 104 kg (230 lb)   Height: 1.575 m (5' 2\")     No LMP recorded. (Menstrual status: IUD).    Physical Exam  Vitals reviewed.   Constitutional:       General: She is not in acute distress.     Appearance: She is not ill-appearing.   HENT:      Right Ear: Tympanic membrane and ear canal normal.      Left Ear: Tympanic membrane and ear canal normal.      Nose:      Right Sinus: Maxillary sinus tenderness and frontal sinus tenderness present.      Left Sinus: Maxillary sinus tenderness and frontal sinus tenderness present.      Mouth/Throat:      Mouth: Mucous membranes are moist.      Pharynx: No posterior oropharyngeal erythema.   Eyes:      Extraocular Movements: Extraocular movements intact.      Conjunctiva/sclera: Conjunctivae normal.      Pupils: Pupils " are equal, round, and reactive to light.   Cardiovascular:      Rate and Rhythm: Normal rate and regular rhythm.      Heart sounds: No murmur heard.     No friction rub.   Pulmonary:      Effort: Pulmonary effort is normal. No respiratory distress.      Breath sounds: No wheezing, rhonchi or rales.   Lymphadenopathy:      Cervical: No cervical adenopathy.   Neurological:      Mental Status: She is alert.         Procedures    Point of Care Test & Imaging Results from this visit  Results for orders placed or performed in visit on 01/06/25   POCT Covid-19 Rapid Antigen   Result Value Ref Range    POC LE-COV-2 AG  Presumptive negative test for SARS-CoV-2 (no antigen detected)     Presumptive negative test for SARS-CoV-2 (no antigen detected)   POCT Influenza A/B manually resulted   Result Value Ref Range    POC Rapid Influenza A Negative Negative    POC Rapid Influenza B Negative Negative   POCT rapid strep A manually resulted   Result Value Ref Range    POC Rapid Strep Negative Negative      No results found.    Diagnostic study results (if any) were reviewed by Taye Recio DO.    Assessment/Plan   Allergies, medications, history, and pertinent labs/EKGs/Imaging reviewed by Taye Recio DO.     Orders and Diagnoses  Diagnoses and all orders for this visit:  Cough, unspecified type  -     POCT Covid-19 Rapid Antigen  -     POCT Influenza A/B manually resulted  -     POCT rapid strep A manually resulted      Medical Admin Record      Patient disposition: Home    Electronically signed by Taye Recio DO  6:52 PM

## 2025-01-07 ENCOUNTER — CLINICAL SUPPORT (OUTPATIENT)
Dept: BEHAVIORAL HEALTH | Facility: CLINIC | Age: 40
End: 2025-01-07
Payer: COMMERCIAL

## 2025-01-07 DIAGNOSIS — F41.1 GAD (GENERALIZED ANXIETY DISORDER): ICD-10-CM

## 2025-01-07 DIAGNOSIS — F31.31 BIPOLAR 1 DISORDER, DEPRESSED, MILD (MULTI): ICD-10-CM

## 2025-01-07 PROCEDURE — 90853 GROUP PSYCHOTHERAPY: CPT | Mod: U2,95

## 2025-01-08 ENCOUNTER — APPOINTMENT (OUTPATIENT)
Dept: POSTOP/PACU | Facility: HOSPITAL | Age: 40
End: 2025-01-08
Payer: COMMERCIAL

## 2025-01-08 NOTE — GROUP NOTE
Group Topic: Feeling Awareness/Expression   Group Date: 1/7/2025  Start Time:  6:00 PM  End Time:  7:30 PM  Facilitators: ROSIE Weber   Department: [facility]    This was a video IOP group on a HIPAA compliant platform. All patients were provided with informed consent.     Date: 1/7/2025, Time: 6:00-7:30pm, Number of Patients: 15, User Name: [email or web address],  Number of Staff: 1  Group topic(s): check-ins. The group engaged in checking in from their week by sharing a high point and a low point. Individually patients offered feedback to and received feedback from their peers. Group discussion followed.     Name: Kelli Silva YOB: 1985   MR: [id number]      Kelli was attentive and on topic. Patient shared appropriately and was engaged appropriately. Patient checked in and shared having some good moments over the holidays including watching her nephew open gifts and engaging in traditions with her partner. Patient endorsed noticing some hypomanic symptoms around the holidays but utilized her support to help recognize her symptoms and take her medications.   ROSIE Yee

## 2025-01-09 ENCOUNTER — APPOINTMENT (OUTPATIENT)
Dept: BEHAVIORAL HEALTH | Facility: CLINIC | Age: 40
End: 2025-01-09
Payer: COMMERCIAL

## 2025-01-09 DIAGNOSIS — F31.9 BIPOLAR 1 DISORDER (MULTI): ICD-10-CM

## 2025-01-09 PROCEDURE — 90837 PSYTX W PT 60 MINUTES: CPT | Performed by: PSYCHOLOGIST

## 2025-01-09 NOTE — PROGRESS NOTES
Insight Oriented Therapy. 50 min. BP1.  We spoke about how she continues to handle the 'bumps in the road'     Teletherapy session  Chief complaint - Phase of life and emotional distress   Treatment plan - Insight and action consistent with ACT therapy. Providing support, safe space to process their thoughts and feelings, developing therapeutic relationship.  Goals - Self-care, insight, coping skills  Prognosis - Average  Progress - Average  Functional status - 70/100  Focused mental status: Patient was oriented to person, place, time and context  Focused mental exam - alert and oriented  Frequency - Every two weeks

## 2025-01-13 ENCOUNTER — TELEPHONE (OUTPATIENT)
Dept: BEHAVIORAL HEALTH | Facility: CLINIC | Age: 40
End: 2025-01-13
Payer: COMMERCIAL

## 2025-01-14 ENCOUNTER — CLINICAL SUPPORT (OUTPATIENT)
Dept: BEHAVIORAL HEALTH | Facility: CLINIC | Age: 40
End: 2025-01-14

## 2025-01-14 DIAGNOSIS — F31.9 BIPOLAR 1 DISORDER (MULTI): Primary | ICD-10-CM

## 2025-01-14 DIAGNOSIS — F31.9 BIPOLAR 1 DISORDER (MULTI): ICD-10-CM

## 2025-01-14 DIAGNOSIS — F41.1 GAD (GENERALIZED ANXIETY DISORDER): ICD-10-CM

## 2025-01-14 PROCEDURE — 90791 PSYCH DIAGNOSTIC EVALUATION: CPT | Mod: AH,95 | Performed by: PSYCHOLOGIST

## 2025-01-14 PROCEDURE — 90791 PSYCH DIAGNOSTIC EVALUATION: CPT | Performed by: PSYCHOLOGIST

## 2025-01-14 RX ORDER — QUETIAPINE FUMARATE 200 MG/1
200 TABLET, FILM COATED ORAL NIGHTLY
Qty: 30 TABLET | Refills: 1 | Status: SHIPPED | OUTPATIENT
Start: 2025-01-14 | End: 2025-03-15

## 2025-01-16 ENCOUNTER — TELEMEDICINE (OUTPATIENT)
Dept: BEHAVIORAL HEALTH | Facility: CLINIC | Age: 40
End: 2025-01-16
Payer: COMMERCIAL

## 2025-01-16 ENCOUNTER — APPOINTMENT (OUTPATIENT)
Dept: BEHAVIORAL HEALTH | Facility: CLINIC | Age: 40
End: 2025-01-16
Payer: COMMERCIAL

## 2025-01-16 DIAGNOSIS — F31.9 BIPOLAR 1 DISORDER (MULTI): ICD-10-CM

## 2025-01-16 DIAGNOSIS — F90.0 ATTENTION DEFICIT HYPERACTIVITY DISORDER (ADHD), PREDOMINANTLY INATTENTIVE TYPE: ICD-10-CM

## 2025-01-16 PROCEDURE — 90837 PSYTX W PT 60 MINUTES: CPT | Performed by: PSYCHOLOGIST

## 2025-01-16 PROCEDURE — 99214 OFFICE O/P EST MOD 30 MIN: CPT | Performed by: PSYCHIATRY & NEUROLOGY

## 2025-01-16 RX ORDER — LISDEXAMFETAMINE DIMESYLATE 70 MG/1
70 CAPSULE ORAL EVERY MORNING
Qty: 30 CAPSULE | Refills: 0 | Status: SHIPPED | OUTPATIENT
Start: 2025-01-16 | End: 2025-02-15

## 2025-01-16 NOTE — PROGRESS NOTES
"Follow-up visit   Virtual or Telephone Consent    An interactive audio and video telecommunication system which permits real time communications between the patient (at the originating site) and provider (at the distant site) was utilized to provide this telehealth service.   Verbal consent was requested and obtained from Kelli Silva on this date, 01/16/25 for a telehealth visit.      Subjective: She said she felt ok gain and \"manic\" anymore. She said Seroquel 200 mg immediate release helped her sleep and hypomanic sx.  Currently, she felt a little depressed. She had not taken Vyvanse today as we discussed. She said her \"ángel\" was triggered by acute bronchitis which affected her sleep. Her bronchitis started on new night and felt a letter in last few days.  She received antibiotic and will fish the course tomorrow. Before then, she was fine.  No ángel or hypomania since yesterday. Ate well. Energy was low. Concentration was not good. Not feel hopeless or helpless. Denied having SI/HI/AVH or paranoia. Was very irritable, but felt better today.   No side effect from medication  Able to take care of herself most of the time, but only worked part-time in last 2 weeks.     DM - under control and stable   Still had tachycardia lately  No problem with eyes, ears, nose or throat   No SOB or chest pain   Asthma -with medication   GERD with medication. No other problem with GI  No problem with  system  Migraine on this past Monday, no other neurological problem   No problem with bones, joints, or muscles   Hx of anemia with iron infusion    No new allergy     Objective:   Appearance: well-groomed.   Demeanor: average.   Eye Contact: average.   Motor Activity: average.   Speech: clear.   Language: Neurologic language is intact.   Fund of Knowledge: intact fund of knowledge.   Delusions: None Reported.   Hallucinations: not reported  Self Harm: None Reported.   Aggressive: None Reported.   Mood: ok  Affect: full with " smile   Orientation: alert, oriented x3.   Manner: cooperative.   Thought process: goal-directed.   Thought association: displays rational thought process.   Content of thought: normal  Abstract/ Rational Thought: intact   Memory: grossly intact.   Behavior: normal motor activity, relaxed, good eye contact, calm.   Attention/Concentration: normal.   Cognition: intact.   Intelligence Estimate: average.   Executive Function: intact.   Insight: intact.   Judgement: intact.   Musculoskeletal: normal strength and tone. No abnormal movement  Impression: bipolar I disorder, mostly recently, hypomania - recovering   ANUP - manageable with medication   ADHD - manageable with Vyvanse 70 mg  Discussion/Plan:    Agreed to continue quetiapine 200 mg at bedtime for a few more weeks before discontinuing it.   Continued other medication   Will have ketamine infusion next Wednesday as scheduled  Follow-up in 3 months in-person to sign the controlled substance agreement and urine drug screening.   Ata Nova MD.

## 2025-01-16 NOTE — PROGRESS NOTES
Insight Oriented Therapy. 50 min.  BP1.  We spoke about how she is feeling better now after a period of hypomania earlier in the week. Some good sleep, slow structure, and practicing some skills helped her get back to center.  We spoke a bit about how she has been feeling insecure at times during her sessions.    Teletherapy session  Chief complaint - Phase of life and emotional distress   Treatment plan - Insight and action consistent with ACT therapy. Providing support, safe space to process their thoughts and feelings, developing therapeutic relationship.  Goals - Self-care, insight, coping skills  Prognosis - Average  Progress - Average  Functional status - 70/100  Focused mental status: Patient was oriented to person, place, time and context  Focused mental exam - alert and oriented  Frequency - Every two weeks

## 2025-01-19 NOTE — GROUP NOTE
"Group Topic: Feeling Awareness/Expression   Group Date: 1/14/2025  Start Time:  6:00 PM  End Time:  7:30 PM  Facilitators: Garima Wong PsyD   Department: [facility]    Number of Participants: 14   Group Focus: check in  Treatment Modality: Patient-Centered Therapy  Interventions utilized were support  Purpose: feelings and communication skills    This was a video IOP aftercare group on a HIPAA compliant platform. All patients were provided with informed consent.    IOP Aftercare Group: Check-in  Participants had opportunity to check-in with their peers and discuss recent stressors. They offered each other support and shared coping that they have found useful. They reviewed previous coping skills learned in IOP.   Garima Wong Psy.D.      Name: Kelli Silva YOB: 1985   MR: [id number]      Kelli was present and actively engaged in discussion. She discussed becoming sick over the holidays and how she managed this. She stated that she enjoyed \"spending time with family.\" She noted that she is noticing some increase in hypomania and that she is planning to try a new medication. Overall, she is working on reducing symptoms and \"fighting off a migraine.\"         "

## 2025-01-21 ENCOUNTER — CLINICAL SUPPORT (OUTPATIENT)
Dept: BEHAVIORAL HEALTH | Facility: CLINIC | Age: 40
End: 2025-01-21
Payer: COMMERCIAL

## 2025-01-21 DIAGNOSIS — F90.0 ATTENTION DEFICIT HYPERACTIVITY DISORDER (ADHD), PREDOMINANTLY INATTENTIVE TYPE: ICD-10-CM

## 2025-01-21 DIAGNOSIS — F41.1 GAD (GENERALIZED ANXIETY DISORDER): ICD-10-CM

## 2025-01-21 DIAGNOSIS — F31.9 BIPOLAR 1 DISORDER (MULTI): ICD-10-CM

## 2025-01-21 PROCEDURE — 90853 GROUP PSYCHOTHERAPY: CPT | Mod: AH,95 | Performed by: PSYCHOLOGIST

## 2025-01-21 PROCEDURE — 90853 GROUP PSYCHOTHERAPY: CPT | Performed by: PSYCHOLOGIST

## 2025-01-22 ENCOUNTER — HOSPITAL ENCOUNTER (OUTPATIENT)
Dept: POSTOP/PACU | Facility: HOSPITAL | Age: 40
Setting detail: OUTPATIENT SURGERY
Discharge: HOME | End: 2025-01-22
Payer: COMMERCIAL

## 2025-01-22 VITALS
SYSTOLIC BLOOD PRESSURE: 135 MMHG | RESPIRATION RATE: 14 BRPM | OXYGEN SATURATION: 96 % | WEIGHT: 238.76 LBS | HEART RATE: 73 BPM | BODY MASS INDEX: 43.67 KG/M2 | TEMPERATURE: 98.2 F | DIASTOLIC BLOOD PRESSURE: 63 MMHG

## 2025-01-22 DIAGNOSIS — F31.9 BIPOLAR I DISORDER WITH DEPRESSION (MULTI): Primary | ICD-10-CM

## 2025-01-22 PROCEDURE — 2500000004 HC RX 250 GENERAL PHARMACY W/ HCPCS (ALT 636 FOR OP/ED): Performed by: PSYCHIATRY & NEUROLOGY

## 2025-01-22 PROCEDURE — 2500000005 HC RX 250 GENERAL PHARMACY W/O HCPCS: Performed by: PSYCHIATRY & NEUROLOGY

## 2025-01-22 PROCEDURE — KETSP PR KETAMINE INFUSION SELF-PAY: Performed by: STUDENT IN AN ORGANIZED HEALTH CARE EDUCATION/TRAINING PROGRAM

## 2025-01-22 PROCEDURE — KETSP HC KETAMINE INFUSION SELF-PAY: Performed by: STUDENT IN AN ORGANIZED HEALTH CARE EDUCATION/TRAINING PROGRAM

## 2025-01-22 RX ORDER — KETAMINE HCL IN 0.9 % NACL 200 MG/200
0.75 PLASTIC BAG, INJECTION (ML) INTRAVENOUS ONCE
Start: 2025-01-22 | End: 2025-01-22

## 2025-01-22 RX ORDER — ONDANSETRON HYDROCHLORIDE 2 MG/ML
8 INJECTION, SOLUTION INTRAVENOUS ONCE
Status: COMPLETED | OUTPATIENT
Start: 2025-01-22 | End: 2025-01-22

## 2025-01-22 RX ORDER — KETAMINE HCL IN 0.9 % NACL 200 MG/200
0.75 PLASTIC BAG, INJECTION (ML) INTRAVENOUS ONCE
Status: COMPLETED | OUTPATIENT
Start: 2025-01-22 | End: 2025-01-22

## 2025-01-22 RX ORDER — HEPARIN SODIUM,PORCINE/PF 10 UNIT/ML
50 SYRINGE (ML) INTRAVENOUS AS NEEDED
Status: CANCELLED | OUTPATIENT
Start: 2025-01-22

## 2025-01-22 RX ORDER — ONDANSETRON HYDROCHLORIDE 2 MG/ML
8 INJECTION, SOLUTION INTRAVENOUS ONCE
Start: 2025-01-22 | End: 2025-01-22

## 2025-01-22 RX ORDER — HEPARIN 100 UNIT/ML
500 SYRINGE INTRAVENOUS AS NEEDED
Status: CANCELLED | OUTPATIENT
Start: 2025-01-22

## 2025-01-22 RX ADMIN — ONDANSETRON 8 MG: 2 INJECTION INTRAMUSCULAR; INTRAVENOUS at 11:55

## 2025-01-22 RX ADMIN — Medication 81.2 MG: at 12:09

## 2025-01-22 ASSESSMENT — ANXIETY QUESTIONNAIRES
3. WORRYING TOO MUCH ABOUT DIFFERENT THINGS: SEVERAL DAYS
7. FEELING AFRAID AS IF SOMETHING AWFUL MIGHT HAPPEN: SEVERAL DAYS
2. NOT BEING ABLE TO STOP OR CONTROL WORRYING: SEVERAL DAYS
GAD7 TOTAL SCORE: 6
5. BEING SO RESTLESS THAT IT IS HARD TO SIT STILL: SEVERAL DAYS
1. FEELING NERVOUS, ANXIOUS, OR ON EDGE: SEVERAL DAYS
IF YOU CHECKED OFF ANY PROBLEMS ON THIS QUESTIONNAIRE, HOW DIFFICULT HAVE THESE PROBLEMS MADE IT FOR YOU TO DO YOUR WORK, TAKE CARE OF THINGS AT HOME, OR GET ALONG WITH OTHER PEOPLE: SOMEWHAT DIFFICULT
6. BECOMING EASILY ANNOYED OR IRRITABLE: NOT AT ALL
4. TROUBLE RELAXING: SEVERAL DAYS

## 2025-01-22 ASSESSMENT — PATIENT HEALTH QUESTIONNAIRE - PHQ9
3. TROUBLE FALLING OR STAYING ASLEEP OR SLEEPING TOO MUCH: NEARLY EVERY DAY
7. TROUBLE CONCENTRATING ON THINGS, SUCH AS READING THE NEWSPAPER OR WATCHING TELEVISION: NEARLY EVERY DAY
10. IF YOU CHECKED OFF ANY PROBLEMS, HOW DIFFICULT HAVE THESE PROBLEMS MADE IT FOR YOU TO DO YOUR WORK, TAKE CARE OF THINGS AT HOME, OR GET ALONG WITH OTHER PEOPLE: VERY DIFFICULT
2. FEELING DOWN, DEPRESSED OR HOPELESS: SEVERAL DAYS
5. POOR APPETITE OR OVEREATING: SEVERAL DAYS
SUM OF ALL RESPONSES TO PHQ9 QUESTIONS 1 AND 2: 2
SUM OF ALL RESPONSES TO PHQ QUESTIONS 1-9: 12
9. THOUGHTS THAT YOU WOULD BE BETTER OFF DEAD, OR OF HURTING YOURSELF: NOT AT ALL
6. FEELING BAD ABOUT YOURSELF - OR THAT YOU ARE A FAILURE OR HAVE LET YOURSELF OR YOUR FAMILY DOWN: SEVERAL DAYS
1. LITTLE INTEREST OR PLEASURE IN DOING THINGS: SEVERAL DAYS
4. FEELING TIRED OR HAVING LITTLE ENERGY: SEVERAL DAYS
8. MOVING OR SPEAKING SO SLOWLY THAT OTHER PEOPLE COULD HAVE NOTICED. OR THE OPPOSITE, BEING SO FIGETY OR RESTLESS THAT YOU HAVE BEEN MOVING AROUND A LOT MORE THAN USUAL: SEVERAL DAYS

## 2025-01-22 NOTE — PROCEDURES
"Ketamine/Esketamine Procedure    Name: Kelli  : 1985  MRN: [id number]    Date: 25    Ketamine Infusion: 0.75mg per kilo over 40 minutes (maintenance)    Time out was taken with staff to confirm correct patient and correct procedure to be performed.     Subjective:  Pt reports mood is a bit low - had an interval of ángel and now is coming down. Feels that Seroquel 200mg is not enough for her to sleep well - sleeping 5 hours per night with poor onset. Plans to ask Dr. Nova about this dose. Denies suicidal/homicidal ideation. Denies auditory/visual hallucinations. Reports eating well. Denies interval change in medications other than there seroquel and does report self-limited illness in interval that has since resolved.     Patient Health Questionnaire-9 Score: 12 (2025 11:27 AM) (prior = 8)  ANUP-7 Total Score: 6 (2025 11:26 AM) (prior = 0)    Objective:  Blood pressure 126/61, pulse 75, temperature 36.8 °C (98.2 °F), temperature source Temporal, resp. rate 16, weight 108 kg (238 lb 12.1 oz), SpO2 97%.    Mental Status Exam:  General/Appearance: NAD, appears stated age, casually dressed, body habitus: obese, grooming/hygiene is excellent  Attitude/Behavior: engages in interview, cooperative, appropriate eye contact  Motor Activity: no psychomotor agitation/retardation, no tics/tremors, no EPS/TD, gait: not assessed  Mood: \"Not great\"  Affect: Dysphoric, restricted, mood congruent, appropriate to content  Speech: spontaneous, coherent, fluent; appropriate quantity, volume, rate, tone  Thought Process: linear, logical, goal-directed, future-oriented  Thought Content: denies SI/HI, Delusional thinking: none elicited  Thought Perception: denies AVH, not responding to internal stimuli  Cognition:  Grossly intact  Insight: good  Judgment: good    Ketamine infusion observation:  About 20 minutes after starting infusion, she said she felt drowsy and like she was floating. No other side effects. Vital " signs were stable. HR: 69 bpm; pulse Ox: 97%; BP: 145/77 mmHg.  Close to the end of infusion, she said she felt dizzy and loopy, increased from prior. No other side effects. Vital signs were stable. HR: 59 bpm; pulse Ox: 94%; BP: 140/76 mmHg.    Tolerated treatment well. No complications.    Assessment:  Bipolar I D/O, currently depressed  ANUP under control   No immediate risk of harm to self or others    Plan:  Continue outpatient management with Dr. Nova. Ketamine infusion on 2/5/25 as currently scheduled.    Suzie Álvarez DO

## 2025-01-22 NOTE — NURSING NOTE
Pt. Monitored for 60 minutes after end of ketamine infusion. Blood glucose dipped from 93 to 74 at 1220. Pt. Requested and was given apple juice and blood glucose at 1225 jairo to 88. VSS. Pt. A&Ox4 at time of discharge. IV access discontinued with cannula/needle intact. No excessive bleeding. Pt. Has Band-Aid. Pt. Tolerated well.

## 2025-01-22 NOTE — PATIENT INSTRUCTIONS
Homegoing Instructions    Patient: Kelli Silva  MRN: [id number]  YOB: 1985    Care Providers: Dr. Suzie Roman, RN    Allergies:  Aspirin, Cigarette smoke, Fish containing products, Iodinated contrast media, Ketorolac, Ketorolac tromethamine, Ondansetron, Other, Prochlorperazine, Shellfish containing products, Sumatriptan, Azithromycin, Ceftriaxone, Doxycycline, Dulaglutide, House dust, Liraglutide, Metformin, Metoclopramide, Prednisone, Sitagliptin, Cfhnuxlx-6-cf3 antimigraine agents, and Zolmitriptan    Medications:  Current Outpatient Medications   Medication Sig Dispense Refill    Accu-Chek Guide test strips strip USE TO CHECK BLOOD SUGAR UP TO 4 TIMES PER DAY AS INSTRUCTED. E11.9      albuterol 2.5 mg /3 mL (0.083 %) nebulizer solution Inhale 3 mL (2.5 mg) every 6 hours if needed for wheezing or shortness of breath.      albuterol 90 mcg/actuation inhaler Inhale 2 puffs. 6 TIMES DAILY.      atomoxetine (Strattera) 60 mg capsule Take 1 capsule (60 mg) by mouth once daily. Swallow capsule whole; do not open. If opened accidentally, do not touch eyes; wash hands immediately (product is an eye irritant). 90 capsule 3    Autolet lancing device USE AS INSTRUCTED TO CHECK BLOOD SUGAR UP TO 4 TIMES DAILY. E11.9      azelastine-fluticasone 137-50 mcg/spray spray,non-aerosol Administer 1 spray into each nostril twice a day.      buPROPion XL (Wellbutrin XL) 300 mg 24 hr tablet Take 1 tablet (300 mg) by mouth once daily in the morning. Do not crush, chew, or split. 90 tablet 3    busPIRone (Buspar) 30 mg tablet Take 1 tablet (30 mg) by mouth 2 times a day. 180 tablet 3    calcium carbonate-vitamin D3 (Oyster Shell) 250 mg-3.125 mcg (125 unit) tablet Take 1 tablet by mouth once daily.      cariprazine (Vraylar) 6 mg capsule Take 1 capsule (6 mg) by mouth once daily at bedtime. 30 capsule 6    Corlanor 7.5 mg tablet Take 1 tablet (7.5 mg) by mouth 2 times daily (morning and late afternoon).       desvenlafaxine 100 mg 24 hr tablet TAKE 1 TABLET BY MOUTH EVERY DAY IN THE MORNING 90 tablet 3    dicyclomine (Bentyl) 20 mg tablet Take 1 tablet (20 mg) by mouth 4 times a day before meals.      lactobacillus (Culturelle) 10 billion cell capsule Take 1 capsule by mouth twice a day.      lamoTRIgine (LaMICtal) 200 mg disintegrating tablet Take 1 tablet (200 mg) by mouth 2 times a day. 180 tablet 3    lisdexamfetamine (Vyvanse) 70 mg capsule Take 1 capsule (70 mg) by mouth once daily in the morning. 30 capsule 0    LORazepam (Ativan) 1 mg tablet Take 1 tablet (1 mg) by mouth 2 times a day as needed for anxiety or sleep. 60 tablet 2    pantoprazole (ProtoNix) 40 mg EC tablet Take 1 tablet (40 mg) by mouth 2 times a day. Do not crush, chew, or split.      pregabalin (Lyrica) 150 mg capsule Take 1 capsule (150 mg) by mouth once daily. each day at the same time.      pregabalin (Lyrica) 50 mg capsule Take 1 capsule (50 mg) by mouth 2 times a day.  (am and afternoon) in addition to 150 mg capsule at night      QUEtiapine (SEROquel) 200 mg tablet Take 1 tablet (200 mg) by mouth once daily at bedtime. 30 tablet 1    Reyvow 100 mg tablet Take 1 tablet by mouth every 24 (twenty four) hours if needed. AT ONSET OF MIGRAINE NO MORE THAN 1X      traZODone (Desyrel) 100 mg tablet Take 2 tablets (200 mg) by mouth once daily at bedtime. 180 tablet 3    Trelegy Ellipta 200-62.5-25 mcg blister with device 1 puff once daily.      zavegepant (Zavzpret) 10 mg/actuation spray,non-aerosol Administer 10 mg into affected nostril(s) if needed.      glucagon (Glucagen) 1 mg injection 1 mg.  Only take if BG< 60 (Patient not taking: Reported on 1/22/2025)      glucagon (Gvoke HypoPen 2-Pack) 1 mg/0.2 mL auto-injector Inject 1 Pen under the skin. (Patient not taking: Reported on 1/22/2025)      glucose 4 gram chewable tablet Chew 4 tablets (16 g). (Patient not taking: Reported on 1/22/2025)      ipratropium-albuteroL (Duo-Neb) 0.5-2.5 mg/3 mL  nebulizer solution Take 3 mL by nebulization every 4 hours if needed for wheezing or shortness of breath. (Patient not taking: Reported on 1/22/2025)      levonorgestrel (Mirena) 21 mcg/24 hours (8 yrs) 52 mg IUD 52 mg by intrauterine route.      tezepelumab-ekko (Tezspire) SubQ syringe Inject 210 mg under the skin every 28 (twenty-eight) days. (Patient not taking: Reported on 1/22/2025)       Current Facility-Administered Medications   Medication Dose Route Frequency Provider Last Rate Last Admin    alteplase (Cathflo Activase) injection 2 mg  2 mg intra-catheter PRN Ata Nova MD PhD        ARIPiprazole ER (2 month) (Abilify Asimtufii) IM injection 960 mg  960 mg intramuscular Once Ata Nova MD PhD           Your Provider's Instructions:  Do not consume alcoholic beverages for 24 hours.  Do not make important decisions or sign any important documents for the next 24 hours.  It is recommended that a responsible adult remain with you for the next 24 hours as you may be lightheaded or dizzy.  Do not drive or operate machinery until the day after a treatment session or after a restful sleep.  Resume your normal diet.     When to Call Your Doctor:  Call your doctor for questions and/or concerns.  Call 911 for an emergency situation.    Health Maintenance:  If you currently use tobacco products, it is advised that you quit to improve your health.  If you currently use or have stopped using tobacco products within the last 12 months, the following are resources that can help you:  American Heart Association ([phone], (www.americanheart.org)  [place] Tobacco Quit Line 1-290.986.2864 (QUIT-NOW)  http://www.od.[place].gov/odhPrograms/hprr/tob_risk1.aspx    Do you or a loved one need help for alcohol or drug use? Call the [employer] at 211 or visit FindTreatment.gov for resources.    To find out where you can safely dispose unused medicines, visit www.rxdrugdropbox.org, or call your local police department.    Please  call us at [phone] with any concerns.    Prior to your next appointment for Spravato/Ketamine, you will be sent the PHQ-9 as well as the ANUP-7 assessments via [facility]. In the past, you have typically completed these on paper during your appointment. We ask that you check your MyChart the day before your appointment and complete these online. This will assist us in streamlining your check-in during your appointment.     Please make certain you have transportation arranged to and from your appointment. This can be a friend, family member, or arranged through your health insurance provider.    Before your next appointment:  Stop or reduce smoking, vaping, and any nicotine use if at all possible.  No alcohol for 24 hours before your appointment.    Day of treatment:  You may have clear liquids (water, tea, coffee, no-pulp juice, plain Jello, hard candy, BUT no milk, cream or non-dairy creamers) up until 30 minutes before your appointment time. You may have solids up to 2 hours before your appointment time.  Please bring headphones to listen to calming music, guided meditation, or anything you'd like. You can also bring a journal to write in during the treatment time.  Set an intention for each appointment. Your intention can be qualities you want to cultivate in yourself, mental health goals you would like to achieve, or experiences you would like to attract into your life.   Bring any inhalers you may need while at your appointment.  Take all morning medications as prescribed, except the followin hours before your appointment, no chlordiazepoxide (Librium), diazepam (Valium), or Flurazepam (Dalmane)   12 hours before your appointment, no lamotrigine (Lamictal).  Try to avoid taking any anti-anxiety medications if at all possible the day of your appointment. However, if your anxiety is unbearable and would prevent you from attending your appointment, please take your prescribed dose.   No stimulants prior to  your treatment; you may take them once you are home.  Use any nasal sprays up to 1 hour before your appointment (for SPRAVATO only).     Cancellations: If you need to cancel your appointment, we must have at least a 24-hour notice. If you cancel with less than 24-hour notice or do not show three times, no additional appointments will be scheduled until the treatment plan is discussed with your outpatient psychiatrist. Also, if you arrive more than 15 minutes late, we may need to cancel your appointment and reschedule. Please call [phone] to cancel appointments. Thank you for your cooperation!

## 2025-01-23 ENCOUNTER — APPOINTMENT (OUTPATIENT)
Dept: BEHAVIORAL HEALTH | Facility: CLINIC | Age: 40
End: 2025-01-23
Payer: COMMERCIAL

## 2025-01-23 ENCOUNTER — TELEPHONE (OUTPATIENT)
Dept: BEHAVIORAL HEALTH | Facility: CLINIC | Age: 40
End: 2025-01-23

## 2025-01-23 DIAGNOSIS — F31.9 BIPOLAR 1 DISORDER (MULTI): ICD-10-CM

## 2025-01-23 DIAGNOSIS — F41.9 ANXIETY: ICD-10-CM

## 2025-01-23 PROCEDURE — 90837 PSYTX W PT 60 MINUTES: CPT | Performed by: PSYCHOLOGIST

## 2025-01-23 RX ORDER — QUETIAPINE FUMARATE 300 MG/1
300 TABLET, FILM COATED ORAL NIGHTLY
Qty: 30 TABLET | Refills: 1 | Status: SHIPPED | OUTPATIENT
Start: 2025-01-23 | End: 2025-03-24

## 2025-01-23 RX ORDER — QUETIAPINE FUMARATE 300 MG/1
300 TABLET, FILM COATED ORAL NIGHTLY
Qty: 30 TABLET | Refills: 1 | Status: SHIPPED | OUTPATIENT
Start: 2025-01-23 | End: 2025-01-23 | Stop reason: SDUPTHER

## 2025-01-23 NOTE — PROGRESS NOTES
Insight Oriented Therapy. 50 min.  BP1.  We spoke about her missing her meds for the first time in awhile which contributed to her having mixed energy and awaking too early.  She feels stable and has work today and feels confident going through her day.  We spoke about shared relational vision with her partner.  We also discussed her journaling she sent in the night before.     Teletherapy session  Chief complaint - Phase of life and emotional distress   Treatment plan - Insight and action consistent with ACT therapy. Providing support, safe space to process their thoughts and feelings, developing therapeutic relationship.  Goals - Self-care, insight, coping skills  Prognosis - Average  Progress - Average  Functional status - 70/100  Focused mental status: Patient was oriented to person, place, time and context  Focused mental exam - alert and oriented  Frequency - Every two weeks     An interactive audio and video telecommunication system which permits real time communications between the patient (at the originating site) and provider (at the distant site) was utilized to provide this telehealth service.  
Statement Selected

## 2025-01-26 NOTE — GROUP NOTE
Group Topic: Goals   Group Date: 1/21/2025  Start Time:  6:00 PM  End Time:  7:30 PM  Facilitators: Garima Wong PsyD   Department: [facility]    Number of Participants: 16   Group Focus: goals  Treatment Modality: Cognitive Behavioral Therapy  Interventions utilized were exploration and support  Purpose: insight or knowledge    This was a video IOP aftercare group on a HIPAA compliant platform. All patients were provided with informed consent.    IOP Aftercare Group: Goal setting  Participants checked-in from the week and shared recent challenges. They reviewed goal setting and why goal setting is important. Group members identified purpose of using SMART goals and set goals for the week.   Garima Wong Psy.D.      Name: Kelli Silva YOB: 1985   MR: [id number]      Kelli was present and actively engaged in discussion. She reported that she is working on setting goals for maintenance of her mental health. She discussed goals that she would like to work on (e.g., joining a gym).

## 2025-01-28 ENCOUNTER — CLINICAL SUPPORT (OUTPATIENT)
Dept: BEHAVIORAL HEALTH | Facility: CLINIC | Age: 40
End: 2025-01-28
Payer: COMMERCIAL

## 2025-01-28 DIAGNOSIS — F41.9 ANXIETY: ICD-10-CM

## 2025-01-28 DIAGNOSIS — F31.9 BIPOLAR 1 DISORDER (MULTI): ICD-10-CM

## 2025-01-28 PROCEDURE — 90853 GROUP PSYCHOTHERAPY: CPT | Performed by: PSYCHOLOGIST

## 2025-01-28 PROCEDURE — 90853 GROUP PSYCHOTHERAPY: CPT | Mod: AH,95 | Performed by: PSYCHOLOGIST

## 2025-01-30 ENCOUNTER — APPOINTMENT (OUTPATIENT)
Dept: BEHAVIORAL HEALTH | Facility: CLINIC | Age: 40
End: 2025-01-30
Payer: COMMERCIAL

## 2025-01-30 DIAGNOSIS — F31.9 BIPOLAR 1 DISORDER (MULTI): ICD-10-CM

## 2025-01-30 PROCEDURE — 90837 PSYTX W PT 60 MINUTES: CPT | Performed by: PSYCHOLOGIST

## 2025-01-30 NOTE — PROGRESS NOTES
Insight Oriented Therapy. 50 min.  BP1.  We spoke about how she is worried about mom, not that she is in bad hands for her procedure, but that she really cares and wants to know it went well.  She's a little dysthymic, so we spoke about initiation and focus issues and how to work through them.     Teletherapy session  Chief complaint - Phase of life and emotional distress   Treatment plan - Insight and action consistent with ACT therapy. Providing support, safe space to process their thoughts and feelings, developing therapeutic relationship.  Goals - Self-care, insight, coping skills  Prognosis - Average  Progress - Average  Functional status - 70/100  Focused mental status: Patient was oriented to person, place, time and context  Focused mental exam - alert and oriented  Frequency - Every two weeks     An interactive audio and video telecommunication system which permits real time communications between the patient (at the originating site) and provider (at the distant site) was utilized to provide this telehealth service

## 2025-02-03 ENCOUNTER — APPOINTMENT (OUTPATIENT)
Dept: RADIOLOGY | Facility: HOSPITAL | Age: 40
End: 2025-02-03
Payer: COMMERCIAL

## 2025-02-03 ENCOUNTER — HOSPITAL ENCOUNTER (EMERGENCY)
Facility: HOSPITAL | Age: 40
Discharge: HOME | End: 2025-02-03
Attending: EMERGENCY MEDICINE
Payer: COMMERCIAL

## 2025-02-03 ENCOUNTER — HOSPITAL ENCOUNTER (OUTPATIENT)
Dept: CARDIOLOGY | Facility: HOSPITAL | Age: 40
Discharge: HOME | End: 2025-02-03
Payer: COMMERCIAL

## 2025-02-03 VITALS
DIASTOLIC BLOOD PRESSURE: 72 MMHG | SYSTOLIC BLOOD PRESSURE: 154 MMHG | HEART RATE: 81 BPM | WEIGHT: 232 LBS | RESPIRATION RATE: 16 BRPM | OXYGEN SATURATION: 96 % | TEMPERATURE: 98.2 F | BODY MASS INDEX: 42.69 KG/M2 | HEIGHT: 62 IN

## 2025-02-03 DIAGNOSIS — R42 LIGHTHEADED: ICD-10-CM

## 2025-02-03 DIAGNOSIS — R07.9 CHEST PAIN, UNSPECIFIED TYPE: Primary | ICD-10-CM

## 2025-02-03 LAB
ALBUMIN SERPL BCP-MCNC: 3.9 G/DL (ref 3.4–5)
ALP SERPL-CCNC: 90 U/L (ref 33–110)
ALT SERPL W P-5'-P-CCNC: 38 U/L (ref 7–45)
ANION GAP SERPL CALC-SCNC: 12 MMOL/L (ref 10–20)
AST SERPL W P-5'-P-CCNC: 23 U/L (ref 9–39)
BASOPHILS # BLD AUTO: 0.02 X10*3/UL (ref 0–0.1)
BASOPHILS NFR BLD AUTO: 0.2 %
BILIRUB SERPL-MCNC: 0.4 MG/DL (ref 0–1.2)
BNP SERPL-MCNC: 141 PG/ML (ref 0–99)
BUN SERPL-MCNC: 10 MG/DL (ref 6–23)
CALCIUM SERPL-MCNC: 8.6 MG/DL (ref 8.6–10.3)
CARDIAC TROPONIN I PNL SERPL HS: 3 NG/L (ref 0–13)
CARDIAC TROPONIN I PNL SERPL HS: <3 NG/L (ref 0–13)
CHLORIDE SERPL-SCNC: 104 MMOL/L (ref 98–107)
CO2 SERPL-SCNC: 24 MMOL/L (ref 21–32)
CREAT SERPL-MCNC: 0.75 MG/DL (ref 0.5–1.05)
D DIMER PPP FEU-MCNC: 452 NG/ML FEU
EGFRCR SERPLBLD CKD-EPI 2021: >90 ML/MIN/1.73M*2
EOSINOPHIL # BLD AUTO: 0.03 X10*3/UL (ref 0–0.7)
EOSINOPHIL NFR BLD AUTO: 0.3 %
ERYTHROCYTE [DISTWIDTH] IN BLOOD BY AUTOMATED COUNT: 13.2 % (ref 11.5–14.5)
FLUAV RNA RESP QL NAA+PROBE: NOT DETECTED
FLUBV RNA RESP QL NAA+PROBE: NOT DETECTED
GLUCOSE SERPL-MCNC: 87 MG/DL (ref 74–99)
HCT VFR BLD AUTO: 39 % (ref 36–46)
HGB BLD-MCNC: 13.3 G/DL (ref 12–16)
IMM GRANULOCYTES # BLD AUTO: 0.06 X10*3/UL (ref 0–0.7)
IMM GRANULOCYTES NFR BLD AUTO: 0.5 % (ref 0–0.9)
INR PPP: 1 (ref 0.9–1.1)
LYMPHOCYTES # BLD AUTO: 1.34 X10*3/UL (ref 1.2–4.8)
LYMPHOCYTES NFR BLD AUTO: 11.5 %
MCH RBC QN AUTO: 31 PG (ref 26–34)
MCHC RBC AUTO-ENTMCNC: 34.1 G/DL (ref 32–36)
MCV RBC AUTO: 91 FL (ref 80–100)
MONOCYTES # BLD AUTO: 0.69 X10*3/UL (ref 0.1–1)
MONOCYTES NFR BLD AUTO: 5.9 %
NEUTROPHILS # BLD AUTO: 9.51 X10*3/UL (ref 1.2–7.7)
NEUTROPHILS NFR BLD AUTO: 81.6 %
NRBC BLD-RTO: 0 /100 WBCS (ref 0–0)
PLATELET # BLD AUTO: 193 X10*3/UL (ref 150–450)
POTASSIUM SERPL-SCNC: 3.8 MMOL/L (ref 3.5–5.3)
PROT SERPL-MCNC: 6.5 G/DL (ref 6.4–8.2)
PROTHROMBIN TIME: 11.5 SECONDS (ref 9.8–12.8)
RBC # BLD AUTO: 4.29 X10*6/UL (ref 4–5.2)
RSV RNA RESP QL NAA+PROBE: NOT DETECTED
SARS-COV-2 RNA RESP QL NAA+PROBE: NOT DETECTED
SODIUM SERPL-SCNC: 136 MMOL/L (ref 136–145)
WBC # BLD AUTO: 11.7 X10*3/UL (ref 4.4–11.3)

## 2025-02-03 PROCEDURE — 83880 ASSAY OF NATRIURETIC PEPTIDE: CPT | Performed by: NURSE PRACTITIONER

## 2025-02-03 PROCEDURE — 99284 EMERGENCY DEPT VISIT MOD MDM: CPT | Mod: 25 | Performed by: EMERGENCY MEDICINE

## 2025-02-03 PROCEDURE — 87637 SARSCOV2&INF A&B&RSV AMP PRB: CPT | Performed by: EMERGENCY MEDICINE

## 2025-02-03 PROCEDURE — 85379 FIBRIN DEGRADATION QUANT: CPT | Performed by: NURSE PRACTITIONER

## 2025-02-03 PROCEDURE — 96360 HYDRATION IV INFUSION INIT: CPT

## 2025-02-03 PROCEDURE — 96361 HYDRATE IV INFUSION ADD-ON: CPT

## 2025-02-03 PROCEDURE — 2500000004 HC RX 250 GENERAL PHARMACY W/ HCPCS (ALT 636 FOR OP/ED): Performed by: EMERGENCY MEDICINE

## 2025-02-03 PROCEDURE — 71046 X-RAY EXAM CHEST 2 VIEWS: CPT

## 2025-02-03 PROCEDURE — 84484 ASSAY OF TROPONIN QUANT: CPT | Performed by: EMERGENCY MEDICINE

## 2025-02-03 PROCEDURE — 93005 ELECTROCARDIOGRAM TRACING: CPT

## 2025-02-03 PROCEDURE — 84484 ASSAY OF TROPONIN QUANT: CPT | Performed by: NURSE PRACTITIONER

## 2025-02-03 PROCEDURE — 99285 EMERGENCY DEPT VISIT HI MDM: CPT | Mod: 25

## 2025-02-03 PROCEDURE — 71046 X-RAY EXAM CHEST 2 VIEWS: CPT | Performed by: RADIOLOGY

## 2025-02-03 PROCEDURE — 85610 PROTHROMBIN TIME: CPT | Performed by: NURSE PRACTITIONER

## 2025-02-03 PROCEDURE — 85025 COMPLETE CBC W/AUTO DIFF WBC: CPT | Performed by: NURSE PRACTITIONER

## 2025-02-03 PROCEDURE — 80053 COMPREHEN METABOLIC PANEL: CPT | Performed by: NURSE PRACTITIONER

## 2025-02-03 RX ADMIN — SODIUM CHLORIDE, POTASSIUM CHLORIDE, SODIUM LACTATE AND CALCIUM CHLORIDE 1000 ML: 600; 310; 30; 20 INJECTION, SOLUTION INTRAVENOUS at 18:03

## 2025-02-03 ASSESSMENT — PAIN DESCRIPTION - PAIN TYPE: TYPE: ACUTE PAIN

## 2025-02-03 ASSESSMENT — PAIN - FUNCTIONAL ASSESSMENT: PAIN_FUNCTIONAL_ASSESSMENT: 0-10

## 2025-02-03 ASSESSMENT — PAIN SCALES - GENERAL: PAINLEVEL_OUTOF10: 8

## 2025-02-03 ASSESSMENT — PAIN DESCRIPTION - LOCATION: LOCATION: BACK

## 2025-02-03 ASSESSMENT — PAIN DESCRIPTION - ORIENTATION: ORIENTATION: RIGHT

## 2025-02-03 NOTE — ED TRIAGE NOTES
Patient arrives by EMS reporting shortness of breath 1 hour after receiving her Tezpire injection this morning for her asthma. She also reports dizziness, chest pain and blurred vision. Reports hx of PE in 2012, not currently on any anticoagulation

## 2025-02-03 NOTE — ED TRIAGE NOTES
TRIAGE NOTE   I saw the patient as the Clinician in Triage and performed a brief history and physical exam, established acuity, and ordered appropriate tests to develop basic plan of care. Patient will be seen by an JAMEL, resident and/or physician who will independently evaluate the patient. Please see subsequent provider notes for further details and disposition.     Brief HPI: In brief, Kelli Silva is a 39 y.o. female with significant PMH for T2DM, renal carcinoma with horseshoe kidney and prior PE/DVT in 2010 no current thinners presenting to ED today from home [facility] for evaluation of right-sided chest pain.  Attending of the patient got monthly Tezspire for asthma.  Shortly thereafter the patient developed pain in her right anterior chest that radiates through to the back.  Worse with a deep breath.  Symptoms are similar to prior episodes of PE.  Denies recent travel/surgery but has had renal carcinoma prior PE/DVT and uses Mirena.  Denies fever/chills, cough/cold symptoms, shortness of breath, nausea/vomiting, abdominal pain, urinary symptoms, change in bowel habits or any other complaints.  No smoking, EtOH or IV drugs.  PCP at [facility].      Focused Physical exam:   General: 39-year-old female, awake and alert, oriented x 3.  Well-nourished and hydrated.  Nontoxic looking.  Skin: Pink, warm and dry.  Cardiac: Regular rate and rhythm.  Pulmonary: Clear bilaterally.  Abdomen: Obese and soft with bowel sounds, nontender.  No CVA tenderness.    Plan/MDM:   39 y.o. female with significant PMH for T2DM, renal carcinoma with horseshoe kidney and prior PE/DVT in 2010 no current thinners is evaluated at the bedside for sudden onset of right anterior chest pain that radiates through to the back shortly after receiving an asthma injection this morning.  Patient is concerned she may have another PE.  Vital signs within normal limits.  Afebrile.  Lungs clear, abdomen soft and nontender.  IV established, will  check basic labs including troponin/D-dimer, chest x-ray and EKG in preparation for further evaluation in the main ED.  Patient is agreeable to this plan.  Please see subsequent provider note for further details and disposition

## 2025-02-03 NOTE — ED PROVIDER NOTES
HPI   Chief Complaint   Patient presents with    Shortness of Breath       39yF DM asthma HLD presents with SOB.  Pt had her scheduled dose of injectable Tezspire for asthma (not her first dose) and afterwards felt back pain radiating to her chest, with mild lightheadedness.  No fever or cough.  No abd pain, n/v/d or dysuria.  Pt missed lunch while sitting in the ED waiting room but has been eating/drinking well otherwise and still feels mildly lightheaded.      History provided by:  Patient   used: No            Patient History   Past Medical History:   Diagnosis Date    Normal routine history and physical examination     Normal routine history and physical examination     Other specified health status     No pertinent past medical history     Past Surgical History:   Procedure Laterality Date    OTHER SURGICAL HISTORY  05/08/2020    Venous access port placement     Family History   Problem Relation Name Age of Onset    Brain cancer Father      Kidney cancer Maternal Grandfather      Kidney cancer Sibling       Social History     Tobacco Use    Smoking status: Never    Smokeless tobacco: Never   Vaping Use    Vaping status: Never Used   Substance Use Topics    Alcohol use: Never    Drug use: Never       Physical Exam   ED Triage Vitals [02/03/25 1423]   Temperature Heart Rate Respirations BP   36.8 °C (98.2 °F) 81 16 154/72      Pulse Ox Temp src Heart Rate Source Patient Position   96 % -- -- --      BP Location FiO2 (%)     -- --       Physical Exam  Vitals and nursing note reviewed.   Constitutional:       General: She is not in acute distress.     Appearance: She is well-developed. She is not ill-appearing, toxic-appearing or diaphoretic.      Interventions: She is not intubated.  HENT:      Head: Normocephalic and atraumatic.      Mouth/Throat:      Mouth: Mucous membranes are moist.   Eyes:      Extraocular Movements: Extraocular movements intact.   Cardiovascular:      Rate and Rhythm:  Normal rate and regular rhythm.      Heart sounds: No murmur heard.     No friction rub. No gallop.   Pulmonary:      Effort: Pulmonary effort is normal. No tachypnea, bradypnea, accessory muscle usage or respiratory distress. She is not intubated.      Breath sounds: Normal breath sounds. No stridor. No decreased breath sounds, wheezing, rhonchi or rales.   Chest:      Chest wall: No edema.   Abdominal:      Palpations: Abdomen is soft.      Tenderness: There is no abdominal tenderness. There is no guarding or rebound.   Musculoskeletal:         General: Normal range of motion.      Cervical back: Normal range of motion and neck supple.      Right lower leg: No tenderness.      Left lower leg: No tenderness.   Skin:     General: Skin is warm and dry.      Coloration: Skin is not cyanotic or pale.      Findings: No ecchymosis, erythema or rash.   Neurological:      General: No focal deficit present.      Mental Status: She is alert and oriented to person, place, and time.      Cranial Nerves: No cranial nerve deficit.      Motor: No weakness.   Psychiatric:         Mood and Affect: Mood normal. Mood is not anxious.         Behavior: Behavior normal. Behavior is not agitated.           ED Course & MDM   ED Course as of 02/04/25 1923   Mon Feb 03, 2025   1907 EKG interpreted by me normal sinus rhythm normal axis normal intervals QTc 450 no ischemic changes [EK]      ED Course User Index  [EK] Elyse H Klerman, MD         Diagnoses as of 02/04/25 1923   Chest pain, unspecified type   Lightheaded                 No data recorded                                 Medical Decision Making  39yF presents with back and chest pain and lightheadedness.  Pt well appearing, hemodynamically stable and without focal neurologic deficits/NIHSS 0.  EKG ischemia or arrhythmia.  Chest x-ray without pneumothorax or pneumonia.  Labs reassuring, including normal electrolytes, negative troponin x 2 and negative D-dimer, with borderline  leukocytosis.  Swabs negative.  Patient feeling better after IV LR.  Recommend supportive care, close outpatient PCP follow-up and return precautions.    Amount and/or Complexity of Data Reviewed  Labs: ordered.  Radiology: ordered.  ECG/medicine tests: ordered and independent interpretation performed. Decision-making details documented in ED Course.    Risk  Prescription drug management.  Decision regarding hospitalization.        Procedure  Procedures     Elyse H Klerman, MD  02/03/25 2120       Elyse H Klerman, MD  02/04/25 1921

## 2025-02-04 ENCOUNTER — CLINICAL SUPPORT (OUTPATIENT)
Dept: BEHAVIORAL HEALTH | Facility: CLINIC | Age: 40
End: 2025-02-04
Payer: COMMERCIAL

## 2025-02-04 DIAGNOSIS — F31.9 BIPOLAR 1 DISORDER (MULTI): ICD-10-CM

## 2025-02-04 DIAGNOSIS — F41.9 ANXIETY: ICD-10-CM

## 2025-02-04 LAB
ATRIAL RATE: 81 BPM
P AXIS: 68 DEGREES
P OFFSET: 207 MS
P ONSET: 154 MS
PR INTERVAL: 146 MS
Q ONSET: 227 MS
QRS COUNT: 13 BEATS
QRS DURATION: 68 MS
QT INTERVAL: 388 MS
QTC CALCULATION(BAZETT): 450 MS
QTC FREDERICIA: 428 MS
R AXIS: 16 DEGREES
T AXIS: 40 DEGREES
T OFFSET: 421 MS
VENTRICULAR RATE: 81 BPM

## 2025-02-04 PROCEDURE — 90853 GROUP PSYCHOTHERAPY: CPT | Mod: AH,95 | Performed by: PSYCHOLOGIST

## 2025-02-04 PROCEDURE — 90853 GROUP PSYCHOTHERAPY: CPT | Performed by: PSYCHOLOGIST

## 2025-02-04 NOTE — DISCHARGE INSTRUCTIONS
You were seen in the ER for back pain rating into your chest and lightheadedness.  Labs, EKG and chest x-ray are reassuring.  Please take all of your usual medications and follow-up with your PCP in 2 to 3 days if your symptoms come back or do not improve.  Return to the ED for severe pain, trouble breathing or other concerns.

## 2025-02-04 NOTE — GROUP NOTE
Group Topic: Feeling Awareness/Expression   Group Date: 1/28/2025  Start Time:  6:00 PM  End Time:  7:30 PM  Facilitators: Garima Wong PsyD   Department: [facility]    Number of Participants: 16   Group Focus: check in  Treatment Modality: Patient-Centered Therapy  Interventions utilized were support  Purpose: insight or knowledge    This was a video IOP aftercare group on a HIPAA compliant platform. All patients were provided with informed consent.    IOP Aftercare Group: Check-in  Participants had opportunity to check-in with their peers and discuss recent stressors. They received support from one another and identified ways they are coping with recent challenges.   Garima Wong Psy.D.        Name: Kelli Silva YOB: 1985   MR: [id number]      Kelil was present and actively engaged in discussion. She stated that her hypomania has resolved after speaking with her doctor. She processed identifying warning signs.

## 2025-02-05 ENCOUNTER — OFFICE VISIT (OUTPATIENT)
Dept: NEUROLOGY | Facility: HOSPITAL | Age: 40
End: 2025-02-05
Payer: COMMERCIAL

## 2025-02-05 ENCOUNTER — HOSPITAL ENCOUNTER (OUTPATIENT)
Dept: POSTOP/PACU | Facility: HOSPITAL | Age: 40
Setting detail: OUTPATIENT SURGERY
Discharge: HOME | End: 2025-02-05
Payer: COMMERCIAL

## 2025-02-05 VITALS
TEMPERATURE: 96.9 F | SYSTOLIC BLOOD PRESSURE: 134 MMHG | HEART RATE: 63 BPM | RESPIRATION RATE: 18 BRPM | BODY MASS INDEX: 42.69 KG/M2 | WEIGHT: 232 LBS | HEIGHT: 62 IN | DIASTOLIC BLOOD PRESSURE: 72 MMHG

## 2025-02-05 VITALS
DIASTOLIC BLOOD PRESSURE: 70 MMHG | RESPIRATION RATE: 14 BRPM | TEMPERATURE: 97.5 F | HEART RATE: 67 BPM | OXYGEN SATURATION: 98 % | SYSTOLIC BLOOD PRESSURE: 130 MMHG

## 2025-02-05 DIAGNOSIS — G90.A POTS (POSTURAL ORTHOSTATIC TACHYCARDIA SYNDROME): Primary | ICD-10-CM

## 2025-02-05 DIAGNOSIS — F31.9 BIPOLAR I DISORDER WITH DEPRESSION (MULTI): Primary | ICD-10-CM

## 2025-02-05 PROBLEM — F31.62 BIPOLAR 1 DISORDER, MIXED, MODERATE (MULTI): Status: RESOLVED | Noted: 2022-03-02 | Resolved: 2025-02-05

## 2025-02-05 PROBLEM — F31.0 BIPOLAR AFFECTIVE DISORDER, CURRENT EPISODE HYPOMANIC (MULTI): Status: RESOLVED | Noted: 2023-10-09 | Resolved: 2025-02-05

## 2025-02-05 PROBLEM — F31.75 BIPOLAR DISORDER, IN PARTIAL REMISSION, MOST RECENT EPISODE DEPRESSED (MULTI): Status: RESOLVED | Noted: 2019-12-25 | Resolved: 2025-02-05

## 2025-02-05 PROBLEM — F31.31 BIPOLAR 1 DISORDER, DEPRESSED, MILD (MULTI): Status: RESOLVED | Noted: 2023-10-09 | Resolved: 2025-02-05

## 2025-02-05 PROCEDURE — KETSP PR KETAMINE INFUSION SELF-PAY: Performed by: STUDENT IN AN ORGANIZED HEALTH CARE EDUCATION/TRAINING PROGRAM

## 2025-02-05 PROCEDURE — 99215 OFFICE O/P EST HI 40 MIN: CPT | Mod: GC | Performed by: PSYCHIATRY & NEUROLOGY

## 2025-02-05 PROCEDURE — 2500000004 HC RX 250 GENERAL PHARMACY W/ HCPCS (ALT 636 FOR OP/ED): Performed by: PSYCHIATRY & NEUROLOGY

## 2025-02-05 PROCEDURE — 99205 OFFICE O/P NEW HI 60 MIN: CPT | Performed by: PSYCHIATRY & NEUROLOGY

## 2025-02-05 PROCEDURE — 2500000005 HC RX 250 GENERAL PHARMACY W/O HCPCS: Performed by: PSYCHIATRY & NEUROLOGY

## 2025-02-05 PROCEDURE — 3075F SYST BP GE 130 - 139MM HG: CPT | Performed by: PSYCHIATRY & NEUROLOGY

## 2025-02-05 PROCEDURE — 3008F BODY MASS INDEX DOCD: CPT | Performed by: PSYCHIATRY & NEUROLOGY

## 2025-02-05 PROCEDURE — 3078F DIAST BP <80 MM HG: CPT | Performed by: PSYCHIATRY & NEUROLOGY

## 2025-02-05 PROCEDURE — KETSP HC KETAMINE INFUSION SELF-PAY: Performed by: STUDENT IN AN ORGANIZED HEALTH CARE EDUCATION/TRAINING PROGRAM

## 2025-02-05 RX ORDER — KETAMINE HCL IN 0.9 % NACL 200 MG/200
0.75 PLASTIC BAG, INJECTION (ML) INTRAVENOUS ONCE
Start: 2025-02-05 | End: 2025-02-05

## 2025-02-05 RX ORDER — KETAMINE HCL IN 0.9 % NACL 200 MG/200
0.75 PLASTIC BAG, INJECTION (ML) INTRAVENOUS ONCE
Status: COMPLETED | OUTPATIENT
Start: 2025-02-05 | End: 2025-02-05

## 2025-02-05 RX ORDER — ONDANSETRON HYDROCHLORIDE 2 MG/ML
8 INJECTION, SOLUTION INTRAVENOUS ONCE
Start: 2025-02-05 | End: 2025-02-05

## 2025-02-05 RX ORDER — ONDANSETRON HYDROCHLORIDE 2 MG/ML
8 INJECTION, SOLUTION INTRAVENOUS ONCE
Status: COMPLETED | OUTPATIENT
Start: 2025-02-05 | End: 2025-02-05

## 2025-02-05 RX ORDER — MIDODRINE HYDROCHLORIDE 5 MG/1
5 TABLET ORAL 2 TIMES DAILY
Qty: 120 TABLET | Refills: 5 | Status: SHIPPED | OUTPATIENT
Start: 2025-02-05 | End: 2026-02-05

## 2025-02-05 RX ADMIN — ONDANSETRON 8 MG: 2 INJECTION INTRAMUSCULAR; INTRAVENOUS at 12:16

## 2025-02-05 RX ADMIN — Medication 79 MG: at 12:27

## 2025-02-05 ASSESSMENT — PATIENT HEALTH QUESTIONNAIRE - PHQ9
6. FEELING BAD ABOUT YOURSELF - OR THAT YOU ARE A FAILURE OR HAVE LET YOURSELF OR YOUR FAMILY DOWN: SEVERAL DAYS
4. FEELING TIRED OR HAVING LITTLE ENERGY: SEVERAL DAYS
9. THOUGHTS THAT YOU WOULD BE BETTER OFF DEAD, OR OF HURTING YOURSELF: NOT AT ALL
8. MOVING OR SPEAKING SO SLOWLY THAT OTHER PEOPLE COULD HAVE NOTICED. OR THE OPPOSITE, BEING SO FIGETY OR RESTLESS THAT YOU HAVE BEEN MOVING AROUND A LOT MORE THAN USUAL: NOT AT ALL
5. POOR APPETITE OR OVEREATING: NOT AT ALL
3. TROUBLE FALLING OR STAYING ASLEEP OR SLEEPING TOO MUCH: SEVERAL DAYS
2. FEELING DOWN, DEPRESSED OR HOPELESS: SEVERAL DAYS
SUM OF ALL RESPONSES TO PHQ QUESTIONS 1-9: 8
1. LITTLE INTEREST OR PLEASURE IN DOING THINGS: SEVERAL DAYS
SUM OF ALL RESPONSES TO PHQ9 QUESTIONS 1 AND 2: 2
10. IF YOU CHECKED OFF ANY PROBLEMS, HOW DIFFICULT HAVE THESE PROBLEMS MADE IT FOR YOU TO DO YOUR WORK, TAKE CARE OF THINGS AT HOME, OR GET ALONG WITH OTHER PEOPLE: SOMEWHAT DIFFICULT
7. TROUBLE CONCENTRATING ON THINGS, SUCH AS READING THE NEWSPAPER OR WATCHING TELEVISION: NEARLY EVERY DAY

## 2025-02-05 ASSESSMENT — ANXIETY QUESTIONNAIRES
1. FEELING NERVOUS, ANXIOUS, OR ON EDGE: NEARLY EVERY DAY
3. WORRYING TOO MUCH ABOUT DIFFERENT THINGS: SEVERAL DAYS
6. BECOMING EASILY ANNOYED OR IRRITABLE: NOT AT ALL
GAD7 TOTAL SCORE: 13
7. FEELING AFRAID AS IF SOMETHING AWFUL MIGHT HAPPEN: NEARLY EVERY DAY
4. TROUBLE RELAXING: NEARLY EVERY DAY
2. NOT BEING ABLE TO STOP OR CONTROL WORRYING: NEARLY EVERY DAY
5. BEING SO RESTLESS THAT IT IS HARD TO SIT STILL: NOT AT ALL
IF YOU CHECKED OFF ANY PROBLEMS ON THIS QUESTIONNAIRE, HOW DIFFICULT HAVE THESE PROBLEMS MADE IT FOR YOU TO DO YOUR WORK, TAKE CARE OF THINGS AT HOME, OR GET ALONG WITH OTHER PEOPLE: VERY DIFFICULT

## 2025-02-05 ASSESSMENT — PAIN SCALES - GENERAL: PAINLEVEL_OUTOF10: 0-NO PAIN

## 2025-02-05 NOTE — PROCEDURES
"General    Date/Time: 2025 12:29 PM    Performed by: Suzie Álvarez DO  Authorized by: Suzie Álvarez DO      Ketamine/Esketamine Procedure    Name: Kelli Silva  : 1985  MRN: [id number]    Date: 25    Ketamine Infusion: 0.75mg per kilo over 40 minutes (maintenance)     Time out was taken with staff to confirm correct patient and correct procedure to be performed.     Subjective:  Pt states depression/anxiety increased in setting of mother's health concerns. Sleeping ok with seroquel. Appetite increased. Denies suicidal/homicidal ideation. Denies auditory/visual hallucinations. Reports will be starting Midodrine for POTS.     Patient Health Questionnaire-9 Score: 8 (2025 12:01 PM) (Prior 12)  ANUP-7 Total Score: 13 (2025 12:00 PM) (Prior 6)    Objective:    Mental Status Exam:  General/Appearance: NAD, appears stated age, casually dressed, body habitus: obese, grooming/hygiene is excellent  Attitude/Behavior: engages in interview, cooperative, appropriate eye contact  Motor Activity: no psychomotor agitation/retardation, no tics/tremors, no EPS/TD, gait: not assessed  Mood: \"OK until my mother's illness, now depressed and anxious\"  Affect: Dysphoric, restricted, mood congruent, appropriate to content  Speech: spontaneous, coherent, fluent; appropriate quantity, volume, rate, tone  Thought Process: linear, logical, goal-directed, future-oriented  Thought Content: denies SI/HI, Delusional thinking: none elicited  Thought Perception: denies AVH, not responding to internal stimuli  Cognition:  Grossly intact  Insight: good  Judgment: good     Time since procedure start Subjective Heart Rate Pulse Oximetry Blood Pressure   Before procedure start See above 66 bpm 96% SpO2 140/73 mmHg   20 minutes after dosing Feels floaty and confused. No other side effects. 63 bpm 96% SpO2 155/87 mmHg   40 minutes after dosing Feels floating/confused feeling improved. No other side effects. 68 bpm 97% SpO2 " 162/89 mmHg     Tolerated treatment well. No complications.    Assessment:  Bipolar I D/O, currently depressed  ANUP under control   No immediate risk of harm to self or others    Plan:  Continue outpatient management with Dr. Nova.   Ketamine infusion in 2 weeks.     Suzie Álvarez DO

## 2025-02-05 NOTE — PATIENT INSTRUCTIONS
Homegoing Instructions    Patient: Kelli Silva  MRN: [id number]  YOB: 1985    Care Providers: Dr. Suzie Roman, RN    Allergies:  Aspirin, Cigarette smoke, Fish containing products, Iodinated contrast media, Ketorolac, Ketorolac tromethamine, Ondansetron, Other, Prochlorperazine, Shellfish containing products, Sumatriptan, Azithromycin, Ceftriaxone, Doxycycline, Dulaglutide, House dust, Liraglutide, Metformin, Metoclopramide, Prednisone, Sitagliptin, [id number] antimigraine agents, and Zolmitriptan    Medications:  Current Outpatient Medications   Medication Sig Dispense Refill    Accu-Chek Guide test strips strip USE TO CHECK BLOOD SUGAR UP TO 4 TIMES PER DAY AS INSTRUCTED. E11.9      albuterol 2.5 mg /3 mL (0.083 %) nebulizer solution Inhale 3 mL (2.5 mg) every 6 hours if needed for wheezing or shortness of breath.      albuterol 90 mcg/actuation inhaler Inhale 2 puffs. 6 TIMES DAILY.      atomoxetine (Strattera) 60 mg capsule Take 1 capsule (60 mg) by mouth once daily. Swallow capsule whole; do not open. If opened accidentally, do not touch eyes; wash hands immediately (product is an eye irritant). 90 capsule 3    Autolet lancing device USE AS INSTRUCTED TO CHECK BLOOD SUGAR UP TO 4 TIMES DAILY. E11.9      azelastine-fluticasone 137-50 mcg/spray spray,non-aerosol Administer 1 spray into each nostril twice a day.      buPROPion XL (Wellbutrin XL) 300 mg 24 hr tablet Take 1 tablet (300 mg) by mouth once daily in the morning. Do not crush, chew, or split. 90 tablet 3    busPIRone (Buspar) 30 mg tablet Take 1 tablet (30 mg) by mouth 2 times a day. 180 tablet 3    calcium carbonate-vitamin D3 (Oyster Shell) 250 mg-3.125 mcg (125 unit) tablet Take 1 tablet by mouth once daily.      cariprazine (Vraylar) 6 mg capsule Take 1 capsule (6 mg) by mouth once daily at bedtime. 30 capsule 6    Corlanor 7.5 mg tablet Take 1 tablet (7.5 mg) by mouth 2 times daily (morning and late afternoon).       desvenlafaxine 100 mg 24 hr tablet TAKE 1 TABLET BY MOUTH EVERY DAY IN THE MORNING 90 tablet 3    dicyclomine (Bentyl) 20 mg tablet Take 1 tablet (20 mg) by mouth 4 times a day before meals.      ipratropium-albuteroL (Duo-Neb) 0.5-2.5 mg/3 mL nebulizer solution Take 3 mL by nebulization every 4 hours if needed for wheezing or shortness of breath.      lactobacillus (Culturelle) 10 billion cell capsule Take 1 capsule by mouth twice a day.      lamoTRIgine (LaMICtal) 200 mg disintegrating tablet Take 1 tablet (200 mg) by mouth 2 times a day. 180 tablet 3    lisdexamfetamine (Vyvanse) 70 mg capsule Take 1 capsule (70 mg) by mouth once daily in the morning. 30 capsule 0    LORazepam (Ativan) 1 mg tablet Take 1 tablet (1 mg) by mouth 2 times a day as needed for anxiety or sleep. 60 tablet 2    pantoprazole (ProtoNix) 40 mg EC tablet Take 1 tablet (40 mg) by mouth 2 times a day. Do not crush, chew, or split.      pregabalin (Lyrica) 150 mg capsule Take 1 capsule (150 mg) by mouth once daily. each day at the same time.      pregabalin (Lyrica) 50 mg capsule Take 1 capsule (50 mg) by mouth 2 times a day.  (am and afternoon) in addition to 150 mg capsule at night      QUEtiapine (SEROquel) 300 mg tablet Take 1 tablet (300 mg) by mouth once daily at bedtime. 30 tablet 1    Reyvow 100 mg tablet Take 1 tablet by mouth every 24 (twenty four) hours if needed. AT ONSET OF MIGRAINE NO MORE THAN 1X      tezepelumab-ekko (Tezspire) SubQ syringe Inject 210 mg under the skin every 28 (twenty-eight) days.      Trelegy Ellipta 200-62.5-25 mcg blister with device 1 puff once daily.      zavegepant (Zavzpret) 10 mg/actuation spray,non-aerosol Administer 10 mg into affected nostril(s) if needed.      glucagon (Glucagen) 1 mg injection 1 mg.  Only take if BG< 60 (Patient not taking: Reported on 2/5/2025)      glucagon (Gvoke HypoPen 2-Pack) 1 mg/0.2 mL auto-injector Inject 1 Pen under the skin. (Patient not taking: Reported on 2/5/2025)       glucose 4 gram chewable tablet Chew 4 tablets (16 g). (Patient not taking: Reported on 12/13/2024)      levonorgestrel (Mirena) 21 mcg/24 hours (8 yrs) 52 mg IUD 52 mg by intrauterine route.      midodrine (Proamatine) 5 mg tablet Take 1 tablet (5 mg) by mouth 2 times a day. 120 tablet 5     Current Facility-Administered Medications   Medication Dose Route Frequency Provider Last Rate Last Admin    ARIPiprazole ER (2 month) (Abilify Asimtufii) IM injection 960 mg  960 mg intramuscular Once Ata Nova MD PhD           Your Provider's Instructions:  Do not consume alcoholic beverages for 24 hours.  Do not make important decisions or sign any important documents for the next 24 hours.  It is recommended that a responsible adult remain with you for the next 24 hours as you may be lightheaded or dizzy.  Do not drive or operate machinery until the day after a treatment session or after a restful sleep.  Resume your normal diet.     When to Call Your Doctor:  Call your doctor for questions and/or concerns.  Call 911 for an emergency situation.    Health Maintenance:  If you currently use tobacco products, it is advised that you quit to improve your health.  If you currently use or have stopped using tobacco products within the last 12 months, the following are resources that can help you:  American Heart Association ([phone], (www.americanheart.org)  [place] Tobacco Quit Line 1-179.512.6894 (QUIT-NOW)  http://www.od.[place].[facility]/odhPrograms/hprr/tob_risk1.aspx    Do you or a loved one need help for alcohol or drug use? Call the [employer] at 211 or visit FindTreatment.gov for resources.    To find out where you can safely dispose unused medicines, visit www.rxdrugdropbox.org, or call your local police department.    Please call us at [phone] with any concerns.    Prior to your next appointment for Spravato/Ketamine, you will be sent the PHQ-9 as well as the ANUP-7 assessments via [employer]. In the past, you have  typically completed these on paper during your appointment. We ask that you check your MyChart the day before your appointment and complete these online. This will assist us in streamlining your check-in during your appointment.     Please make certain you have transportation arranged to and from your appointment. This can be a friend, family member, or arranged through your health insurance provider.    Before your next appointment:  Stop or reduce smoking, vaping, and any nicotine use if at all possible.  No alcohol for 24 hours before your appointment.    Day of treatment:  You may have clear liquids (water, tea, coffee, no-pulp juice, plain Jello, hard candy, BUT no milk, cream or non-dairy creamers) up until 30 minutes before your appointment time. You may have solids up to 2 hours before your appointment time.  Please bring headphones to listen to calming music, guided meditation, or anything you'd like. You can also bring a journal to write in during the treatment time.  Set an intention for each appointment. Your intention can be qualities you want to cultivate in yourself, mental health goals you would like to achieve, or experiences you would like to attract into your life.   Bring any inhalers you may need while at your appointment.  Take all morning medications as prescribed, except the followin hours before your appointment, no chlordiazepoxide (Librium), diazepam (Valium), or Flurazepam (Dalmane)   12 hours before your appointment, no lamotrigine (Lamictal).  Try to avoid taking any anti-anxiety medications if at all possible the day of your appointment. However, if your anxiety is unbearable and would prevent you from attending your appointment, please take your prescribed dose.   No stimulants prior to your treatment; you may take them once you are home.  Use any nasal sprays up to 1 hour before your appointment (for SPRAVATO only).     Cancellations: If you need to cancel your appointment, we  must have at least a 24-hour notice. If you cancel with less than 24-hour notice or do not show three times, no additional appointments will be scheduled until the treatment plan is discussed with your outpatient psychiatrist. Also, if you arrive more than 15 minutes late, we may need to cancel your appointment and reschedule. Please call [phone] to cancel appointments. Thank you for your cooperation!

## 2025-02-05 NOTE — PATIENT INSTRUCTIONS
"Plan for today:   We will add midodrine 5mg twice daily  Continue taking Corlanor 7.5 mg twice daily  Please try to get the tilt table testing and send it to [facility]  Please try to do more leg exercise  Follow up in about 4-6 months        \"5 conservative measures\" for POTS:   1. Wearing waist-high compression stockings 40-50 mmHg in compression, every day as long she is up and about and removing them at bedtime  2. Drinking one gallon of water a day  3. Eating at least 6 gm of salt (roughly 3 teaspoons) throughout her day.  4. Elevating the head of her bed 45 degrees (not by stacking pillows, but bending her mattress up and stacking bricks underneath).  5. Jogging in water 1 hour every day    These measures could be tried strictly for a period of 2 to 3 months, and if they do not bring relief, usage of symptomatic pharmacological treatment would then be justified.   "

## 2025-02-05 NOTE — H&P
Psychiatry Procedure H&P for each visit    Name: Kelli  : 1985  MRN: [id number]    Date: 25     History & Physical Reviewed:  Pregnancy/Lactating:  Are You Pregnant: No  Are You Currently Breastfeeding: No    I have reviewed the History and Physical dated:   History and Physical Reviewed and relevant findings noted. Patient examined to review pertinent physical findings: Yes  Home Medications Reviewed: Yes  Allergies Reviewed: Yes    ERAS (Enhanced Recovery After Surgery):  ERAS Patient: No    Consent:  COVID-19 Consent:   COVID-19 Risk Consent: Yes: Surgeon has reviewed key risks related to the risk of meli COVID-19 and if they contract COVID-19 what the risks are.

## 2025-02-05 NOTE — NURSING NOTE
Pt. Monitored for 60 minutes after end of ketamine infusion. VSS. Pt. Reports no floatiness or dizziness. Pt. A&Ox4 at time of discharge. IV access discontinued with Mediport needle intact. No bleeding. Pt. Tolerated well.

## 2025-02-05 NOTE — PROGRESS NOTES
Neuromuscular Medicine Consult     Kelli Silva, MRN: [id number], : 1985  Reason for Referral: No chief complaint on file.    Primary Care Physician: Omar Woods MD     Impression/Plan:   Kelli Silva is a 40 yo female who presents to [facility] care for review postural tachycardia syndrome (POTS). Her other medical history is notable for Bipolar 1, DM2, severe persistent asthma, renal cell carcinoma, infective endocarditis (recovered), hemiplegic migraines.      Her POTS symptoms started at age of 15, manifested as orthostatic palpitations and dizziness/lightheadedness associated with blurry vision, nausea, facial flushing. She had several syncopal episodes. Her other non-orthostatic symptoms include fatigue, chest pain, brain fog, nausea and stomach pain after eating, urinary frequency, dry eyes, dry mouth. She has had several tilt table tests, the only result we can see is a descriptive paragraph from prior [facility] note that described the tilt table test in , with evidence suggestive of POTS. Her current neuro exam is essentially normal. She does have isolated diffuse hyperreflexia with significantly dilated pupil, suggesting high sympathetic tone, which is most likely due to the serotonergics and neuroleptics that she is on.     For management, she has tried conservative management with limited effect. She was initially started on beta blockers (tried multiple), all caused hypotension and worsened her asthma. She then tried verapamil and fludrocortisone, which was effective for years until she had decline of renal function with hyperkalemia when hospitalized for infective endocarditis in .  Verapamil and fludrocortisone were stopped and she was started on Ivabradine, which has been effective. She has periodic worsening of her POTS symptoms after infections, especially COVID. Recently she had bronchitis with worsening of POTS symptoms again. She is hoping to get additional  therapeutic effect from other medications if possible. We will try midodrine for her.       Impression: postural tachycardia syndrome (POTS)    Plan:  We will add midodrine 5mg twice daily  Continue taking Corlanor 7.5 mg twice daily  Patient will try to get her prior tilt table test result and send it to the [facility].   Continue conservative management  Follow up in about 4-6 months      CARRIE Quezada  Neuromuscular Fellow    ATTENDING NOTE - EMEKA PALMER M.D.    I saw patient with trainee and agree with the edits, history and exam that I helped formulate per above.    Her symptoms started in the late teens and have been persistent since.  She was diagnosed with postural tachycardia syndrome at age 15 by tilt table study that is not available for us to review.  She was treated with verapamil and fludrocortisone for several years was doing well.  Both were stopped when she developed infective endocarditis several years ago secondary to an indwelling venous catheter.  She continues to have orthostatic intolerance symptomatology and was placed more recently on Corlanor 7.5 mg twice daily that may have helped her palpitation but not necessarily her dizziness and nausea.  In addition to postural tachycardia syndrome, she has migraine, diabetes mellitus, history of renal cancer and asthma.    Her neurological examination is intact.  We looked in our record for her initial autonomic study done at age 15 but could not find.  Her autonomic studies done at the [facility] are also not available.    In summary, Ms. Kelli Silva has symptoms highly suggestive of postural tachycardia syndrome (POTS).  We asked her to retrieve her autonomic studies for us to review.  We suggested to add midodrine 5 mg twice daily to her current known 7.5 mg twice daily dose.  She will continue all nonpharmacological therapy.  Will see her back in 4 to 6 months.  She will call for any questions.      Emeka Palmer M.D., F.A.C.P.    Director, Neuromuscular Center & EMG laboratory   [facility]   [facility]   Professor of Neurology   [facility] [employer]    The total appointment time today was 60 minutes. Time included preparing to see the patient, obtaining the history, performing a medically necessary appropriate physical examination, counseling and educating the patient, ordering medications, referring and communicating with other providers, independently interpreting results  to the patient and documenting clinical information in the medical record.        History of Present Illness:    Ms. Silva is a 39 y.o. female with hx of bipolar disorder, POTS, gastroparesis, hemiplegic migraines (left sided weakness and sensory changes), asthma, PCOS, DM2, fibromyalgia, DVT and PE, infective endocarditis (from central like infection when hospitalized for pneumonia, recovered), who presents to establish care for POTS. She was previously seen at [facility].     When she was 16 yo, her HR went up to 130 when she was walking at school, had palpitation, lightheadedness and dizziness, blurry vision, sweating, nausea. She had a migraine headache at that time too.  She eventually passed out. She went to [facility] and had tilt table test, she passed out when tilted.     After the first episode, she continued to have intermittent symptoms as stated above, usually when changing position from laying down or sitting to standing. She also has fatigue, chest pain, brain fog, Nausea and stomach pain after eating, urinary frequency, dry eyes, dry mouth, facial flushing with positional change. She has palpitation at night when sleeping, sometimes wakes her up. No high blood pressure. No vaginal dryness. No hypermobile joint. No tingling or numbness in feet when not having migraine.     She had another tilt table test at [facility] in 2002, which was  suggestive of POTS. She passed out eventually during that test and had a convulsive syncope. She was started on beta blockers (propranolol, nadolol, lopressor, nebivolol), all of them caused hypotension and worsening of asthma. Then she was started on verapamil and fludrocortisone, which worked but then she had endocarditis from a central line infection in 2012. Both verapamil and fludrocortisone were stopped due to worsening renal function and hyperkalemia (she has horse shoe kidney). She was then started on Ivarbradine, dose increased to 7.5 mg BID and was effective and controlled most of her symptoms, though her symptoms may get worse when she gets viral infection.  She had renal cell carcinoma in 2020, and she was told that fludrocortisone was not good for that. She currently has normal renal function.      January 2021 and June 2023 she got COVID with worsening of POTS symptoms, both recovered in about 6 months    She had bronchitis last month, then her POTS symptoms got worse. Now she recovered from bronchitis but the POTS symptoms were still bad. She has lightheadedness when sitting up, which relieves after laying down. She thinks her symptoms are 7/10 now, when it's well controlled, it would be 4/10. Recently has frequent diarrhea, thought to be due to protonix. In the past several years, she has been having increased sweating, gets hot very easily.     For conservative management, she is drinking 1L of gaterate and 1L of water a day. She tried salt tablets in the past, which increased her nausea so she stopped. She tried 30mmHg knee-high compression stocking and it did not help. Abdominal binder did not help. She walks 15-20 min several times a week. She did water aerobics, she likes it but she is not sure if it is helpful for her symptom. She is planing to join the exercise activity in the [facility] center. She uses 3 pillows to sleep at night due to breathing problem related to asthma, which is not helpful for  "her POTS symptoms.     She denies family history of POTS or hypermobile joints.   Good morning  Relevant past medical, surgical, family, and social histories, along with ROS was reviewed and pertinent details noted above.     Past Medical History:   Diagnosis Date    Normal routine history and physical examination     Normal routine history and physical examination     Other specified health status     No pertinent past medical history     Past Surgical History:   Procedure Laterality Date    OTHER SURGICAL HISTORY  05/08/2020    Venous access port placement     Family History   Problem Relation Name Age of Onset    Brain cancer Father      Kidney cancer Maternal Grandfather      Kidney cancer Sibling       Social History     Tobacco Use    Smoking status: Never    Smokeless tobacco: Never   Substance Use Topics    Alcohol use: Never      Allergies   Allergen Reactions    Aspirin Shortness of breath    Cigarette Smoke Shortness of breath    Fish Containing Products Hives, Rash and Shortness of breath     All seafood    Iodinated Contrast Media Shortness of breath     \"Asthma attack\" per pt Bronchospasm    Ketorolac Shortness of breath     \"Asthma attack\" per pt    Ketorolac Tromethamine Shortness of breath and Other     asthma    Ondansetron Other     Prolonged QT interval    Other Shortness of breath     Nicotiana Tabacum    Prochlorperazine Shortness of breath and Other     \"Asthma attack\" per pt    agitation    Other Reaction(s): Unknown  Mental state changes    Shellfish Containing Products Hives, Rash and Shortness of breath     All seafood    Sumatriptan Runny nose     Aphasia, weakness    Azithromycin Nausea/vomiting     nausea/vomiting    Ceftriaxone Other    Doxycycline Diarrhea    Dulaglutide Other    House Dust Other     Itchy watery eyes    Liraglutide Nausea Only    Metformin Other     Per patient, lactic acidosis    Metoclopramide Itching    Prednisone Other     Becomes suicidal   Tolerated " 1/10/2020*  Confusion  Mental state change    Sitagliptin Other    [id number] Antimigraine Agents Other    Zolmitriptan Other     Aphasia, and weakness,  no more triptans        Medications:    Current Outpatient Medications:     Accu-Chek Guide test strips strip, USE TO CHECK BLOOD SUGAR UP TO 4 TIMES PER DAY AS INSTRUCTED. E11.9, Disp: , Rfl:     albuterol 2.5 mg /3 mL (0.083 %) nebulizer solution, Inhale 3 mL (2.5 mg) every 6 hours if needed for wheezing or shortness of breath., Disp: , Rfl:     albuterol 90 mcg/actuation inhaler, Inhale 2 puffs. 6 TIMES DAILY., Disp: , Rfl:     atomoxetine (Strattera) 60 mg capsule, Take 1 capsule (60 mg) by mouth once daily. Swallow capsule whole; do not open. If opened accidentally, do not touch eyes; wash hands immediately (product is an eye irritant)., Disp: 90 capsule, Rfl: 3    Autolet lancing device, USE AS INSTRUCTED TO CHECK BLOOD SUGAR UP TO 4 TIMES DAILY. E11.9, Disp: , Rfl:     azelastine-fluticasone 137-50 mcg/spray spray,non-aerosol, Administer 1 spray into each nostril twice a day., Disp: , Rfl:     buPROPion XL (Wellbutrin XL) 300 mg 24 hr tablet, Take 1 tablet (300 mg) by mouth once daily in the morning. Do not crush, chew, or split., Disp: 90 tablet, Rfl: 3    busPIRone (Buspar) 30 mg tablet, Take 1 tablet (30 mg) by mouth 2 times a day., Disp: 180 tablet, Rfl: 3    calcium carbonate-vitamin D3 (Oyster Shell) 250 mg-3.125 mcg (125 unit) tablet, Take 1 tablet by mouth once daily., Disp: , Rfl:     cariprazine (Vraylar) 6 mg capsule, Take 1 capsule (6 mg) by mouth once daily at bedtime., Disp: 30 capsule, Rfl: 6    Corlanor 7.5 mg tablet, Take 1 tablet (7.5 mg) by mouth 2 times daily (morning and late afternoon)., Disp: , Rfl:     desvenlafaxine 100 mg 24 hr tablet, TAKE 1 TABLET BY MOUTH EVERY DAY IN THE MORNING, Disp: 90 tablet, Rfl: 3    dicyclomine (Bentyl) 20 mg tablet, Take 1 tablet (20 mg) by mouth 4 times a day before meals., Disp: , Rfl:     glucagon  (Glucagen) 1 mg injection, 1 mg.  Only take if BG< 60 (Patient not taking: Reported on 1/22/2025), Disp: , Rfl:     glucagon (Gvoke HypoPen 2-Pack) 1 mg/0.2 mL auto-injector, Inject 1 Pen under the skin. (Patient not taking: Reported on 1/22/2025), Disp: , Rfl:     glucose 4 gram chewable tablet, Chew 4 tablets (16 g). (Patient not taking: Reported on 1/22/2025), Disp: , Rfl:     ipratropium-albuteroL (Duo-Neb) 0.5-2.5 mg/3 mL nebulizer solution, Take 3 mL by nebulization every 4 hours if needed for wheezing or shortness of breath. (Patient not taking: Reported on 1/22/2025), Disp: , Rfl:     lactobacillus (Culturelle) 10 billion cell capsule, Take 1 capsule by mouth twice a day., Disp: , Rfl:     lamoTRIgine (LaMICtal) 200 mg disintegrating tablet, Take 1 tablet (200 mg) by mouth 2 times a day., Disp: 180 tablet, Rfl: 3    levonorgestrel (Mirena) 21 mcg/24 hours (8 yrs) 52 mg IUD, 52 mg by intrauterine route., Disp: , Rfl:     lisdexamfetamine (Vyvanse) 70 mg capsule, Take 1 capsule (70 mg) by mouth once daily in the morning., Disp: 30 capsule, Rfl: 0    LORazepam (Ativan) 1 mg tablet, Take 1 tablet (1 mg) by mouth 2 times a day as needed for anxiety or sleep., Disp: 60 tablet, Rfl: 2    pantoprazole (ProtoNix) 40 mg EC tablet, Take 1 tablet (40 mg) by mouth 2 times a day. Do not crush, chew, or split., Disp: , Rfl:     pregabalin (Lyrica) 150 mg capsule, Take 1 capsule (150 mg) by mouth once daily. each day at the same time., Disp: , Rfl:     pregabalin (Lyrica) 50 mg capsule, Take 1 capsule (50 mg) by mouth 2 times a day.  (am and afternoon) in addition to 150 mg capsule at night, Disp: , Rfl:     QUEtiapine (SEROquel) 300 mg tablet, Take 1 tablet (300 mg) by mouth once daily at bedtime., Disp: 30 tablet, Rfl: 1    Reyvow 100 mg tablet, Take 1 tablet by mouth every 24 (twenty four) hours if needed. AT ONSET OF MIGRAINE NO MORE THAN 1X, Disp: , Rfl:     tezepelumab-ekko (Tezspire) SubQ syringe, Inject 210 mg under  "the skin every 28 (twenty-eight) days. (Patient not taking: Reported on 1/22/2025), Disp: , Rfl:     traZODone (Desyrel) 100 mg tablet, Take 2 tablets (200 mg) by mouth once daily at bedtime., Disp: 180 tablet, Rfl: 3    Trelegy Ellipta 200-62.5-25 mcg blister with device, 1 puff once daily., Disp: , Rfl:     zavegepant (Zavzpret) 10 mg/actuation spray,non-aerosol, Administer 10 mg into affected nostril(s) if needed., Disp: , Rfl:     Current Facility-Administered Medications:     ARIPiprazole ER (2 month) (Abilify Asimtufii) IM injection 960 mg, 960 mg, intramuscular, Once, Ata Nova MD PhD       Physical Exam:   /72   Pulse 63   Temp 36.1 °C (96.9 °F)   Resp 18   Ht 1.575 m (5' 2\")   Wt 105 kg (232 lb)   BMI 42.43 kg/m²      Orthostatic vitals were not done today since she is on medication.       General Appearance:  No distress, alert, interactive and cooperative.   Musculoskeletal:  Nopes cavus or hammertoes    Neurological:  Mental status: the patient provided an accurate history, language was normal.   Cranial Nerves:  CN 2   Visual fields full to confrontation.   CN 3, 4, 6   Pupils round, 6 mm in diameter, slowly reactive to light.   Lids symmetric; no ptosis.   EOMs normal alignment, full range, with normal pursuit and convergence  No nystagmus.   CN 5   Facial sensation intact bilaterally.   Jaw opening 5/5   CN 7   Normal and symmetric facial strength. Nasolabial folds symmetric.   Able to lift eyebrows and close eye lids with eye lashes buried symmetrically.   CN 8   Hearing intact to conversation.     CN 9, 10   Phonation within normal limits, no dysarthria.   CN 11   Normal strength of shoulder shrug and neck turning.   CN 12   Tongue midline, with normal bulk and strength; no fasciculations.     Motor:   Muscle bulk: Normal throughout.  Muscle tone: Normal in both upper and lower extremities.  Movements: No fasciculations, tremors, or other abnormal movement.     Manual Muscle Testing " "(MMT) reveals the following MRC grades:  Neck Flexion 5  Neck Extension 5  R L   Shoulder abduction  5 5  Elbow flexion   5 5  Elbow extension  5 5  Finger flexion   5 5  Finger extension  5 5  Finger abduction  5 5  Thumb abduction   5 5  Hip flexion   5 5  Hip abduction    5 5  Hip adduction    5 5  Knee flexion   5 5  Knee extension  5 5  Ankle dorsiflexion  5 5  Ankle plantarflexion  5 5        Reflexes:     R          L  BR:               3+        3+  Biceps:         3+        3+  Triceps:        3+        3+  Knee:           2+        2+ (somewhat brisk but no cross adduction)  Ankle:          2+        2+    Retana's: present bilaterally  No clonus or other pathological reflexes    Sensory:   In both upper and lower extremities, sensation was intact to light touch, pinprick, temperature, vibration. Feels about 14s of vibration in toes on both sides and no gradient change.     Coordination:    In both upper extremities, finger-nose-finger was intact without dysmetria or overshoot.      Gait:   Normal gait       Results:     The following labs, imaging, and/or data were personally reviewed and demonstrated:     Recent CBC and CMP reviewed. CMP normal. CBC showed mild neutrophilic leukocytosis.     From [facility] record (unable to see the tilt table report):     \"Tilt 2002:  REPORT*: EEG done during HUT. Supine ME=725/64 and HR=89. Supine normotension.   baseline lead II EKG showed SA, UY=824, QRs=80 and QT=320 ms; positive T.   The patient tolerated 70 degrees of tilt for 45 minutes. During the tilt,   patient complained intermittently of feeling dizzy, nauseous and warm. These   symptoms started as soon as the tilt table angle was elevated to 70 degrees.   Symptoms lasted throughout HUT with varying intensity. At times, her symptoms   were very intense. The SBP, and the DBP showed a normal response during early   HUT [until 3d minutes into 70 degree] followed by a sustained gradual decline   until max-tilt. The " "heart rate response to HUT was diagnostic of Progressive   Orthostatic Tachycardia Syndrome (POTS; mostly 130-135bpm in late 70 degree   tilt). Lead II EKG during HUT showed sinus tachycardia; there was T wave   flattening during 70 degree HUT. At maximum tilt, HI=459/62 and DW=073. The   test was terminated due to end of protocol. Recovery was rapid . However when   the patient was being helped getting off the table, she appeared to loose all   of the muscle tone and passed out; tonic-clonic activity in upper chest and   twitching of eyelids was noted. Vitals were stable during the episode and she   regained conciousness in 2 minutes but appeared weak without localization nor   lateralization. She was carried to a cart and escrted to F17 to be transported   by cart to her Hospital room. No injuries and no sequelae. Conclusion: POTS   and POH. \"    "

## 2025-02-06 ENCOUNTER — APPOINTMENT (OUTPATIENT)
Dept: BEHAVIORAL HEALTH | Facility: CLINIC | Age: 40
End: 2025-02-06
Payer: COMMERCIAL

## 2025-02-06 DIAGNOSIS — F31.9 BIPOLAR 1 DISORDER (MULTI): ICD-10-CM

## 2025-02-06 PROCEDURE — 90837 PSYTX W PT 60 MINUTES: CPT | Performed by: PSYCHOLOGIST

## 2025-02-06 NOTE — GROUP NOTE
Group Topic: Feeling Awareness/Expression   Group Date: 2/4/2025  Start Time:  6:00 PM  End Time:  7:30 PM  Facilitators: Garima Wong PsyD   Department: [facility]    Number of Participants: 10   Group Focus: other Values  Treatment Modality: Cognitive Behavioral Therapy  Interventions utilized were exploration and patient education  Purpose: other: Values    This was a video IOP aftercare group on a HIPAA compliant platform. All patients were provided with informed consent.    IOP Aftercare Group: Values  Participants defined values and explored how their values influence their lives. They explored their own values and  them into categories of very important, important and not important. Group members discussed how their behaviors are aligned with their values and how their behavior is not aligned with their values.   Garima Wong Psy.D.      Name: Kelli Silva YOB: 1985   MR: [id number]      Kelli was present and actively engaged in discussion. She stated that she values compassion, being caring, family and friends and her health. She noted that values have influenced how she acts when there is a crisis.

## 2025-02-06 NOTE — PROGRESS NOTES
Humanistic/Insight Oriented Therapy. 50 min.  BP1.  We spoke about how she is concerned with her moms procedure as she had some complications, but was able to utilize her coping skills and get back to baseline and a decent nights sleep. Talked through moms process.     Chief complaint - Phase of life and emotional distress   Treatment plan - Insight and action consistent with ACT therapy. Providing support, safe space to process their thoughts and feelings, developing therapeutic relationship.  Goals - Self-care, insight, coping skills  Prognosis - Average  Progress - Average  Functional status - 70/100  Focused mental status: Patient was oriented to person, place, time and context  Focused mental exam - alert and oriented  Frequency - Every two weeks     An interactive audio and video telecommunication system which permits real time communications between the patient (at the originating site) and provider (at the distant site) was utilized to provide this telehealth service

## 2025-02-11 ENCOUNTER — CLINICAL SUPPORT (OUTPATIENT)
Dept: BEHAVIORAL HEALTH | Facility: CLINIC | Age: 40
End: 2025-02-11
Payer: COMMERCIAL

## 2025-02-11 DIAGNOSIS — F31.9 BIPOLAR 1 DISORDER (MULTI): ICD-10-CM

## 2025-02-11 DIAGNOSIS — F41.9 ANXIETY: ICD-10-CM

## 2025-02-11 PROCEDURE — 90853 GROUP PSYCHOTHERAPY: CPT | Mod: AH,95 | Performed by: PSYCHOLOGIST

## 2025-02-11 PROCEDURE — 90853 GROUP PSYCHOTHERAPY: CPT | Performed by: PSYCHOLOGIST

## 2025-02-13 ENCOUNTER — APPOINTMENT (OUTPATIENT)
Dept: BEHAVIORAL HEALTH | Facility: CLINIC | Age: 40
End: 2025-02-13
Payer: COMMERCIAL

## 2025-02-13 DIAGNOSIS — F31.9 BIPOLAR 1 DISORDER (MULTI): ICD-10-CM

## 2025-02-13 PROCEDURE — 90837 PSYTX W PT 60 MINUTES: CPT | Performed by: PSYCHOLOGIST

## 2025-02-13 NOTE — PROGRESS NOTES
Insight Oriented Therapy. 50 min.  BP1.  We spoke about her being a bit worn down with so many clients, a bit down, but looking forward to the weekend to relax and catch up on notes. Mom's recovery is taking longer, but she is stable and that matters.  Talked a bit about her health.    Teletherapy session  Chief complaint - Phase of life and emotional distress   Treatment plan - Insight and action consistent with ACT therapy. Providing support, safe space to process their thoughts and feelings, developing therapeutic relationship.  Goals - Self-care, insight, coping skills  Prognosis - Average  Progress - Average  Functional status - 70/100  Focused mental status: Patient was oriented to person, place, time and context  Focused mental exam - alert and oriented  Frequency - Every two weeks     An interactive audio and video telecommunication system which permits real time communications between the patient (at the originating site) and provider (at the distant site) was utilized to provide this telehealth service

## 2025-02-17 NOTE — GROUP NOTE
"Group Topic: Feeling Awareness/Expression   Group Date: 2/11/2025  Start Time:  6:00 PM  End Time:  7:30 PM  Facilitators: Garima Wong PsyD   [facility]    Number of Participants: 12   Group Focus: check in  Treatment Modality: Patient-Centered Therapy  Interventions utilized were exploration and support  Purpose: coping skills and feelings    This was a video IOP aftercare group on a HIPAA compliant platform. All patients were provided with informed consent.    IOP Aftercare Group: Check-in  Participants had opportunity to check-in with their peers and discuss recent stressors. They supported each other and shared coping to assist in overcoming challenges they are facing.   Garima Wong Psy.D.      Name: Kelli Silva YOB: 1985   MR: [id number]      Kelli was present and actively engaged in discussion. She processed difficulties she has recently faced related to her mother's health. She described feeling \"somewhat depressed\" but is trying not to \"discount what [she is] doing.\" She stated that setting a daily intention and has been helpful.         "

## 2025-02-18 ENCOUNTER — CLINICAL SUPPORT (OUTPATIENT)
Dept: BEHAVIORAL HEALTH | Facility: CLINIC | Age: 40
End: 2025-02-18
Payer: COMMERCIAL

## 2025-02-18 DIAGNOSIS — F41.9 ANXIETY: ICD-10-CM

## 2025-02-18 DIAGNOSIS — F31.9 BIPOLAR 1 DISORDER (MULTI): ICD-10-CM

## 2025-02-18 PROCEDURE — 90853 GROUP PSYCHOTHERAPY: CPT | Mod: AH,95 | Performed by: PSYCHOLOGIST

## 2025-02-18 PROCEDURE — 90853 GROUP PSYCHOTHERAPY: CPT | Performed by: PSYCHOLOGIST

## 2025-02-19 ENCOUNTER — HOSPITAL ENCOUNTER (OUTPATIENT)
Dept: POSTOP/PACU | Facility: HOSPITAL | Age: 40
Setting detail: OUTPATIENT SURGERY
Discharge: HOME | End: 2025-02-19
Payer: COMMERCIAL

## 2025-02-19 VITALS
DIASTOLIC BLOOD PRESSURE: 64 MMHG | BODY MASS INDEX: 43.79 KG/M2 | TEMPERATURE: 97.5 F | SYSTOLIC BLOOD PRESSURE: 134 MMHG | OXYGEN SATURATION: 97 % | RESPIRATION RATE: 14 BRPM | HEART RATE: 70 BPM | WEIGHT: 239.42 LBS

## 2025-02-19 DIAGNOSIS — F31.9 BIPOLAR I DISORDER WITH DEPRESSION (MULTI): Primary | ICD-10-CM

## 2025-02-19 PROCEDURE — 2500000004 HC RX 250 GENERAL PHARMACY W/ HCPCS (ALT 636 FOR OP/ED): Performed by: PSYCHIATRY & NEUROLOGY

## 2025-02-19 PROCEDURE — KETSP HC KETAMINE INFUSION SELF-PAY: Performed by: STUDENT IN AN ORGANIZED HEALTH CARE EDUCATION/TRAINING PROGRAM

## 2025-02-19 PROCEDURE — KETSP PR KETAMINE INFUSION SELF-PAY: Performed by: STUDENT IN AN ORGANIZED HEALTH CARE EDUCATION/TRAINING PROGRAM

## 2025-02-19 PROCEDURE — 2500000004 HC RX 250 GENERAL PHARMACY W/ HCPCS (ALT 636 FOR OP/ED): Performed by: STUDENT IN AN ORGANIZED HEALTH CARE EDUCATION/TRAINING PROGRAM

## 2025-02-19 RX ORDER — KETAMINE HCL IN 0.9 % NACL 200 MG/200
0.75 PLASTIC BAG, INJECTION (ML) INTRAVENOUS ONCE
Status: COMPLETED | OUTPATIENT
Start: 2025-02-19 | End: 2025-02-19

## 2025-02-19 RX ORDER — KETAMINE HCL IN 0.9 % NACL 200 MG/200
0.75 PLASTIC BAG, INJECTION (ML) INTRAVENOUS ONCE
Status: CANCELLED
Start: 2025-02-19 | End: 2025-02-19

## 2025-02-19 RX ORDER — PROMETHAZINE HYDROCHLORIDE 25 MG/ML
50 INJECTION, SOLUTION INTRAMUSCULAR; INTRAVENOUS ONCE
Status: DISCONTINUED | OUTPATIENT
Start: 2025-02-19 | End: 2025-02-19

## 2025-02-19 RX ORDER — PROMETHAZINE HYDROCHLORIDE 50 MG/ML
50 INJECTION, SOLUTION INTRAMUSCULAR; INTRAVENOUS ONCE
Status: CANCELLED | OUTPATIENT
Start: 2025-02-19

## 2025-02-19 RX ORDER — ONDANSETRON HYDROCHLORIDE 2 MG/ML
8 INJECTION, SOLUTION INTRAVENOUS ONCE
Start: 2025-02-19 | End: 2025-02-19

## 2025-02-19 RX ORDER — KETAMINE HCL IN 0.9 % NACL 200 MG/200
0.75 PLASTIC BAG, INJECTION (ML) INTRAVENOUS ONCE
Start: 2025-02-19 | End: 2025-02-19

## 2025-02-19 RX ORDER — ONDANSETRON HYDROCHLORIDE 2 MG/ML
8 INJECTION, SOLUTION INTRAVENOUS ONCE
Status: COMPLETED | OUTPATIENT
Start: 2025-02-19 | End: 2025-02-19

## 2025-02-19 RX ADMIN — PROMETHAZINE HYDROCHLORIDE 25 MG: 25 INJECTION INTRAMUSCULAR; INTRAVENOUS at 10:47

## 2025-02-19 RX ADMIN — SODIUM CHLORIDE 81.4 MG: 9 INJECTION, SOLUTION INTRAVENOUS at 09:34

## 2025-02-19 RX ADMIN — ONDANSETRON 8 MG: 2 INJECTION INTRAMUSCULAR; INTRAVENOUS at 09:30

## 2025-02-19 ASSESSMENT — PATIENT HEALTH QUESTIONNAIRE - PHQ9
6. FEELING BAD ABOUT YOURSELF - OR THAT YOU ARE A FAILURE OR HAVE LET YOURSELF OR YOUR FAMILY DOWN: SEVERAL DAYS
8. MOVING OR SPEAKING SO SLOWLY THAT OTHER PEOPLE COULD HAVE NOTICED. OR THE OPPOSITE, BEING SO FIGETY OR RESTLESS THAT YOU HAVE BEEN MOVING AROUND A LOT MORE THAN USUAL: NOT AT ALL
9. THOUGHTS THAT YOU WOULD BE BETTER OFF DEAD, OR OF HURTING YOURSELF: NOT AT ALL
3. TROUBLE FALLING OR STAYING ASLEEP OR SLEEPING TOO MUCH: NOT AT ALL
SUM OF ALL RESPONSES TO PHQ9 QUESTIONS 1 AND 2: 2
SUM OF ALL RESPONSES TO PHQ QUESTIONS 1-9: 10
4. FEELING TIRED OR HAVING LITTLE ENERGY: SEVERAL DAYS
10. IF YOU CHECKED OFF ANY PROBLEMS, HOW DIFFICULT HAVE THESE PROBLEMS MADE IT FOR YOU TO DO YOUR WORK, TAKE CARE OF THINGS AT HOME, OR GET ALONG WITH OTHER PEOPLE: SOMEWHAT DIFFICULT
7. TROUBLE CONCENTRATING ON THINGS, SUCH AS READING THE NEWSPAPER OR WATCHING TELEVISION: NEARLY EVERY DAY
5. POOR APPETITE OR OVEREATING: NEARLY EVERY DAY
1. LITTLE INTEREST OR PLEASURE IN DOING THINGS: SEVERAL DAYS
2. FEELING DOWN, DEPRESSED OR HOPELESS: SEVERAL DAYS

## 2025-02-19 ASSESSMENT — ANXIETY QUESTIONNAIRES
2. NOT BEING ABLE TO STOP OR CONTROL WORRYING: SEVERAL DAYS
IF YOU CHECKED OFF ANY PROBLEMS ON THIS QUESTIONNAIRE, HOW DIFFICULT HAVE THESE PROBLEMS MADE IT FOR YOU TO DO YOUR WORK, TAKE CARE OF THINGS AT HOME, OR GET ALONG WITH OTHER PEOPLE: SOMEWHAT DIFFICULT
1. FEELING NERVOUS, ANXIOUS, OR ON EDGE: SEVERAL DAYS
5. BEING SO RESTLESS THAT IT IS HARD TO SIT STILL: SEVERAL DAYS
3. WORRYING TOO MUCH ABOUT DIFFERENT THINGS: SEVERAL DAYS
4. TROUBLE RELAXING: SEVERAL DAYS
GAD7 TOTAL SCORE: 6
6. BECOMING EASILY ANNOYED OR IRRITABLE: NOT AT ALL
7. FEELING AFRAID AS IF SOMETHING AWFUL MIGHT HAPPEN: SEVERAL DAYS

## 2025-02-19 NOTE — H&P
Psychiatry Procedure H&P for each visit    Name: Kelli  : 1985  MRN: [id number]    Date: 25     History & Physical Reviewed:  Pregnancy/Lactating:  Are You Pregnant: No  Are You Currently Breastfeeding: No    I have reviewed the History and Physical dated:   History and Physical Reviewed and relevant findings noted. Patient examined to review pertinent physical findings: Yes  Home Medications Reviewed: Yes  Allergies Reviewed: Yes    ERAS (Enhanced Recovery After Surgery):  ERAS Patient: No    Consent:  COVID-19 Consent:   COVID-19 Risk Consent: Yes: Surgeon has reviewed key risks related to the risk of meli COVID-19 and if they contract COVID-19 what the risks are.

## 2025-02-19 NOTE — PATIENT INSTRUCTIONS
Homegoing Instructions    Patient: Kelli Silva  MRN: [id number]  YOB: 1985    Care Providers: Dr. Suzie Roman, RN    Allergies:  Aspirin, Cigarette smoke, Fish containing products, Iodinated contrast media, Ketorolac, Ketorolac tromethamine, Ondansetron, Other, Prochlorperazine, Shellfish containing products, Sumatriptan, Azithromycin, Ceftriaxone, Doxycycline, Dulaglutide, House dust, Liraglutide, Metformin, Metoclopramide, Prednisone, Sitagliptin, Bwmrpfla-2-vz9 antimigraine agents, and Zolmitriptan    Medications:  Current Outpatient Medications   Medication Sig Dispense Refill    Accu-Chek Guide test strips strip USE TO CHECK BLOOD SUGAR UP TO 4 TIMES PER DAY AS INSTRUCTED. E11.9      atomoxetine (Strattera) 60 mg capsule Take 1 capsule (60 mg) by mouth once daily. Swallow capsule whole; do not open. If opened accidentally, do not touch eyes; wash hands immediately (product is an eye irritant). 90 capsule 3    Autolet lancing device USE AS INSTRUCTED TO CHECK BLOOD SUGAR UP TO 4 TIMES DAILY. E11.9      azelastine-fluticasone 137-50 mcg/spray spray,non-aerosol Administer 1 spray into each nostril twice a day.      buPROPion XL (Wellbutrin XL) 300 mg 24 hr tablet Take 1 tablet (300 mg) by mouth once daily in the morning. Do not crush, chew, or split. 90 tablet 3    busPIRone (Buspar) 30 mg tablet Take 1 tablet (30 mg) by mouth 2 times a day. 180 tablet 3    calcium carbonate-vitamin D3 (Oyster Shell) 250 mg-3.125 mcg (125 unit) tablet Take 1 tablet by mouth once daily.      cariprazine (Vraylar) 6 mg capsule Take 1 capsule (6 mg) by mouth once daily at bedtime. 30 capsule 6    Corlanor 7.5 mg tablet Take 1 tablet (7.5 mg) by mouth 2 times daily (morning and late afternoon).      desvenlafaxine 100 mg 24 hr tablet TAKE 1 TABLET BY MOUTH EVERY DAY IN THE MORNING 90 tablet 3    dicyclomine (Bentyl) 20 mg tablet Take 1 tablet (20 mg) by mouth 4 times a day before meals.       lactobacillus (Culturelle) 10 billion cell capsule Take 1 capsule by mouth twice a day.      lamoTRIgine (LaMICtal) 200 mg disintegrating tablet Take 1 tablet (200 mg) by mouth 2 times a day. 180 tablet 3    lisdexamfetamine (Vyvanse) 70 mg capsule Take 1 capsule (70 mg) by mouth once daily in the morning. 30 capsule 0    LORazepam (Ativan) 1 mg tablet Take 1 tablet (1 mg) by mouth 2 times a day as needed for anxiety or sleep. 60 tablet 2    midodrine (Proamatine) 5 mg tablet Take 1 tablet (5 mg) by mouth 2 times a day. 120 tablet 5    pantoprazole (ProtoNix) 40 mg EC tablet Take 1 tablet (40 mg) by mouth 2 times a day. Do not crush, chew, or split.      pregabalin (Lyrica) 150 mg capsule Take 1 capsule (150 mg) by mouth once daily. each day at the same time.      pregabalin (Lyrica) 50 mg capsule Take 1 capsule (50 mg) by mouth 2 times a day.  (am and afternoon) in addition to 150 mg capsule at night      QUEtiapine (SEROquel) 300 mg tablet Take 1 tablet (300 mg) by mouth once daily at bedtime. 30 tablet 1    tezepelumab-ekko (Tezspire) SubQ syringe Inject 210 mg under the skin every 28 (twenty-eight) days.      Trelegy Ellipta 200-62.5-25 mcg blister with device 1 puff once daily.      zavegepant (Zavzpret) 10 mg/actuation spray,non-aerosol Administer 10 mg into affected nostril(s) if needed.      albuterol 2.5 mg /3 mL (0.083 %) nebulizer solution Inhale 3 mL (2.5 mg) every 6 hours if needed for wheezing or shortness of breath. (Patient not taking: Reported on 2/19/2025)      albuterol 90 mcg/actuation inhaler Inhale 2 puffs. 6 TIMES DAILY. (Patient not taking: Reported on 2/19/2025)      glucagon (Glucagen) 1 mg injection 1 mg.  Only take if BG< 60 (Patient not taking: Reported on 1/22/2025)      glucagon (Gvoke HypoPen 2-Pack) 1 mg/0.2 mL auto-injector Inject 1 Pen under the skin. (Patient not taking: Reported on 1/22/2025)      glucose 4 gram chewable tablet Chew 4 tablets (16 g). (Patient not taking: Reported  on 2/19/2025)      ipratropium-albuteroL (Duo-Neb) 0.5-2.5 mg/3 mL nebulizer solution Take 3 mL by nebulization every 4 hours if needed for wheezing or shortness of breath. (Patient not taking: Reported on 2/19/2025)      levonorgestrel (Mirena) 21 mcg/24 hours (8 yrs) 52 mg IUD 52 mg by intrauterine route.      Reyvow 100 mg tablet Take 1 tablet by mouth every 24 (twenty four) hours if needed. AT ONSET OF MIGRAINE NO MORE THAN 1X (Patient not taking: Reported on 2/19/2025)       Current Facility-Administered Medications   Medication Dose Route Frequency Provider Last Rate Last Admin    alteplase (Cathflo Activase) injection 2 mg  2 mg intra-catheter PRN Ata Nova MD PhD        ARIPiprazole ER (2 month) (Abilify Asimtufii) IM injection 960 mg  960 mg intramuscular Once Ata Nova MD PhD           Your Provider's Instructions:  Do not consume alcoholic beverages for 24 hours.  Do not make important decisions or sign any important documents for the next 24 hours.  It is recommended that a responsible adult remain with you for the next 24 hours as you may be lightheaded or dizzy.  Do not drive or operate machinery until the day after a treatment session or after a restful sleep.  Resume your normal diet.     When to Call Your Doctor:  Call your doctor for questions and/or concerns.  Call 911 for an emergency situation.    Health Maintenance:  If you currently use tobacco products, it is advised that you quit to improve your health.  If you currently use or have stopped using tobacco products within the last 12 months, the following are resources that can help you:  American Heart Association ([phone], (www.americanheart.org)  [place] Tobacco Quit Line 1-444.323.9015 (QUIT-NOW)  http://www.odh.[place].gov/odhPrograms/hprr/tob_risk1.aspx    Do you or a loved one need help for alcohol or drug use? Call the [place] at 211 or visit FindTreatment.gov for resources.    To find out where you can safely dispose unused  medicines, visit www.rxdrugdropbox.org, or call your local police department.    Please call us at [phone] with any concerns.    Prior to your next appointment for Spravato/Ketamine, you will be sent the PHQ-9 as well as the ANUP-7 assessments via [facility]. In the past, you have typically completed these on paper during your appointment. We ask that you check your MyChart the day before your appointment and complete these online. This will assist us in streamlining your check-in during your appointment.     Please make certain you have transportation arranged to and from your appointment. This can be a friend, family member, or arranged through your health insurance provider.    Before your next appointment:  Stop or reduce smoking, vaping, and any nicotine use if at all possible.  No alcohol for 24 hours before your appointment.    Day of treatment:  You may have clear liquids (water, tea, coffee, no-pulp juice, plain Jello, hard candy, BUT no milk, cream or non-dairy creamers) up until 30 minutes before your appointment time. You may have solids up to 2 hours before your appointment time.  Please bring headphones to listen to calming music, guided meditation, or anything you'd like. You can also bring a journal to write in during the treatment time.  Set an intention for each appointment. Your intention can be qualities you want to cultivate in yourself, mental health goals you would like to achieve, or experiences you would like to attract into your life.   Bring any inhalers you may need while at your appointment.  Take all morning medications as prescribed, except the followin hours before your appointment, no chlordiazepoxide (Librium), diazepam (Valium), or Flurazepam (Dalmane)   12 hours before your appointment, no lamotrigine (Lamictal).  Try to avoid taking any anti-anxiety medications if at all possible the day of your appointment. However, if your anxiety is unbearable and would prevent you  from attending your appointment, please take your prescribed dose.   No stimulants prior to your treatment; you may take them once you are home.  Use any nasal sprays up to 1 hour before your appointment (for SPRAVATO only).     Cancellations: If you need to cancel your appointment, we must have at least a 24-hour notice. If you cancel with less than 24-hour notice or do not show three times, no additional appointments will be scheduled until the treatment plan is discussed with your outpatient psychiatrist. Also, if you arrive more than 15 minutes late, we may need to cancel your appointment and reschedule. Please call [phone] to cancel appointments. Thank you for your cooperation!

## 2025-02-19 NOTE — NURSING NOTE
Pt. Monitored for 60 minutes after end of ketamine infusion. B/P was high with reg. cuff forearm and was much better with lg. cuff, upper arm. Will use lg. cuff, upper arm from now on. Pt. Reports no floatiness or dizziness. Pt. A&Ox4 at time of discharge. IV access discontinued with cannula/needle intact. Pt. given Band-Aid. No bleeding. Pt. Tolerated well.  At approximately 1035, pt. reported dizziness and nausea. Dr. Álvarez alerted and promethazine 25m IV was ordered and infused over 15 minutes at 1047. Dizziness and nausea abated. No dizziness or nausea at time of discharge.

## 2025-02-19 NOTE — PROCEDURES
"General    Date/Time: 2025 8:56 AM    Performed by: Suzie Álvarez DO  Authorized by: Suzie Álvarez DO      Ketamine/Esketamine Procedure    Name: Kelli BLANCHARDB: 1985  MRN: [id number]    Date: 25    Ketamine Infusion: 0.75mg per kilo over 40 minutes (Maintenance)    Time out was taken with staff to confirm correct patient and correct procedure to be performed.     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 States is looking forward to a visit with a friend in 2 weeks - would like next ketamine infusion in 3 weeks. States urologist feels that her urinary urgency might be related to a progression of her DM - is working on lifestyle changes and considering stimulator if that doesn't work. States sleep is good with Seroquel and feels it is helping her mood as well - plans to message Dr. Nova to see if she can stay on it. Does not like the side effect of increased appetite, but the effects on her mood may make it worth it. Denies other interval changes to health/medications. Denies suicidal/homicidal ideation. Denies auditory/visual hallucinations.    Patient Health Questionnaire-9 Score: 10 (2025  8:56 AM) (Prior 8)  ANUP-7 Total Score: 6 (2025  8:55 AM) (Prior 13)    Objective:    Mental Status Exam:  General/Appearance: NAD, appears stated age, casually dressed, body habitus: obese, grooming/hygiene is excellent  Attitude/Behavior: engages in interview, cooperative, appropriate eye contact  Motor Activity: no psychomotor agitation/retardation, no tics/tremors, no EPS/TD, gait: not assessed  Mood: \"Work has been really hard\"  Affect: Dysphoric, restricted, mood congruent, appropriate to content  Speech: spontaneous, coherent, fluent; appropriate quantity, volume, rate, tone  Thought Process: linear, logical, goal-directed, future-oriented  Thought Content: denies SI/HI, Delusional thinking: none " "elicited  Thought Perception: denies AVH, not responding to internal stimuli  Cognition:  Grossly intact  Insight: good  Judgment: good    Time since procedure start Subjective Heart Rate Pulse Oximetry Blood Pressure   Before procedure start See above 65 bpm 97% SpO2 137/83 mmHg   20 minutes after initiation of infusion Reports feeling floaty and \"out of it\". No other side effects. 66 bpm 95% SpO2 166/85 mmHg   40 minutes after initiation of infusion Ongoing floating and \"out of it feeling\" - more so than prior. No other side effects. 70 bpm 95% SpO2 170/85 mmHg   50 minutes after initiation of infusion Dizzy feeling improved, but nausea worsening. Mild emesis. No other side effects. 60 bpm 90% SpO2 153/68 mmHg     Tolerated treatment well. Some nausea/vomiting - required Phenergan 25mg IVPB x 1 with significant improvement. Verified last PO intake was ~4.5 hours ago.     Assessment:  Bipolar I D/O, currently depressed  ANUP under control   No immediate risk of harm to self or others    Plan:  Continue outpatient management with Dr. Nova.   Ketamine infusion in 3 weeks (on 3/12) per pt)  Given N/V, pt agreeable to extend duration of infusion to 60 minutes next time - therapy plan adjusted to reflect this. Will D/W team next week given increase in dose this week from 79>82mg.    Suzie Álvarez, DO   "

## 2025-02-20 ENCOUNTER — PROCEDURE VISIT (OUTPATIENT)
Dept: BEHAVIORAL HEALTH | Facility: CLINIC | Age: 40
End: 2025-02-20
Payer: COMMERCIAL

## 2025-02-20 ENCOUNTER — APPOINTMENT (OUTPATIENT)
Dept: BEHAVIORAL HEALTH | Facility: CLINIC | Age: 40
End: 2025-02-20
Payer: COMMERCIAL

## 2025-02-20 VITALS
TEMPERATURE: 98.8 F | SYSTOLIC BLOOD PRESSURE: 119 MMHG | BODY MASS INDEX: 43.73 KG/M2 | WEIGHT: 239.1 LBS | HEART RATE: 73 BPM | RESPIRATION RATE: 16 BRPM | DIASTOLIC BLOOD PRESSURE: 62 MMHG

## 2025-02-20 DIAGNOSIS — F31.9 BIPOLAR 1 DISORDER (MULTI): Primary | ICD-10-CM

## 2025-02-20 DIAGNOSIS — F31.9 BIPOLAR 1 DISORDER (MULTI): ICD-10-CM

## 2025-02-20 DIAGNOSIS — F90.0 ATTENTION DEFICIT HYPERACTIVITY DISORDER (ADHD), PREDOMINANTLY INATTENTIVE TYPE: ICD-10-CM

## 2025-02-20 PROCEDURE — 90837 PSYTX W PT 60 MINUTES: CPT | Performed by: PSYCHOLOGIST

## 2025-02-20 RX ORDER — LISDEXAMFETAMINE DIMESYLATE 70 MG/1
70 CAPSULE ORAL EVERY MORNING
Qty: 30 CAPSULE | Refills: 0 | Status: SHIPPED | OUTPATIENT
Start: 2025-02-20 | End: 2025-03-22

## 2025-02-20 NOTE — PROGRESS NOTES
Patient here at the clinic for every 2 months injection of Abilify Asimtufii for Bipolar affective disorder,current episode hypomanic. Patient identified by full name and , vitals obtained. Patient last seen by Provider on 25, last seen by nurse on 24. Medication verified by providers note and medication order.      Appetite:  good  Sleep:  good  Appearance: clean, age appropriate and well groomed  Build: overweight  Attitude: cooperative, calm, pleasant, friendly, open  Eye Contact: normal  Activity: alert, attentive, appropriate  Speech: appropriate & spontaneous, normal rate & flow, clear  Delusions: denies  Thought Content: logical  Thought Process: logical  Judgement/insight:  Intact/good  Mood: calm   Affect: appropriate  AH/VH/SI/HI patient denies  Access to Firearms/weapons: No  Depression: Patient denies  Thoughts of hopelessness: denies  Anxiety: pt denies  Self-injurious behavior: denies at this time  Cravings/Urges: denies  Last Use: NA  Tobacco Use: Non smoker  Spiritual or cultural practices that may affect your care or impact your health care decisions? No  Living situation:  Lives with boyfriend  Employed: Employed at [employer]      Patient was pleasant,engaged and cooperative. Patient denies any issues with the previous injection.  Patient denies SI/HI, VH/AH and self harming thoughts at this time.       Patient received Abilify Asimtufii 960mg/3.2ml  via 22 gauge,1 and 1/2 inch needle in the left gluteal area with no reaction to the injection site. No bleeding or erythema noted, patient tolerated the procedure  well. Nurse informed patient to use warm compress for comfort if needed. Patient will return to the clinic in 2 months.    RN provided education on medication and side effects,  Patient is aware to contact nurse for any issues or concerns or call 911/ mobile crisis number for emergent situations.      LOT: [id number]  EXP:  MAY 2026  NDC:  [id number]    Solange Newton  RN,BSN

## 2025-02-20 NOTE — PROGRESS NOTES
Humanistic/Insight Oriented Therapy. 50 min.  BP1.  We spoke about how she is doing ok, trying to manage a full load of doctor appointments with her work.  Talked about some strategies to be more humanist and present in her sessions.  Talked about the way that social/relational engagement levels her towards balanced.     Chief complaint - Phase of life and emotional distress   Treatment plan - Insight and action consistent with ACT therapy. Providing support, safe space to process their thoughts and feelings, developing therapeutic relationship.  Goals - Self-care, insight, coping skills  Prognosis - Average  Progress - Average  Functional status - 70/100  Focused mental status: Patient was oriented to person, place, time and context  Focused mental exam - alert and oriented  Frequency - Every two weeks     An interactive audio and video telecommunication system which permits real time communications between the patient (at the originating site) and provider (at the distant site) was utilized to provide this telehealth service

## 2025-02-22 ENCOUNTER — OFFICE VISIT (OUTPATIENT)
Dept: URGENT CARE | Age: 40
End: 2025-02-22
Payer: COMMERCIAL

## 2025-02-22 VITALS
HEIGHT: 62 IN | OXYGEN SATURATION: 97 % | DIASTOLIC BLOOD PRESSURE: 85 MMHG | HEART RATE: 88 BPM | WEIGHT: 235 LBS | SYSTOLIC BLOOD PRESSURE: 137 MMHG | RESPIRATION RATE: 16 BRPM | BODY MASS INDEX: 43.24 KG/M2 | TEMPERATURE: 99 F

## 2025-02-22 DIAGNOSIS — Z79.4 TYPE 2 DIABETES MELLITUS WITH DIABETIC DERMATITIS, WITH LONG-TERM CURRENT USE OF INSULIN: ICD-10-CM

## 2025-02-22 DIAGNOSIS — B37.2 INTERTRIGINOUS CANDIDIASIS: Primary | ICD-10-CM

## 2025-02-22 DIAGNOSIS — E11.620 TYPE 2 DIABETES MELLITUS WITH DIABETIC DERMATITIS, WITH LONG-TERM CURRENT USE OF INSULIN: ICD-10-CM

## 2025-02-22 PROCEDURE — 1036F TOBACCO NON-USER: CPT | Performed by: PHYSICIAN ASSISTANT

## 2025-02-22 PROCEDURE — 99213 OFFICE O/P EST LOW 20 MIN: CPT | Performed by: PHYSICIAN ASSISTANT

## 2025-02-22 PROCEDURE — 3008F BODY MASS INDEX DOCD: CPT | Performed by: PHYSICIAN ASSISTANT

## 2025-02-22 PROCEDURE — 3079F DIAST BP 80-89 MM HG: CPT | Performed by: PHYSICIAN ASSISTANT

## 2025-02-22 PROCEDURE — 3075F SYST BP GE 130 - 139MM HG: CPT | Performed by: PHYSICIAN ASSISTANT

## 2025-02-22 RX ORDER — FLUCONAZOLE 150 MG/1
150 TABLET ORAL DAILY
Qty: 7 TABLET | Refills: 0 | Status: SHIPPED | OUTPATIENT
Start: 2025-02-22 | End: 2025-03-01

## 2025-02-22 RX ORDER — CLOTRIMAZOLE AND BETAMETHASONE DIPROPIONATE 10; .64 MG/G; MG/G
1 CREAM TOPICAL 2 TIMES DAILY
Qty: 15 G | Refills: 0 | Status: SHIPPED | OUTPATIENT
Start: 2025-02-22 | End: 2025-03-22

## 2025-02-22 ASSESSMENT — PATIENT HEALTH QUESTIONNAIRE - PHQ9
1. LITTLE INTEREST OR PLEASURE IN DOING THINGS: NOT AT ALL
2. FEELING DOWN, DEPRESSED OR HOPELESS: NOT AT ALL
SUM OF ALL RESPONSES TO PHQ9 QUESTIONS 1 AND 2: 0

## 2025-02-22 NOTE — PROGRESS NOTES
Subjective   Patient ID: Kelli Silva is a 40 y.o. female. They present today with a chief complaint of rash    Patient disposition: Home    HISTORY OF PRESENT ILLNESS:    Diabetic adult female presents for rash of skin fold under stomach for 1 week, weepy and itchy, worsening. Denies vaginal rash but has hx of vaginal yeast infxns. Denies any rash elsewhere or other sx.    Past Medical History  Allergies as of 02/22/2025 - Reviewed 02/22/2025   Allergen Reaction Noted    Aspirin Shortness of breath 10/09/2023    Cigarette smoke Shortness of breath 01/16/2007    Fish containing products Hives, Rash, and Shortness of breath 12/31/2013    Iodinated contrast media Shortness of breath 10/07/2023    Ketorolac Shortness of breath 10/07/2023    Ketorolac tromethamine Shortness of breath and Other 04/25/2008    Ondansetron Other 10/11/2022    Other Shortness of breath 01/16/2007    Prochlorperazine Shortness of breath and Other 08/09/2005    Shellfish containing products Hives, Rash, and Shortness of breath 12/31/2013    Sumatriptan Runny nose 04/27/2017    Azithromycin Nausea/vomiting 08/10/2021    Ceftriaxone Other 10/09/2023    Doxycycline Diarrhea 04/27/2017    Dulaglutide Other 08/30/2022    House dust Other 04/27/2017    Liraglutide Nausea Only 10/11/2022    Metformin Other 09/23/2019    Metoclopramide Itching 05/08/2013    Prednisone Other 07/17/2018    Sitagliptin Other 08/30/2022    Ytjjzbjj-4-[id number] antimigraine agents Other 10/09/2023    Zolmitriptan Other 04/27/2017       (Not in a hospital admission)       Past Medical History:   Diagnosis Date    Normal routine history and physical examination     Normal routine history and physical examination     Other specified health status     No pertinent past medical history       Past Surgical History:   Procedure Laterality Date    OTHER SURGICAL HISTORY  05/08/2020    Venous access port placement        reports that she has never smoked. She has never used smokeless  "tobacco. She reports that she does not drink alcohol and does not use drugs.    Review of Systems    Negative except as documented in the History of Present Illness.                             Objective    Vitals:    02/22/25 1326   BP: 137/85   Pulse: 88   Resp: 16   Temp: 37.2 °C (99 °F)   TempSrc: Oral   SpO2: 97%   Weight: 107 kg (235 lb)   Height: 1.575 m (5' 2\")     No LMP recorded. (Menstrual status: IUD).      PHYSICAL EXAMINATION:    Chaperone Amaris ___ MA present at bedside.    CONSTITUTIONAL: well-appearing, nontoxic    EYES:   No scleral icterus or orbital trauma noted. No conjunctival injection.     ENT:  Head and face are unremarkable and atraumatic. Mucous membranes moist.     LUNGS:  No respiratory distress noted. No coughing noted.    CARDIOVASCULAR:   Well-perfused.    ABDOMEN:  Nonobese, nondistended     MUSCULOSKELETAL: GALO with equal strength. Gait [observed to be normal.]    SKIN:     Pink intertriginous rash with clear borders and satellite lesions under pannus.   Good color, with no significant rashes, pallor, cyanosis, wounds.    : No rash of vulva. No apparent discharge.    NEURO:  Normal baseline mental status. No obvious neurological deficits, normal sensation and strength bilaterally.    PSYCH: Appropriate mood and affect.         ---------------------------------------------------------------         MDM:  Candidal intertrigo treated with combo Lotrimin and oral Diflucan. Will fu here or at PCP PRN if unresolved.        Procedures    Diagnostic study results (if any) were reviewed by Bill Henriquez PA-C.    No results found for this visit on 02/22/25.     Assessment/Plan   Allergies, medications, history, and pertinent labs/EKGs/Imaging reviewed by Bill Henriquez PA-C.     Orders and Diagnoses  Diagnoses and all orders for this visit:  Intertriginous candidiasis  Type 2 diabetes mellitus with diabetic dermatitis, with long-term current use of insulin      Medical Admin " Record      Follow Up Instructions  No follow-ups on file.    Electronically signed by Bill Henriquez PA-C  1:51 PM

## 2025-02-24 NOTE — GROUP NOTE
Group Topic: Feeling Awareness/Expression   Group Date: 2/18/2025  Start Time:  6:00 PM  End Time:  7:30 PM  Facilitators: Garima Wong PsyD   Department: [facility]    Number of Participants: 16   Group Focus: feeling awareness/expression  Treatment Modality: Cognitive Behavioral Therapy  Interventions utilized were patient education  Purpose: feelings    This was a video IOP aftercare group on a HIPAA compliant platform. All patients were provided with informed consent.    IOP Aftercare Group: Happiness  Participants had opportunity to check-in with their peers and discuss recent stressors. They explored the emotion of happiness and discussed what happiness has meant to them. They began learning strategies for increasing moments of happiness.  Garima Wong Psy.D.      Name: Kelli Silva YOB: 1985   MR: [id number]      Kelli was present and actively listened to discussion. She was observed nodding along and remaining attentive as her peers shared.

## 2025-02-25 ENCOUNTER — CLINICAL SUPPORT (OUTPATIENT)
Dept: BEHAVIORAL HEALTH | Facility: CLINIC | Age: 40
End: 2025-02-25
Payer: COMMERCIAL

## 2025-02-25 ENCOUNTER — APPOINTMENT (OUTPATIENT)
Dept: BEHAVIORAL HEALTH | Facility: CLINIC | Age: 40
End: 2025-02-25
Payer: COMMERCIAL

## 2025-02-25 DIAGNOSIS — F31.31 BIPOLAR 1 DISORDER, DEPRESSED, MILD (MULTI): ICD-10-CM

## 2025-02-25 DIAGNOSIS — F90.0 ATTENTION DEFICIT HYPERACTIVITY DISORDER (ADHD), PREDOMINANTLY INATTENTIVE TYPE: Primary | ICD-10-CM

## 2025-02-25 PROCEDURE — 99215 OFFICE O/P EST HI 40 MIN: CPT | Performed by: PSYCHIATRY & NEUROLOGY

## 2025-02-25 PROCEDURE — 1036F TOBACCO NON-USER: CPT | Performed by: PSYCHIATRY & NEUROLOGY

## 2025-02-25 PROCEDURE — 90853 GROUP PSYCHOTHERAPY: CPT | Performed by: PSYCHOLOGIST

## 2025-02-25 PROCEDURE — 90853 GROUP PSYCHOTHERAPY: CPT | Mod: AH,95 | Performed by: PSYCHOLOGIST

## 2025-02-25 RX ORDER — MODAFINIL 100 MG/1
100 TABLET ORAL DAILY
Qty: 30 TABLET | Refills: 0 | Status: SHIPPED | OUTPATIENT
Start: 2025-02-25 | End: 2025-03-27

## 2025-02-25 NOTE — PROGRESS NOTES
Follow-up visit  She is at home during the visit  Started at 8:00 am, ended at 8:44 am    Virtual or Telephone Consent    An interactive audio and video telecommunication system which permits real time communications between the patient (at the originating site) and provider (at the distant site) was utilized to provide this telehealth service.   Verbal consent was requested and obtained from Kelli Silva on this date, 02/25/25 for a telehealth visit.      Subjective: she said she had problem of paying attention and focus. 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 She said the complaints were random, but did not know exactly at what time. She said her concentration has been poor throughout of the day and she did not feel Strattera was helpful.   Mood has been ok. No depression or ángel. Enjoyed life. Ate well and slept well. Seroquel made her feel hungry, but able to resist eating food. No change in weight. Denied having SI/HI/AVH or paranoia. Not feel irritable.   No side effect from medication     Urinary incontinence due to DM   Had something in the right breast and will need more diagnostic tests.  Tachycardia - with medication and stable   DM - stable with insulin injection and sliding scale  No problem with eyes, ears, nose or throat   No HTN  No SOB or chest pain   GERD with medication   Kidney cancer - stable   Migraine - a couple of times every a month with medication.   No problem with bones, joints, or muscles  Anemia - iron injection every 6 months   No autoimmune disease   No skin problem    No new allergies     Objective:   Appearance: well-groomed.   Build: overweight . 5'2' tall, 230 pounds.   Demeanor: average.   Eye Contact: average.   Motor Activity: average.   Speech: clear.   Language: Neurologic language is intact.   Fund of Knowledge: intact fund of knowledge.   Delusions: None Reported.   Hallucinations: not  reported  Self Harm: None Reported.   Aggressive: None Reported.   Mood:  good  Affect: full.   Orientation: alert, oriented x3.   Manner: cooperative.   Thought process: goal-directed.   Thought association: displays rational thought process.   Content of thought: normal  Abstract/ Rational Thought: intact   Memory: grossly intact.   Behavior: normal motor activity, relaxed, good eye contact, calm.   Attention/Concentration: normal.   Cognition: intact.   Intelligence Estimate: average.   Executive Function: intact.   Insight: intact.   Judgement: intact.   Musculoskeletal: normal strength and tone. No abnormal movement   Impression: bipolar I disorder, currently in remission   ANUP - under control   ADHD - still problematic   Multiple medical problems  Discussion/Plan:    Will add Provigil 100 mg qam, had benefit before and did not have side effect   Continue other medication for now.   She agreed to track her concentration/attention daily to see if any pattern of her concentration exists.   Follow-up in 4-6 weeks.  Ata Nova MD.

## 2025-02-27 ENCOUNTER — APPOINTMENT (OUTPATIENT)
Dept: BEHAVIORAL HEALTH | Facility: CLINIC | Age: 40
End: 2025-02-27
Payer: COMMERCIAL

## 2025-02-27 DIAGNOSIS — F31.9 BIPOLAR 1 DISORDER (MULTI): ICD-10-CM

## 2025-02-27 PROCEDURE — 90837 PSYTX W PT 60 MINUTES: CPT | Performed by: PSYCHOLOGIST

## 2025-02-27 NOTE — PROGRESS NOTES
Humanistic/Insight Oriented Therapy. 50 min.  BP1.  We spoke about a lump on her breat, her fear, her previous rdz with cancer, some coping statements, and how to pace her days as to not get overwhelmed.     Chief complaint - Medical issues and emotional distress   Treatment plan - Insight and action consistent with ACT therapy. Providing support, safe space to process their thoughts and feelings, developing therapeutic relationship.  Goals - Self-care, insight, coping skills  Prognosis - Average  Progress - Average  Functional status - 70/100  Focused mental status: Patient was oriented to person, place, time and context  Focused mental exam - alert and oriented  Frequency - Every two weeks     An interactive audio and video telecommunication system which permits real time communications between the patient (at the originating site) and provider (at the distant site) was utilized to provide this telehealth service

## 2025-02-28 NOTE — GROUP NOTE
"Group Topic: Feeling Awareness/Expression   Group Date: 2/25/2025  Start Time:  6:00 PM  End Time:  7:30 PM  Facilitators: Garima Wong PsyD   Department: [facility]    Number of Participants: 12   Group Focus: check in and feeling awareness/expression  Treatment Modality: Patient-Centered Therapy  Interventions utilized were support  Purpose: feelings and insight or knowledge  This was a video IOP aftercare group on a HIPAA compliant platform. All patients were provided with informed consent.    IOP Aftercare Group: Check-in and support  Participants had opportunity to check-in with their peers and discuss recent stressors. They shared coping that has been helpful recently and processed challenges they are currently facing.   Garima Wong Psy.D.      Name: Kelli Silva YOB: 1985   MR: [id number]      Kelli was present and actively engaged in discussion. She processed recent work-related stressors and struggles. She noted that she has had some difficulty with concentrating and found that her mood was \"good\" until she received negative feedback at work. She stated that a friend is coming to visit and she is looking forward to this.         "

## 2025-03-06 ENCOUNTER — APPOINTMENT (OUTPATIENT)
Dept: BEHAVIORAL HEALTH | Facility: CLINIC | Age: 40
End: 2025-03-06
Payer: COMMERCIAL

## 2025-03-06 ENCOUNTER — TELEPHONE (OUTPATIENT)
Dept: POSTOP/PACU | Facility: HOSPITAL | Age: 40
End: 2025-03-06

## 2025-03-06 DIAGNOSIS — F31.9 BIPOLAR 1 DISORDER (MULTI): ICD-10-CM

## 2025-03-06 PROCEDURE — 90837 PSYTX W PT 60 MINUTES: CPT | Performed by: PSYCHOLOGIST

## 2025-03-06 NOTE — PROGRESS NOTES
Humanistic/Insight Oriented Therapy. 50 min.  BP1.  We spoke about how she is struggling with productivity, some out of her control, some due to her physical struggles, looking into what may be going on in session to contribute.  We talked about aiming for health and wellness assisting her with her production and focus. Enjoying time with friend in town.      Chief complaint - Phase of life and emotional distress   Treatment plan - Insight and action consistent with ACT therapy. Providing support, safe space to process their thoughts and feelings, developing therapeutic relationship.  Goals - Self-care, insight, coping skills  Prognosis - Average  Progress - Average  Functional status - 70/100  Focused mental status: Patient was oriented to person, place, time and context  Focused mental exam - alert and oriented  Frequency - Every two weeks     An interactive audio and video telecommunication system which permits real time communications between the patient (at the originating site) and provider (at the distant site) was utilized to provide this telehealth service

## 2025-03-11 ENCOUNTER — CLINICAL SUPPORT (OUTPATIENT)
Dept: BEHAVIORAL HEALTH | Facility: CLINIC | Age: 40
End: 2025-03-11
Payer: COMMERCIAL

## 2025-03-11 DIAGNOSIS — F41.9 ANXIETY: ICD-10-CM

## 2025-03-11 DIAGNOSIS — F31.9 BIPOLAR 1 DISORDER (MULTI): ICD-10-CM

## 2025-03-11 PROCEDURE — 90853 GROUP PSYCHOTHERAPY: CPT | Performed by: PSYCHOLOGIST

## 2025-03-12 ENCOUNTER — HOSPITAL ENCOUNTER (OUTPATIENT)
Dept: POSTOP/PACU | Facility: HOSPITAL | Age: 40
Setting detail: OUTPATIENT SURGERY
Discharge: HOME | End: 2025-03-12
Payer: COMMERCIAL

## 2025-03-12 VITALS
TEMPERATURE: 98.6 F | OXYGEN SATURATION: 97 % | BODY MASS INDEX: 44.52 KG/M2 | RESPIRATION RATE: 16 BRPM | SYSTOLIC BLOOD PRESSURE: 130 MMHG | HEART RATE: 73 BPM | WEIGHT: 243.39 LBS | DIASTOLIC BLOOD PRESSURE: 62 MMHG

## 2025-03-12 DIAGNOSIS — F31.9 BIPOLAR I DISORDER WITH DEPRESSION (MULTI): Primary | ICD-10-CM

## 2025-03-12 PROCEDURE — KETSP PR KETAMINE INFUSION SELF-PAY: Performed by: STUDENT IN AN ORGANIZED HEALTH CARE EDUCATION/TRAINING PROGRAM

## 2025-03-12 PROCEDURE — 99417 PROLNG OP E/M EACH 15 MIN: CPT

## 2025-03-12 PROCEDURE — KETSP HC KETAMINE INFUSION SELF-PAY: Performed by: STUDENT IN AN ORGANIZED HEALTH CARE EDUCATION/TRAINING PROGRAM

## 2025-03-12 PROCEDURE — 99215 OFFICE O/P EST HI 40 MIN: CPT

## 2025-03-12 PROCEDURE — 2500000004 HC RX 250 GENERAL PHARMACY W/ HCPCS (ALT 636 FOR OP/ED): Performed by: STUDENT IN AN ORGANIZED HEALTH CARE EDUCATION/TRAINING PROGRAM

## 2025-03-12 PROCEDURE — 2500000004 HC RX 250 GENERAL PHARMACY W/ HCPCS (ALT 636 FOR OP/ED): Performed by: PSYCHIATRY & NEUROLOGY

## 2025-03-12 RX ORDER — ONDANSETRON HYDROCHLORIDE 2 MG/ML
8 INJECTION, SOLUTION INTRAVENOUS ONCE
Status: COMPLETED | OUTPATIENT
Start: 2025-03-12 | End: 2025-03-12

## 2025-03-12 RX ORDER — ONDANSETRON HYDROCHLORIDE 2 MG/ML
8 INJECTION, SOLUTION INTRAVENOUS ONCE
Start: 2025-03-12 | End: 2025-03-12

## 2025-03-12 RX ORDER — KETAMINE HCL IN 0.9 % NACL 200 MG/200
0.75 PLASTIC BAG, INJECTION (ML) INTRAVENOUS ONCE
Start: 2025-03-12 | End: 2025-03-12

## 2025-03-12 RX ORDER — KETAMINE HCL IN 0.9 % NACL 200 MG/200
0.75 PLASTIC BAG, INJECTION (ML) INTRAVENOUS ONCE
Status: COMPLETED | OUTPATIENT
Start: 2025-03-12 | End: 2025-03-12

## 2025-03-12 RX ADMIN — ONDANSETRON 8 MG: 2 INJECTION INTRAMUSCULAR; INTRAVENOUS at 09:22

## 2025-03-12 RX ADMIN — SODIUM CHLORIDE 83 MG: 9 INJECTION, SOLUTION INTRAVENOUS at 09:37

## 2025-03-12 ASSESSMENT — PATIENT HEALTH QUESTIONNAIRE - PHQ9
10. IF YOU CHECKED OFF ANY PROBLEMS, HOW DIFFICULT HAVE THESE PROBLEMS MADE IT FOR YOU TO DO YOUR WORK, TAKE CARE OF THINGS AT HOME, OR GET ALONG WITH OTHER PEOPLE: SOMEWHAT DIFFICULT
5. POOR APPETITE OR OVEREATING: NOT AT ALL
3. TROUBLE FALLING OR STAYING ASLEEP: NOT AT ALL
8. MOVING OR SPEAKING SO SLOWLY THAT OTHER PEOPLE COULD HAVE NOTICED. OR THE OPPOSITE, BEING SO FIGETY OR RESTLESS THAT YOU HAVE BEEN MOVING AROUND A LOT MORE THAN USUAL: NOT AT ALL
2. FEELING DOWN, DEPRESSED OR HOPELESS: SEVERAL DAYS
10. IF YOU CHECKED OFF ANY PROBLEMS, HOW DIFFICULT HAVE THESE PROBLEMS MADE IT FOR YOU TO DO YOUR WORK, TAKE CARE OF THINGS AT HOME, OR GET ALONG WITH OTHER PEOPLE: SOMEWHAT DIFFICULT
1. LITTLE INTEREST OR PLEASURE IN DOING THINGS: SEVERAL DAYS
4. FEELING TIRED OR HAVING LITTLE ENERGY: SEVERAL DAYS
2. FEELING DOWN, DEPRESSED OR HOPELESS: SEVERAL DAYS
8. MOVING OR SPEAKING SO SLOWLY THAT OTHER PEOPLE COULD HAVE NOTICED. OR THE OPPOSITE - BEING SO FIDGETY OR RESTLESS THAT YOU HAVE BEEN MOVING AROUND A LOT MORE THAN USUAL: NOT AT ALL
9. THOUGHTS THAT YOU WOULD BE BETTER OFF DEAD, OR OF HURTING YOURSELF: NOT AT ALL
6. FEELING BAD ABOUT YOURSELF - OR THAT YOU ARE A FAILURE OR HAVE LET YOURSELF OR YOUR FAMILY DOWN: SEVERAL DAYS
7. TROUBLE CONCENTRATING ON THINGS, SUCH AS READING THE NEWSPAPER OR WATCHING TELEVISION: NEARLY EVERY DAY
5. POOR APPETITE OR OVEREATING: NOT AT ALL
9. THOUGHTS THAT YOU WOULD BE BETTER OFF DEAD, OR OF HURTING YOURSELF: NOT AT ALL
SUM OF ALL RESPONSES TO PHQ QUESTIONS 1-9: 7
4. FEELING TIRED OR HAVING LITTLE ENERGY: SEVERAL DAYS
1. LITTLE INTEREST OR PLEASURE IN DOING THINGS: SEVERAL DAYS
6. FEELING BAD ABOUT YOURSELF - OR THAT YOU ARE A FAILURE OR HAVE LET YOURSELF OR YOUR FAMILY DOWN: SEVERAL DAYS
SUM OF ALL RESPONSES TO PHQ9 QUESTIONS 1 & 2: 2
3. TROUBLE FALLING OR STAYING ASLEEP OR SLEEPING TOO MUCH: NOT AT ALL
7. TROUBLE CONCENTRATING ON THINGS, SUCH AS READING THE NEWSPAPER OR WATCHING TELEVISION: NEARLY EVERY DAY

## 2025-03-12 ASSESSMENT — PAIN - FUNCTIONAL ASSESSMENT
PAIN_FUNCTIONAL_ASSESSMENT: 0-10

## 2025-03-12 ASSESSMENT — ANXIETY QUESTIONNAIRES
1. FEELING NERVOUS, ANXIOUS, OR ON EDGE: SEVERAL DAYS
IF YOU CHECKED OFF ANY PROBLEMS ON THIS QUESTIONNAIRE, HOW DIFFICULT HAVE THESE PROBLEMS MADE IT FOR YOU TO DO YOUR WORK, TAKE CARE OF THINGS AT HOME, OR GET ALONG WITH OTHER PEOPLE: SOMEWHAT DIFFICULT
6. BECOMING EASILY ANNOYED OR IRRITABLE: NOT AT ALL
1. FEELING NERVOUS, ANXIOUS, OR ON EDGE: SEVERAL DAYS
5. BEING SO RESTLESS THAT IT IS HARD TO SIT STILL: NOT AT ALL
4. TROUBLE RELAXING: SEVERAL DAYS
IF YOU CHECKED OFF ANY PROBLEMS ON THIS QUESTIONNAIRE, HOW DIFFICULT HAVE THESE PROBLEMS MADE IT FOR YOU TO DO YOUR WORK, TAKE CARE OF THINGS AT HOME, OR GET ALONG WITH OTHER PEOPLE: SOMEWHAT DIFFICULT
3. WORRYING TOO MUCH ABOUT DIFFERENT THINGS: SEVERAL DAYS
GAD7 TOTAL SCORE: 5
5. BEING SO RESTLESS THAT IT IS HARD TO SIT STILL: NOT AT ALL
6. BECOMING EASILY ANNOYED OR IRRITABLE: NOT AT ALL
2. NOT BEING ABLE TO STOP OR CONTROL WORRYING: SEVERAL DAYS
7. FEELING AFRAID AS IF SOMETHING AWFUL MIGHT HAPPEN: SEVERAL DAYS
7. FEELING AFRAID AS IF SOMETHING AWFUL MIGHT HAPPEN: SEVERAL DAYS
2. NOT BEING ABLE TO STOP OR CONTROL WORRYING: SEVERAL DAYS
3. WORRYING TOO MUCH ABOUT DIFFERENT THINGS: SEVERAL DAYS
4. TROUBLE RELAXING: SEVERAL DAYS

## 2025-03-12 NOTE — H&P
Psychiatry Procedure H&P for each visit    Name: Kelli  : 1985  MRN: [id number]    Date: 25     History & Physical Reviewed:  Pregnancy/Lactating:  Are You Pregnant: No  Are You Currently Breastfeeding: No    I have reviewed the History and Physical dated:   History and Physical Reviewed and relevant findings noted. Patient examined to review pertinent physical findings: Yes  Home Medications Reviewed: Yes  Allergies Reviewed: Yes    ERAS (Enhanced Recovery After Surgery):  ERAS Patient: No    Consent:  COVID-19 Consent:   COVID-19 Risk Consent: Yes: Surgeon has reviewed key risks related to the risk of meli COVID-19 and if they contract COVID-19 what the risks are.

## 2025-03-12 NOTE — PATIENT INSTRUCTIONS
Homegoing Instructions    Patient: Kelli Silva  MRN: [id number]  YOB: 1985    Care Providers: Dr. Suzie Leonard, RN    Allergies:  Aspirin, Cigarette smoke, Fish containing products, Iodinated contrast media, Ketorolac, Ketorolac tromethamine, Ondansetron, Other, Prochlorperazine, Shellfish containing products, Sumatriptan, Azithromycin, Ceftriaxone, Doxycycline, Dulaglutide, House dust, Liraglutide, Metformin, Metoclopramide, Prednisone, Sitagliptin, Anhtfomf-1-ch3 antimigraine agents, and Zolmitriptan    Medications:  Current Outpatient Medications   Medication Sig Dispense Refill    Accu-Chek Guide test strips strip USE TO CHECK BLOOD SUGAR UP TO 4 TIMES PER DAY AS INSTRUCTED. E11.9      albuterol 2.5 mg /3 mL (0.083 %) nebulizer solution Inhale 3 mL (2.5 mg) every 6 hours if needed for wheezing or shortness of breath.      albuterol 90 mcg/actuation inhaler Inhale 2 puffs. 6 TIMES DAILY.      atomoxetine (Strattera) 60 mg capsule Take 1 capsule (60 mg) by mouth once daily. Swallow capsule whole; do not open. If opened accidentally, do not touch eyes; wash hands immediately (product is an eye irritant). 90 capsule 3    Autolet lancing device USE AS INSTRUCTED TO CHECK BLOOD SUGAR UP TO 4 TIMES DAILY. E11.9      buPROPion XL (Wellbutrin XL) 300 mg 24 hr tablet Take 1 tablet (300 mg) by mouth once daily in the morning. Do not crush, chew, or split. 90 tablet 3    busPIRone (Buspar) 30 mg tablet Take 1 tablet (30 mg) by mouth 2 times a day. 180 tablet 3    calcium carbonate-vitamin D3 (Oyster Shell) 250 mg-3.125 mcg (125 unit) tablet Take 1 tablet by mouth once daily.      cariprazine (Vraylar) 6 mg capsule Take 1 capsule (6 mg) by mouth once daily at bedtime. 30 capsule 6    clotrimazole-betamethasone (Lotrisone) cream Apply 1 Application topically 2 times a day for 28 days. 15 g 0    Corlanor 7.5 mg tablet Take 1 tablet (7.5 mg) by mouth 2 times daily (morning and late  afternoon).      desvenlafaxine 100 mg 24 hr tablet TAKE 1 TABLET BY MOUTH EVERY DAY IN THE MORNING 90 tablet 3    dicyclomine (Bentyl) 20 mg tablet Take 1 tablet (20 mg) by mouth 4 times a day before meals.      glucagon (Glucagen) 1 mg injection 1 mg.  Only take if BG< 60      glucagon (Gvoke HypoPen 2-Pack) 1 mg/0.2 mL auto-injector Inject 1 Pen under the skin.      glucose 4 gram chewable tablet Chew 4 tablets (16 g).      ipratropium-albuteroL (Duo-Neb) 0.5-2.5 mg/3 mL nebulizer solution Take 3 mL by nebulization every 4 hours if needed for wheezing or shortness of breath.      lactobacillus (Culturelle) 10 billion cell capsule Take 1 capsule by mouth twice a day.      lamoTRIgine (LaMICtal) 200 mg disintegrating tablet Take 1 tablet (200 mg) by mouth 2 times a day. 180 tablet 3    levonorgestrel (Mirena) 21 mcg/24 hours (8 yrs) 52 mg IUD 52 mg by intrauterine route.      lisdexamfetamine (Vyvanse) 70 mg capsule Take 1 capsule (70 mg) by mouth once daily in the morning. 30 capsule 0    midodrine (Proamatine) 5 mg tablet Take 1 tablet (5 mg) by mouth 2 times a day. 120 tablet 5    modafinil (ProvigiL) 100 mg tablet Take 1 tablet (100 mg) by mouth once daily. 30 tablet 0    pantoprazole (ProtoNix) 40 mg EC tablet Take 1 tablet (40 mg) by mouth 2 times a day. Do not crush, chew, or split.      pregabalin (Lyrica) 150 mg capsule Take 1 capsule (150 mg) by mouth once daily. each day at the same time.      pregabalin (Lyrica) 50 mg capsule Take 1 capsule (50 mg) by mouth 2 times a day.  (am and afternoon) in addition to 150 mg capsule at night      QUEtiapine (SEROquel) 300 mg tablet Take 1 tablet (300 mg) by mouth once daily at bedtime. 30 tablet 1    Reyvow 100 mg tablet Take 1 tablet by mouth every 24 (twenty four) hours if needed. AT ONSET OF MIGRAINE NO MORE THAN 1X      tezepelumab-ekko (Tezspire) SubQ syringe Inject 210 mg under the skin every 28 (twenty-eight) days.      Trelegy Ellipta 200-62.5-25 mcg  blister with device 1 puff once daily.      zavegepant (Zavzpret) 10 mg/actuation spray,non-aerosol Administer 10 mg into affected nostril(s) if needed.      azelastine-fluticasone 137-50 mcg/spray spray,non-aerosol Administer 1 spray into each nostril twice a day.      LORazepam (Ativan) 1 mg tablet Take 1 tablet (1 mg) by mouth 2 times a day as needed for anxiety or sleep. 60 tablet 2     Current Facility-Administered Medications   Medication Dose Route Frequency Provider Last Rate Last Admin    ARIPiprazole ER (2 month) (Abilify Asimtufii) IM injection 960 mg  960 mg intramuscular Once Ata Nova MD PhD           Your Provider's Instructions:  Do not consume alcoholic beverages for 24 hours.  Do not make important decisions or sign any important documents for the next 24 hours.  It is recommended that a responsible adult remain with you for the next 24 hours as you may be lightheaded or dizzy.  Do not drive or operate machinery until the day after a treatment session or after a restful sleep.  Resume your normal diet.     When to Call Your Doctor:  Call your doctor for questions and/or concerns.  Call 911 for an emergency situation.    Health Maintenance:  If you currently use tobacco products, it is advised that you quit to improve your health.  If you currently use or have stopped using tobacco products within the last 12 months, the following are resources that can help you:  American Heart Association ([phone], (www.americanheart.org)  [place] Tobacco Quit Line 1-670.160.9928 (QUIT-NOW)  http://www.odh.[place].gov/odhPrograms/hprr/tob_risk1.aspx    Do you or a loved one need help for alcohol or drug use? Call the [employer] at 211 or visit FindTreatment.gov for resources.    To find out where you can safely dispose unused medicines, visit www.rxdrugdropbox.org, or call your local police department.    Please call us at [phone] with any concerns.    Prior to your next appointment for Spravato/Ketamine,  you will be sent the PHQ-9 as well as the ANUP-7 assessments via [employer]. In the past, you have typically completed these on paper during your appointment. We ask that you check your MyChart the day before your appointment and complete these online. This will assist us in streamlining your check-in during your appointment.     Please make certain you have transportation arranged to and from your appointment. This can be a friend, family member, or arranged through your health insurance provider.    Before your next appointment:  Stop or reduce smoking, vaping, and any nicotine use if at all possible.  No alcohol for 24 hours before your appointment.    Day of treatment:  You may have clear liquids (water, tea, coffee, no-pulp juice, plain Jello, hard candy, BUT no milk, cream or non-dairy creamers) up until 30 minutes before your appointment time. You may have solids up to 2 hours before your appointment time.  Please bring headphones to listen to calming music, guided meditation, or anything you'd like. You can also bring a journal to write in during the treatment time.  Set an intention for each appointment. Your intention can be qualities you want to cultivate in yourself, mental health goals you would like to achieve, or experiences you would like to attract into your life.   Bring any inhalers you may need while at your appointment.  Take all morning medications as prescribed, except the followin hours before your appointment, no chlordiazepoxide (Librium), diazepam (Valium), or Flurazepam (Dalmane)   12 hours before your appointment, no lamotrigine (Lamictal).  Try to avoid taking any anti-anxiety medications if at all possible the day of your appointment. However, if your anxiety is unbearable and would prevent you from attending your appointment, please take your prescribed dose.   No stimulants prior to your treatment; you may take them once you are home.  Use any nasal sprays up to 1 hour before your  appointment (for SPRAVATO only).     Cancellations: If you need to cancel your appointment, we must have at least a 24-hour notice. If you cancel with less than 24-hour notice or do not show three times, no additional appointments will be scheduled until the treatment plan is discussed with your outpatient psychiatrist. Also, if you arrive more than 15 minutes late, we may need to cancel your appointment and reschedule. Please call [phone] to cancel appointments. Thank you for your cooperation!

## 2025-03-12 NOTE — ADDENDUM NOTE
Encounter addended by: Madison Leonard RN on: 3/12/2025 12:54 PM   Actions taken: Flowsheet macro applied, Flowsheet accepted

## 2025-03-12 NOTE — PROCEDURES
"General    Date/Time: 3/12/2025 9:06 AM    Performed by: Suzie Álvarez DO  Authorized by: Suzie Álvarez DO      Ketamine/Esketamine Procedure    Name: Kelli BLANCHARDB: 1985  MRN: [id number]    Date: 25    Ketamine Infusion: 0.75mg per kilo over 460 minutes (Maintenance)     Time out was taken with staff to confirm correct patient and correct procedure to be performed.     Subjective:  Pt reports mood has been \"up and down\" as she got a notification of an abnormal screening mammogram and has further F/U pending. Reports feels as if this is situational in nature and reports was doing \"ok\" before this. Reports appetite and sleep are fine with Seroquel. Denies suicidal/homicidal ideation. Denies auditory/visual hallucinations. Reports started provigil since last infusion and finding helpful, denies other interval changes to health/medications.    Patient Health Questionnaire-9 Score: (Patient-Rptd) 7 (3/12/2025  9:04 AM) (Prior 10)  ANUP-7 Total Score: (Patient-Rptd) 5 (3/12/2025  9:02 AM) (Prior 6)    Objective:    Mental Status Exam:  General/Appearance: NAD, appears stated age, casually dressed, body habitus: obese, grooming/hygiene is excellent  Attitude/Behavior: engages in interview, cooperative, appropriate eye contact  Motor Activity: no psychomotor agitation/retardation, no tics/tremors, no EPS/TD, gait: not assessed  Mood: \"OK, up and down the past few days\"  Affect: Mildly dysphoric, restricted, mood congruent, appropriate to content  Speech: spontaneous, coherent, fluent; appropriate quantity, volume, rate, tone  Thought Process: linear, logical, goal-directed, future-oriented  Thought Content: denies SI/HI, Delusional thinking: none elicited  Thought Perception: denies AVH, not responding to internal stimuli  Cognition:  Grossly intact  Insight: good  Judgment: good    Time since procedure start Subjective Heart Rate Pulse Oximetry Blood Pressure   Before procedure start See above 70 bpm 98% SpO2 " "142/65 mmHg   20 minutes after initiation of infusion Reports extending interval has prevented N/V. Feels floaty and \"out of it.\" No other side effects. 65 bpm 96% SpO2 137/63 mmHg   40 minutes after initiation of infusion  Feels good - a bit sleepy. No other side effects. 67 bpm 95% SpO2 146/77 mmHg   60 minutes after initiation of infusion  Feels like things are floating in her vision, but effects are decreasing. No other side effects. 61 bpm 95% SpO2 145/79 mmHg     Tolerated treatment well. No complications.    Assessment:  Bipolar I D/O, currently depressed  ANUP under control   No immediate risk of harm to self or others    Plan:  Continue outpatient management with Dr. Nova.   Ketamine infusion in 4 weeks (on 4/9) per pt - pt to call if needs to come in sooner. Continue with infusion over 60 minutes.    Suzie Álvarez, DO  "

## 2025-03-13 ENCOUNTER — APPOINTMENT (OUTPATIENT)
Dept: BEHAVIORAL HEALTH | Facility: CLINIC | Age: 40
End: 2025-03-13
Payer: COMMERCIAL

## 2025-03-13 DIAGNOSIS — F31.9 BIPOLAR 1 DISORDER (MULTI): ICD-10-CM

## 2025-03-13 PROCEDURE — 90837 PSYTX W PT 60 MINUTES: CPT | Performed by: PSYCHOLOGIST

## 2025-03-13 NOTE — PROGRESS NOTES
Humanistic/Insight Oriented Therapy. 50 min.  BP1. We spoke about how she is getting tested tomorrow to see if she has cancer.  She's looking forward to knowing what's going on.  We talked about work and work related processes.     Chief complaint - Phase of life and emotional distress   Treatment plan - Insight and action consistent with ACT therapy. Providing support, safe space to process their thoughts and feelings, developing therapeutic relationship.  Goals - Self-care, insight, coping skills  Prognosis - Average  Progress - Average  Functional status - 70/100  Focused mental status: Patient was oriented to person, place, time and context  Focused mental exam - alert and oriented  Frequency - Every two weeks     An interactive audio and video telecommunication system which permits real time communications between the patient (at the originating site) and provider (at the distant site) was utilized to provide this telehealth service

## 2025-03-17 NOTE — GROUP NOTE
Group Topic: Feeling Awareness/Expression   Group Date: 3/11/2025  Start Time:  6:00 PM  End Time:  7:30 PM  Facilitators: Garima Wong PsyD   Department: [facility]    Number of Participants: 14   Group Focus: check in  Treatment Modality: Patient-Centered Therapy  Interventions utilized were exploration and support  Purpose: feelings    This was a video IOP aftercare group on a HIPAA compliant platform. All patients were provided with informed consent.    IOP Aftercare Group: Check-in  Participants had opportunity to check-in with their peers and discuss recent stressors. They shared recent coping that they have used and supported each other with overcoming challenges. They shared how they are coping with the time change.   Garima oWng Psy.D.      Name: Kelli Silva YOB: 1985   MR: [id number]      Kelli was present and actively engaged in discussion. She reported that her visit with her friend went well over the weekend. She stated that she engaged in mindfulness to help her stay present in the moment. She feels her focus is improving. She processed possible health related concerns.

## 2025-03-20 ENCOUNTER — APPOINTMENT (OUTPATIENT)
Dept: BEHAVIORAL HEALTH | Facility: CLINIC | Age: 40
End: 2025-03-20
Payer: COMMERCIAL

## 2025-03-27 ENCOUNTER — APPOINTMENT (OUTPATIENT)
Dept: BEHAVIORAL HEALTH | Facility: CLINIC | Age: 40
End: 2025-03-27
Payer: COMMERCIAL

## 2025-03-27 ENCOUNTER — TELEPHONE (OUTPATIENT)
Dept: OTHER | Age: 40
End: 2025-03-27

## 2025-03-31 DIAGNOSIS — F90.0 ATTENTION DEFICIT HYPERACTIVITY DISORDER (ADHD), PREDOMINANTLY INATTENTIVE TYPE: ICD-10-CM

## 2025-03-31 RX ORDER — LISDEXAMFETAMINE DIMESYLATE 70 MG/1
70 CAPSULE ORAL EVERY MORNING
Qty: 30 CAPSULE | Refills: 0 | Status: SHIPPED | OUTPATIENT
Start: 2025-03-31 | End: 2025-04-30

## 2025-04-01 ENCOUNTER — APPOINTMENT (OUTPATIENT)
Dept: BEHAVIORAL HEALTH | Facility: CLINIC | Age: 40
End: 2025-04-01
Payer: COMMERCIAL

## 2025-04-01 VITALS
TEMPERATURE: 98.2 F | WEIGHT: 246.2 LBS | HEART RATE: 70 BPM | HEIGHT: 62 IN | RESPIRATION RATE: 16 BRPM | SYSTOLIC BLOOD PRESSURE: 123 MMHG | DIASTOLIC BLOOD PRESSURE: 86 MMHG | BODY MASS INDEX: 45.3 KG/M2

## 2025-04-01 DIAGNOSIS — F31.31 BIPOLAR 1 DISORDER, DEPRESSED, MILD (MULTI): ICD-10-CM

## 2025-04-01 DIAGNOSIS — F90.0 ATTENTION DEFICIT HYPERACTIVITY DISORDER (ADHD), PREDOMINANTLY INATTENTIVE TYPE: ICD-10-CM

## 2025-04-01 DIAGNOSIS — Z79.899 CONTROLLED SUBSTANCE AGREEMENT SIGNED: Primary | ICD-10-CM

## 2025-04-01 PROCEDURE — 3008F BODY MASS INDEX DOCD: CPT | Performed by: PSYCHIATRY & NEUROLOGY

## 2025-04-01 PROCEDURE — 3079F DIAST BP 80-89 MM HG: CPT | Performed by: PSYCHIATRY & NEUROLOGY

## 2025-04-01 PROCEDURE — 3074F SYST BP LT 130 MM HG: CPT | Performed by: PSYCHIATRY & NEUROLOGY

## 2025-04-01 PROCEDURE — 99215 OFFICE O/P EST HI 40 MIN: CPT | Performed by: PSYCHIATRY & NEUROLOGY

## 2025-04-01 PROCEDURE — 1036F TOBACCO NON-USER: CPT | Performed by: PSYCHIATRY & NEUROLOGY

## 2025-04-01 RX ORDER — ZIPRASIDONE HYDROCHLORIDE 20 MG/1
20 CAPSULE ORAL NIGHTLY
Qty: 30 CAPSULE | Refills: 0 | Status: SHIPPED | OUTPATIENT
Start: 2025-04-01 | End: 2025-05-01

## 2025-04-01 RX ORDER — MODAFINIL 100 MG/1
100 TABLET ORAL DAILY
Qty: 30 TABLET | Refills: 2 | Status: SHIPPED | OUTPATIENT
Start: 2025-04-01 | End: 2025-06-30

## 2025-04-01 RX ORDER — MODAFINIL 100 MG/1
100 TABLET ORAL DAILY
Qty: 30 TABLET | Refills: 2 | Status: SHIPPED | OUTPATIENT
Start: 2025-04-01 | End: 2025-04-01 | Stop reason: SDUPTHER

## 2025-04-01 RX ORDER — ATOMOXETINE 80 MG/1
80 CAPSULE ORAL DAILY
Qty: 90 CAPSULE | Refills: 1 | Status: SHIPPED | OUTPATIENT
Start: 2025-04-01 | End: 2026-04-01

## 2025-04-01 ASSESSMENT — COLUMBIA-SUICIDE SEVERITY RATING SCALE - C-SSRS
2. HAVE YOU ACTUALLY HAD ANY THOUGHTS OF KILLING YOURSELF?: NO
1. HAVE YOU WISHED YOU WERE DEAD OR WISHED YOU COULD GO TO SLEEP AND NOT WAKE UP?: NO
ATTEMPT LIFETIME: NO
TOTAL  NUMBER OF INTERRUPTED ATTEMPTS LIFETIME: NO
6. HAVE YOU EVER DONE ANYTHING, STARTED TO DO ANYTHING, OR PREPARED TO DO ANYTHING TO END YOUR LIFE?: NO
TOTAL  NUMBER OF ABORTED OR SELF INTERRUPTED ATTEMPTS LIFETIME: NO

## 2025-04-01 NOTE — PROGRESS NOTES
Follow-up visit   In-person   Started 9:02 am, ended at 9:33 am  Documentation, prescribing, and filling out form of FMLA from 10:10 am to 10:30 am  Total time = 51 minutes     Subjective: She said she felt ok overall. She said she had seizure-like presentation at work and was hospitalized for a week at [facility] to rule out seizure disorder. However, her EEG dis not show any seizure activities. She said she missed one dose of Seroquel when she was in the hospital. She said she has had problem for a week even with Seroquel 300 mg.   She said her mood has been fine. No depression and enjoyed life. Ate well, but not sleep well.   Had problem with staying asleep. Slept about 5 hours last night. Denied having SI/HI/AVH or paranoia. Energy was good. Concentration was not as good as before even with Vyvanse and Strattera.   Anxiety under control most of the time. Only use Ativan PRN  No side effect from medication    Objective:   Appearance: well-groomed.   Demeanor: average.   Eye Contact: average.   Motor Activity: average.   Speech: clear.   Language: Neurologic language is intact.   Fund of Knowledge: intact fund of knowledge.   Delusions: None Reported.   Hallucinations: not reported  Self Harm: None Reported.   Aggressive: None Reported.   Mood: good  Affect: full with smile   Orientation: alert, oriented x3.   Manner: cooperative.   Thought process: goal-directed.   Thought association: displays rational thought process.   Content of thought: normal  Abstract/ Rational Thought: intact   Memory: grossly intact.   Behavior: normal motor activity, relaxed, good eye contact, calm.   Attention/Concentration: normal.   Cognition: intact.   Intelligence Estimate: average.   Executive Function: intact.   Insight: intact.   Judgement: intact.   Musculoskeletal: normal strength and tone. Normal gait. No abnormal movement.   Impression: bipolar I depression, in remission   ANUP - under control   ADHD - manageable with Vyvanse and  Strattera, but still problematic   Insomnia - still problematic   Discussion/Plan:    Options for sleep including ziprasidone, asenapine, and other medications were discussed with her. She agreed to try ziprasidone because it doesn't cause weight gain.   Also agreed to increase Strattera for ADHD  Her dose of ketamine infusion was discussed because her BMI was over 40.  Controlled substance agreement of Ativan, Vyvanse, and Progivil were singed.  Ziprasidone 20 mg for sleep ordered   Provigil refilled    Urine drug screen was ordered.   FMLA form filled out   Follow-up in 2-3 months or earlier  Ata Nova MD.

## 2025-04-03 ENCOUNTER — APPOINTMENT (OUTPATIENT)
Dept: BEHAVIORAL HEALTH | Facility: CLINIC | Age: 40
End: 2025-04-03
Payer: COMMERCIAL

## 2025-04-03 DIAGNOSIS — F31.9 BIPOLAR 1 DISORDER (MULTI): ICD-10-CM

## 2025-04-03 LAB
AMPHET UR-MCNC: 3355 NG/ML
AMPHETAMINES UR QL: POSITIVE NG/ML
BARBITURATES UR QL: NEGATIVE NG/ML
BENZODIAZ UR QL: NEGATIVE NG/ML
BZE UR QL: NEGATIVE NG/ML
CREAT UR-MCNC: 31 MG/DL
DRUG SCREEN COMMENT UR-IMP: ABNORMAL
METHADONE UR QL: NEGATIVE NG/ML
METHAMPHET UR-MCNC: NEGATIVE NG/ML
OPIATES UR QL: NEGATIVE NG/ML
OXIDANTS UR QL: NEGATIVE MCG/ML
OXYCODONE UR QL: NEGATIVE NG/ML
PCP UR QL: NEGATIVE NG/ML
PH UR: 5.9 [PH] (ref 4.5–9)
QUEST NOTES AND COMMENTS: ABNORMAL
THC UR QL: NEGATIVE NG/ML

## 2025-04-03 PROCEDURE — 90837 PSYTX W PT 60 MINUTES: CPT | Performed by: PSYCHOLOGIST

## 2025-04-03 NOTE — PROGRESS NOTES
Humanistic/Insight Oriented Therapy. 50 min.  BP1.  We spoke about the impact and experience of her tremors/convulsions, what they may be, and how to adapt with some of the restrictions like not being able to drive.  We spoke about them paying off the house and the financial toll of missing work.      Chief complaint - Phase of life and emotional distress   Treatment plan - Insight and action consistent with ACT therapy. Providing support, safe space to process their thoughts and feelings, developing therapeutic relationship.  Goals - Self-care, insight, coping skills  Prognosis - Average  Progress - Average  Functional status - 70/100  Focused mental status: Patient was oriented to person, place, time and context  Focused mental exam - alert and oriented  Frequency - Every two weeks     An interactive audio and video telecommunication system which permits real time communications between the patient (at the originating site) and provider (at the distant site) was utilized to provide this telehealth service

## 2025-04-08 ENCOUNTER — CLINICAL SUPPORT (OUTPATIENT)
Dept: BEHAVIORAL HEALTH | Facility: CLINIC | Age: 40
End: 2025-04-08
Payer: COMMERCIAL

## 2025-04-08 DIAGNOSIS — F41.9 ANXIETY: ICD-10-CM

## 2025-04-08 DIAGNOSIS — F31.9 BIPOLAR 1 DISORDER (MULTI): ICD-10-CM

## 2025-04-08 PROCEDURE — 90853 GROUP PSYCHOTHERAPY: CPT | Performed by: PSYCHOLOGIST

## 2025-04-08 ASSESSMENT — ANXIETY QUESTIONNAIRES
6. BECOMING EASILY ANNOYED OR IRRITABLE: NOT AT ALL
2. NOT BEING ABLE TO STOP OR CONTROL WORRYING: NOT AT ALL
4. TROUBLE RELAXING: NOT AT ALL
GAD7 TOTAL SCORE: 0
6. BECOMING EASILY ANNOYED OR IRRITABLE: NOT AT ALL
3. WORRYING TOO MUCH ABOUT DIFFERENT THINGS: NOT AT ALL
3. WORRYING TOO MUCH ABOUT DIFFERENT THINGS: NOT AT ALL
7. FEELING AFRAID AS IF SOMETHING AWFUL MIGHT HAPPEN: NOT AT ALL
4. TROUBLE RELAXING: NOT AT ALL
5. BEING SO RESTLESS THAT IT IS HARD TO SIT STILL: NOT AT ALL
IF YOU CHECKED OFF ANY PROBLEMS ON THIS QUESTIONNAIRE, HOW DIFFICULT HAVE THESE PROBLEMS MADE IT FOR YOU TO DO YOUR WORK, TAKE CARE OF THINGS AT HOME, OR GET ALONG WITH OTHER PEOPLE: NOT DIFFICULT AT ALL
7. FEELING AFRAID AS IF SOMETHING AWFUL MIGHT HAPPEN: NOT AT ALL
5. BEING SO RESTLESS THAT IT IS HARD TO SIT STILL: NOT AT ALL
1. FEELING NERVOUS, ANXIOUS, OR ON EDGE: NOT AT ALL
1. FEELING NERVOUS, ANXIOUS, OR ON EDGE: NOT AT ALL
IF YOU CHECKED OFF ANY PROBLEMS ON THIS QUESTIONNAIRE, HOW DIFFICULT HAVE THESE PROBLEMS MADE IT FOR YOU TO DO YOUR WORK, TAKE CARE OF THINGS AT HOME, OR GET ALONG WITH OTHER PEOPLE: NOT DIFFICULT AT ALL
2. NOT BEING ABLE TO STOP OR CONTROL WORRYING: NOT AT ALL

## 2025-04-09 ENCOUNTER — APPOINTMENT (OUTPATIENT)
Dept: RADIOLOGY | Facility: HOSPITAL | Age: 40
End: 2025-04-09
Payer: COMMERCIAL

## 2025-04-09 ENCOUNTER — HOSPITAL ENCOUNTER (OUTPATIENT)
Dept: POSTOP/PACU | Facility: HOSPITAL | Age: 40
Setting detail: OUTPATIENT SURGERY
Discharge: HOME | End: 2025-04-09
Payer: COMMERCIAL

## 2025-04-09 ENCOUNTER — CLINICAL SUPPORT (OUTPATIENT)
Dept: EMERGENCY MEDICINE | Facility: HOSPITAL | Age: 40
End: 2025-04-09
Payer: COMMERCIAL

## 2025-04-09 ENCOUNTER — HOSPITAL ENCOUNTER (OUTPATIENT)
Facility: HOSPITAL | Age: 40
Setting detail: OBSERVATION
Discharge: HOME | End: 2025-04-10
Attending: EMERGENCY MEDICINE | Admitting: INTERNAL MEDICINE
Payer: COMMERCIAL

## 2025-04-09 VITALS
RESPIRATION RATE: 18 BRPM | OXYGEN SATURATION: 98 % | DIASTOLIC BLOOD PRESSURE: 65 MMHG | SYSTOLIC BLOOD PRESSURE: 136 MMHG | BODY MASS INDEX: 42.74 KG/M2 | WEIGHT: 233.69 LBS | TEMPERATURE: 99.7 F | HEART RATE: 98 BPM

## 2025-04-09 DIAGNOSIS — E16.2 HYPOGLYCEMIA: Primary | ICD-10-CM

## 2025-04-09 DIAGNOSIS — R10.84 GENERALIZED ABDOMINAL PAIN: ICD-10-CM

## 2025-04-09 DIAGNOSIS — R11.2 NAUSEA AND VOMITING, UNSPECIFIED VOMITING TYPE: ICD-10-CM

## 2025-04-09 DIAGNOSIS — E16.2 HYPOGLYCEMIA: ICD-10-CM

## 2025-04-09 DIAGNOSIS — K85.90 ACUTE PANCREATITIS WITHOUT INFECTION OR NECROSIS, UNSPECIFIED PANCREATITIS TYPE (HHS-HCC): ICD-10-CM

## 2025-04-09 DIAGNOSIS — E08.9 DIABETES MELLITUS DUE TO UNDERLYING CONDITION WITHOUT COMPLICATION, WITHOUT LONG-TERM CURRENT USE OF INSULIN: ICD-10-CM

## 2025-04-09 DIAGNOSIS — F31.9 BIPOLAR I DISORDER WITH DEPRESSION (MULTI): Primary | ICD-10-CM

## 2025-04-09 LAB
ALBUMIN SERPL BCP-MCNC: 3.7 G/DL (ref 3.4–5)
ALP SERPL-CCNC: 78 U/L (ref 33–110)
ALT SERPL W P-5'-P-CCNC: 38 U/L (ref 7–45)
ANION GAP BLDV CALCULATED.4IONS-SCNC: 11 MMOL/L (ref 10–25)
ANION GAP SERPL CALC-SCNC: 10 MMOL/L (ref 10–20)
APPEARANCE UR: CLEAR
AST SERPL W P-5'-P-CCNC: 21 U/L (ref 9–39)
BASE EXCESS BLDV CALC-SCNC: -0.1 MMOL/L (ref -2–3)
BASOPHILS # BLD AUTO: 0.04 X10*3/UL (ref 0–0.1)
BASOPHILS NFR BLD AUTO: 0.3 %
BILIRUB SERPL-MCNC: 0.2 MG/DL (ref 0–1.2)
BILIRUB UR STRIP.AUTO-MCNC: NEGATIVE MG/DL
BODY TEMPERATURE: 37 DEGREES CELSIUS
BUN SERPL-MCNC: 7 MG/DL (ref 6–23)
C PEPTIDE SERPL-MCNC: 0.4 NG/ML (ref 0.7–3.9)
CA-I BLDV-SCNC: 1.18 MMOL/L (ref 1.1–1.33)
CALCIUM SERPL-MCNC: 8.8 MG/DL (ref 8.6–10.6)
CARDIAC TROPONIN I PNL SERPL HS: <3 NG/L (ref 0–34)
CHLORIDE BLDV-SCNC: 106 MMOL/L (ref 98–107)
CHLORIDE SERPL-SCNC: 108 MMOL/L (ref 98–107)
CO2 SERPL-SCNC: 27 MMOL/L (ref 21–32)
COLOR UR: ABNORMAL
CREAT SERPL-MCNC: 0.92 MG/DL (ref 0.5–1.05)
EGFRCR SERPLBLD CKD-EPI 2021: 80 ML/MIN/1.73M*2
EOSINOPHIL # BLD AUTO: 0.02 X10*3/UL (ref 0–0.7)
EOSINOPHIL NFR BLD AUTO: 0.1 %
ERYTHROCYTE [DISTWIDTH] IN BLOOD BY AUTOMATED COUNT: 12.8 % (ref 11.5–14.5)
FLUAV RNA RESP QL NAA+PROBE: NOT DETECTED
FLUBV RNA RESP QL NAA+PROBE: NOT DETECTED
GLUCOSE BLD MANUAL STRIP-MCNC: 102 MG/DL (ref 74–99)
GLUCOSE BLD MANUAL STRIP-MCNC: 111 MG/DL (ref 74–99)
GLUCOSE BLD MANUAL STRIP-MCNC: 112 MG/DL (ref 74–99)
GLUCOSE BLD MANUAL STRIP-MCNC: 142 MG/DL (ref 74–99)
GLUCOSE BLD MANUAL STRIP-MCNC: 185 MG/DL (ref 74–99)
GLUCOSE BLD MANUAL STRIP-MCNC: 238 MG/DL (ref 74–99)
GLUCOSE BLD MANUAL STRIP-MCNC: 50 MG/DL (ref 74–99)
GLUCOSE BLD MANUAL STRIP-MCNC: 55 MG/DL (ref 74–99)
GLUCOSE BLD MANUAL STRIP-MCNC: 56 MG/DL (ref 74–99)
GLUCOSE BLD MANUAL STRIP-MCNC: 61 MG/DL (ref 74–99)
GLUCOSE BLD MANUAL STRIP-MCNC: 68 MG/DL (ref 74–99)
GLUCOSE BLD MANUAL STRIP-MCNC: 80 MG/DL (ref 74–99)
GLUCOSE BLD MANUAL STRIP-MCNC: 83 MG/DL (ref 74–99)
GLUCOSE BLD MANUAL STRIP-MCNC: 90 MG/DL (ref 74–99)
GLUCOSE BLDV-MCNC: 131 MG/DL (ref 74–99)
GLUCOSE SERPL-MCNC: 70 MG/DL (ref 74–99)
GLUCOSE UR STRIP.AUTO-MCNC: ABNORMAL MG/DL
HCO3 BLDV-SCNC: 25.9 MMOL/L (ref 22–26)
HCT VFR BLD AUTO: 37.1 % (ref 36–46)
HCT VFR BLD EST: 38 % (ref 36–46)
HGB BLD-MCNC: 13 G/DL (ref 12–16)
HGB BLDV-MCNC: 12.5 G/DL (ref 12–16)
HOLD SPECIMEN: NORMAL
HOLD SPECIMEN: NORMAL
IMM GRANULOCYTES # BLD AUTO: 0.07 X10*3/UL (ref 0–0.7)
IMM GRANULOCYTES NFR BLD AUTO: 0.5 % (ref 0–0.9)
INHALED O2 CONCENTRATION: 21 %
INSULIN SERPL-ACNC: 219 UIU/ML (ref 3–25)
KETONES UR STRIP.AUTO-MCNC: NEGATIVE MG/DL
LACTATE BLDV-SCNC: 2 MMOL/L (ref 0.4–2)
LEUKOCYTE ESTERASE UR QL STRIP.AUTO: NEGATIVE
LIPASE SERPL-CCNC: 40 U/L (ref 9–82)
LYMPHOCYTES # BLD AUTO: 3.35 X10*3/UL (ref 1.2–4.8)
LYMPHOCYTES NFR BLD AUTO: 22.1 %
MAGNESIUM SERPL-MCNC: 1.87 MG/DL (ref 1.6–2.4)
MCH RBC QN AUTO: 31.9 PG (ref 26–34)
MCHC RBC AUTO-ENTMCNC: 35 G/DL (ref 32–36)
MCV RBC AUTO: 91 FL (ref 80–100)
MONOCYTES # BLD AUTO: 1.33 X10*3/UL (ref 0.1–1)
MONOCYTES NFR BLD AUTO: 8.8 %
NEUTROPHILS # BLD AUTO: 10.36 X10*3/UL (ref 1.2–7.7)
NEUTROPHILS NFR BLD AUTO: 68.2 %
NITRITE UR QL STRIP.AUTO: NEGATIVE
NRBC BLD-RTO: 0 /100 WBCS (ref 0–0)
OXYHGB MFR BLDV: 72.5 % (ref 45–75)
PCO2 BLDV: 47 MM HG (ref 41–51)
PH BLDV: 7.35 PH (ref 7.33–7.43)
PH UR STRIP.AUTO: 7 [PH]
PHOSPHATE SERPL-MCNC: 1.6 MG/DL (ref 2.5–4.9)
PLATELET # BLD AUTO: 303 X10*3/UL (ref 150–450)
PO2 BLDV: 40 MM HG (ref 35–45)
POTASSIUM BLDV-SCNC: 3.5 MMOL/L (ref 3.5–5.3)
POTASSIUM SERPL-SCNC: 3.3 MMOL/L (ref 3.5–5.3)
PROT SERPL-MCNC: 6.2 G/DL (ref 6.4–8.2)
PROT UR STRIP.AUTO-MCNC: NEGATIVE MG/DL
RBC # BLD AUTO: 4.08 X10*6/UL (ref 4–5.2)
RBC # UR STRIP.AUTO: NEGATIVE MG/DL
SAO2 % BLDV: 74 % (ref 45–75)
SARS-COV-2 RNA RESP QL NAA+PROBE: NOT DETECTED
SODIUM BLDV-SCNC: 139 MMOL/L (ref 136–145)
SODIUM SERPL-SCNC: 142 MMOL/L (ref 136–145)
SP GR UR STRIP.AUTO: 1.01
UROBILINOGEN UR STRIP.AUTO-MCNC: NORMAL MG/DL
WBC # BLD AUTO: 15.2 X10*3/UL (ref 4.4–11.3)

## 2025-04-09 PROCEDURE — 93010 ELECTROCARDIOGRAM REPORT: CPT | Performed by: EMERGENCY MEDICINE

## 2025-04-09 PROCEDURE — 2500000004 HC RX 250 GENERAL PHARMACY W/ HCPCS (ALT 636 FOR OP/ED)

## 2025-04-09 PROCEDURE — 82947 ASSAY GLUCOSE BLOOD QUANT: CPT

## 2025-04-09 PROCEDURE — 84132 ASSAY OF SERUM POTASSIUM: CPT | Performed by: EMERGENCY MEDICINE

## 2025-04-09 PROCEDURE — 74176 CT ABD & PELVIS W/O CONTRAST: CPT | Performed by: STUDENT IN AN ORGANIZED HEALTH CARE EDUCATION/TRAINING PROGRAM

## 2025-04-09 PROCEDURE — 83525 ASSAY OF INSULIN: CPT

## 2025-04-09 PROCEDURE — 99222 1ST HOSP IP/OBS MODERATE 55: CPT

## 2025-04-09 PROCEDURE — 2580000001 HC RX 258 IV SOLUTIONS

## 2025-04-09 PROCEDURE — 93010 ELECTROCARDIOGRAM REPORT: CPT | Performed by: INTERNAL MEDICINE

## 2025-04-09 PROCEDURE — 99285 EMERGENCY DEPT VISIT HI MDM: CPT | Mod: 25 | Performed by: EMERGENCY MEDICINE

## 2025-04-09 PROCEDURE — 96361 HYDRATE IV INFUSION ADD-ON: CPT

## 2025-04-09 PROCEDURE — KETSP HC KETAMINE INFUSION SELF-PAY: Performed by: STUDENT IN AN ORGANIZED HEALTH CARE EDUCATION/TRAINING PROGRAM

## 2025-04-09 PROCEDURE — KETSP PR KETAMINE INFUSION SELF-PAY: Performed by: STUDENT IN AN ORGANIZED HEALTH CARE EDUCATION/TRAINING PROGRAM

## 2025-04-09 PROCEDURE — 2500000004 HC RX 250 GENERAL PHARMACY W/ HCPCS (ALT 636 FOR OP/ED): Performed by: STUDENT IN AN ORGANIZED HEALTH CARE EDUCATION/TRAINING PROGRAM

## 2025-04-09 PROCEDURE — 84132 ASSAY OF SERUM POTASSIUM: CPT | Performed by: NURSE PRACTITIONER

## 2025-04-09 PROCEDURE — 83690 ASSAY OF LIPASE: CPT | Performed by: NURSE PRACTITIONER

## 2025-04-09 PROCEDURE — 2500000005 HC RX 250 GENERAL PHARMACY W/O HCPCS: Performed by: STUDENT IN AN ORGANIZED HEALTH CARE EDUCATION/TRAINING PROGRAM

## 2025-04-09 PROCEDURE — 81003 URINALYSIS AUTO W/O SCOPE: CPT | Performed by: NURSE PRACTITIONER

## 2025-04-09 PROCEDURE — G0378 HOSPITAL OBSERVATION PER HR: HCPCS

## 2025-04-09 PROCEDURE — 2500000001 HC RX 250 WO HCPCS SELF ADMINISTERED DRUGS (ALT 637 FOR MEDICARE OP): Performed by: STUDENT IN AN ORGANIZED HEALTH CARE EDUCATION/TRAINING PROGRAM

## 2025-04-09 PROCEDURE — 71046 X-RAY EXAM CHEST 2 VIEWS: CPT

## 2025-04-09 PROCEDURE — 93005 ELECTROCARDIOGRAM TRACING: CPT

## 2025-04-09 PROCEDURE — 87636 SARSCOV2 & INF A&B AMP PRB: CPT | Performed by: NURSE PRACTITIONER

## 2025-04-09 PROCEDURE — 1100000001 HC PRIVATE ROOM DAILY

## 2025-04-09 PROCEDURE — 2500000004 HC RX 250 GENERAL PHARMACY W/ HCPCS (ALT 636 FOR OP/ED): Performed by: PSYCHIATRY & NEUROLOGY

## 2025-04-09 PROCEDURE — 84484 ASSAY OF TROPONIN QUANT: CPT | Performed by: NURSE PRACTITIONER

## 2025-04-09 PROCEDURE — 83735 ASSAY OF MAGNESIUM: CPT | Performed by: NURSE PRACTITIONER

## 2025-04-09 PROCEDURE — 71046 X-RAY EXAM CHEST 2 VIEWS: CPT | Performed by: RADIOLOGY

## 2025-04-09 PROCEDURE — 84100 ASSAY OF PHOSPHORUS: CPT | Performed by: NURSE PRACTITIONER

## 2025-04-09 PROCEDURE — 85025 COMPLETE CBC W/AUTO DIFF WBC: CPT | Performed by: NURSE PRACTITIONER

## 2025-04-09 PROCEDURE — 2500000004 HC RX 250 GENERAL PHARMACY W/ HCPCS (ALT 636 FOR OP/ED): Performed by: NURSE PRACTITIONER

## 2025-04-09 PROCEDURE — 74176 CT ABD & PELVIS W/O CONTRAST: CPT

## 2025-04-09 PROCEDURE — 2500000001 HC RX 250 WO HCPCS SELF ADMINISTERED DRUGS (ALT 637 FOR MEDICARE OP)

## 2025-04-09 PROCEDURE — 84681 ASSAY OF C-PEPTIDE: CPT

## 2025-04-09 PROCEDURE — 99285 EMERGENCY DEPT VISIT HI MDM: CPT | Performed by: NURSE PRACTITIONER

## 2025-04-09 RX ORDER — ALUMINUM HYDROXIDE, MAGNESIUM HYDROXIDE, AND SIMETHICONE 1200; 120; 1200 MG/30ML; MG/30ML; MG/30ML
20 SUSPENSION ORAL 4 TIMES DAILY PRN
Status: DISCONTINUED | OUTPATIENT
Start: 2025-04-09 | End: 2025-04-10 | Stop reason: HOSPADM

## 2025-04-09 RX ORDER — DICYCLOMINE HYDROCHLORIDE 10 MG/1
20 CAPSULE ORAL
Status: DISCONTINUED | OUTPATIENT
Start: 2025-04-09 | End: 2025-04-10 | Stop reason: HOSPADM

## 2025-04-09 RX ORDER — ONDANSETRON HYDROCHLORIDE 2 MG/ML
8 INJECTION, SOLUTION INTRAVENOUS ONCE
Status: DISCONTINUED | OUTPATIENT
Start: 2025-04-09 | End: 2025-04-09 | Stop reason: SDUPTHER

## 2025-04-09 RX ORDER — PREGABALIN 50 MG/1
50 CAPSULE ORAL
Status: DISCONTINUED | OUTPATIENT
Start: 2025-04-10 | End: 2025-04-10 | Stop reason: HOSPADM

## 2025-04-09 RX ORDER — ONDANSETRON HYDROCHLORIDE 2 MG/ML
8 INJECTION, SOLUTION INTRAVENOUS ONCE
Start: 2025-04-23 | End: 2025-04-23

## 2025-04-09 RX ORDER — INSULIN LISPRO 100 [IU]/ML
0-5 INJECTION, SOLUTION INTRAVENOUS; SUBCUTANEOUS
Status: DISCONTINUED | OUTPATIENT
Start: 2025-04-10 | End: 2025-04-10 | Stop reason: HOSPADM

## 2025-04-09 RX ORDER — ENOXAPARIN SODIUM 100 MG/ML
40 INJECTION SUBCUTANEOUS EVERY 12 HOURS SCHEDULED
Status: DISCONTINUED | OUTPATIENT
Start: 2025-04-09 | End: 2025-04-10 | Stop reason: HOSPADM

## 2025-04-09 RX ORDER — FLUTICASONE FUROATE AND VILANTEROL 200; 25 UG/1; UG/1
1 POWDER RESPIRATORY (INHALATION)
Status: DISCONTINUED | OUTPATIENT
Start: 2025-04-10 | End: 2025-04-10 | Stop reason: HOSPADM

## 2025-04-09 RX ORDER — BUSPIRONE HYDROCHLORIDE 5 MG/1
30 TABLET ORAL 2 TIMES DAILY
Status: DISCONTINUED | OUTPATIENT
Start: 2025-04-09 | End: 2025-04-10 | Stop reason: HOSPADM

## 2025-04-09 RX ORDER — ONDANSETRON HYDROCHLORIDE 2 MG/ML
8 INJECTION, SOLUTION INTRAVENOUS ONCE
Status: COMPLETED | OUTPATIENT
Start: 2025-04-09 | End: 2025-04-09

## 2025-04-09 RX ORDER — DEXTROSE 50 % IN WATER (D50W) INTRAVENOUS SYRINGE
Status: DISPENSED
Start: 2025-04-09 | End: 2025-04-10

## 2025-04-09 RX ORDER — KETAMINE HCL IN 0.9 % NACL 200 MG/200
0.75 PLASTIC BAG, INJECTION (ML) INTRAVENOUS ONCE
Status: COMPLETED | OUTPATIENT
Start: 2025-04-09 | End: 2025-04-09

## 2025-04-09 RX ORDER — ATOMOXETINE 40 MG/1
80 CAPSULE ORAL DAILY
Status: DISCONTINUED | OUTPATIENT
Start: 2025-04-10 | End: 2025-04-10 | Stop reason: CLARIF

## 2025-04-09 RX ORDER — DEXTROSE 50 % IN WATER (D50W) INTRAVENOUS SYRINGE
12.5
Status: DISCONTINUED | OUTPATIENT
Start: 2025-04-09 | End: 2025-04-10 | Stop reason: HOSPADM

## 2025-04-09 RX ORDER — LORAZEPAM 1 MG/1
1 TABLET ORAL 2 TIMES DAILY PRN
Status: DISCONTINUED | OUTPATIENT
Start: 2025-04-09 | End: 2025-04-10 | Stop reason: HOSPADM

## 2025-04-09 RX ORDER — ACETAMINOPHEN 325 MG/1
975 TABLET ORAL ONCE
Status: CANCELLED | OUTPATIENT
Start: 2025-04-23 | End: 2025-04-23

## 2025-04-09 RX ORDER — MIDODRINE HYDROCHLORIDE 5 MG/1
5 TABLET ORAL 2 TIMES DAILY
Status: DISCONTINUED | OUTPATIENT
Start: 2025-04-09 | End: 2025-04-10 | Stop reason: HOSPADM

## 2025-04-09 RX ORDER — LAMOTRIGINE 100 MG/1
200 TABLET, ORALLY DISINTEGRATING ORAL 2 TIMES DAILY
Status: DISCONTINUED | OUTPATIENT
Start: 2025-04-09 | End: 2025-04-10 | Stop reason: HOSPADM

## 2025-04-09 RX ORDER — ZIPRASIDONE HYDROCHLORIDE 20 MG/1
20 CAPSULE ORAL NIGHTLY
Status: DISCONTINUED | OUTPATIENT
Start: 2025-04-09 | End: 2025-04-10 | Stop reason: HOSPADM

## 2025-04-09 RX ORDER — DEXTROSE 50 % IN WATER (D50W) INTRAVENOUS SYRINGE
25 ONCE
Status: CANCELLED | OUTPATIENT
Start: 2025-04-23 | End: 2025-04-23

## 2025-04-09 RX ORDER — ACETAMINOPHEN 325 MG/1
975 TABLET ORAL ONCE
Status: COMPLETED | OUTPATIENT
Start: 2025-04-09 | End: 2025-04-09

## 2025-04-09 RX ORDER — ACETAMINOPHEN 325 MG/1
975 TABLET ORAL EVERY 6 HOURS PRN
Status: DISCONTINUED | OUTPATIENT
Start: 2025-04-09 | End: 2025-04-10 | Stop reason: HOSPADM

## 2025-04-09 RX ORDER — DEXTROSE 50 % IN WATER (D50W) INTRAVENOUS SYRINGE
25
Status: DISCONTINUED | OUTPATIENT
Start: 2025-04-09 | End: 2025-04-10 | Stop reason: HOSPADM

## 2025-04-09 RX ORDER — KETAMINE HCL IN 0.9 % NACL 200 MG/200
0.75 PLASTIC BAG, INJECTION (ML) INTRAVENOUS ONCE
Start: 2025-04-23 | End: 2025-04-23

## 2025-04-09 RX ORDER — BUPROPION HYDROCHLORIDE 300 MG/1
300 TABLET ORAL EVERY MORNING
Status: DISCONTINUED | OUTPATIENT
Start: 2025-04-10 | End: 2025-04-10 | Stop reason: HOSPADM

## 2025-04-09 RX ORDER — MODAFINIL 100 MG/1
100 TABLET ORAL DAILY
Status: DISCONTINUED | OUTPATIENT
Start: 2025-04-10 | End: 2025-04-10 | Stop reason: HOSPADM

## 2025-04-09 RX ORDER — IPRATROPIUM BROMIDE AND ALBUTEROL SULFATE 2.5; .5 MG/3ML; MG/3ML
3 SOLUTION RESPIRATORY (INHALATION) EVERY 4 HOURS PRN
Status: DISCONTINUED | OUTPATIENT
Start: 2025-04-09 | End: 2025-04-10 | Stop reason: HOSPADM

## 2025-04-09 RX ORDER — DEXTROSE 50 % IN WATER (D50W) INTRAVENOUS SYRINGE
25 ONCE
Status: COMPLETED | OUTPATIENT
Start: 2025-04-09 | End: 2025-04-09

## 2025-04-09 RX ORDER — PANTOPRAZOLE SODIUM 40 MG/1
40 TABLET, DELAYED RELEASE ORAL
Status: DISCONTINUED | OUTPATIENT
Start: 2025-04-10 | End: 2025-04-10 | Stop reason: HOSPADM

## 2025-04-09 RX ORDER — DESVENLAFAXINE 100 MG/1
100 TABLET, EXTENDED RELEASE ORAL
Status: DISCONTINUED | OUTPATIENT
Start: 2025-04-10 | End: 2025-04-10 | Stop reason: HOSPADM

## 2025-04-09 RX ORDER — LISDEXAMFETAMINE DIMESYLATE 70 MG/1
70 CAPSULE ORAL EVERY MORNING
Status: DISCONTINUED | OUTPATIENT
Start: 2025-04-10 | End: 2025-04-10 | Stop reason: CLARIF

## 2025-04-09 RX ORDER — KETAMINE HCL IN 0.9 % NACL 200 MG/200
0.75 PLASTIC BAG, INJECTION (ML) INTRAVENOUS ONCE
Status: DISCONTINUED | OUTPATIENT
Start: 2025-04-09 | End: 2025-04-09 | Stop reason: ALTCHOICE

## 2025-04-09 RX ORDER — PREGABALIN 75 MG/1
150 CAPSULE ORAL NIGHTLY
Status: DISCONTINUED | OUTPATIENT
Start: 2025-04-09 | End: 2025-04-10 | Stop reason: HOSPADM

## 2025-04-09 RX ORDER — FAMOTIDINE 10 MG/ML
20 INJECTION, SOLUTION INTRAVENOUS ONCE
Status: DISCONTINUED | OUTPATIENT
Start: 2025-04-09 | End: 2025-04-10

## 2025-04-09 RX ORDER — PSYLLIUM HUSK 0.4 G
1 CAPSULE ORAL DAILY
Status: DISCONTINUED | OUTPATIENT
Start: 2025-04-10 | End: 2025-04-10 | Stop reason: HOSPADM

## 2025-04-09 RX ORDER — IVABRADINE 5 MG/1
7.5 TABLET, FILM COATED ORAL
Status: DISCONTINUED | OUTPATIENT
Start: 2025-04-10 | End: 2025-04-10 | Stop reason: HOSPADM

## 2025-04-09 RX ADMIN — BUSPIRONE HYDROCHLORIDE 30 MG: 5 TABLET ORAL at 22:36

## 2025-04-09 RX ADMIN — DEXTROSE MONOHYDRATE 25 G: 25 INJECTION, SOLUTION INTRAVENOUS at 12:02

## 2025-04-09 RX ADMIN — CARIPRAZINE 6 MG: 3 CAPSULE, GELATIN COATED ORAL at 22:35

## 2025-04-09 RX ADMIN — SODIUM CHLORIDE 1000 ML: 9 INJECTION, SOLUTION INTRAVENOUS at 11:28

## 2025-04-09 RX ADMIN — PREGABALIN 150 MG: 75 CAPSULE ORAL at 22:34

## 2025-04-09 RX ADMIN — SODIUM CHLORIDE 1000 ML: 9 INJECTION, SOLUTION INTRAVENOUS at 16:24

## 2025-04-09 RX ADMIN — DICYCLOMINE HYDROCHLORIDE 20 MG: 10 CAPSULE ORAL at 22:36

## 2025-04-09 RX ADMIN — ONDANSETRON 8 MG: 2 INJECTION INTRAMUSCULAR; INTRAVENOUS at 09:55

## 2025-04-09 RX ADMIN — DEXTROSE MONOHYDRATE 25 G: 25 INJECTION, SOLUTION INTRAVENOUS at 10:44

## 2025-04-09 RX ADMIN — PROMETHAZINE HYDROCHLORIDE 12.5 MG: 25 INJECTION INTRAMUSCULAR; INTRAVENOUS at 10:38

## 2025-04-09 RX ADMIN — SODIUM CHLORIDE, SODIUM LACTATE, POTASSIUM CHLORIDE, CALCIUM CHLORIDE AND DEXTROSE MONOHYDRATE 1000 ML: 5; 600; 310; 30; 20 INJECTION, SOLUTION INTRAVENOUS at 16:47

## 2025-04-09 RX ADMIN — ACETAMINOPHEN 975 MG: 325 TABLET ORAL at 10:39

## 2025-04-09 RX ADMIN — SODIUM CHLORIDE 79.8 MG: 9 INJECTION, SOLUTION INTRAVENOUS at 10:16

## 2025-04-09 RX ADMIN — ENOXAPARIN SODIUM 40 MG: 100 INJECTION SUBCUTANEOUS at 22:35

## 2025-04-09 RX ADMIN — MIDODRINE HYDROCHLORIDE 5 MG: 5 TABLET ORAL at 22:35

## 2025-04-09 RX ADMIN — POTASSIUM PHOSPHATE, MONOBASIC 500 MG: 500 TABLET, SOLUBLE ORAL at 22:35

## 2025-04-09 SDOH — SOCIAL STABILITY: SOCIAL INSECURITY: WITHIN THE LAST YEAR, HAVE YOU BEEN HUMILIATED OR EMOTIONALLY ABUSED IN OTHER WAYS BY YOUR PARTNER OR EX-PARTNER?: NO

## 2025-04-09 SDOH — ECONOMIC STABILITY: FOOD INSECURITY: WITHIN THE PAST 12 MONTHS, YOU WORRIED THAT YOUR FOOD WOULD RUN OUT BEFORE YOU GOT THE MONEY TO BUY MORE.: NEVER TRUE

## 2025-04-09 SDOH — SOCIAL STABILITY: SOCIAL INSECURITY: ARE YOU OR HAVE YOU BEEN THREATENED OR ABUSED PHYSICALLY, EMOTIONALLY, OR SEXUALLY BY ANYONE?: NO

## 2025-04-09 SDOH — SOCIAL STABILITY: SOCIAL INSECURITY: HAVE YOU HAD THOUGHTS OF HARMING ANYONE ELSE?: NO

## 2025-04-09 SDOH — SOCIAL STABILITY: SOCIAL INSECURITY: WERE YOU ABLE TO COMPLETE ALL THE BEHAVIORAL HEALTH SCREENINGS?: YES

## 2025-04-09 SDOH — SOCIAL STABILITY: SOCIAL INSECURITY: WITHIN THE LAST YEAR, HAVE YOU BEEN AFRAID OF YOUR PARTNER OR EX-PARTNER?: NO

## 2025-04-09 SDOH — ECONOMIC STABILITY: FOOD INSECURITY: WITHIN THE PAST 12 MONTHS, THE FOOD YOU BOUGHT JUST DIDN'T LAST AND YOU DIDN'T HAVE MONEY TO GET MORE.: NEVER TRUE

## 2025-04-09 SDOH — SOCIAL STABILITY: SOCIAL INSECURITY: DO YOU FEEL UNSAFE GOING BACK TO THE PLACE WHERE YOU ARE LIVING?: NO

## 2025-04-09 SDOH — ECONOMIC STABILITY: INCOME INSECURITY: IN THE PAST 12 MONTHS HAS THE ELECTRIC, GAS, OIL, OR WATER COMPANY THREATENED TO SHUT OFF SERVICES IN YOUR HOME?: NO

## 2025-04-09 SDOH — SOCIAL STABILITY: SOCIAL INSECURITY: ARE THERE ANY APPARENT SIGNS OF INJURIES/BEHAVIORS THAT COULD BE RELATED TO ABUSE/NEGLECT?: NO

## 2025-04-09 SDOH — SOCIAL STABILITY: SOCIAL INSECURITY: DOES ANYONE TRY TO KEEP YOU FROM HAVING/CONTACTING OTHER FRIENDS OR DOING THINGS OUTSIDE YOUR HOME?: NO

## 2025-04-09 SDOH — SOCIAL STABILITY: SOCIAL INSECURITY: HAVE YOU HAD ANY THOUGHTS OF HARMING ANYONE ELSE?: NO

## 2025-04-09 SDOH — SOCIAL STABILITY: SOCIAL INSECURITY: HAS ANYONE EVER THREATENED TO HURT YOUR FAMILY OR YOUR PETS?: NO

## 2025-04-09 SDOH — SOCIAL STABILITY: SOCIAL INSECURITY: DO YOU FEEL ANYONE HAS EXPLOITED OR TAKEN ADVANTAGE OF YOU FINANCIALLY OR OF YOUR PERSONAL PROPERTY?: NO

## 2025-04-09 SDOH — SOCIAL STABILITY: SOCIAL INSECURITY: ABUSE: ADULT

## 2025-04-09 ASSESSMENT — PAIN DESCRIPTION - LOCATION: LOCATION: CHEST

## 2025-04-09 ASSESSMENT — PATIENT HEALTH QUESTIONNAIRE - PHQ9
8. MOVING OR SPEAKING SO SLOWLY THAT OTHER PEOPLE COULD HAVE NOTICED. OR THE OPPOSITE, BEING SO FIGETY OR RESTLESS THAT YOU HAVE BEEN MOVING AROUND A LOT MORE THAN USUAL: NOT AT ALL
7. TROUBLE CONCENTRATING ON THINGS, SUCH AS READING THE NEWSPAPER OR WATCHING TELEVISION: SEVERAL DAYS
6. FEELING BAD ABOUT YOURSELF - OR THAT YOU ARE A FAILURE OR HAVE LET YOURSELF OR YOUR FAMILY DOWN: SEVERAL DAYS
5. POOR APPETITE OR OVEREATING: NEARLY EVERY DAY
4. FEELING TIRED OR HAVING LITTLE ENERGY: NEARLY EVERY DAY
10. IF YOU CHECKED OFF ANY PROBLEMS, HOW DIFFICULT HAVE THESE PROBLEMS MADE IT FOR YOU TO DO YOUR WORK, TAKE CARE OF THINGS AT HOME, OR GET ALONG WITH OTHER PEOPLE: VERY DIFFICULT
3. TROUBLE FALLING OR STAYING ASLEEP: MORE THAN HALF THE DAYS
1. LITTLE INTEREST OR PLEASURE IN DOING THINGS: SEVERAL DAYS
9. THOUGHTS THAT YOU WOULD BE BETTER OFF DEAD, OR OF HURTING YOURSELF: NOT AT ALL
8. MOVING OR SPEAKING SO SLOWLY THAT OTHER PEOPLE COULD HAVE NOTICED. OR THE OPPOSITE - BEING SO FIDGETY OR RESTLESS THAT YOU HAVE BEEN MOVING AROUND A LOT MORE THAN USUAL: NOT AT ALL
9. THOUGHTS THAT YOU WOULD BE BETTER OFF DEAD, OR OF HURTING YOURSELF: NOT AT ALL
SUM OF ALL RESPONSES TO PHQ QUESTIONS 1-9: 12
7. TROUBLE CONCENTRATING ON THINGS, SUCH AS READING THE NEWSPAPER OR WATCHING TELEVISION: SEVERAL DAYS
SUM OF ALL RESPONSES TO PHQ9 QUESTIONS 1 AND 2: 2
2. FEELING DOWN, DEPRESSED OR HOPELESS: SEVERAL DAYS
4. FEELING TIRED OR HAVING LITTLE ENERGY: NEARLY EVERY DAY
10. IF YOU CHECKED OFF ANY PROBLEMS, HOW DIFFICULT HAVE THESE PROBLEMS MADE IT FOR YOU TO DO YOUR WORK, TAKE CARE OF THINGS AT HOME, OR GET ALONG WITH OTHER PEOPLE: VERY DIFFICULT
1. LITTLE INTEREST OR PLEASURE IN DOING THINGS: SEVERAL DAYS
SUM OF ALL RESPONSES TO PHQ9 QUESTIONS 1 & 2: 2
5. POOR APPETITE OR OVEREATING: NEARLY EVERY DAY
2. FEELING DOWN, DEPRESSED OR HOPELESS: SEVERAL DAYS
1. LITTLE INTEREST OR PLEASURE IN DOING THINGS: SEVERAL DAYS
6. FEELING BAD ABOUT YOURSELF - OR THAT YOU ARE A FAILURE OR HAVE LET YOURSELF OR YOUR FAMILY DOWN: SEVERAL DAYS
3. TROUBLE FALLING OR STAYING ASLEEP OR SLEEPING TOO MUCH: MORE THAN HALF THE DAYS

## 2025-04-09 ASSESSMENT — LIFESTYLE VARIABLES
AUDIT-C TOTAL SCORE: 0
HOW OFTEN DO YOU HAVE 6 OR MORE DRINKS ON ONE OCCASION: NEVER
HOW MANY STANDARD DRINKS CONTAINING ALCOHOL DO YOU HAVE ON A TYPICAL DAY: PATIENT DOES NOT DRINK
HOW OFTEN DO YOU HAVE A DRINK CONTAINING ALCOHOL: NEVER
SKIP TO QUESTIONS 9-10: 1
AUDIT-C TOTAL SCORE: 0

## 2025-04-09 ASSESSMENT — COGNITIVE AND FUNCTIONAL STATUS - GENERAL
PATIENT BASELINE BEDBOUND: NO
DAILY ACTIVITIY SCORE: 24
MOBILITY SCORE: 22
MOVING FROM LYING ON BACK TO SITTING ON SIDE OF FLAT BED WITH BEDRAILS: A LITTLE
TURNING FROM BACK TO SIDE WHILE IN FLAT BAD: A LITTLE

## 2025-04-09 ASSESSMENT — ACTIVITIES OF DAILY LIVING (ADL)
LACK_OF_TRANSPORTATION: NO
ASSISTIVE_DEVICE: EYEGLASSES
FEEDING YOURSELF: INDEPENDENT
JUDGMENT_ADEQUATE_SAFELY_COMPLETE_DAILY_ACTIVITIES: YES
WALKS IN HOME: INDEPENDENT
DRESSING YOURSELF: INDEPENDENT
HEARING - LEFT EAR: FUNCTIONAL
HEARING - RIGHT EAR: FUNCTIONAL
GROOMING: INDEPENDENT
ADEQUATE_TO_COMPLETE_ADL: YES
PATIENT'S MEMORY ADEQUATE TO SAFELY COMPLETE DAILY ACTIVITIES?: YES
BATHING: INDEPENDENT
TOILETING: INDEPENDENT

## 2025-04-09 ASSESSMENT — PAIN SCALES - GENERAL
PAINLEVEL_OUTOF10: 6
PAINLEVEL_OUTOF10: 6
PAINLEVEL_OUTOF10: 0 - NO PAIN
PAINLEVEL_OUTOF10: 0 - NO PAIN

## 2025-04-09 ASSESSMENT — PAIN - FUNCTIONAL ASSESSMENT
PAIN_FUNCTIONAL_ASSESSMENT: 0-10

## 2025-04-09 ASSESSMENT — PAIN DESCRIPTION - PAIN TYPE: TYPE: ACUTE PAIN

## 2025-04-09 NOTE — H&P
Psychiatry Procedure H&P for each visit    Name: Kelli  : 1985  MRN: [id number]    Date: 25     History & Physical Reviewed:  Pregnancy/Lactating:  Are You Pregnant: No  Are You Currently Breastfeeding: No    I have reviewed the History and Physical dated:   History and Physical Reviewed and relevant findings noted. Patient examined to review pertinent physical findings: Yes  Home Medications Reviewed: Yes  Allergies Reviewed: Yes    ERAS (Enhanced Recovery After Surgery):  ERAS Patient: No    Consent:  COVID-19 Consent:   COVID-19 Risk Consent: Yes: Surgeon has reviewed key risks related to the risk of meli COVID-19 and if they contract COVID-19 what the risks are.

## 2025-04-09 NOTE — PATIENT INSTRUCTIONS
You received both Zofran and Phenergan here today. Would not take your home Phenergan for a few hours unless you're experiencing severe nausea.    You received Tylenol 975mg here today at 10:40. Please do not take any medications containing acetaminophen until at least 4:40 PM today (this includes things like DayQuil...please double-check the ingredients of anything over-the-counter).     If you continue to experience low blood sugar at home, please go to the ED/call 911.

## 2025-04-09 NOTE — NURSING NOTE
Patient presented for Ketamine Infusion.    Blood glucose prior to infusion was 61.  Given 8 ounces of orange juice and 1 pack of cookies.   Glucose jairo to 80 prior to start of treatment.   Infusion began and patient became symptomatic with nausea and chills.   Dr. Álvarez made aware.  Patients glucometer read 56 after juice and cookies.  Glucose given through mediport (see mar)  1100 Glucose 258 and continued to drop.   Glucose administered again (see mar)  Fluids given IV (see Mar)  Dr. lÁvarez at bedside through accuchecks and care  Decision to send patient to ER for unstable glucose.

## 2025-04-09 NOTE — ED PROVIDER NOTES
EMERGENCY DEPARTMENT ENCOUNTER      Pt Name: Kelli Silva  MRN: [id number]  Birthdate 1985  Date of evaluation: 4/9/2025  Provider: DELPHINE Morris-CNP    CHIEF COMPLAINT       Chief Complaint   Patient presents with    Hypoglycemia       HISTORY OF PRESENT ILLNESS    Kelli Silva is a 40 y.o. female who presents to the emergency department with concern for persistent hypoglycemia in the setting of viral gastroenteritis symptoms over the last week.  Patient was at the ketamine infusion and they checked her blood glucose, was reading low, found to be 56 in triage and she was given 2 doses of D50 prior to being roomed.  She states that she does have a history of diabetes type 2 and sometimes she does experience hypoglycemia, only takes insulin denies taking any additional medications for her diabetes.  She states that she is otherwise compliant with her daily medications.  She states that over the week she has had gastroparesis symptoms including nausea with nonbloody vomiting, worsening right lower quadrant abdominal pain, fatigue and intermittent chest pain.  She taken no additional medications at home for symptoms.  Denies any smoking, drugs or alcohol.  Denies any known sick contacts.  Denies any fevers, chills, shortness of breath, diarrhea, urinary symptoms or any additional symptoms or complaints at this time.  Does state that she feels like this is flaring up her reflux.    Nursing Notes were reviewed.    History provided by patient.  No  used.    REVIEW OF SYSTEMS     ROS is otherwise negative unless stated above.    PAST MEDICAL HISTORY     Past Medical History:   Diagnosis Date    Normal routine history and physical examination     Normal routine history and physical examination     Other specified health status     No pertinent past medical history       SURGICAL HISTORY       Past Surgical History:   Procedure Laterality Date    OTHER SURGICAL HISTORY  05/08/2020     Venous access port placement       ALLERGIES     Aspirin, Cigarette smoke, Fish containing products, Iodinated contrast media, Ketorolac, Ketorolac tromethamine, Ondansetron, Other, Prochlorperazine, Shellfish containing products, Sumatriptan, Azithromycin, Ceftriaxone, Doxycycline, Dulaglutide, House dust, Liraglutide, Metformin, Metoclopramide, Prednisone, Sitagliptin, Uafymebb-9-ca2 antimigraine agents, and Zolmitriptan    FAMILY HISTORY       Family History   Problem Relation Name Age of Onset    Brain cancer Father      Kidney cancer Maternal Grandfather      Kidney cancer Sibling          SOCIAL HISTORY       Social History     Socioeconomic History    Marital status: Significant Other   Tobacco Use    Smoking status: Never    Smokeless tobacco: Never   Vaping Use    Vaping status: Never Used   Substance and Sexual Activity    Alcohol use: Never    Drug use: Never     Social Drivers of Health     Financial Resource Strain: Low Risk  (12/10/2024)    Overall Financial Resource Strain (CARDIA)     Difficulty of Paying Living Expenses: Not very hard   Food Insecurity: Unknown (3/13/2025)    Received from [facility]    Hunger Vital Sign     Worried About Running Out of Food in the Last Year: Never true   Transportation Needs: No Transportation Needs (12/10/2024)    PRAPARE - Transportation     Lack of Transportation (Medical): No     Lack of Transportation (Non-Medical): No   Physical Activity: Inactive (4/10/2024)    Received from [facility] [employer] [facility]    Exercise Vital Sign     Days of Exercise per Week: 0 days     Minutes of Exercise per Session: 0 min   Stress: No Stress Concern Present (4/10/2024)    Received from [employer]    [employer] [place] [employer] - Occupational Stress Questionnaire     Feeling of Stress : Only a little   Social Connections: Moderately Isolated (4/10/2024)    Received from [employer]    Social Connection and Isolation Panel [NHANES]      Frequency of Communication with Friends and Family: More than three times a week     Frequency of Social Gatherings with Friends and Family: Three times a week     Attends Yarsanism Services: Never     Active Member of Clubs or Organizations: No     Attends Club or Organization Meetings: Never     Marital Status: Living with partner   Intimate Partner Violence: Not At Risk (12/10/2024)    Humiliation, Afraid, Rape, and Kick questionnaire     Fear of Current or Ex-Partner: No     Emotionally Abused: No     Physically Abused: No     Sexually Abused: No   Housing Stability: Low Risk  (12/10/2024)    Housing Stability Vital Sign     Unable to Pay for Housing in the Last Year: No     Number of Times Moved in the Last Year: 0     Homeless in the Last Year: No       PHYSICAL EXAM   VS: As documented in the triage note and EMR flowsheet from this visit were reviewed.    GEN: NAD, nontoxic, well-appearing, ambulates without difficulty  EYES: PERRLA  HEENT: Airway patent  CARD: RRR, nontender chest, no crepitus deformities, no JVD, no murmurs rubs or gallops ; No edema noted.  Positive pulses bilaterally throughout.  Capillary refill less than 3 seconds.  No abnormal redness, warmth, tenderness or swelling noted to bilateral lower extremities.  PULMONARY: Clear all lung fields. Moving air well, Nonlabored, no accessory muscle use, able to speak complete sentences  ABDOMEN: Abdomen soft, non-distended, no rebound, no guarding. Bowel sounds normal in all 4 quadrants.  Right lower quadrant tenderness to palpation.  No CVA tenderness.  No masses or organomegaly noted.  No evidence of peritonitis.  : deferred  MUSK: Spine appears normal, range of motion is not limited, no muscle or joint tenderness. Strength 5 out of 5 equal bilaterally throughout.  No step-offs, deformities or additional signs of trauma noted.  No spinal/midline tenderness to palpation  SKIN: Skin normal color for race, warm, dry and intact. No evidence of  trauma. No rash noted.  NEURO: Alert and oriented x 3, speech is clear, no obvious deficits noted.  GCS 15  PSYCH: Alert and oriented to person, place, time/situation. normal mood and affect. No apparent risk to self or others. Thoughts are linear.  Does not appear decompensated.  Does not appear internally stimulated.  LYMPH: No adenopathy or splenomegaly. No cervical, supraclavicular or inguinal lymphadenopathy.    DIAGNOSTIC RESULTS   RADIOLOGY:   Non-plain film images such as CT, Ultrasound and MRI are read by the radiologist. Plain radiographic images are visualized and preliminarily interpreted by myself with the below findings: None      Interpretation per the Radiologist below, if available at the time of this note:    CT abdomen pelvis wo IV contrast    (Results Pending)   XR chest 2 views    (Results Pending)         ED BEDSIDE ULTRASOUND:   Performed by myself - none    LABS:  Labs Reviewed   POCT GLUCOSE - Abnormal       Result Value    POCT Glucose 56 (*)    POCT GLUCOSE - Abnormal    POCT Glucose 185 (*)    CBC WITH AUTO DIFFERENTIAL   COMPREHENSIVE METABOLIC PANEL   MAGNESIUM   BLOOD GAS VENOUS FULL PANEL   LIPASE   URINALYSIS WITH REFLEX CULTURE AND MICROSCOPIC    Narrative:     The following orders were created for panel order Urinalysis with Reflex Culture and Microscopic.  Procedure                               Abnormality         Status                     ---------                               -----------         ------                     Urinalysis with Reflex C...[[id number]]                                                 Extra Urine Gray Tube[[id number]]                                                         Please view results for these tests on the individual orders.   INFLUENZA A AND B PCR   SARS-COV-2 PCR   URINALYSIS WITH REFLEX CULTURE AND MICROSCOPIC   EXTRA URINE GRAY TUBE   PHOSPHORUS   TROPONIN I, HIGH SENSITIVITY   POCT GLUCOSE METER       All other labs were within normal range  "or not returned as of this dictation.    EMERGENCY DEPARTMENT COURSE/MDM:   Vitals:    Vitals:    04/09/25 1352 04/09/25 1356   BP:  121/64   BP Location:  Left arm   Patient Position:  Sitting   Pulse:  (!) 103   Resp:  16   Temp:  36.4 °C (97.5 °F)   TempSrc:  Temporal   SpO2:  98%   Weight: 104 kg (230 lb)    Height: 1.575 m (5' 2\")        I reviewed the patient's triage vitals.    This is a 40-year-old female presents ED for evaluation of persistent hypoglycemia in the setting of viral illness symptoms over the last week.  She is neurologically and neurovascular intact.  She is ambulatory at her baseline.  She is placed on continuous cardiac monitor and pulse oximetry.  She has no peritonitis on my examination and she does not appear overtly dehydrated.  She was noted to be given 2 A of dextrose IV prior to being roomed due to persistent hypoglycemia but recheck in the room reveals a normal blood glucose, no indication for repeat.  If she drops again we will consider a dextrose drip.  She is given IV fluids and medications for her symptoms.  She is hemodynamically stable at this time.  She will be signed out to oncoming provider pending workup results and final disposition.      ED Course as of 04/09/25 1448   Wed Apr 09, 2025   1411 EKG reviewed and interpreted independently by me showing sinus tachycardia rate of 102 bpm, QRS within the limits, ST flattening throughout QTc is being read as sick 67 however ST flattening shows blunting of T waves and QTc appears to be closer to the 4-5 100s. [LP]      ED Course User Index  [LP] Zenia Austin DO         Diagnoses as of 04/09/25 1448   Hypoglycemia   Nausea and vomiting, unspecified vomiting type   Generalized abdominal pain       Patient was counseled regarding labs, imaging, likely diagnosis, and plan. All questions were answered.     ------------------------------------------------------------------  EKG Interpretation: See ED attending " interpretation    Differential Diagnoses Considered: Hypoglycemia, electrolyte abnormality, dehydration, norovirus, influenza, COVID, ACS, viral gastroenteritis, gastroparesis    Chronic Medical Conditions Significantly Affecting Care: Diabetes    External Records Reviewed: I reviewed recent and relevant outside records including: PCP notes, prior discharge summary, previous radiologic studies, HIE    Escalation of Care:  Signed out to oncoming provider pending workup results and final disposition    Social Determinants of Health Significantly Affecting Care:  None    Prescription Drug Consideration: N/A    Diagnostic testing considered: No additional indicated at this time    Discussion of Management with Other Providers:   I discussed the patient/results with: Oncoming provider    ------------------------------------------------------------------  ED Medications administered this visit:    Medications   dextrose injection 50 %  - Omnicell Override Pull (has no administration in time range)   glucagon (Glucagen) injection 1 mg (has no administration in time range)   dextrose 50 % injection 25 g (has no administration in time range)   glucagon (Glucagen) injection 1 mg (has no administration in time range)   dextrose 50 % injection 12.5 g (has no administration in time range)   sodium chloride 0.9 % bolus 1,000 mL (has no administration in time range)   famotidine PF (Pepcid) injection 20 mg (has no administration in time range)       New Prescriptions from this visit:    New Prescriptions    No medications on file       Follow-up:  No follow-up provider specified.      Final Impression:   1. Hypoglycemia    2. Nausea and vomiting, unspecified vomiting type    3. Generalized abdominal pain          Sherrie Roger, APRN-CNP    This patient was staffed with ED Attending Dr. Austin to review the plan of care during ED course.    (Please note that portions of this note were completed with a voice recognition  program.  Efforts were made to edit the dictations but occasionally words are mis-transcribed.)     Sherrie Roger, APRN-CNP  04/09/25 7116

## 2025-04-09 NOTE — PROCEDURES
"Interventional Psychiaty Procedure    Date/Time: 4/9/2025 10:25 AM    Performed by: Suzie Álvarez DO  Authorized by: Suzie Álvarez DO      Ketamine Infusion: 0.75mg per kilo over 60 minutes    Time out was taken with staff to confirm correct patient and correct procedure to be performed.     Subjective:  Pt reports mood has been \"mostly ok\" but has been struggling with significant health challenges. Had 2 x seizure-like episodes that may have been complicated migraines, and is now not able to drive x 6 months. Reports that Lyrica dose was increased. Had recent GI issues that prevented PO intake x 1 week and lost 13 lbs. Reports that she has been sleeping poorly without Seroquel and plans to speak with Dr. Nova about this. Reports that she has some nausea today, but feels she will be able to move forward with Tx.     Patient Health Questionnaire-9 Score: (Patient-Rptd) 12 (4/9/2025) (Prior = 7)  ANUP-7 Total Score: (Patient-Rptd) 0 (4/8/2025  5:50 PM) (Prior = 5)    Objective:  Mental Status Exam:  General/Appearance: NAD, appears stated age, casually dressed, body habitus: obese, grooming/hygiene is excellent  Attitude/Behavior: engages in interview, cooperative, appropriate eye contact  Motor Activity: no psychomotor agitation/retardation, no tics/tremors, no EPS/TD, gait: not assessed  Mood: \"OK, up and down the past few days\"  Affect: Mildly dysphoric, restricted, mood congruent, appropriate to content  Speech: spontaneous, coherent, fluent; appropriate quantity, volume, rate, tone  Thought Process: linear, logical, goal-directed, future-oriented  Thought Content: denies SI/HI, Delusional thinking: none elicited  Thought Perception: denies AVH, not responding to internal stimuli  Cognition:  Grossly intact  Insight: good  Judgment: good     Time since procedure start Subjective Heart Rate Pulse Oximetry Blood Pressure   Before procedure start See above 111 bpm 98% SpO2 135/65 mmHg   20 minutes after start of " infusion Nausea and dizziness. No other side effects. 115 bpm 97% SpO2 156/75 mmHg   40 minutes after start of infusion Feeling better. No other side effects. 111 bpm 97% SpO2 156/75 mmHg   60 minutes after start of infusion Feeling better. No other side effects. 107 bpm 95% SpO2 135/79 mmHg     Vitals:    04/09/25 0900 04/09/25 0934 04/09/25 0938 04/09/25 0958   BP:  (!) 124/91 136/65    Pulse:  98     Resp:  18     Temp:  37.6 °C (99.7 °F)     TempSrc:  Temporal     SpO2:  98%     Weight: 112 kg (246 lb 14.6 oz)   106 kg (233 lb 11 oz)     Tolerated treatment well. No complications from ketamine.     Pt experienced HA, N/V and low blood sugar. Attributed to migraine +/- effects of gastroparesis +/- residual effects of gastroenteritis. In addition to usual Zofran pre-medication, pt received 1L NS after infusion complete, phenergan 12.5mg IVPB, Acetaminophen 975mg PO x 1, and Dextrose 50% injection 25mg x 2. Her BS were checked regularly. Prior to discharge pt reported that she felt much better, was able to tolerate PO, nausea/headache had resolved and she felt ready to leave, however she subsequently dropped blood sugar down to <60s despite interventions and was agreeable to go to ED.    Assessment:  Bipolar I D/O, currently depressed  ANUP under control   No immediate risk of harm to self or others    Plan:  Continue Intravenous Ketamine Infusion, next in 3 weeks. Continue outpatient medications and treatment with Dr. Nova. Pt to ED for ongoing management of low blood sugar.    Suzie Álvarez DO

## 2025-04-09 NOTE — LETTER
April 10, 2025     Patient: Kelli Silva   YOB: 1985   Date of Visit: 4/9/2025       To Whom It May Concern:    Kelli Silva was seen at [facility] on 4/9/2025 until 4/10/25. Please excuse Kelli for her absence from work on these days.  She can return to work on Monday 4/14/25  with no restrictions.     If you have any questions or concerns, please don't hesitate to call.         Sincerely,         Sarah San,              CC: No Recipients

## 2025-04-09 NOTE — ED TRIAGE NOTES
Pt presents from ketamine infusion clinic for hypoglycemia. Pt reports that they checked her BG at the clinic and was low, rechecked here and found to be 56 in triage, verbal order for D50 given by Dr. Cuba. Pt has a port in place and D50 given. Pt reports that she has DM II that sometimes drops suddenly, last time being in December requiring admission. Pt states she has been compliant with medications at home. Pt is Aox4 and good historian of health.      Pt reports intermittent chest pains in triage.

## 2025-04-10 ENCOUNTER — APPOINTMENT (OUTPATIENT)
Dept: CARDIOLOGY | Facility: HOSPITAL | Age: 40
End: 2025-04-10
Payer: COMMERCIAL

## 2025-04-10 ENCOUNTER — APPOINTMENT (OUTPATIENT)
Dept: BEHAVIORAL HEALTH | Facility: CLINIC | Age: 40
End: 2025-04-10
Payer: COMMERCIAL

## 2025-04-10 VITALS
TEMPERATURE: 98.6 F | RESPIRATION RATE: 16 BRPM | BODY MASS INDEX: 42.33 KG/M2 | HEART RATE: 109 BPM | HEIGHT: 62 IN | SYSTOLIC BLOOD PRESSURE: 124 MMHG | WEIGHT: 230 LBS | OXYGEN SATURATION: 95 % | DIASTOLIC BLOOD PRESSURE: 61 MMHG

## 2025-04-10 DIAGNOSIS — F31.9 BIPOLAR 1 DISORDER (MULTI): ICD-10-CM

## 2025-04-10 LAB
ALBUMIN SERPL BCP-MCNC: 3.2 G/DL (ref 3.4–5)
ALP SERPL-CCNC: 68 U/L (ref 33–110)
ALT SERPL W P-5'-P-CCNC: 30 U/L (ref 7–45)
ANION GAP SERPL CALC-SCNC: 12 MMOL/L (ref 10–20)
AST SERPL W P-5'-P-CCNC: 11 U/L (ref 9–39)
ATRIAL RATE: 102 BPM
ATRIAL RATE: 74 BPM
BASOPHILS # BLD AUTO: 0.02 X10*3/UL (ref 0–0.1)
BASOPHILS NFR BLD AUTO: 0.2 %
BILIRUB SERPL-MCNC: 0.2 MG/DL (ref 0–1.2)
BUN SERPL-MCNC: 8 MG/DL (ref 6–23)
CALCIUM SERPL-MCNC: 7.8 MG/DL (ref 8.6–10.6)
CHLORIDE SERPL-SCNC: 112 MMOL/L (ref 98–107)
CO2 SERPL-SCNC: 24 MMOL/L (ref 21–32)
CREAT SERPL-MCNC: 0.88 MG/DL (ref 0.5–1.05)
EGFRCR SERPLBLD CKD-EPI 2021: 84 ML/MIN/1.73M*2
EOSINOPHIL # BLD AUTO: 0.07 X10*3/UL (ref 0–0.7)
EOSINOPHIL NFR BLD AUTO: 0.8 %
ERYTHROCYTE [DISTWIDTH] IN BLOOD BY AUTOMATED COUNT: 13.2 % (ref 11.5–14.5)
EST. AVERAGE GLUCOSE BLD GHB EST-MCNC: 91 MG/DL
GLUCOSE BLD MANUAL STRIP-MCNC: 118 MG/DL (ref 74–99)
GLUCOSE BLD MANUAL STRIP-MCNC: 136 MG/DL (ref 74–99)
GLUCOSE BLD MANUAL STRIP-MCNC: 145 MG/DL (ref 74–99)
GLUCOSE BLD MANUAL STRIP-MCNC: 242 MG/DL (ref 74–99)
GLUCOSE SERPL-MCNC: 102 MG/DL (ref 74–99)
HBA1C MFR BLD: 4.8 %
HCT VFR BLD AUTO: 32.6 % (ref 36–46)
HGB BLD-MCNC: 10.8 G/DL (ref 12–16)
HOLD SPECIMEN: NORMAL
IMM GRANULOCYTES # BLD AUTO: 0.03 X10*3/UL (ref 0–0.7)
IMM GRANULOCYTES NFR BLD AUTO: 0.4 % (ref 0–0.9)
LYMPHOCYTES # BLD AUTO: 2.89 X10*3/UL (ref 1.2–4.8)
LYMPHOCYTES NFR BLD AUTO: 34.6 %
MAGNESIUM SERPL-MCNC: 1.59 MG/DL (ref 1.6–2.4)
MCH RBC QN AUTO: 31.8 PG (ref 26–34)
MCHC RBC AUTO-ENTMCNC: 33.1 G/DL (ref 32–36)
MCV RBC AUTO: 96 FL (ref 80–100)
MONOCYTES # BLD AUTO: 0.52 X10*3/UL (ref 0.1–1)
MONOCYTES NFR BLD AUTO: 6.2 %
NEUTROPHILS # BLD AUTO: 4.82 X10*3/UL (ref 1.2–7.7)
NEUTROPHILS NFR BLD AUTO: 57.8 %
NRBC BLD-RTO: 0 /100 WBCS (ref 0–0)
P AXIS: 45 DEGREES
P OFFSET: 206 MS
P ONSET: 156 MS
PLATELET # BLD AUTO: 235 X10*3/UL (ref 150–450)
POTASSIUM SERPL-SCNC: 3.3 MMOL/L (ref 3.5–5.3)
PR INTERVAL: 128 MS
PR INTERVAL: 136 MS
PROT SERPL-MCNC: 5.1 G/DL (ref 6.4–8.2)
Q ONSET: 224 MS
Q ONSET: 226 MS
QRS COUNT: 12 BEATS
QRS COUNT: 17 BEATS
QRS DURATION: 72 MS
QRS DURATION: 74 MS
QT INTERVAL: 382 MS
QT INTERVAL: 512 MS
QTC CALCULATION(BAZETT): 424 MS
QTC CALCULATION(BAZETT): 667 MS
QTC FREDERICIA: 410 MS
QTC FREDERICIA: 610 MS
R AXIS: 28 DEGREES
R AXIS: 7 DEGREES
RBC # BLD AUTO: 3.4 X10*6/UL (ref 4–5.2)
SODIUM SERPL-SCNC: 145 MMOL/L (ref 136–145)
T AXIS: 60 DEGREES
T AXIS: 69 DEGREES
T OFFSET: 415 MS
T OFFSET: 482 MS
VENTRICULAR RATE: 102 BPM
VENTRICULAR RATE: 74 BPM
WBC # BLD AUTO: 8.4 X10*3/UL (ref 4.4–11.3)

## 2025-04-10 PROCEDURE — 83036 HEMOGLOBIN GLYCOSYLATED A1C: CPT | Performed by: INTERNAL MEDICINE

## 2025-04-10 PROCEDURE — 94640 AIRWAY INHALATION TREATMENT: CPT

## 2025-04-10 PROCEDURE — 83735 ASSAY OF MAGNESIUM: CPT

## 2025-04-10 PROCEDURE — 96374 THER/PROPH/DIAG INJ IV PUSH: CPT

## 2025-04-10 PROCEDURE — 90837 PSYTX W PT 60 MINUTES: CPT | Performed by: PSYCHOLOGIST

## 2025-04-10 PROCEDURE — 2500000002 HC RX 250 W HCPCS SELF ADMINISTERED DRUGS (ALT 637 FOR MEDICARE OP, ALT 636 FOR OP/ED)

## 2025-04-10 PROCEDURE — 93005 ELECTROCARDIOGRAM TRACING: CPT

## 2025-04-10 PROCEDURE — 3044F HG A1C LEVEL LT 7.0%: CPT | Performed by: PSYCHOLOGIST

## 2025-04-10 PROCEDURE — 82947 ASSAY GLUCOSE BLOOD QUANT: CPT

## 2025-04-10 PROCEDURE — 2500000004 HC RX 250 GENERAL PHARMACY W/ HCPCS (ALT 636 FOR OP/ED): Performed by: INTERNAL MEDICINE

## 2025-04-10 PROCEDURE — 93010 ELECTROCARDIOGRAM REPORT: CPT | Performed by: INTERNAL MEDICINE

## 2025-04-10 PROCEDURE — G0378 HOSPITAL OBSERVATION PER HR: HCPCS

## 2025-04-10 PROCEDURE — 84075 ASSAY ALKALINE PHOSPHATASE: CPT

## 2025-04-10 PROCEDURE — 99239 HOSP IP/OBS DSCHRG MGMT >30: CPT | Performed by: INTERNAL MEDICINE

## 2025-04-10 PROCEDURE — 85025 COMPLETE CBC W/AUTO DIFF WBC: CPT

## 2025-04-10 PROCEDURE — 2500000004 HC RX 250 GENERAL PHARMACY W/ HCPCS (ALT 636 FOR OP/ED)

## 2025-04-10 PROCEDURE — 2500000001 HC RX 250 WO HCPCS SELF ADMINISTERED DRUGS (ALT 637 FOR MEDICARE OP)

## 2025-04-10 RX ORDER — BLOOD-GLUCOSE SENSOR
1 EACH MISCELLANEOUS
COMMUNITY

## 2025-04-10 RX ORDER — CETIRIZINE HYDROCHLORIDE 10 MG/1
10 TABLET ORAL NIGHTLY PRN
COMMUNITY

## 2025-04-10 RX ORDER — IPRATROPIUM BROMIDE 21 UG/1
2 SPRAY, METERED NASAL 2 TIMES DAILY
COMMUNITY

## 2025-04-10 RX ORDER — HEPARIN 100 UNIT/ML
5 SYRINGE INTRAVENOUS ONCE
Status: COMPLETED | OUTPATIENT
Start: 2025-04-10 | End: 2025-04-10

## 2025-04-10 RX ORDER — VONOPRAZAN FUMARATE 26.72 MG/1
20 TABLET ORAL EVERY MORNING
COMMUNITY

## 2025-04-10 RX ORDER — INSULIN ASPART 100 [IU]/ML
2-6 INJECTION, SOLUTION INTRAVENOUS; SUBCUTANEOUS
COMMUNITY
End: 2025-04-10 | Stop reason: HOSPADM

## 2025-04-10 RX ORDER — FEXOFENADINE HCL 180 MG/1
180 TABLET ORAL DAILY PRN
COMMUNITY

## 2025-04-10 RX ORDER — PROMETHAZINE HYDROCHLORIDE 25 MG/1
25 SUPPOSITORY RECTAL EVERY 6 HOURS PRN
COMMUNITY

## 2025-04-10 RX ORDER — FAMOTIDINE 40 MG/1
40 TABLET, FILM COATED ORAL NIGHTLY
COMMUNITY

## 2025-04-10 RX ORDER — DIPHENHYDRAMINE HYDROCHLORIDE 50 MG/ML
25 INJECTION, SOLUTION INTRAMUSCULAR; INTRAVENOUS ONCE
Status: COMPLETED | OUTPATIENT
Start: 2025-04-10 | End: 2025-04-10

## 2025-04-10 RX ORDER — NYSTATIN 100000 [USP'U]/G
1 POWDER TOPICAL 3 TIMES DAILY
COMMUNITY

## 2025-04-10 RX ADMIN — PREGABALIN 50 MG: 50 CAPSULE ORAL at 08:53

## 2025-04-10 RX ADMIN — HEPARIN 500 UNITS: 100 SYRINGE at 13:41

## 2025-04-10 RX ADMIN — PREGABALIN 50 MG: 50 CAPSULE ORAL at 13:11

## 2025-04-10 RX ADMIN — DIPHENHYDRAMINE HYDROCHLORIDE 25 MG: 50 INJECTION INTRAMUSCULAR; INTRAVENOUS at 11:15

## 2025-04-10 RX ADMIN — ACETAMINOPHEN 975 MG: 325 TABLET, FILM COATED ORAL at 11:14

## 2025-04-10 RX ADMIN — POTASSIUM PHOSPHATE, MONOBASIC 500 MG: 500 TABLET, SOLUBLE ORAL at 06:17

## 2025-04-10 RX ADMIN — DICYCLOMINE HYDROCHLORIDE 20 MG: 10 CAPSULE ORAL at 11:14

## 2025-04-10 RX ADMIN — PANTOPRAZOLE SODIUM 40 MG: 40 TABLET, DELAYED RELEASE ORAL at 06:15

## 2025-04-10 RX ADMIN — BUSPIRONE HYDROCHLORIDE 30 MG: 5 TABLET ORAL at 08:53

## 2025-04-10 RX ADMIN — LAMOTRIGINE 200 MG: 100 TABLET, ORALLY DISINTEGRATING ORAL at 08:56

## 2025-04-10 RX ADMIN — POTASSIUM PHOSPHATE, MONOBASIC 500 MG: 500 TABLET, SOLUBLE ORAL at 13:11

## 2025-04-10 RX ADMIN — TRIMETHOBENZAMIDE HYDROCHLORIDE 200 MG: 100 INJECTION INTRAMUSCULAR at 09:07

## 2025-04-10 RX ADMIN — IVABRADINE 7.5 MG: 5 TABLET, FILM COATED ORAL at 08:54

## 2025-04-10 RX ADMIN — DICYCLOMINE HYDROCHLORIDE 20 MG: 10 CAPSULE ORAL at 06:15

## 2025-04-10 RX ADMIN — INSULIN LISPRO 2 UNITS: 100 INJECTION, SOLUTION INTRAVENOUS; SUBCUTANEOUS at 06:15

## 2025-04-10 RX ADMIN — BUPROPION HYDROCHLORIDE 300 MG: 300 TABLET, EXTENDED RELEASE ORAL at 08:56

## 2025-04-10 RX ADMIN — DESVENLAFAXINE SUCCINATE 100 MG: 100 TABLET, FILM COATED, EXTENDED RELEASE ORAL at 08:56

## 2025-04-10 RX ADMIN — FLUTICASONE FUROATE AND VILANTEROL TRIFENATATE 1 PUFF: 200; 25 POWDER RESPIRATORY (INHALATION) at 09:08

## 2025-04-10 RX ADMIN — ENOXAPARIN SODIUM 40 MG: 100 INJECTION SUBCUTANEOUS at 08:56

## 2025-04-10 RX ADMIN — MIDODRINE HYDROCHLORIDE 5 MG: 5 TABLET ORAL at 08:53

## 2025-04-10 RX ADMIN — TIOTROPIUM BROMIDE INHALATION SPRAY 2 PUFF: 3.12 SPRAY, METERED RESPIRATORY (INHALATION) at 09:08

## 2025-04-10 RX ADMIN — Medication 1 TABLET: at 08:56

## 2025-04-10 SDOH — ECONOMIC STABILITY: FOOD INSECURITY: WITHIN THE PAST 12 MONTHS, YOU WORRIED THAT YOUR FOOD WOULD RUN OUT BEFORE YOU GOT MONEY TO BUY MORE.: NEVER TRUE

## 2025-04-10 SDOH — ECONOMIC STABILITY: FOOD INSECURITY: WITHIN THE PAST 12 MONTHS, THE FOOD YOU BOUGHT JUST DIDN'T LAST AND YOU DIDN'T HAVE MONEY TO GET MORE.: NEVER TRUE

## 2025-04-10 SDOH — ECONOMIC STABILITY: INCOME INSECURITY: IN THE LAST 12 MONTHS, WAS THERE A TIME WHEN YOU WERE NOT ABLE TO PAY THE MORTGAGE OR RENT ON TIME?: NO

## 2025-04-10 SDOH — ECONOMIC STABILITY: TRANSPORTATION INSECURITY: IN THE PAST 12 MONTHS, HAS LACK OF TRANSPORTATION KEPT YOU FROM MEDICAL APPOINTMENTS OR FROM GETTING MEDICATIONS?: NO

## 2025-04-10 SDOH — ECONOMIC STABILITY: FOOD INSECURITY: WITHIN THE PAST 12 MONTHS, YOU WORRIED THAT YOUR FOOD WOULD RUN OUT BEFORE YOU GOT THE MONEY TO BUY MORE.: NEVER TRUE

## 2025-04-10 SDOH — ECONOMIC STABILITY: HOUSING INSECURITY: IN THE PAST 12 MONTHS HAS THE ELECTRIC, GAS, OIL, OR WATER COMPANY THREATENED TO SHUT OFF SERVICES IN YOUR HOME?: NO

## 2025-04-10 SDOH — ECONOMIC STABILITY: FOOD INSECURITY

## 2025-04-10 SDOH — ECONOMIC STABILITY: HOUSING INSECURITY

## 2025-04-10 SDOH — ECONOMIC STABILITY: TRANSPORTATION INSECURITY

## 2025-04-10 SDOH — ECONOMIC STABILITY: HOUSING INSECURITY: IN THE LAST 12 MONTHS, WAS THERE A TIME WHEN YOU WERE NOT ABLE TO PAY THE MORTGAGE OR RENT ON TIME?: NO

## 2025-04-10 SDOH — ECONOMIC STABILITY: HOUSING INSECURITY: IN THE LAST 12 MONTHS, HOW MANY PLACES HAVE YOU LIVED?: 1

## 2025-04-10 SDOH — ECONOMIC STABILITY: GENERAL

## 2025-04-10 ASSESSMENT — ACTIVITIES OF DAILY LIVING (ADL)
HOW_WELL_CAN_YOU_COMPLETE_GROOMING_TASKS: INDEPENDENTLY
HOW_WELL_CAN_YOU_BATHE_YOURSELF: INDEPENDENTLY
ADL_BEFORE_ADMISSION: INDEPENDENTLY
HOW_WELL_CAN_YOU_DRESS_YOURSELF: INDEPENDENTLY
ADL_BEFORE_ADMISSION: RIGHT
HEARING_RIGHT_EAR: NO PROBLEMS
DRESSING: INDEPENDENT
BATHING: INDEPENDENT
ADEQUATE_TO_COMPLETE_ADL: YES
HEARING_LEFT_EAR: NO PROBLEMS
LACK_OF_TRANSPORTATION: NO
HOW_WELL_CAN_YOU_USE_BATHROOM_BY_YOURSELF: INDEPENDENTLY
ADEQUATE_TO_COMPLETE_ADL: YES
WALKS_IN_HOME: INDEPENDENTLY
HOW_WELL_CAN_YOU_FEED_YOURSELF: INDEPENDENTLY
TOILETING: INDEPENDENT
LACK_OF_TRANSPORTATION: NO
FEEDING: INDEPENDENT

## 2025-04-10 ASSESSMENT — PAIN SCALES - GENERAL: PAINLEVEL_OUTOF10: 7

## 2025-04-10 ASSESSMENT — PAIN - FUNCTIONAL ASSESSMENT: PAIN_FUNCTIONAL_ASSESSMENT: 0-10

## 2025-04-10 ASSESSMENT — COGNITIVE AND FUNCTIONAL STATUS - GENERAL: DAILY ACTIVITIY SCORE: 24

## 2025-04-10 ASSESSMENT — SOCIAL DETERMINANTS OF HEALTH (SDOH): IN THE PAST 12 MONTHS, HAS THE ELECTRIC, GAS, OIL, OR WATER COMPANY THREATENED TO SHUT OFF SERVICE IN YOUR HOME?: NO

## 2025-04-10 ASSESSMENT — PAIN DESCRIPTION - DESCRIPTORS: DESCRIPTORS: ACHING

## 2025-04-10 NOTE — CARE PLAN
The patient's goals for the shift include      The clinical goals for the shift include Pt will get adequate rest throughout shift      Problem: Skin  Goal: Decreased wound size/increased tissue granulation at next dressing change  Outcome: Met  Goal: Participates in plan/prevention/treatment measures  Outcome: Met  Goal: Prevent/manage excess moisture  Outcome: Met  Goal: Prevent/minimize sheer/friction injuries  Outcome: Met  Goal: Promote/optimize nutrition  Outcome: Met  Goal: Promote skin healing  Outcome: Met     Problem: Fall/Injury  Goal: Not fall by end of shift  Outcome: Met  Goal: Be free from injury by end of the shift  Outcome: Met  Goal: Verbalize understanding of personal risk factors for fall in the hospital  Outcome: Met  Goal: Verbalize understanding of risk factor reduction measures to prevent injury from fall in the home  Outcome: Met  Goal: Use assistive devices by end of the shift  Outcome: Met  Goal: Pace activities to prevent fatigue by end of the shift  Outcome: Met

## 2025-04-10 NOTE — PROGRESS NOTES
04/10/25 0930   Discharge Planning   Living Arrangements Spouse/significant other   Support Systems Spouse/significant other;Parent   Assistance Needed Independent for adls   Type of Residence Private residence   Number of Stairs to Enter Residence 2   Number of Stairs Within Residence 12   Home or Post Acute Services None   Expected Discharge Disposition Home   Does the patient need discharge transport arranged? No   Financial Resource Strain   How hard is it for you to pay for the very basics like food, housing, medical care, and heating? Not hard   Housing Stability   In the last 12 months, was there a time when you were not able to pay the mortgage or rent on time? N   At any time in the past 12 months, were you homeless or living in a shelter (including now)? N   Transportation Needs   In the past 12 months, has lack of transportation kept you from medical appointments or from getting medications? no   In the past 12 months, has lack of transportation kept you from meetings, work, or from getting things needed for daily living? No   Stroke Family Assessment   Stroke Family Assessment Needed No   Intensity of Service   Intensity of Service 0-30 min     Transitional Care Coordination Progress Note:  Patient discussed during interdisciplinary rounds.   Team members present: MD, TCC  Plan per Medical/Surgical team: Hypoglycemia  Payor: Liliana  Discharge disposition: Home  Potential Barriers: none  ADOD: 1-2 days    Previous Home Care: NA  DME: shower chair, CGM  Pharmacy: [facility] [address]  Falls: 2 falls over the last two months, per patient related to seizures  PCP:  Dr Omar Woods; last visit 2-3 weeks ago  Met with patient at bedside, provided introduction of self and role. Patient states she lives at home with her significant other. Patient states she is independent for adls; safe at home. Patient states either her mother provides transport to appointments or she takes a lyft/uber. LACE  score 78, patient states she has an upcoming appointment with her PCP in May, new pcp appointment not needed at this time. Patient states no concerns obtaining/affording medications; states no social/financial concerns. Patient states mother to provide transport home at time of discharge. Will continue to follow for discharge planning needs.     Herlinda KRUEGER, RN  Transitional Care Coordinator (TCC)  [phone]

## 2025-04-10 NOTE — CARE PLAN
The patient's goals for the shift include      The clinical goals for the shift include Pt will get adequate rest throughout shift

## 2025-04-10 NOTE — H&P
HPI:   Patient is a 40-year-old female with PMH significant for Bipolar disorder, non-epileptic convulsions, POTS, Migraine, Asthma, GERD, DVT/PE, and DM presenting for one week of nausea, vomiting, and abdominal pain, and one day of persistent hypoglycemia. Per the patient, she has chronic GERD and has on and off abdominal pain and nausea regularly secondary to gastroparesis. She takes antiemetics at home but not Reglan, as she has had bad reactions to this in the past. For the past week however, she says the pain and nausea is different in character in that it is persistent. She describes RUQ pain the radiates to the back and she has been unable to eat that much over the past week due to the pain and nausea. She had cholecystectomy several years ago due to symptomatic gallstones. She denies marijuana or alcohol use. She has no history of PUD as far as she knows and had a recent EGD for bloody bowel movements which she was told didn’t show anything. [facility] and was given Mylanta/Lidocaine which she states improved her symptoms, but they recurred once she returned home. She also reports having diarrhea for the past week. She reports a history of C-diff in the past with an unclear etiology, as she had not been taking antibiotics at the time. She reports feeling clammy all over but denies fevers. She lives with her boyfriend, who does not currently have similar symptoms. She denies any recent travel, new foods or restaurants, or any other new exposures.      Additionally, she had a ketamine infusion today, which she has been having regularly for several years, and was noted to be hypoglycemic, so she was sent to the ED. She reports taking sliding scale insulin at home and took 4 units last night for a blood sugar of around 200, and she subsequently had a grilled cheese sandwich. She denies any oral hypoglycemic medications or SGLT2 inhibitor use.      On presentation to the ED:   VS: T 36.4 P 103  "/64 98% on RA     Labs:    CBC: 15.2/13/303   RFP: 142/3.3/108/27/7/0.92<70   LFTs WNL   Phos 1.6 Lipase 40 Troponin <3   VBG: pH 7.35 COs 47 pO2 40   UA: 2+ glucose     CXR:   No evidence of acute cardiopulmonary process.     CT AP   No acute abnormality in the abdomen/pelvis.   Hepatomegaly and diffuse hepatic steatosis.     EGD 2023:   Small hiatal hernia. - Granular mucosa in the esophagus. Biopsied. - Multiple gastric polyps. Biopsied. - A single gastric polyp. Biopsied. - Normal first portion of the duodenum and second portion of the duodenum.     ED Interventions:   2L NS   1L D5 LR   Phenergan   Zofran   Ketamine   Famotidine   D50 x 2   Tylenol     /84 (BP Location: Right arm, Patient Position: Lying)   Pulse 98   Temp 36.3 °C (97.3 °F) (Oral)   Resp 17   Ht 1.575 m (5' 2\")   Wt 104 kg (230 lb)   SpO2 100%   BMI 42.07 kg/m²     Physical Exam:     GEN: A&Ox3, no acute distress, appears comfortable.  Conversational and appropriate.  No confusion or gross mental status changes.   EYES: EOMI, non-injected sclera.   ENT: Moist mucous membranes   CARDIO: Normal rate and regular rhythm. No murmurs, rubs, or gallops.     PULM: LCTAB   GI: Soft, decreased bowel sounds, diffusely tender to deep palpation, worse in RUQ   SKIN: Warm and dry, no rashes or lesions.   EXT: bilateral LE non-pitting edema   NEURO: Cranial nerves II-XII grossly intact.    PSYCH: Appropriate mood and behavior, converses and responds appropriately during exam     Assessment and Plan:   Patient is a 40-year-old female with PMH significant for Bipolar disorder, non-epileptic convulsions, POTS, Migraine, Asthma, GERD, DVT/PE, and DM presenting for one week of nausea, vomiting, diarrhea, and abdominal pain, and one day of persistent hypoglycemia. As far as the abdominal pain, nausea, and diarrhea, differential is broad at this point, with primary concern for viral/infectious gastroenteritis. Other considerations include GERD and " PUD. Pancreatitis essentially ruled out as lipase and CT scan are normal. Given normal LFTs, biliary pathology unlikely as well. She has had some recent adjustments to her mental health medication regimen and polypharmacy is potentially leading to interactions resulting in GI intolerance. As far as the hypoglycemia, this can most likely be attributed to continued insulin intake despite poor PO intake.     #Hypoglycemia - resolved   -f/u insulin level   -f/u C-peptide   -SSI   -Hypoglycemia protocol     #Nausea, Diarrhea, Abdominal Pain   :: leukocytosis of 15.2  -Tylenol PRN   -Tigan PRN   -Mylanta PRN   -stool pathogen panel   - KPhos for hypophosphatemia and hypokalemia   - consider consulting pharmacy to evaluate for drug interactions given multiple psychotropic medications; can reach out to primary psychiatrist as well     #Bipolar Disorder   -Buproprion 300mg   -Buspirone 30mg BID   -Cariprazine 6mg   -Desvenlafaxine 100mg   - Lamotrigine 200mg BID   -Modafinil 100mg   -Pregabalin 150mg QHS + 50mg BID   -Ziprasidone    - Ativan 1mg PRN     #ADHD   -Atomoxetine   -Lisdexamfetamine 70mg     #Non-Epileptic Seizures   -Lamotrigine     #Asthma   :: On Tezepelumab at home   - Trelegy formulary substitute   -DuoNebs prn     #POTS   -Ivabradine 7.5mg BID   -MIdodrine 5mg BID     #GERD   -Pantoprazole 40mg BID     N: regular diet   GI: PPI BID   DVT: Lovenox   Code status - FULL CODE

## 2025-04-10 NOTE — DISCHARGE SUMMARY
Discharge Diagnosis  Hypoglycemia  Gastroenteritis   Bipolar disorder      Discharge Meds     Medication List      CONTINUE taking these medications     Abilify Asimtufii 960 mg/3.2 mL injection; Generic drug: ARIPiprazole ER   (2 month)   Accu-Chek Guide test strips; Generic drug: blood sugar diagnostic   * albuterol 2.5 mg /3 mL (0.083 %) nebulizer solution   * albuterol 90 mcg/actuation inhaler   atomoxetine 80 mg capsule; Commonly known as: Strattera; Take 1 capsule   (80 mg) by mouth once daily. Swallow capsule whole; do not open. If opened   accidentally, do not touch eyes; wash hands immediately (product is an eye   irritant).   Autolet lancing device   azelastine-fluticasone 137-50 mcg/spray nasal spray; Commonly known as:   Dymista   buPROPion  mg 24 hr tablet; Commonly known as: Wellbutrin XL; Take   1 tablet (300 mg) by mouth once daily in the morning. Do not crush, chew,   or split.   busPIRone 30 mg tablet; Commonly known as: Buspar; Take 1 tablet (30 mg)   by mouth 2 times a day.   calcium carbonate-vitamin D3 250 mg-3.125 mcg (125 unit) tablet;   Commonly known as: Oyster Shell   cariprazine 6 mg capsule; Commonly known as: Vraylar; Take 1 capsule (6   mg) by mouth once daily at bedtime.   Corlanor 7.5 mg tablet; Generic drug: ivabradine   desvenlafaxine 100 mg 24 hr tablet; Commonly known as: Pristiq; TAKE 1   TABLET BY MOUTH EVERY DAY IN THE MORNING   Dexcom G7 Sensor device; Generic drug: blood-glucose sensor   dicyclomine 20 mg tablet; Commonly known as: Bentyl   famotidine 40 mg tablet; Commonly known as: Pepcid   fexofenadine 180 mg tablet; Commonly known as: Allegra   glucose 4 gram chewable tablet   Gvoke HypoPen 2-Pack 1 mg/0.2 mL auto-injector; Generic drug: glucagon   ipratropium 21 mcg (0.03 %) nasal spray; Commonly known as: Atrovent   lactobacillus 10 billion cell capsule; Commonly known as: Culturelle   lamoTRIgine 200 mg disintegrating tablet; Commonly known as: LaMICtal;   Take  1 tablet (200 mg) by mouth 2 times a day.   midodrine 5 mg tablet; Commonly known as: Proamatine; Take 1 tablet (5   mg) by mouth 2 times a day.   Mirena 20 mcg/24hr IUD; Generic drug: levonorgestrel   modafinil 100 mg tablet; Commonly known as: ProvigiL; Take 1 tablet (100   mg) by mouth once daily.   nystatin 100,000 unit/gram powder; Commonly known as: Mycostatin   pregabalin 150 mg capsule; Commonly known as: Lyrica   promethazine 25 mg suppository; Commonly known as: Phenergan   Reyvow 100 mg tablet; Generic drug: lasmiditan   Tezspire SubQ syringe; Generic drug: tezepelumab-ekko   Trelegy Ellipta 200-62.5-25 mcg blister with device; Generic drug:   fluticasone-umeclidin-vilanter   Voquezna 20 mg tablet; Generic drug: vonoprazan   Zavzpret 10 mg/actuation spray,non-aerosol; Generic drug: zavegepant   ziprasidone 20 mg capsule; Commonly known as: Geodon; Take 1 capsule (20   mg) by mouth once daily at bedtime.   ZyrTEC 10 mg tablet; Generic drug: cetirizine  * This list has 2 medication(s) that are the same as other medications   prescribed for you. Read the directions carefully, and ask your doctor or   other care provider to review them with you.     STOP taking these medications     lisdexamfetamine 70 mg capsule; Commonly known as: Vyvanse   LORazepam 1 mg tablet; Commonly known as: Ativan   NovoLOG U-100 Insulin aspart 100 unit/mL injection; Generic drug:   insulin aspart       Hospital Course  Patient is a 40-year-old female with PMH significant for Bipolar disorder, non-epileptic convulsions, POTS, Migraine, Asthma, GERD, DVT/PE, and DM presenting for one week of nausea, vomiting, diarrhea, and abdominal pain, and one day of persistent hypoglycemia. Patient was getting ketamine infusion and was noticed to be hypoglycemic and was sent to the ER.  Patient reported abdominal pain, nausea, vomiting and hx of consistent with some pschy meds adjustments at this time.   She was recently started on geodon prior  to the symptoms.  CT scan on admission did not show any acute findings.   The patient tolerated her diet and had no diarrhea today.  Her Hemoglobin A1C returned 4.8.  The patient reported she has been a diabetic type 2 every since college.  She was asked to follow up with an endocrinologist for management and adjustment to her insulin.  Stable for discharge     Pertinent Physical Exam At Time of Discharge  GEN: A&Ox3, no acute distress, appears comfortable.  Conversational and appropriate.  No confusion or gross mental status changes.   EYES: EOMI, non-injected sclera.   ENT: Moist mucous membranes   CARDIO: Normal rate and regular rhythm. No murmurs, rubs, or gallops.     PULM: LCTAB   GI: Soft, decreased bowel sounds, diffusely tender to deep palpation, worse in RUQ   SKIN: Warm and dry, no rashes or lesions.   EXT: bilateral LE non-pitting edema   NEURO: Cranial nerves II-XII grossly intact.    PSYCH: Appropriate mood and behavior, converses and responds appropriately during exam      Outpatient Follow-Up  Future Appointments   Date Time Provider Department [place]   4/17/2025  7:30 AM Chester Gabriel PhD [id number] [place]   4/24/2025  7:30 AM Chester Gabriel PhD [id number] [place]   4/24/2025 10:00 AM DO [id number], INJECTION NURSE [id number] [facility]   5/1/2025  7:30 AM Chester Gabriel PhD [id number] [place]   5/8/2025  7:30 AM Chester Gabriel PhD [id number] [place]   5/15/2025  7:30 AM Chester Gabriel PhD [id number] [place]   5/22/2025  7:30 AM Chester Gabriel PhD [id number] [place]   5/29/2025  7:30 AM Chester Gabriel PhD [id number] [place]   6/3/2025  8:30 AM Ata Nova MD PhD [id number] [facility]   6/5/2025  7:30 AM Chester Gabriel PhD [id number] [place]   6/12/2025  7:30 AM Chester Gabriel PhD [id number] [place]   6/19/2025  7:30 AM Chester Gabriel, PhD [id number] [place]   6/26/2025  7:30 AM Chester Gabriel PhD [id number] [place]   7/3/2025  7:30 AM Chseter Gabriel PhD [id number] [place]   7/10/2025  7:30 AM  Chester Gabriel, PhD [id number] [place]   7/17/2025  7:30 AM Chester Gabriel PhD [id number] [place]   7/24/2025  7:30 AM Chester Gabriel PhD [id number] [place]   7/30/2025  9:00 AM Rosalie Rivas MD [id number] [facility]   7/31/2025  7:30 AM Chester Gabriel PhD [id number] [place]       >35 minutes spent coordinating care of the patient    Sarah San DO

## 2025-04-10 NOTE — PROGRESS NOTES
Pharmacy Medication History Review    Kelli Silva is a 40 y.o. female admitted for Hypoglycemia. Pharmacy reviewed the patient's [id number] medications and allergies for accuracy.    Medications ADDED:  Pepcid   Ipratropium Bromide   Nystatin   Promethazine   Voquezna   Novolog   Allegra   Zyrtec   Dexcom G7  Abilify  Medications CHANGED:  Albuterol inhaler   Glucagon   Glucose    Medications REMOVED/NOT TAKING:   Duo-Neb   Protonix   Lyrica      The list below reflects the updated PTA list.   Prior to Admission Medications   Prescriptions Last Dose Informant   ARIPiprazole ER, 2 month, (Abilify Asimtufii) 960 mg/3.2 mL injection  Self   Sig: Inject 3.2 mL (960 mg) into the muscle every 8 (eight) weeks.   Accu-Chek Guide test strips strip  Self   Sig: USE TO CHECK BLOOD SUGAR UP TO 4 TIMES PER DAY AS INSTRUCTED. E11.9   Autolet lancing device  Self   Sig: USE AS INSTRUCTED TO CHECK BLOOD SUGAR UP TO 4 TIMES DAILY. E11.9   Corlanor 7.5 mg tablet  Self   Sig: Take 1 tablet (7.5 mg) by mouth 2 times daily (morning and late afternoon).   Dexcom G7 Sensor device  Self   Si each every 10 (ten) days.   LORazepam (Ativan) 1 mg tablet     Sig: Take 1 tablet (1 mg) by mouth 2 times a day as needed for anxiety or sleep.   Reyvow 100 mg tablet  Self   Sig: Take 1 tablet by mouth every 24 (twenty four) hours if needed. AT ONSET OF MIGRAINE NO MORE THAN 1X   Trelegy Ellipta 200-62.5-25 mcg blister with device  Self   Si puff once daily. Pt reported she gets med from [facility].    albuterol 2.5 mg /3 mL (0.083 %) nebulizer solution  Self   Sig: Inhale 3 mL (2.5 mg) every 6 hours if needed for wheezing or shortness of breath.  Pt reported still taking, no fill history.    albuterol 90 mcg/actuation inhaler  Self   Sig: Inhale 2 puffs. 6 TIMES DAILY.   Patient taking differently: Inhale 2 puffs 4 times a day as needed for wheezing or shortness of breath. 6 TIMES DAILY.   atomoxetine (Strattera) 80 mg capsule  Self    Sig: Take 1 capsule (80 mg) by mouth once daily. Swallow capsule whole; do not open. If opened accidentally, do not touch eyes; wash hands immediately (product is an eye irritant).   azelastine-fluticasone 137-50 mcg/spray spray,non-aerosol     Sig: Administer 1 spray into each nostril twice a day.   buPROPion XL (Wellbutrin XL) 300 mg 24 hr tablet  Self   Sig: Take 1 tablet (300 mg) by mouth once daily in the morning. Do not crush, chew, or split.   busPIRone (Buspar) 30 mg tablet  Self   Sig: Take 1 tablet (30 mg) by mouth 2 times a day.   calcium carbonate-vitamin D3 (Oyster Shell) 250 mg-3.125 mcg (125 unit) tablet  Self   Sig: Take 1 tablet by mouth once daily.   cariprazine (Vraylar) 6 mg capsule  Self   Sig: Take 1 capsule (6 mg) by mouth once daily at bedtime.   cetirizine (ZyrTEC) 10 mg tablet  Self   Sig: Take 1 tablet (10 mg) by mouth as needed at bedtime for allergies.   desvenlafaxine 100 mg 24 hr tablet  Self   Sig: TAKE 1 TABLET BY MOUTH EVERY DAY IN THE MORNING   dicyclomine (Bentyl) 20 mg tablet  Self   Sig: Take 1 tablet (20 mg) by mouth 4 times a day before meals.   famotidine (Pepcid) 40 mg tablet  Self   Sig: Take 1 tablet (40 mg) by mouth once daily at bedtime.  Pt reported that she takes 20 mg.    fexofenadine (Allegra) 180 mg tablet  Self   Sig: Take 1 tablet (180 mg) by mouth once daily as needed (for allergies). Take in the AM   glucagon (Gvoke HypoPen 2-Pack) 1 mg/0.2 mL auto-injector  Self   Sig: Inject 1 Pen under the skin if needed.   No fill history, Pt reported she still takes as needed.    glucose 4 gram chewable tablet  Self   Sig: Chew 4 tablets (16 g) if needed for low blood sugar - see comments.  No fill history, Pt reported she still takes as needed.    insulin aspart (NovoLOG U-100 Insulin aspart) 100 unit/mL injection  Self   Sig: Inject 2-6 Units under the skin 3 times a day before meals. Take as directed per insulin instructions. Take with meals according to scale #2 given  to patient (2 units for every 50 mg/dL above 150 mg/dL). Max 30 units a day^Disp: 15 mL^Rfl: 0  Last filled on 24 for 30 day supply, pt reported having surplus supply.    ipratropium (Atrovent) 21 mcg (0.03 %) nasal spray  Self   Sig: Administer 2 sprays into each nostril 2 times a day.  Pt takes once daily at bedtime.    lactobacillus (Culturelle) 10 billion cell capsule  Self   Sig: Take 1 capsule by mouth twice a day.   lamoTRIgine (LaMICtal) 200 mg disintegrating tablet  Self   Sig: Take 1 tablet (200 mg) by mouth 2 times a day.   levonorgestrel (Mirena) 21 mcg/24 hours (8 yrs) 52 mg IUD  Self   Si mg by intrauterine route.   lisdexamfetamine (Vyvanse) 70 mg capsule  Self   Sig: Take 1 capsule (70 mg) by mouth once daily in the morning.   midodrine (Proamatine) 5 mg tablet  Self   Sig: Take 1 tablet (5 mg) by mouth 2 times a day.   modafinil (ProvigiL) 100 mg tablet  Self   Sig: Take 1 tablet (100 mg) by mouth once daily.   nystatin (Mycostatin) 100,000 unit/gram powder  Self   Sig: Apply 1 Application topically 3 times a day.   pregabalin (Lyrica) 150 mg capsule  Self   Sig: Take 1 capsule (150 mg) by mouth once daily. each day at the same time.   Patient taking differently: Take 1 capsule (150 mg) by mouth 2 times a day. each day at the same time.   promethazine (Phenergan) 25 mg suppository  Self   Sig: Insert 1 suppository (25 mg) into the rectum every 6 hours if needed for nausea or vomiting.   tezepelumab-ekko (Tezspire) SubQ syringe Past Month Self   Sig: Inject 210 mg under the skin every 28 (twenty-eight) days.   Pt is about due for injection.     vonoprazan (Voquezna) 20 mg tablet  Self   Sig: Take 20 mg by mouth once daily in the morning.   zavegepant (Zavzpret) 10 mg/actuation spray,non-aerosol  Self   Sig: Administer 10 mg into affected nostril(s) if needed. 1 spray into one nostril daily as needed.   ziprasidone (Geodon) 20 mg capsule  Self   Sig: Take 1 capsule (20 mg) by mouth once  "daily at bedtime.      Facility-Administered Medications Last Administration Doses Remaining   ARIPiprazole ER (2 month) (Abilify Asimtufii) IM injection 960 mg None recorded 1           The list below reflects the updated allergy list. Please review each documented allergy for additional clarification and justification.  Allergies  Reviewed by Breanna Phoenix on 4/10/2025        Severity Reactions Comments    Aspirin High Shortness of breath     Cigarette Smoke High Shortness of breath     Fish Containing Products High Hives, Rash, Shortness of breath All seafood    Iodinated Contrast Media High Shortness of breath \"Asthma attack\" per pt Bronchospasm    Ketorolac High Shortness of breath \"Asthma attack\" per pt    Ketorolac Tromethamine High Shortness of breath, Other asthma    Ondansetron High Other Prolonged QT interval    Other High Shortness of breath Nicotiana Tabacum    Prochlorperazine High Shortness of breath, Other \"Asthma attack\" per pt agitation Other Reaction(s): Unknown Mental state changes    Shellfish Containing Products High Hives, Rash, Shortness of breath All seafood    Sumatriptan High Runny nose Aphasia, weakness    Azithromycin Not Specified Nausea/vomiting nausea/vomiting    Ceftriaxone Not Specified Other     Doxycycline Not Specified Diarrhea     Dulaglutide Not Specified Other     House Dust Not Specified Other Itchy watery eyes    Liraglutide Not Specified Nausea Only     Metformin Not Specified Other Per patient, lactic acidosis    Metoclopramide Not Specified Itching     Prednisone Not Specified Other Becomes suicidal  Tolerated 1/10/2020* Confusion Mental state change    Sitagliptin Not Specified Other     [id number] Antimigraine Agents Not Specified Other     Zolmitriptan Not Specified Other Aphasia, and weakness,  no more triptans            Patient accepts M2B at discharge.     Sources:   [facility]  Pharmacy dispense history  Patient Interview Good historian  Chart " "Review     Additional Comments:  Pt was able to confirm her meds and dosages.       QUINCY WARNER [place]  Pharmacy Technician  04/10/25     Secure Chat preferred   If no response call [phone] or [email or web address] \"Med Rec\"   "

## 2025-04-10 NOTE — PROGRESS NOTES
Emergency Department Transition of Care Note       Signout   I received Kelli Silva in signout from Sherrie Roger CNP.  Please see the previous note for all HPI, PE and MDM up to the time of signout at 1500.    In brief Kelli Silva is an 40 y.o. female presenting for   Chief Complaint   Patient presents with    Hypoglycemia           ED Course & Medical Decision Making   At the time of signout, the patient's disposition is pending reevaluation, repeat point-of-care glucose.  This is a 40-year-old female with a history of diabetes who presents to the emergency department for hypoglycemia.  She has been taking her insulin as prescribed however did endorse nausea and vomiting over the past week.  Previous provider obtained a CT scan of the abdomen pelvis to evaluate which shows signs consistent with pancreatitis.  Patient is informed however denies history of alcohol use or other substance use.  She also reports having a cholecystectomy.  At this time unclear cause of pancreatitis however does explain her persistent nausea and vomiting and inability to tolerate p.o.  She obtained multiple point-of-care glucoses as well as amps of D50.  Her glucose continued to drop over time requiring D5 fluids.  No history of using sulfonylureas.  Given her persistent hypoglycemia in the setting of new diagnosis of pancreatitis for unclear reasons, I do feel she is appropriate for admission for further workup and management.  Patient is in agreement.  Patient accepted to medicine.  She remained stable and transferred to regular nursing floor.    ED Course:  ED Course as of 04/10/25 1024   Wed Apr 09, 2025   1411 EKG reviewed and interpreted independently by me showing sinus tachycardia rate of 102 bpm, QRS within the limits, ST flattening throughout QTc is being read as sick 67 however ST flattening shows blunting of T waves and QTc appears to be closer to the 4-5 100s. [LP]      ED Course User Index  [LP] Zenia Austin,  DO         Diagnoses as of 04/10/25 1024   Hypoglycemia   Nausea and vomiting, unspecified vomiting type   Generalized abdominal pain   Acute pancreatitis without infection or necrosis, unspecified pancreatitis type ([facility])       Patient seen by and discussed with the attending emergency medicine physician.     Disposition   As a result of their workup, the patient will require admission to the hospital.  The patient was informed of her diagnosis.  The patient was given the opportunity to ask questions and I answered them. The patient agreed to be admitted to the hospital.    Procedures   Procedures    Patient seen and discussed with ED attending physician.    Steven Guillermo DO  Emergency Medicine PGY-3  [facility]

## 2025-04-10 NOTE — DISCHARGE INSTRUCTIONS
Your hemoglobin A1C  is 4.7  DM is diagnosed when the Hemoglobin A1C is 6.5.  I would recommend continue with diet control and follow up with endocrine for close monitor

## 2025-04-11 ENCOUNTER — TELEPHONE (OUTPATIENT)
Dept: BEHAVIORAL HEALTH | Facility: CLINIC | Age: 40
End: 2025-04-11
Payer: COMMERCIAL

## 2025-04-11 DIAGNOSIS — F31.9 BIPOLAR I DISORDER WITH DEPRESSION (MULTI): Primary | ICD-10-CM

## 2025-04-11 RX ORDER — ASENAPINE 5 MG/1
5 TABLET SUBLINGUAL NIGHTLY
Qty: 15 TABLET | Refills: 0 | Status: SHIPPED | OUTPATIENT
Start: 2025-04-11 | End: 2025-04-14 | Stop reason: SDUPTHER

## 2025-04-11 NOTE — PROGRESS NOTES
Humanistic/Insight Oriented Therapy. 50 min.  BP1.  We spoke about how she went to the hospital again and would like to focus in on being more consistent, within her locus of control, to provide a semblance of consistency and predictability that may help smoothe out the physical and emotional volitility.  We spoke about going 4 days a work week.      Chief complaint - Phase of life and emotional distress   Treatment plan - Insight and action consistent with ACT therapy. Providing support, safe space to process their thoughts and feelings, developing therapeutic relationship.  Goals - Self-care, insight, coping skills  Prognosis - Average  Progress - Average  Functional status - 70/100  Focused mental status: Patient was oriented to person, place, time and context  Focused mental exam - alert and oriented  Frequency - Every two weeks     An interactive audio and video telecommunication system which permits real time communications between the patient (at the originating site) and provider (at the distant site) was utilized to provide this telehealth service

## 2025-04-13 NOTE — GROUP NOTE
"Group Topic: Feeling Awareness/Expression   Group Date: 4/8/2025  Start Time:  6:00 PM  End Time:  7:30 PM  Facilitators: Garima Wong PsyD   Department: [facility]    Number of Participants: 12   Group Focus: check in and feeling awareness/expression  Treatment Modality: Patient-Centered Therapy and Other: Supportive psychotherapy  Interventions utilized were exploration and support  Purpose: feelings and other: Check-in    This was a video IOP aftercare group on a HIPAA compliant platform. All patients were provided with informed consent.    IOP Aftercare Group: Check-in  Participants had opportunity to check-in with their peers and discuss recent stressors. They discussed what have been doing to cope with their stress and group members had opportunity to support each other through recent challenges.   Garima Wong Psy.D.      Name: Kelli Silva YOB: 1985   MR: [id number]      Kelli was present and actively engaged in discussion. She processed recently experiencing two seizures and the difficulties she has had since. She stated that she is not allowed to drive and has been feeling anxious and fearful about experiencing another. She described the whole experience as \"scary.\" She is currently working from home and making adjustments as needed.         "

## 2025-04-14 ENCOUNTER — TELEPHONE (OUTPATIENT)
Dept: BEHAVIORAL HEALTH | Facility: HOSPITAL | Age: 40
End: 2025-04-14
Payer: COMMERCIAL

## 2025-04-14 DIAGNOSIS — F31.9 BIPOLAR I DISORDER WITH DEPRESSION (MULTI): ICD-10-CM

## 2025-04-14 RX ORDER — ASENAPINE 5 MG/1
5 TABLET SUBLINGUAL NIGHTLY
Qty: 15 TABLET | Refills: 0 | Status: SHIPPED | OUTPATIENT
Start: 2025-04-14 | End: 2025-04-15 | Stop reason: SDUPTHER

## 2025-04-14 NOTE — PROGRESS NOTES
Patient paged the answering service at 8:43 AM complaining of inability to sleep for the last two days. Patient reports that she was recently put on Saphris and feels like her thoughts are racing at night. Patient has Ativan PRN available, which I informed that she could take if tonight’s symptoms do not improve. I also offered the patient that she could stop taking the Saphris and contact Dr. Nova’s office for further guidance on Monday. Patient agreeable.

## 2025-04-15 ENCOUNTER — CLINICAL SUPPORT (OUTPATIENT)
Dept: BEHAVIORAL HEALTH | Facility: CLINIC | Age: 40
End: 2025-04-15
Payer: COMMERCIAL

## 2025-04-15 ENCOUNTER — TELEMEDICINE (OUTPATIENT)
Dept: BEHAVIORAL HEALTH | Facility: CLINIC | Age: 40
End: 2025-04-15
Payer: COMMERCIAL

## 2025-04-15 ENCOUNTER — APPOINTMENT (OUTPATIENT)
Dept: BEHAVIORAL HEALTH | Facility: CLINIC | Age: 40
End: 2025-04-15
Payer: COMMERCIAL

## 2025-04-15 DIAGNOSIS — F31.9 BIPOLAR I DISORDER WITH DEPRESSION (MULTI): ICD-10-CM

## 2025-04-15 DIAGNOSIS — F41.9 ANXIETY: ICD-10-CM

## 2025-04-15 DIAGNOSIS — F31.9 BIPOLAR 1 DISORDER (MULTI): ICD-10-CM

## 2025-04-15 PROCEDURE — 90853 GROUP PSYCHOTHERAPY: CPT | Performed by: PSYCHOLOGIST

## 2025-04-15 PROCEDURE — 99214 OFFICE O/P EST MOD 30 MIN: CPT | Performed by: PSYCHIATRY & NEUROLOGY

## 2025-04-15 PROCEDURE — 3044F HG A1C LEVEL LT 7.0%: CPT | Performed by: PSYCHIATRY & NEUROLOGY

## 2025-04-15 RX ORDER — ASENAPINE 10 MG/1
10 TABLET SUBLINGUAL NIGHTLY
Qty: 60 TABLET | Refills: 0 | Status: SHIPPED | OUTPATIENT
Start: 2025-04-15 | End: 2025-06-14

## 2025-04-15 NOTE — PROGRESS NOTES
"Follow-up visit  She is at home     Virtual Consent    An interactive audio and video telecommunication system which permits real time communications between the patient (at the originating site) and provider (at the distant site) was utilized to provide this telehealth service.   Verbal consent was requested and obtained from Kelli Silva on this date, 04/15/25 for a telehealth visit and the patient's location was confirmed at the time of the visit.     Subjective: she said she did not feel well because she felt \"manic\" since last Friday. She said she felt euphoria and increased energy, decreased need from sleep, restless, racing thoughts, and easily distracted. Cleaned her house from top to bottom. No risky behavior. Did not spend more money. Saw 3 patients yesterday, but had difficulty of writing her note. No change in appetite. Slept 3 hours last night, did not feel tired today. Concentration was not good.   Took Saphris 5 mg at bedtime, able to fall asleep, but unable to stay asleep.   Haven't taken Vyvanse since last Friday  Anxiety has been under control, no panic attack   ADHD - difficult to manage without Vyvanse     Hospitalized due to pancreatitis last about a week ago  Nauseas got better   Something in breast on the mammogram , will need further exam     Objective:   Appearance: well-groomed.   Demeanor: average.   Eye Contact: average.   Motor Activity: average.   Speech: clear.   Language: Neurologic language is intact.   Fund of Knowledge: intact fund of knowledge.   Delusions: None Reported.   Hallucinations: not reported  Self Harm: None Reported.   Aggressive: None Reported.   Mood: good   Affect: appropriate, restricted most of the time   Orientation: alert, oriented x3.   Manner: cooperative.   Thought process: goal-directed.   Thought association: displays rational thought process.   Content of thought: normal  Abstract/ Rational Thought: intact   Memory: grossly intact.   Behavior: normal " motor activity, relaxed, good eye contact, calm.   Attention/Concentration: normal.   Cognition: intact.   Intelligence Estimate: average.   Executive Function: intact.   Insight: intact.   Judgement: intact.   Musculoskeletal: normal strength and tone. No abnormal movement.   Impression: bipolar I disorder, currently hypomania, mild to moderate   ANUP - under control most of the time   ADHD - problematic without Vyvanse   Discussion/Plan:    Options for sleep and hypomania including increase asenapine to 10 mg at bed time and 5 mg during daytime or taking Invega 3-6 mg at bedtime were discussed with her. She fully understood.   She agreed to try asenapine 10 mg at bed time, and add Invega 3-6 mg if asenapine is not enough.   Hold Vynanse for now. Resume when hypomnia is under control   Follow-up in 3-4 weeks   Ata Nova MD.

## 2025-04-17 ENCOUNTER — APPOINTMENT (OUTPATIENT)
Dept: BEHAVIORAL HEALTH | Facility: CLINIC | Age: 40
End: 2025-04-17
Payer: COMMERCIAL

## 2025-04-17 ENCOUNTER — TELEMEDICINE (OUTPATIENT)
Dept: BEHAVIORAL HEALTH | Facility: CLINIC | Age: 40
End: 2025-04-17
Payer: COMMERCIAL

## 2025-04-17 DIAGNOSIS — F31.9 BIPOLAR 1 DISORDER (MULTI): ICD-10-CM

## 2025-04-17 PROCEDURE — 3044F HG A1C LEVEL LT 7.0%: CPT | Performed by: PSYCHOLOGIST

## 2025-04-17 PROCEDURE — 90837 PSYTX W PT 60 MINUTES: CPT | Performed by: PSYCHOLOGIST

## 2025-04-17 NOTE — PROGRESS NOTES
Humanistic/Insight Oriented Therapy. 50 min.  BP1.  We spoke about how she stabilized from hypomania, the tools that helped, mastering the basics like sleep, routine, and structure, pacing herself with work and medical appointments, and looking forward to a nice Easter weekend.     Chief complaint - Phase of life and emotional distress   Treatment plan - Insight and action consistent with ACT therapy. Providing support, safe space to process their thoughts and feelings, developing therapeutic relationship.  Goals - Self-care, insight, coping skills  Prognosis - Average  Progress - Average  Functional status - 70/100  Focused mental status: Patient was oriented to person, place, time and context  Focused mental exam - alert and oriented  Frequency - Every two weeks     An interactive audio and video telecommunication system which permits real time communications between the patient (at the originating site) and provider (at the distant site) was utilized to provide this telehealth service

## 2025-04-21 NOTE — GROUP NOTE
Group Topic: Feeling Awareness/Expression   Group Date: 4/15/2025  Start Time:  6:00 PM  End Time:  7:30 PM  Facilitators: Garima Wong PsyD   Department: [facility]    Number of Participants: 15   Group Focus: check in  Treatment Modality: Patient-Centered Therapy  Interventions utilized were exploration and support  Purpose: feelings    This was a video IOP group on a HIPAA compliant platform. All patients were provided with informed consent.    Group topic: Check-in  Group members had opportunity to check-in with their peers and discuss recent stressors. They shared ways they have been coping with stress and supported each other with recent challenges. Participants explored ways to manage mood symptoms.   Garima Wong Psy.D.        Name: Kelli Silva YOB: 1985   MR: [id number]      Kelli was present and actively engaged in discussion. She reported that she is feeling much better than last week (physically). She noted that she had not been sleeping well and had medication change that made things worse (I.e., triggered hypomania from not sleeping). She stated that she did not want a prolonged episode because she does not want to miss anymore work. She discussed how she is coping and stabilizing her mood.

## 2025-04-22 ENCOUNTER — CLINICAL SUPPORT (OUTPATIENT)
Dept: BEHAVIORAL HEALTH | Facility: CLINIC | Age: 40
End: 2025-04-22
Payer: COMMERCIAL

## 2025-04-22 DIAGNOSIS — F41.9 ANXIETY: ICD-10-CM

## 2025-04-22 DIAGNOSIS — F31.9 BIPOLAR 1 DISORDER (MULTI): ICD-10-CM

## 2025-04-22 PROCEDURE — 90853 GROUP PSYCHOTHERAPY: CPT | Performed by: PSYCHOLOGIST

## 2025-04-24 ENCOUNTER — PROCEDURE VISIT (OUTPATIENT)
Dept: BEHAVIORAL HEALTH | Facility: CLINIC | Age: 40
End: 2025-04-24
Payer: COMMERCIAL

## 2025-04-24 ENCOUNTER — APPOINTMENT (OUTPATIENT)
Dept: BEHAVIORAL HEALTH | Facility: CLINIC | Age: 40
End: 2025-04-24
Payer: COMMERCIAL

## 2025-04-24 VITALS
TEMPERATURE: 98.4 F | RESPIRATION RATE: 16 BRPM | HEART RATE: 96 BPM | DIASTOLIC BLOOD PRESSURE: 84 MMHG | SYSTOLIC BLOOD PRESSURE: 129 MMHG

## 2025-04-24 DIAGNOSIS — F31.9 BIPOLAR 1 DISORDER (MULTI): Primary | ICD-10-CM

## 2025-04-24 DIAGNOSIS — F31.9 BIPOLAR 1 DISORDER (MULTI): ICD-10-CM

## 2025-04-24 PROCEDURE — 90837 PSYTX W PT 60 MINUTES: CPT | Performed by: PSYCHOLOGIST

## 2025-04-24 PROCEDURE — 3044F HG A1C LEVEL LT 7.0%: CPT | Performed by: PSYCHOLOGIST

## 2025-04-24 NOTE — PROGRESS NOTES
Humanistic/Insight Oriented Therapy. 50 min.  BP1. We spoke about how she was manic, coped through it, some agitation.  Talked about her disappointment with brother, how hard she works on her mental health and he is enabled, but to take pride in her efforts.     Chief complaint - Phase of life and emotional distress   Treatment plan - Insight and action consistent with ACT therapy. Providing support, safe space to process their thoughts and feelings, developing therapeutic relationship.  Goals - Self-care, insight, coping skills  Prognosis - Average  Progress - Average  Functional status - 70/100  Focused mental status: Patient was oriented to person, place, time and context  Focused mental exam - alert and oriented  Frequency - Every two weeks     An interactive audio and video telecommunication system which permits real time communications between the patient (at the originating site) and provider (at the distant site) was utilized to provide this telehealth service

## 2025-04-24 NOTE — PROGRESS NOTES
Patient here at the clinic for every 2 months injection of Abilify Asimtufii for Bipolar affective disorder. Patient identified by full name and , vitals obtained. Patient last seen by Provider on 4/15/25, last seen by nurse on 25. Medication verified by providers note and medication order.      Appetite:  was poor due to being sick, but better now  Sleep:  not sleeping well  Appearance: clean, age appropriate and well groomed  Build: overweight  Attitude: cooperative, calm, pleasant, friendly, open  Eye Contact: normal  Activity: alert, attentive, appropriate  Speech: appropriate & spontaneous, normal rate & flow, clear  Delusions: denies  Thought Content: logical  Thought Process: logical  Judgement/insight:  Intact/good  Mood: calm   Affect: appropriate  AH/VH/SI/HI patient denies  Access to Firearms/weapons: No  Depression: Patient denies  Thoughts of hopelessness: denies  Anxiety: pt said she has anxiety  Self-injurious behavior: denies at this time  Cravings/Urges: denies  Last Use: NA  Tobacco Use: Non smoker  Spiritual or cultural practices that may affect your care or impact your health care decisions? No  Living situation:  Lives with boyfriend  Employed: Employed at [employer]      Patient was pleasant,engaged and cooperative. Patient denies any issues with the previous injection.  Patient denies SI/HI, VH/AH and self harming thoughts at this time.       Patient received Abilify Asimtufii 960mg/3.2ml  via 22 gauge,1 and 1/2 inch needle in the right gluteal area with no reaction to the injection site. Patient tolerated the procedure  well. Patient will return to the clinic in 2 months.    RN provided education on medication and side effects,  Patient is aware to contact nurse for any issues or concerns or call 911/ mobile crisis number for emergent situations.      LOT: [id number]  EXP:  MAY 2026  NDC:  71448-437-77    Chandrika Haney RN,BSN

## 2025-04-24 NOTE — GROUP NOTE
Group Topic: Feeling Awareness/Expression   Group Date: 4/22/2025  Start Time:  6:00 PM  End Time:  7:30 PM  Facilitators: Garima Wong PsyD   Department: [facility]    Number of Participants: 17   Group Focus: other Grief and loss  Treatment Modality: Patient-Centered Therapy  Interventions utilized were other grief and loss and support  Purpose: feelings    This was a video IOP aftercare group on a HIPAA compliant platform. All patients were provided with informed consent.    IOP Aftercare Group:  Grief and Loss  Group members received education around defining grief, loss and mourning. They explored their own reactions and experiences of grief and loss and had opportunity to support each other in their experiences.   Garima Wong Psy.D.      Name: Kelli Silva YOB: 1985   MR: [id number]      Kelli was present and actively engaged in discussion. She processed loss of her father and experiences of related grief. She discussed feeling grief related to the loss of opportunity to create new memories and shared some of the happy memories she has with her father.

## 2025-04-29 DIAGNOSIS — F31.9 BIPOLAR 1 DISORDER (MULTI): ICD-10-CM

## 2025-04-29 RX ORDER — BUPROPION HYDROCHLORIDE 300 MG/1
300 TABLET ORAL EVERY MORNING
Qty: 90 TABLET | Refills: 3 | Status: SHIPPED | OUTPATIENT
Start: 2025-04-29 | End: 2026-04-29

## 2025-05-01 ENCOUNTER — TELEPHONE (OUTPATIENT)
Dept: BEHAVIORAL HEALTH | Facility: CLINIC | Age: 40
End: 2025-05-01

## 2025-05-01 ENCOUNTER — APPOINTMENT (OUTPATIENT)
Dept: BEHAVIORAL HEALTH | Facility: CLINIC | Age: 40
End: 2025-05-01
Payer: COMMERCIAL

## 2025-05-01 DIAGNOSIS — F90.0 ATTENTION DEFICIT HYPERACTIVITY DISORDER (ADHD), PREDOMINANTLY INATTENTIVE TYPE: Primary | ICD-10-CM

## 2025-05-01 DIAGNOSIS — F31.9 BIPOLAR 1 DISORDER (MULTI): ICD-10-CM

## 2025-05-01 PROCEDURE — 90832 PSYTX W PT 30 MINUTES: CPT | Performed by: PSYCHOLOGIST

## 2025-05-01 PROCEDURE — 3044F HG A1C LEVEL LT 7.0%: CPT | Performed by: PSYCHOLOGIST

## 2025-05-01 RX ORDER — LISDEXAMFETAMINE DIMESYLATE 70 MG/1
70 CAPSULE ORAL EVERY MORNING
Qty: 30 CAPSULE | Refills: 0 | Status: SHIPPED | OUTPATIENT
Start: 2025-05-01 | End: 2025-05-31

## 2025-05-01 NOTE — PROGRESS NOTES
Humanistic/Insight Oriented Therapy. 30 min.  BP1.  We had a half session of supportive therapy talking about pace and rate of her week due to nausea.  We spoke about coping with it as best as possible.      Chief complaint - Phase of life and emotional distress   Treatment plan - Insight and action consistent with ACT therapy. Providing support, safe space to process their thoughts and feelings, developing therapeutic relationship.  Goals - Self-care, insight, coping skills  Prognosis - Average  Progress - Average  Functional status - 70/100  Focused mental status: Patient was oriented to person, place, time and context  Focused mental exam - alert and oriented  Frequency - Every two weeks     An interactive audio and video telecommunication system which permits real time communications between the patient (at the originating site) and provider (at the distant site) was utilized to provide this telehealth service

## 2025-05-07 ENCOUNTER — HOSPITAL ENCOUNTER (OUTPATIENT)
Dept: POSTOP/PACU | Facility: HOSPITAL | Age: 40
Setting detail: OUTPATIENT SURGERY
Discharge: HOME | End: 2025-05-07
Payer: COMMERCIAL

## 2025-05-07 VITALS
TEMPERATURE: 99.5 F | RESPIRATION RATE: 18 BRPM | HEART RATE: 88 BPM | SYSTOLIC BLOOD PRESSURE: 112 MMHG | BODY MASS INDEX: 41.9 KG/M2 | DIASTOLIC BLOOD PRESSURE: 54 MMHG | OXYGEN SATURATION: 99 % | WEIGHT: 229.06 LBS

## 2025-05-07 DIAGNOSIS — E86.0 DEHYDRATION, MILD: ICD-10-CM

## 2025-05-07 DIAGNOSIS — R11.2 DRUG-INDUCED NAUSEA AND VOMITING: ICD-10-CM

## 2025-05-07 DIAGNOSIS — T50.905A DRUG-INDUCED NAUSEA AND VOMITING: ICD-10-CM

## 2025-05-07 DIAGNOSIS — F31.9 BIPOLAR I DISORDER WITH DEPRESSION (MULTI): Primary | ICD-10-CM

## 2025-05-07 PROCEDURE — 2500000004 HC RX 250 GENERAL PHARMACY W/ HCPCS (ALT 636 FOR OP/ED): Mod: JZ | Performed by: PSYCHIATRY & NEUROLOGY

## 2025-05-07 PROCEDURE — 2500000004 HC RX 250 GENERAL PHARMACY W/ HCPCS (ALT 636 FOR OP/ED): Performed by: STUDENT IN AN ORGANIZED HEALTH CARE EDUCATION/TRAINING PROGRAM

## 2025-05-07 PROCEDURE — KETSP HC KETAMINE INFUSION SELF-PAY: Performed by: STUDENT IN AN ORGANIZED HEALTH CARE EDUCATION/TRAINING PROGRAM

## 2025-05-07 PROCEDURE — KETSP PR KETAMINE INFUSION SELF-PAY: Performed by: STUDENT IN AN ORGANIZED HEALTH CARE EDUCATION/TRAINING PROGRAM

## 2025-05-07 RX ORDER — DIPHENHYDRAMINE HYDROCHLORIDE 50 MG/ML
50 INJECTION, SOLUTION INTRAMUSCULAR; INTRAVENOUS ONCE AS NEEDED
OUTPATIENT
Start: 2025-05-21

## 2025-05-07 RX ORDER — DIPHENHYDRAMINE HYDROCHLORIDE 50 MG/ML
50 INJECTION, SOLUTION INTRAMUSCULAR; INTRAVENOUS ONCE
Status: COMPLETED | OUTPATIENT
Start: 2025-05-07 | End: 2025-05-07

## 2025-05-07 RX ORDER — KETAMINE HCL IN 0.9 % NACL 200 MG/200
0.75 PLASTIC BAG, INJECTION (ML) INTRAVENOUS ONCE
Status: COMPLETED | OUTPATIENT
Start: 2025-05-07 | End: 2025-05-07

## 2025-05-07 RX ORDER — KETAMINE HCL IN 0.9 % NACL 200 MG/200
0.75 PLASTIC BAG, INJECTION (ML) INTRAVENOUS ONCE
Start: 2025-05-21 | End: 2025-05-21

## 2025-05-07 RX ORDER — ONDANSETRON HYDROCHLORIDE 2 MG/ML
8 INJECTION, SOLUTION INTRAVENOUS ONCE
Status: CANCELLED
Start: 2025-05-21 | End: 2025-05-21

## 2025-05-07 RX ORDER — ONDANSETRON HYDROCHLORIDE 2 MG/ML
8 INJECTION, SOLUTION INTRAVENOUS ONCE AS NEEDED
Start: 2025-05-21

## 2025-05-07 RX ORDER — DIPHENHYDRAMINE HYDROCHLORIDE 50 MG/ML
50 INJECTION, SOLUTION INTRAMUSCULAR; INTRAVENOUS ONCE AS NEEDED
Status: CANCELLED | OUTPATIENT
Start: 2025-05-07

## 2025-05-07 RX ORDER — ONDANSETRON HYDROCHLORIDE 2 MG/ML
8 INJECTION, SOLUTION INTRAVENOUS ONCE
Status: COMPLETED | OUTPATIENT
Start: 2025-05-07 | End: 2025-05-07

## 2025-05-07 RX ADMIN — SODIUM CHLORIDE 1000 ML: 0.9 INJECTION, SOLUTION INTRAVENOUS at 12:13

## 2025-05-07 RX ADMIN — ONDANSETRON 8 MG: 2 INJECTION INTRAMUSCULAR; INTRAVENOUS at 11:05

## 2025-05-07 RX ADMIN — DIPHENHYDRAMINE HYDROCHLORIDE 50 MG: 50 INJECTION INTRAMUSCULAR; INTRAVENOUS at 11:11

## 2025-05-07 RX ADMIN — KETAMINE HYDROCHLORIDE 79.2 MG: 50 INJECTION, SOLUTION INTRAMUSCULAR; INTRAVENOUS at 11:20

## 2025-05-07 ASSESSMENT — COLUMBIA-SUICIDE SEVERITY RATING SCALE - C-SSRS
1. IN THE PAST MONTH, HAVE YOU WISHED YOU WERE DEAD OR WISHED YOU COULD GO TO SLEEP AND NOT WAKE UP?: NO
6. IN YOUR LIFETIME, HAVE YOU EVER DONE ANYTHING, STARTED TO DO ANYTHING, OR PREPARED TO DO ANYTHING TO END YOUR LIFE?: NO
2. HAVE YOU ACTUALLY HAD ANY THOUGHTS OF KILLING YOURSELF?: NO

## 2025-05-07 ASSESSMENT — ANXIETY QUESTIONNAIRES
7. FEELING AFRAID AS IF SOMETHING AWFUL MIGHT HAPPEN: NOT AT ALL
3. WORRYING TOO MUCH ABOUT DIFFERENT THINGS: NOT AT ALL
4. TROUBLE RELAXING: NOT AT ALL
6. BECOMING EASILY ANNOYED OR IRRITABLE: NOT AT ALL
7. FEELING AFRAID AS IF SOMETHING AWFUL MIGHT HAPPEN: NOT AT ALL
5. BEING SO RESTLESS THAT IT IS HARD TO SIT STILL: NOT AT ALL
6. BECOMING EASILY ANNOYED OR IRRITABLE: NOT AT ALL
IF YOU CHECKED OFF ANY PROBLEMS ON THIS QUESTIONNAIRE, HOW DIFFICULT HAVE THESE PROBLEMS MADE IT FOR YOU TO DO YOUR WORK, TAKE CARE OF THINGS AT HOME, OR GET ALONG WITH OTHER PEOPLE: NOT DIFFICULT AT ALL
3. WORRYING TOO MUCH ABOUT DIFFERENT THINGS: NOT AT ALL
5. BEING SO RESTLESS THAT IT IS HARD TO SIT STILL: NOT AT ALL
IF YOU CHECKED OFF ANY PROBLEMS ON THIS QUESTIONNAIRE, HOW DIFFICULT HAVE THESE PROBLEMS MADE IT FOR YOU TO DO YOUR WORK, TAKE CARE OF THINGS AT HOME, OR GET ALONG WITH OTHER PEOPLE: NOT DIFFICULT AT ALL
1. FEELING NERVOUS, ANXIOUS, OR ON EDGE: NOT AT ALL
2. NOT BEING ABLE TO STOP OR CONTROL WORRYING: NOT AT ALL
GAD7 TOTAL SCORE: 0
1. FEELING NERVOUS, ANXIOUS, OR ON EDGE: NOT AT ALL
4. TROUBLE RELAXING: NOT AT ALL
2. NOT BEING ABLE TO STOP OR CONTROL WORRYING: NOT AT ALL

## 2025-05-07 ASSESSMENT — PATIENT HEALTH QUESTIONNAIRE - PHQ9
6. FEELING BAD ABOUT YOURSELF - OR THAT YOU ARE A FAILURE OR HAVE LET YOURSELF OR YOUR FAMILY DOWN: SEVERAL DAYS
SUM OF ALL RESPONSES TO PHQ QUESTIONS 1-9: 12
1. LITTLE INTEREST OR PLEASURE IN DOING THINGS: SEVERAL DAYS
1. LITTLE INTEREST OR PLEASURE IN DOING THINGS: SEVERAL DAYS
10. IF YOU CHECKED OFF ANY PROBLEMS, HOW DIFFICULT HAVE THESE PROBLEMS MADE IT FOR YOU TO DO YOUR WORK, TAKE CARE OF THINGS AT HOME, OR GET ALONG WITH OTHER PEOPLE: SOMEWHAT DIFFICULT
7. TROUBLE CONCENTRATING ON THINGS, SUCH AS READING THE NEWSPAPER OR WATCHING TELEVISION: SEVERAL DAYS
9. THOUGHTS THAT YOU WOULD BE BETTER OFF DEAD, OR OF HURTING YOURSELF: NOT AT ALL
5. POOR APPETITE OR OVEREATING: NEARLY EVERY DAY
7. TROUBLE CONCENTRATING ON THINGS, SUCH AS READING THE NEWSPAPER OR WATCHING TELEVISION: SEVERAL DAYS
10. IF YOU CHECKED OFF ANY PROBLEMS, HOW DIFFICULT HAVE THESE PROBLEMS MADE IT FOR YOU TO DO YOUR WORK, TAKE CARE OF THINGS AT HOME, OR GET ALONG WITH OTHER PEOPLE: SOMEWHAT DIFFICULT
SUM OF ALL RESPONSES TO PHQ9 QUESTIONS 1 & 2: 2
9. THOUGHTS THAT YOU WOULD BE BETTER OFF DEAD, OR OF HURTING YOURSELF: NOT AT ALL
3. TROUBLE FALLING OR STAYING ASLEEP OR SLEEPING TOO MUCH: SEVERAL DAYS
8. MOVING OR SPEAKING SO SLOWLY THAT OTHER PEOPLE COULD HAVE NOTICED. OR THE OPPOSITE, BEING SO FIGETY OR RESTLESS THAT YOU HAVE BEEN MOVING AROUND A LOT MORE THAN USUAL: SEVERAL DAYS
3. TROUBLE FALLING OR STAYING ASLEEP: SEVERAL DAYS
8. MOVING OR SPEAKING SO SLOWLY THAT OTHER PEOPLE COULD HAVE NOTICED. OR THE OPPOSITE - BEING SO FIDGETY OR RESTLESS THAT YOU HAVE BEEN MOVING AROUND A LOT MORE THAN USUAL: SEVERAL DAYS
4. FEELING TIRED OR HAVING LITTLE ENERGY: NEARLY EVERY DAY
2. FEELING DOWN, DEPRESSED OR HOPELESS: SEVERAL DAYS
5. POOR APPETITE OR OVEREATING: NEARLY EVERY DAY
2. FEELING DOWN, DEPRESSED OR HOPELESS: SEVERAL DAYS
6. FEELING BAD ABOUT YOURSELF - OR THAT YOU ARE A FAILURE OR HAVE LET YOURSELF OR YOUR FAMILY DOWN: SEVERAL DAYS
4. FEELING TIRED OR HAVING LITTLE ENERGY: NEARLY EVERY DAY

## 2025-05-07 ASSESSMENT — PAIN SCALES - GENERAL
PAINLEVEL_OUTOF10: 0 - NO PAIN
PAINLEVEL_OUTOF10: 0 - NO PAIN

## 2025-05-07 ASSESSMENT — PAIN - FUNCTIONAL ASSESSMENT
PAIN_FUNCTIONAL_ASSESSMENT: 0-10

## 2025-05-07 NOTE — PROCEDURES
"Interventional Psychiatry Procedure    Date/Time: 5/7/2025 11:10 AM    Performed by: Suzie Álvarez DO  Authorized by: Suzie Álvarez DO      Ketamine Infusion: 0.75mg per kilo over 60 minutes, Maintenance, [id number]    Time out was taken with staff to confirm correct patient and correct procedure to be performed.     Subjective:  Pt reports has been physically ill in interim - ED visit for vomiting. Feeling nauseous today - still wants ketamine, requests IV Benadryl in addition to IV Zofran as this has been helpful to her in the past and agreeable to let us know if nausea worsens so we can pause infusion and/or provide additional antiemetics. Reports mood is \"frustrated\" and with poor sleep and unable to eat except for strawberry smoothies and mint tea. Denies suicidal/homicidal ideation. Denies auditory/visual hallucinations. Would like next infusion in 3 weeks.   Interval changes in health: Per above   Interval changes in medications: Change to Lyrica dose and PPI        Synopsis SmartLink 2/19/2025    08:56 3/12/2025    09:04 4/9/2025    09:31 5/7/2025    10:44   PHQ9   Patient Health Questionnaire-9 Score 10 7  12 12        Patient-reported          ANUP-7 Total Score: (Patient-Rptd) 0 (5/7/2025 10:44 AM) (Prior = 0)    Objective:  Mental Status Exam  General/Appearance: NAD, appears stated age, casually dressed, body habitus: obese, grooming/hygiene is excellent  Attitude/Behavior: engages in interview, cooperative, appropriate eye contact  Motor Activity: no psychomotor agitation/retardation, no tics/tremors, no EPS/TD, gait: not assessed  Mood: \"frustrated\"  Affect: Mildly dysphoric, restricted, mood congruent, appropriate to content  Speech: spontaneous, coherent, fluent; appropriate quantity, volume, rate, tone  Thought Process: linear, logical, goal-directed, future-oriented  Thought Content: denies SI/HI, Delusional thinking: none elicited  Thought Perception: denies AVH, not responding to internal " stimuli  Cognition:  Grossly intact  Insight: good  Judgment: good    Vitals:    05/07/25 1038 05/07/25 1100 05/07/25 1200 05/07/25 1320   BP: 128/61  111/59 112/54   Pulse: (!) 112  98 88   Resp: 18  18 18   Temp: 37.5 °C (99.5 °F)      TempSrc: Temporal      SpO2: 95%  100% 99%   Weight:  104 kg (229 lb 0.9 oz)       Date/Time Gonzalez Agitation Sedation Scale (RASS)   05/07/25 1320 Alert and calm A   05/07/25 1200 Alert and calm A       Patient tolerated treatment well. IV Benadryl given in addition to Zofran for nausea with good effect. No complications. Patient was discharged home in the care of their designated .    Assessment:  1. Bipolar I disorder with depression (Multi)    No immediate risk of harm to self or others    Plan:  Continue Intravenous Ketamine Infusion. Follow up in about 3 weeks (around 5/28/2025). Continue outpatient medications and treatment with Dr. Nova.    Suzie Álvarez DO

## 2025-05-07 NOTE — NURSING NOTE
Treatment Date: 5/7/2025  Patient Name: Kelli Silva  MRN: [id number]  Care Providers: Dr. Suzie Leonard RN  Problem List[1]    Visit Type: In-clinic [place] Administration       Pre-Treatment checklist completed     Consent reviewed and verified in chart         Pregnancy test verified if applicable/Declination of pregnancy test signed  Time out completed with Patient and Provider     Vitals obtained: PER [facility] PROTOCOL AND POLICY.  PRIOR TO DOSING, AFTER 20 MINUTES, AT 40 MINUTES AND PRIOR TO DISCHARGE.   Vitals:    05/07/25 1038 05/07/25 1100 05/07/25 1200 05/07/25 1320   BP: 128/61  111/59 112/54   Pulse: (!) 112  98 88   Resp: 18  18 18   Temp: 37.5 °C (99.5 °F)      TempSrc: Temporal      SpO2: 95%  100% 99%   Weight:  104 kg (229 lb 0.9 oz)           SpO2 Continously monitored through treatment        Cooperative, conversant, engaged, and with good eye contact.  Well groomed, appropriate eye contact.     Patient NPO status reviewed     Allergies reviewed: RX Allergies[2]     Fall precautions in place:    Both side rails are up on the bed, Falls risk band is on.     Administration Details          No immediate adverse reactions noted during administration    Monitoring Period 1 hour post infusion.  Patient received a 1000ml bolus of 0.9% NS during recovery and observation period    Patient monitored for sedation and dissociation    RASS score documented every 30 minutes    Patient remained in line of sight during session    Restroom use supervised for safety per protocol    Patient alert, ambulatory with steady gait    Ride confirmed prior to discharge (no driving permitted post-treatment)    Patient education reinforced:    No driving, alcohol, or decision-making for 24 hours    Call clinic or go to ED if symptoms worsen    Follow-up appointment scheduled   Discharge instructions provided to patient with After Visit Summary    Nurse Signature: Madison Leonard RN       [1]   Patient  Active Problem List  Diagnosis    Abnormal bowel movement    Abnormal urinalysis    Absolute anemia    Acne vulgaris    Acute bronchitis    Ambulatory surgery center as place of occurrence of external cause    Asthma    Attention deficit hyperactivity disorder, predominantly inattentive type    Attention deficit disorder    Bipolar I disorder with depression (Multi)    BPPV (benign paroxysmal positional vertigo)    Chronic idiopathic constipation    Chronic migraine without aura without status migrainosus, not intractable    Chronic migraine without aura, with intractable migraine, so stated, with status migrainosus    Intractable chronic migraine without aura    Cough    Foot sprain, left, subsequent encounter    Fibromyalgia, primary    Gastroenteritis    Gastroparesis    Generalized anxiety disorder    GERD (gastroesophageal reflux disease)    Gross hematuria    Hematemesis with nausea    Horseshoe kidney    History of renal cell carcinoma    History of diabetes mellitus, type II    Personal history of DVT (deep vein thrombosis)    History of blood clots    Hirsutism    Hemiplegic migraine    Hyperlipidemia    Iron deficiency anemia    Intractable abdominal pain    Insulin pump status    Insomnia    Diabetes mellitus (Multi)    Hypogammaglobulinemia (Multi)    Iron disorder    Lisfranc dislocation, left, subsequent encounter    Malignant neoplasm of left kidney, except renal pelvis (Multi)    Malnutrition of mild degree (Multi)    Obesity, Class II, BMI 35-39.9    Obesity, Class III, BMI 40-49.9 (morbid obesity)    Complicated UTI (urinary tract infection)    Other specified disorders of kidney and ureter    Lung nodule    Parkinsonism (Multi)    PCOS (polycystic ovarian syndrome)    Tachycardia    Splenic cyst    Second degree burn of finger    Restless legs syndrome    Pyelonephritis    POTS (postural orthostatic tachycardia syndrome)    Personal history of PE (pulmonary embolism)    Upper back strain     "Vitamin D deficiency    Vitreous floaters of both eyes    Herpes zoster without complication    Hypoglycemia   [2]   Allergies  Allergen Reactions    Aspirin Shortness of breath    Cigarette Smoke Shortness of breath    Fish Containing Products Hives, Rash and Shortness of breath     All seafood    Iodinated Contrast Media Shortness of breath     \"Asthma attack\" per pt Bronchospasm    Ketorolac Shortness of breath     \"Asthma attack\" per pt    Ketorolac Tromethamine Shortness of breath and Other     asthma    Ondansetron Other     Prolonged QT interval    Other Shortness of breath     Nicotiana Tabacum    Prochlorperazine Shortness of breath and Other     \"Asthma attack\" per pt    agitation    Other Reaction(s): Unknown  Mental state changes    Shellfish Containing Products Hives, Rash and Shortness of breath     All seafood    Sumatriptan Runny nose     Aphasia, weakness    Azithromycin Nausea/vomiting     nausea/vomiting    Ceftriaxone Other    Doxycycline Diarrhea    Dulaglutide Other    House Dust Other     Itchy watery eyes    Liraglutide Nausea Only    Metformin Other     Per patient, lactic acidosis    Metoclopramide Itching    Prednisone Other     Becomes suicidal   Tolerated 1/10/2020*  Confusion  Mental state change    Sitagliptin Other    [id number] Antimigraine Agents Other    Zolmitriptan Other     Aphasia, and weakness,  no more triptans     "

## 2025-05-08 ENCOUNTER — APPOINTMENT (OUTPATIENT)
Dept: BEHAVIORAL HEALTH | Facility: CLINIC | Age: 40
End: 2025-05-08
Payer: COMMERCIAL

## 2025-05-08 DIAGNOSIS — F31.9 BIPOLAR 1 DISORDER (MULTI): ICD-10-CM

## 2025-05-08 DIAGNOSIS — F31.9 BIPOLAR I DISORDER WITH DEPRESSION (MULTI): ICD-10-CM

## 2025-05-08 PROCEDURE — 90832 PSYTX W PT 30 MINUTES: CPT | Performed by: PSYCHOLOGIST

## 2025-05-08 PROCEDURE — 3044F HG A1C LEVEL LT 7.0%: CPT | Performed by: PSYCHOLOGIST

## 2025-05-08 RX ORDER — ASENAPINE 10 MG/1
10 TABLET SUBLINGUAL NIGHTLY
Qty: 90 TABLET | Refills: 1 | Status: SHIPPED | OUTPATIENT
Start: 2025-05-08 | End: 2025-07-07

## 2025-05-08 NOTE — PROGRESS NOTES
Humanistic/Insight Oriented Therapy. 30min.  BP1.  We processed how she is working on ways to cope with her nausea, celebrated a clean scan, and how to manage her work and weekend with her condition.     Chief complaint - Phase of life and emotional distress   Treatment plan - Insight and action consistent with ACT therapy. Providing support, safe space to process their thoughts and feelings, developing therapeutic relationship.  Goals - Self-care, insight, coping skills  Prognosis - Average  Progress - Average  Functional status - 70/100  Focused mental status: Patient was oriented to person, place, time and context  Focused mental exam - alert and oriented  Frequency - Every two weeks     An interactive audio and video telecommunication system which permits real time communications between the patient (at the originating site) and provider (at the distant site) was utilized to provide this telehealth service

## 2025-05-15 ENCOUNTER — APPOINTMENT (OUTPATIENT)
Dept: BEHAVIORAL HEALTH | Facility: CLINIC | Age: 40
End: 2025-05-15
Payer: COMMERCIAL

## 2025-05-15 PROCEDURE — 3044F HG A1C LEVEL LT 7.0%: CPT | Performed by: PSYCHOLOGIST

## 2025-05-15 PROCEDURE — 90837 PSYTX W PT 60 MINUTES: CPT | Performed by: PSYCHOLOGIST

## 2025-05-15 NOTE — PROGRESS NOTES
Humanistic/Insight Oriented Therapy. 50 min.  BP1.  We spoke about how she is feeling less nauseous and this has improved her overall mood.  We processed her thin slicing of goals to make them workable.  We talked about transportation issues and making steps towards a trip.  We spoke about the meaning behind feelings.      Chief complaint - Phase of life and emotional distress   Treatment plan - Insight and action consistent with ACT therapy. Providing support, safe space to process their thoughts and feelings, developing therapeutic relationship.  Goals - Self-care, insight, coping skills  Prognosis - Average  Progress - Average  Functional status - 70/100  Focused mental status: Patient was oriented to person, place, time and context  Focused mental exam - alert and oriented  Frequency - Every two weeks     An interactive audio and video telecommunication system which permits real time communications between the patient (at the originating site) and provider (at the distant site) was utilized to provide this telehealth service

## 2025-05-16 DIAGNOSIS — F31.9 BIPOLAR I DISORDER WITH DEPRESSION (MULTI): ICD-10-CM

## 2025-05-16 RX ORDER — ONDANSETRON HYDROCHLORIDE 2 MG/ML
8 INJECTION, SOLUTION INTRAVENOUS ONCE AS NEEDED
OUTPATIENT
Start: 2025-05-21

## 2025-05-16 RX ORDER — FAMOTIDINE 10 MG/ML
20 INJECTION, SOLUTION INTRAVENOUS ONCE AS NEEDED
OUTPATIENT
Start: 2025-05-21

## 2025-05-16 RX ORDER — DIPHENHYDRAMINE HYDROCHLORIDE 50 MG/ML
50 INJECTION, SOLUTION INTRAMUSCULAR; INTRAVENOUS AS NEEDED
OUTPATIENT
Start: 2025-05-21

## 2025-05-16 RX ORDER — LIDOCAINE AND PRILOCAINE 25; 25 MG/G; MG/G
1 CREAM TOPICAL ONCE AS NEEDED
OUTPATIENT
Start: 2025-05-21

## 2025-05-16 RX ORDER — ACETAMINOPHEN 325 MG/1
650 TABLET ORAL ONCE AS NEEDED
OUTPATIENT
Start: 2025-05-21

## 2025-05-16 RX ORDER — LABETALOL HYDROCHLORIDE 5 MG/ML
10 INJECTION, SOLUTION INTRAVENOUS AS NEEDED
OUTPATIENT
Start: 2025-05-21

## 2025-05-16 RX ORDER — ALBUTEROL SULFATE 0.83 MG/ML
3 SOLUTION RESPIRATORY (INHALATION) AS NEEDED
OUTPATIENT
Start: 2025-05-21

## 2025-05-16 RX ORDER — MIDAZOLAM HYDROCHLORIDE 1 MG/ML
1 INJECTION, SOLUTION INTRAMUSCULAR; INTRAVENOUS ONCE AS NEEDED
OUTPATIENT
Start: 2025-05-21

## 2025-05-16 RX ORDER — LABETALOL HYDROCHLORIDE 5 MG/ML
30 INJECTION, SOLUTION INTRAVENOUS AS NEEDED
OUTPATIENT
Start: 2025-05-21

## 2025-05-16 RX ORDER — METOPROLOL TARTRATE 1 MG/ML
10 INJECTION, SOLUTION INTRAVENOUS ONCE AS NEEDED
OUTPATIENT
Start: 2025-05-21

## 2025-05-16 RX ORDER — METOPROLOL TARTRATE 1 MG/ML
15 INJECTION, SOLUTION INTRAVENOUS ONCE AS NEEDED
OUTPATIENT
Start: 2025-05-21

## 2025-05-16 RX ORDER — EPINEPHRINE 0.3 MG/.3ML
0.3 INJECTION SUBCUTANEOUS EVERY 5 MIN PRN
OUTPATIENT
Start: 2025-05-21

## 2025-05-16 RX ORDER — KETAMINE HCL IN 0.9 % NACL 200 MG/200
0.75 PLASTIC BAG, INJECTION (ML) INTRAVENOUS ONCE
OUTPATIENT
Start: 2025-05-21 | End: 2025-05-21

## 2025-05-16 RX ORDER — LABETALOL HYDROCHLORIDE 5 MG/ML
20 INJECTION, SOLUTION INTRAVENOUS AS NEEDED
OUTPATIENT
Start: 2025-05-21

## 2025-05-16 RX ORDER — DICYCLOMINE HYDROCHLORIDE 20 MG/1
10 TABLET ORAL ONCE AS NEEDED
OUTPATIENT
Start: 2025-05-21

## 2025-05-16 RX ORDER — DIPHENHYDRAMINE HYDROCHLORIDE 50 MG/ML
50 INJECTION, SOLUTION INTRAMUSCULAR; INTRAVENOUS ONCE AS NEEDED
OUTPATIENT
Start: 2025-05-21

## 2025-05-16 RX ORDER — METOPROLOL TARTRATE 1 MG/ML
5 INJECTION, SOLUTION INTRAVENOUS ONCE AS NEEDED
OUTPATIENT
Start: 2025-05-21

## 2025-05-20 ENCOUNTER — CLINICAL SUPPORT (OUTPATIENT)
Dept: BEHAVIORAL HEALTH | Facility: CLINIC | Age: 40
End: 2025-05-20
Payer: COMMERCIAL

## 2025-05-20 DIAGNOSIS — F31.9 BIPOLAR I DISORDER WITH DEPRESSION (MULTI): ICD-10-CM

## 2025-05-20 PROCEDURE — 90853 GROUP PSYCHOTHERAPY: CPT | Performed by: PSYCHOLOGIST

## 2025-05-21 ENCOUNTER — HOSPITAL ENCOUNTER (OUTPATIENT)
Facility: HOSPITAL | Age: 40
Discharge: HOME | End: 2025-05-21

## 2025-05-21 ENCOUNTER — APPOINTMENT (OUTPATIENT)
Facility: HOSPITAL | Age: 40
End: 2025-05-21

## 2025-05-21 VITALS
DIASTOLIC BLOOD PRESSURE: 67 MMHG | WEIGHT: 230.6 LBS | HEART RATE: 93 BPM | SYSTOLIC BLOOD PRESSURE: 122 MMHG | BODY MASS INDEX: 42.18 KG/M2 | OXYGEN SATURATION: 96 % | RESPIRATION RATE: 18 BRPM

## 2025-05-21 DIAGNOSIS — F31.9 BIPOLAR I DISORDER WITH DEPRESSION (MULTI): Primary | ICD-10-CM

## 2025-05-21 LAB
ATRIAL RATE: 74 BPM
P AXIS: 45 DEGREES
P OFFSET: 206 MS
P ONSET: 156 MS
PR INTERVAL: 136 MS
Q ONSET: 224 MS
QRS COUNT: 12 BEATS
QRS DURATION: 74 MS
QT INTERVAL: 382 MS
QTC CALCULATION(BAZETT): 424 MS
QTC FREDERICIA: 410 MS
R AXIS: 7 DEGREES
T AXIS: 60 DEGREES
T OFFSET: 415 MS
VENTRICULAR RATE: 74 BPM

## 2025-05-21 PROCEDURE — KETSP HC KETAMINE INFUSION SELF-PAY: Performed by: STUDENT IN AN ORGANIZED HEALTH CARE EDUCATION/TRAINING PROGRAM

## 2025-05-21 PROCEDURE — KETSP PR KETAMINE INFUSION SELF-PAY: Performed by: STUDENT IN AN ORGANIZED HEALTH CARE EDUCATION/TRAINING PROGRAM

## 2025-05-21 PROCEDURE — 2500000004 HC RX 250 GENERAL PHARMACY W/ HCPCS (ALT 636 FOR OP/ED): Mod: JZ | Performed by: STUDENT IN AN ORGANIZED HEALTH CARE EDUCATION/TRAINING PROGRAM

## 2025-05-21 RX ORDER — DICYCLOMINE HYDROCHLORIDE 10 MG/1
10 CAPSULE ORAL ONCE AS NEEDED
OUTPATIENT
Start: 2025-06-08

## 2025-05-21 RX ORDER — KETAMINE HCL IN 0.9 % NACL 200 MG/200
0.75 PLASTIC BAG, INJECTION (ML) INTRAVENOUS ONCE
OUTPATIENT
Start: 2025-06-08 | End: 2025-06-08

## 2025-05-21 RX ORDER — DIPHENHYDRAMINE HYDROCHLORIDE 50 MG/ML
50 INJECTION, SOLUTION INTRAMUSCULAR; INTRAVENOUS ONCE AS NEEDED
Status: CANCELLED | OUTPATIENT
Start: 2025-05-21

## 2025-05-21 RX ORDER — DIPHENHYDRAMINE HYDROCHLORIDE 50 MG/ML
50 INJECTION, SOLUTION INTRAMUSCULAR; INTRAVENOUS AS NEEDED
OUTPATIENT
Start: 2025-06-08

## 2025-05-21 RX ORDER — ACETAMINOPHEN 325 MG/1
650 TABLET ORAL ONCE AS NEEDED
OUTPATIENT
Start: 2025-06-08

## 2025-05-21 RX ORDER — DIPHENHYDRAMINE HYDROCHLORIDE 50 MG/ML
50 INJECTION, SOLUTION INTRAMUSCULAR; INTRAVENOUS ONCE AS NEEDED
Status: DISCONTINUED | OUTPATIENT
Start: 2025-05-21 | End: 2025-05-22 | Stop reason: HOSPADM

## 2025-05-21 RX ORDER — FAMOTIDINE 10 MG/ML
20 INJECTION, SOLUTION INTRAVENOUS ONCE AS NEEDED
OUTPATIENT
Start: 2025-06-08

## 2025-05-21 RX ORDER — ALBUTEROL SULFATE 0.83 MG/ML
3 SOLUTION RESPIRATORY (INHALATION) AS NEEDED
OUTPATIENT
Start: 2025-06-08

## 2025-05-21 RX ORDER — DIPHENHYDRAMINE HYDROCHLORIDE 50 MG/ML
50 INJECTION, SOLUTION INTRAMUSCULAR; INTRAVENOUS AS NEEDED
Status: COMPLETED | OUTPATIENT
Start: 2025-05-21 | End: 2025-05-21

## 2025-05-21 RX ORDER — FAMOTIDINE 10 MG/ML
20 INJECTION, SOLUTION INTRAVENOUS ONCE AS NEEDED
Status: DISCONTINUED | OUTPATIENT
Start: 2025-05-21 | End: 2025-05-22 | Stop reason: HOSPADM

## 2025-05-21 RX ORDER — METOPROLOL TARTRATE 1 MG/ML
10 INJECTION, SOLUTION INTRAVENOUS ONCE AS NEEDED
OUTPATIENT
Start: 2025-06-08

## 2025-05-21 RX ORDER — ONDANSETRON HYDROCHLORIDE 2 MG/ML
8 INJECTION, SOLUTION INTRAVENOUS ONCE AS NEEDED
OUTPATIENT
Start: 2025-06-08

## 2025-05-21 RX ORDER — LIDOCAINE AND PRILOCAINE 25; 25 MG/G; MG/G
1 CREAM TOPICAL ONCE AS NEEDED
OUTPATIENT
Start: 2025-06-08

## 2025-05-21 RX ORDER — KETAMINE HCL IN 0.9 % NACL 200 MG/200
0.75 PLASTIC BAG, INJECTION (ML) INTRAVENOUS ONCE
Status: COMPLETED | OUTPATIENT
Start: 2025-05-21 | End: 2025-05-21

## 2025-05-21 RX ORDER — EPINEPHRINE 1 MG/ML
0.3 INJECTION, SOLUTION, CONCENTRATE INTRAVENOUS EVERY 5 MIN PRN
OUTPATIENT
Start: 2025-06-08

## 2025-05-21 RX ORDER — LABETALOL HYDROCHLORIDE 5 MG/ML
30 INJECTION, SOLUTION INTRAVENOUS AS NEEDED
OUTPATIENT
Start: 2025-06-08

## 2025-05-21 RX ORDER — EPINEPHRINE 1 MG/ML
0.3 INJECTION, SOLUTION, CONCENTRATE INTRAVENOUS EVERY 5 MIN PRN
Status: DISCONTINUED | OUTPATIENT
Start: 2025-05-21 | End: 2025-05-22 | Stop reason: HOSPADM

## 2025-05-21 RX ORDER — MIDAZOLAM HYDROCHLORIDE 1 MG/ML
1 INJECTION, SOLUTION INTRAMUSCULAR; INTRAVENOUS ONCE AS NEEDED
OUTPATIENT
Start: 2025-06-08

## 2025-05-21 RX ORDER — METOPROLOL TARTRATE 1 MG/ML
5 INJECTION, SOLUTION INTRAVENOUS ONCE AS NEEDED
OUTPATIENT
Start: 2025-06-08

## 2025-05-21 RX ORDER — DIPHENHYDRAMINE HYDROCHLORIDE 50 MG/ML
50 INJECTION, SOLUTION INTRAMUSCULAR; INTRAVENOUS ONCE AS NEEDED
OUTPATIENT
Start: 2025-06-08

## 2025-05-21 RX ORDER — ALBUTEROL SULFATE 0.83 MG/ML
3 SOLUTION RESPIRATORY (INHALATION) AS NEEDED
Status: DISCONTINUED | OUTPATIENT
Start: 2025-05-21 | End: 2025-05-22 | Stop reason: HOSPADM

## 2025-05-21 RX ORDER — METOPROLOL TARTRATE 1 MG/ML
15 INJECTION, SOLUTION INTRAVENOUS ONCE AS NEEDED
OUTPATIENT
Start: 2025-06-08

## 2025-05-21 RX ORDER — LABETALOL HYDROCHLORIDE 5 MG/ML
20 INJECTION, SOLUTION INTRAVENOUS AS NEEDED
OUTPATIENT
Start: 2025-06-08

## 2025-05-21 RX ORDER — LABETALOL HYDROCHLORIDE 5 MG/ML
10 INJECTION, SOLUTION INTRAVENOUS AS NEEDED
OUTPATIENT
Start: 2025-06-08

## 2025-05-21 RX ADMIN — DIPHENHYDRAMINE HYDROCHLORIDE 50 MG: 50 INJECTION INTRAMUSCULAR; INTRAVENOUS at 12:21

## 2025-05-21 RX ADMIN — KETAMINE HYDROCHLORIDE 79 MG: 50 INJECTION INTRAMUSCULAR; INTRAVENOUS at 12:40

## 2025-05-21 ASSESSMENT — PATIENT HEALTH QUESTIONNAIRE - PHQ9
1. LITTLE INTEREST OR PLEASURE IN DOING THINGS: MORE THAN HALF THE DAYS
3. TROUBLE FALLING OR STAYING ASLEEP OR SLEEPING TOO MUCH: NEARLY EVERY DAY
5. POOR APPETITE OR OVEREATING: NEARLY EVERY DAY
10. IF YOU CHECKED OFF ANY PROBLEMS, HOW DIFFICULT HAVE THESE PROBLEMS MADE IT FOR YOU TO DO YOUR WORK, TAKE CARE OF THINGS AT HOME, OR GET ALONG WITH OTHER PEOPLE: VERY DIFFICULT
4. FEELING TIRED OR HAVING LITTLE ENERGY: NEARLY EVERY DAY
2. FEELING DOWN, DEPRESSED OR HOPELESS: MORE THAN HALF THE DAYS
8. MOVING OR SPEAKING SO SLOWLY THAT OTHER PEOPLE COULD HAVE NOTICED. OR THE OPPOSITE - BEING SO FIDGETY OR RESTLESS THAT YOU HAVE BEEN MOVING AROUND A LOT MORE THAN USUAL: SEVERAL DAYS
9. THOUGHTS THAT YOU WOULD BE BETTER OFF DEAD, OR OF HURTING YOURSELF: NOT AT ALL
7. TROUBLE CONCENTRATING ON THINGS, SUCH AS READING THE NEWSPAPER OR WATCHING TELEVISION: MORE THAN HALF THE DAYS
1. LITTLE INTEREST OR PLEASURE IN DOING THINGS: MORE THAN HALF THE DAYS
4. FEELING TIRED OR HAVING LITTLE ENERGY: NEARLY EVERY DAY
3. TROUBLE FALLING OR STAYING ASLEEP: NEARLY EVERY DAY
6. FEELING BAD ABOUT YOURSELF - OR THAT YOU ARE A FAILURE OR HAVE LET YOURSELF OR YOUR FAMILY DOWN: NEARLY EVERY DAY
2. FEELING DOWN, DEPRESSED OR HOPELESS: MORE THAN HALF THE DAYS
SUM OF ALL RESPONSES TO PHQ9 QUESTIONS 1 & 2: 4
6. FEELING BAD ABOUT YOURSELF - OR THAT YOU ARE A FAILURE OR HAVE LET YOURSELF OR YOUR FAMILY DOWN: NEARLY EVERY DAY
8. MOVING OR SPEAKING SO SLOWLY THAT OTHER PEOPLE COULD HAVE NOTICED. OR THE OPPOSITE, BEING SO FIGETY OR RESTLESS THAT YOU HAVE BEEN MOVING AROUND A LOT MORE THAN USUAL: SEVERAL DAYS
SUM OF ALL RESPONSES TO PHQ QUESTIONS 1-9: 19
5. POOR APPETITE OR OVEREATING: NEARLY EVERY DAY
7. TROUBLE CONCENTRATING ON THINGS, SUCH AS READING THE NEWSPAPER OR WATCHING TELEVISION: MORE THAN HALF THE DAYS
10. IF YOU CHECKED OFF ANY PROBLEMS, HOW DIFFICULT HAVE THESE PROBLEMS MADE IT FOR YOU TO DO YOUR WORK, TAKE CARE OF THINGS AT HOME, OR GET ALONG WITH OTHER PEOPLE: VERY DIFFICULT
9. THOUGHTS THAT YOU WOULD BE BETTER OFF DEAD, OR OF HURTING YOURSELF: NOT AT ALL

## 2025-05-21 ASSESSMENT — COLUMBIA-SUICIDE SEVERITY RATING SCALE - C-SSRS
2. HAVE YOU ACTUALLY HAD ANY THOUGHTS OF KILLING YOURSELF?: NO
6. IN YOUR LIFETIME, HAVE YOU EVER DONE ANYTHING, STARTED TO DO ANYTHING, OR PREPARED TO DO ANYTHING TO END YOUR LIFE?: NO
1. IN THE PAST MONTH, HAVE YOU WISHED YOU WERE DEAD OR WISHED YOU COULD GO TO SLEEP AND NOT WAKE UP?: NO

## 2025-05-21 ASSESSMENT — ANXIETY QUESTIONNAIRES
4. TROUBLE RELAXING: NEARLY EVERY DAY
6. BECOMING EASILY ANNOYED OR IRRITABLE: NOT AT ALL
5. BEING SO RESTLESS THAT IT IS HARD TO SIT STILL: NEARLY EVERY DAY
7. FEELING AFRAID AS IF SOMETHING AWFUL MIGHT HAPPEN: NEARLY EVERY DAY
2. NOT BEING ABLE TO STOP OR CONTROL WORRYING: NEARLY EVERY DAY
7. FEELING AFRAID AS IF SOMETHING AWFUL MIGHT HAPPEN: NEARLY EVERY DAY
3. WORRYING TOO MUCH ABOUT DIFFERENT THINGS: NEARLY EVERY DAY
GAD7 TOTAL SCORE: 18
1. FEELING NERVOUS, ANXIOUS, OR ON EDGE: NEARLY EVERY DAY
1. FEELING NERVOUS, ANXIOUS, OR ON EDGE: NEARLY EVERY DAY
3. WORRYING TOO MUCH ABOUT DIFFERENT THINGS: NEARLY EVERY DAY
IF YOU CHECKED OFF ANY PROBLEMS ON THIS QUESTIONNAIRE, HOW DIFFICULT HAVE THESE PROBLEMS MADE IT FOR YOU TO DO YOUR WORK, TAKE CARE OF THINGS AT HOME, OR GET ALONG WITH OTHER PEOPLE: VERY DIFFICULT
5. BEING SO RESTLESS THAT IT IS HARD TO SIT STILL: NEARLY EVERY DAY
2. NOT BEING ABLE TO STOP OR CONTROL WORRYING: NEARLY EVERY DAY
6. BECOMING EASILY ANNOYED OR IRRITABLE: NOT AT ALL
IF YOU CHECKED OFF ANY PROBLEMS ON THIS QUESTIONNAIRE, HOW DIFFICULT HAVE THESE PROBLEMS MADE IT FOR YOU TO DO YOUR WORK, TAKE CARE OF THINGS AT HOME, OR GET ALONG WITH OTHER PEOPLE: VERY DIFFICULT
4. TROUBLE RELAXING: NEARLY EVERY DAY

## 2025-05-21 ASSESSMENT — PAIN SCALES - GENERAL
PAINLEVEL_OUTOF10: 0 - NO PAIN

## 2025-05-21 ASSESSMENT — PAIN - FUNCTIONAL ASSESSMENT
PAIN_FUNCTIONAL_ASSESSMENT: 0-10

## 2025-05-21 NOTE — PROCEDURES
"Interventional Psychiatry Procedure    Date/Time: 5/21/2025 12:42 PM    Performed by: Suzie Álvarez DO  Authorized by: Suzie Álvarez DO      Ketamine Infusion for Depression    Ketamine Infusion: 0.75mg per kilo over 60 minutes, Maintenance, q2-3weeks    Subjective:  Procedure    Pt reports that her mood has been very low lately. Eating poorly due to difficulties tolerating PO - trial of Emend ongoing. Reports that she is sleeping poorly - agreeable to speak with Dr. Nova re: this at next visit. Denies suicidal/homicidal ideation.  Interval changes in health: Denies   Interval changes in medications: Emend - tried a few pills, ran out and now should be restarting soon.         Synopsis SmartLink 5/7/2025    10:44 5/21/2025   Scores   Patient Health Questionnaire-9 Score 12  19    BH ANUP-7 Total Score 0  18        Patient-reported     Objective:  Mental Status Exam  General/Appearance: NAD, appears stated age, casually dressed, body habitus: obese, grooming/hygiene is excellent  Attitude/Behavior: engages in interview, cooperative, appropriate eye contact  Motor Activity: no psychomotor agitation/retardation, no tics/tremors, no EPS/TD, gait: not assessed  Mood: \"low\"  Affect: Mildly dysphoric, restricted, mood congruent, appropriate to content  Speech: spontaneous, coherent, fluent; appropriate quantity, volume, rate, tone  Thought Process: linear, logical, goal-directed, future-oriented  Thought Content: denies SI/HI, Delusional thinking: none elicited  Thought Perception: denies AVH, not responding to internal stimuli  Cognition:  Grossly intact  Insight: good  Judgment: good    Time out was taken with staff to confirm correct patient and correct procedure to be performed. Fall precautions in place throughout procedure. SpO2 continuously monitored throughout treatment. Intravenous Ketamine infusion was initiated by psychiatrist and monitored by RN.    Vitals:    05/21/25 1100 05/21/25 1152 05/21/25 1440   BP: "  133/79 122/67   Pulse: 100 100 93   Resp: 18 18 18   SpO2:   96%   Weight:  105 kg (230 lb 9.6 oz)      Date/Time Gonzalez Agitation Sedation Scale (RASS)   05/21/25 1440 Alert and calm A   05/21/25 1152 Alert and calm B   05/21/25 1100 Alert and calm B     Patient tolerated treatment well. No complications. Patient was monitored for 60 minutes after the completion of the infusion. Discharge instructions were provided to patient with After Visit Summary. Patient was discharged home in the care of their designated .    Assessment:  1. Bipolar I disorder with depression (Multi)    No immediate risk of harm to self or others    Plan:  Continue Intravenous Ketamine Infusion. Follow up in about 2 weeks (around 6/4/2025). Continue outpatient medications and treatment with Dr. Nova.    Suzie Álvarez DO

## 2025-05-21 NOTE — GROUP NOTE
Group Topic: Feeling Awareness/Expression   Group Date: 5/20/2025  Start Time:  6:00 PM  End Time:  7:30 PM  Facilitators: Garima Wong PsyD   Department: [facility]    Number of Participants: 12   Group Focus: check in and feeling awareness/expression  Treatment Modality: Patient-Centered Therapy  Interventions utilized were exploration and support  Purpose: coping skills and feelings    This was a video IOP aftercare group on a HIPAA compliant platform. All patients were provided with informed consent.    IOP Aftercare Group: Check-in  Participants had opportunity to check-in with their peers and discuss recent stressors. They shared coping that has been useful to them and provided support to one another. They began exploration of self-care and goal setting.         Name: Kelli Silva YOB: 1985   MR: [id number]      Kelli was present and actively engaged in discussion. She processed recent physical health challenges she is facing and trouble with getting necessary medications covered. She obtained resources from a peer that she feels may be useful. She described feeling more depressed due to physical health issues and discussed ways she is coping.     Garima Wong Psy.D.

## 2025-05-22 ENCOUNTER — APPOINTMENT (OUTPATIENT)
Dept: BEHAVIORAL HEALTH | Facility: CLINIC | Age: 40
End: 2025-05-22
Payer: COMMERCIAL

## 2025-05-22 DIAGNOSIS — F31.9 BIPOLAR 1 DISORDER (MULTI): ICD-10-CM

## 2025-05-22 PROCEDURE — 3044F HG A1C LEVEL LT 7.0%: CPT | Performed by: PSYCHOLOGIST

## 2025-05-22 PROCEDURE — 90837 PSYTX W PT 60 MINUTES: CPT | Performed by: PSYCHOLOGIST

## 2025-05-22 NOTE — PROGRESS NOTES
Humanistic/Insight Oriented Therapy. 50 min.  BP1.  Slept a bit late, feeling mild depression resulting from continued physical struggles and a lean work structure that has been frustrating her.  We spoke about subtle ways of self-care, getting back into journaling, her go to enjoy, low key coping, optimize weekend with plans, work on adls.     Chief complaint - Phase of life and emotional distress   Treatment plan - Insight and action consistent with ACT therapy. Providing support, safe space to process their thoughts and feelings, developing therapeutic relationship.  Goals - Self-care, insight, coping skills  Prognosis - Average  Progress - Average  Functional status - 70/100  Focused mental status: Patient was oriented to person, place, time and context  Focused mental exam - alert and oriented  Frequency - Every week    An interactive audio and video telecommunication system which permits real time communications between the patient (at the originating site) and provider (at the distant site) was utilized to provide this telehealth service

## 2025-05-24 ENCOUNTER — APPOINTMENT (OUTPATIENT)
Dept: CARDIOLOGY | Facility: HOSPITAL | Age: 40
End: 2025-05-24
Payer: COMMERCIAL

## 2025-05-24 ENCOUNTER — HOSPITAL ENCOUNTER (EMERGENCY)
Facility: HOSPITAL | Age: 40
Discharge: PSYCHIATRIC OR SUBSTANCE USE FACILITY | End: 2025-05-25
Attending: STUDENT IN AN ORGANIZED HEALTH CARE EDUCATION/TRAINING PROGRAM
Payer: COMMERCIAL

## 2025-05-24 ENCOUNTER — TELEPHONE (OUTPATIENT)
Dept: BEHAVIORAL HEALTH | Facility: HOSPITAL | Age: 40
End: 2025-05-24
Payer: COMMERCIAL

## 2025-05-24 DIAGNOSIS — G47.00 INSOMNIA, UNSPECIFIED TYPE: ICD-10-CM

## 2025-05-24 DIAGNOSIS — R44.0 AUDITORY HALLUCINATIONS: Primary | ICD-10-CM

## 2025-05-24 DIAGNOSIS — F30.9 MANIA: ICD-10-CM

## 2025-05-24 LAB
ALBUMIN SERPL BCP-MCNC: 4 G/DL (ref 3.4–5)
ALP SERPL-CCNC: 78 U/L (ref 33–110)
ALT SERPL W P-5'-P-CCNC: 23 U/L (ref 7–45)
AMPHETAMINES UR QL SCN: ABNORMAL
ANION GAP SERPL CALC-SCNC: 14 MMOL/L (ref 10–20)
APAP SERPL-MCNC: <10 UG/ML (ref ?–30)
APPEARANCE UR: CLEAR
AST SERPL W P-5'-P-CCNC: 16 U/L (ref 9–39)
BACTERIA #/AREA URNS AUTO: ABNORMAL /HPF
BARBITURATES UR QL SCN: ABNORMAL
BASOPHILS # BLD AUTO: 0.03 X10*3/UL (ref 0–0.1)
BASOPHILS NFR BLD AUTO: 0.3 %
BENZODIAZ UR QL SCN: ABNORMAL
BILIRUB SERPL-MCNC: 0.2 MG/DL (ref 0–1.2)
BILIRUB UR STRIP.AUTO-MCNC: NEGATIVE MG/DL
BUN SERPL-MCNC: 8 MG/DL (ref 6–23)
BZE UR QL SCN: ABNORMAL
CALCIUM SERPL-MCNC: 8.6 MG/DL (ref 8.6–10.3)
CANNABINOIDS UR QL SCN: ABNORMAL
CHLORIDE SERPL-SCNC: 106 MMOL/L (ref 98–107)
CO2 SERPL-SCNC: 24 MMOL/L (ref 21–32)
COLOR UR: ABNORMAL
CREAT SERPL-MCNC: 0.85 MG/DL (ref 0.5–1.05)
EGFRCR SERPLBLD CKD-EPI 2021: 88 ML/MIN/1.73M*2
EOSINOPHIL # BLD AUTO: 0.03 X10*3/UL (ref 0–0.7)
EOSINOPHIL NFR BLD AUTO: 0.3 %
ERYTHROCYTE [DISTWIDTH] IN BLOOD BY AUTOMATED COUNT: 12.6 % (ref 11.5–14.5)
ETHANOL SERPL-MCNC: <10 MG/DL
FENTANYL+NORFENTANYL UR QL SCN: ABNORMAL
GLUCOSE SERPL-MCNC: 94 MG/DL (ref 74–99)
GLUCOSE UR STRIP.AUTO-MCNC: NORMAL MG/DL
HCG UR QL IA.RAPID: NEGATIVE
HCT VFR BLD AUTO: 39.5 % (ref 36–46)
HGB BLD-MCNC: 13.4 G/DL (ref 12–16)
IMM GRANULOCYTES # BLD AUTO: 0.04 X10*3/UL (ref 0–0.7)
IMM GRANULOCYTES NFR BLD AUTO: 0.4 % (ref 0–0.9)
KETONES UR STRIP.AUTO-MCNC: NEGATIVE MG/DL
LEUKOCYTE ESTERASE UR QL STRIP.AUTO: ABNORMAL
LYMPHOCYTES # BLD AUTO: 2.85 X10*3/UL (ref 1.2–4.8)
LYMPHOCYTES NFR BLD AUTO: 31 %
MCH RBC QN AUTO: 31.6 PG (ref 26–34)
MCHC RBC AUTO-ENTMCNC: 33.9 G/DL (ref 32–36)
MCV RBC AUTO: 93 FL (ref 80–100)
METHADONE UR QL SCN: ABNORMAL
MONOCYTES # BLD AUTO: 0.52 X10*3/UL (ref 0.1–1)
MONOCYTES NFR BLD AUTO: 5.7 %
MUCOUS THREADS #/AREA URNS AUTO: ABNORMAL /LPF
NEUTROPHILS # BLD AUTO: 5.71 X10*3/UL (ref 1.2–7.7)
NEUTROPHILS NFR BLD AUTO: 62.3 %
NITRITE UR QL STRIP.AUTO: NEGATIVE
NRBC BLD-RTO: 0 /100 WBCS (ref 0–0)
OPIATES UR QL SCN: ABNORMAL
OXYCODONE+OXYMORPHONE UR QL SCN: ABNORMAL
PCP UR QL SCN: ABNORMAL
PH UR STRIP.AUTO: 6 [PH]
PLATELET # BLD AUTO: 215 X10*3/UL (ref 150–450)
POTASSIUM SERPL-SCNC: 3.6 MMOL/L (ref 3.5–5.3)
PROT SERPL-MCNC: 6.3 G/DL (ref 6.4–8.2)
PROT UR STRIP.AUTO-MCNC: NEGATIVE MG/DL
RBC # BLD AUTO: 4.24 X10*6/UL (ref 4–5.2)
RBC # UR STRIP.AUTO: NEGATIVE MG/DL
RBC #/AREA URNS AUTO: ABNORMAL /HPF
SALICYLATES SERPL-MCNC: <3 MG/DL (ref ?–20)
SODIUM SERPL-SCNC: 140 MMOL/L (ref 136–145)
SP GR UR STRIP.AUTO: 1.02
SQUAMOUS #/AREA URNS AUTO: ABNORMAL /HPF
UROBILINOGEN UR STRIP.AUTO-MCNC: NORMAL MG/DL
WBC # BLD AUTO: 9.2 X10*3/UL (ref 4.4–11.3)
WBC #/AREA URNS AUTO: ABNORMAL /HPF

## 2025-05-24 PROCEDURE — 96374 THER/PROPH/DIAG INJ IV PUSH: CPT

## 2025-05-24 PROCEDURE — 96361 HYDRATE IV INFUSION ADD-ON: CPT

## 2025-05-24 PROCEDURE — 96375 TX/PRO/DX INJ NEW DRUG ADDON: CPT

## 2025-05-24 PROCEDURE — 81001 URINALYSIS AUTO W/SCOPE: CPT | Performed by: PHYSICIAN ASSISTANT

## 2025-05-24 PROCEDURE — 93005 ELECTROCARDIOGRAM TRACING: CPT

## 2025-05-24 PROCEDURE — 85025 COMPLETE CBC W/AUTO DIFF WBC: CPT | Performed by: PHYSICIAN ASSISTANT

## 2025-05-24 PROCEDURE — 80320 DRUG SCREEN QUANTALCOHOLS: CPT | Performed by: PHYSICIAN ASSISTANT

## 2025-05-24 PROCEDURE — 99285 EMERGENCY DEPT VISIT HI MDM: CPT | Performed by: STUDENT IN AN ORGANIZED HEALTH CARE EDUCATION/TRAINING PROGRAM

## 2025-05-24 PROCEDURE — 80053 COMPREHEN METABOLIC PANEL: CPT | Performed by: PHYSICIAN ASSISTANT

## 2025-05-24 PROCEDURE — 81025 URINE PREGNANCY TEST: CPT | Performed by: PHYSICIAN ASSISTANT

## 2025-05-24 PROCEDURE — 2500000004 HC RX 250 GENERAL PHARMACY W/ HCPCS (ALT 636 FOR OP/ED): Mod: JZ | Performed by: PHYSICIAN ASSISTANT

## 2025-05-24 PROCEDURE — 80307 DRUG TEST PRSMV CHEM ANLYZR: CPT | Performed by: PHYSICIAN ASSISTANT

## 2025-05-24 PROCEDURE — 87086 URINE CULTURE/COLONY COUNT: CPT | Mod: PARLAB | Performed by: PHYSICIAN ASSISTANT

## 2025-05-24 PROCEDURE — 96376 TX/PRO/DX INJ SAME DRUG ADON: CPT

## 2025-05-24 RX ORDER — MAGNESIUM SULFATE 1 G/100ML
1 INJECTION INTRAVENOUS ONCE
Status: COMPLETED | OUTPATIENT
Start: 2025-05-24 | End: 2025-05-25

## 2025-05-24 RX ORDER — ACETAMINOPHEN 325 MG/1
975 TABLET ORAL ONCE
Status: DISCONTINUED | OUTPATIENT
Start: 2025-05-24 | End: 2025-05-25 | Stop reason: HOSPADM

## 2025-05-24 RX ORDER — DIPHENHYDRAMINE HYDROCHLORIDE 50 MG/ML
25 INJECTION, SOLUTION INTRAMUSCULAR; INTRAVENOUS ONCE
Status: COMPLETED | OUTPATIENT
Start: 2025-05-24 | End: 2025-05-24

## 2025-05-24 RX ORDER — LORAZEPAM 2 MG/ML
1 INJECTION INTRAMUSCULAR ONCE
Status: COMPLETED | OUTPATIENT
Start: 2025-05-24 | End: 2025-05-24

## 2025-05-24 RX ADMIN — DIPHENHYDRAMINE HYDROCHLORIDE 25 MG: 50 INJECTION, SOLUTION INTRAMUSCULAR; INTRAVENOUS at 23:46

## 2025-05-24 RX ADMIN — LORAZEPAM 1 MG: 2 INJECTION INTRAMUSCULAR; INTRAVENOUS at 19:38

## 2025-05-24 RX ADMIN — MAGNESIUM SULFATE HEPTAHYDRATE 1 G: 1 INJECTION, SOLUTION INTRAVENOUS at 23:46

## 2025-05-24 RX ADMIN — DIPHENHYDRAMINE HYDROCHLORIDE 25 MG: 50 INJECTION, SOLUTION INTRAMUSCULAR; INTRAVENOUS at 19:39

## 2025-05-24 SDOH — HEALTH STABILITY: MENTAL HEALTH: ANXIETY SYMPTOMS: NO PROBLEMS REPORTED OR OBSERVED.

## 2025-05-24 SDOH — HEALTH STABILITY: MENTAL HEALTH: IN THE PAST FEW WEEKS, HAVE YOU FELT THAT YOU OR YOUR FAMILY WOULD BE BETTER OFF IF YOU WERE DEAD?: NO

## 2025-05-24 SDOH — HEALTH STABILITY: MENTAL HEALTH: NON-SPECIFIC ACTIVE SUICIDAL THOUGHTS (PAST 1 MONTH): NO

## 2025-05-24 SDOH — HEALTH STABILITY: MENTAL HEALTH: SUICIDAL BEHAVIOR (LIFETIME): YES

## 2025-05-24 SDOH — HEALTH STABILITY: MENTAL HEALTH: SUICIDAL BEHAVIOR (3 MONTHS): NO

## 2025-05-24 SDOH — HEALTH STABILITY: MENTAL HEALTH: IN THE PAST WEEK, HAVE YOU BEEN HAVING THOUGHTS ABOUT KILLING YOURSELF?: NO

## 2025-05-24 SDOH — HEALTH STABILITY: MENTAL HEALTH: IN THE PAST FEW WEEKS, HAVE YOU WISHED YOU WERE DEAD?: NO

## 2025-05-24 SDOH — HEALTH STABILITY: MENTAL HEALTH: HAVE YOU EVER TRIED TO KILL YOURSELF?: YES

## 2025-05-24 SDOH — HEALTH STABILITY: MENTAL HEALTH: WISH TO BE DEAD (PAST 1 MONTH): NO

## 2025-05-24 SDOH — HEALTH STABILITY: MENTAL HEALTH: ARE YOU HAVING THOUGHTS OF KILLING YOURSELF RIGHT NOW?: NO

## 2025-05-24 SDOH — HEALTH STABILITY: MENTAL HEALTH: DEPRESSION SYMPTOMS: NO PROBLEMS REPORTED OR OBSERVED.

## 2025-05-24 SDOH — ECONOMIC STABILITY: HOUSING INSECURITY: FEELS SAFE LIVING IN HOME: YES

## 2025-05-24 ASSESSMENT — LIFESTYLE VARIABLES
PRESCIPTION_ABUSE_PAST_12_MONTHS: NO
SUBSTANCE_ABUSE_PAST_12_MONTHS: NO

## 2025-05-24 ASSESSMENT — COLUMBIA-SUICIDE SEVERITY RATING SCALE - C-SSRS
2. HAVE YOU ACTUALLY HAD ANY THOUGHTS OF KILLING YOURSELF?: NO
6. HAVE YOU EVER DONE ANYTHING, STARTED TO DO ANYTHING, OR PREPARED TO DO ANYTHING TO END YOUR LIFE?: NO
1. IN THE PAST MONTH, HAVE YOU WISHED YOU WERE DEAD OR WISHED YOU COULD GO TO SLEEP AND NOT WAKE UP?: NO
6. HAVE YOU EVER DONE ANYTHING, STARTED TO DO ANYTHING, OR PREPARED TO DO ANYTHING TO END YOUR LIFE?: YES

## 2025-05-24 NOTE — ED TRIAGE NOTES
Pt states that she took her mood stabilizer last night like she was supposed to. States that her thoughts are sounding loud and overpowering what others are saying. Said she spoke to the resident on call for psychiatry who told her to come in and be evaluated. States that her thoughts are negative on her and putting her down. Denies any SI/HI.

## 2025-05-24 NOTE — TELEPHONE ENCOUNTER
"Received a page that this patient had contacted the answering service reporting side effects from Saphris (Asenapine). I called the listed phone number, [phone], and spoke with the patient, Kelli Pelayo.    The patient reported symptoms that began last night, approximately 30 minutes after taking her nightly dose of Saphris. She experienced sensations of \"crawling under skin,\" racing and loud thoughts, feeling hyped up, and sleeping only about an hour before cleaning for the rest of the night. She stated these symptoms were now worse than they had been during the night. The patient has been taking Saphris for 1-2 months without previous side effects. The other medications she reported currently taking included: Abilify (2-month injections), Wellbutrin, Vraylar, Buspar, Lamictal, and Ativan 1mg BID PRN. She took one dose of Ativan this morning at 6:30 AM without symptom relief. She denied any missed doses or starting any new medications other than the above. On chart review, I noted that Dr. Nova recently increased her asenapine from 5mg to 10mg for sleep and hypomania symptoms (4/15/25). She reported the medication was helping her symptoms before these new side effects started last night.    The patient denied thoughts of self-harm or harm to others. She denied auditory or visual hallucinations. I counseled her about the option of reducing her dose back to the 5mg that she had previously tolerated (e.g., cutting 10mg tablet and taking only half). I ensured her safety and instructed her to call the answering service, [phone], or go to the ER if symptoms worsened.    Later, I received another page that the patient had called the answering service again. When I called back, she reported worsening symptoms. Her thoughts were now so loud they were drowning out her partner's voice, though these were not command hallucinations. She described repeating negative, stressful thoughts not related to harm. She also " "reported a migraine and severe restlessness, feeling like she was \"jumping out of skin.\"    I discussed the case with the on-call PGY-3 Psychiatry resident. Given the worsening akathisia symptoms and distressing, intrusive thoughts that could not be adequately assessed over the phone, I instructed the patient to go to the emergency room. The patient was amenable and verbalized that she would go to the [facility] ED as instructed.    I informed the patient that I would document this encounter and notify her primary psychiatrist Dr. Nova.  "

## 2025-05-24 NOTE — ED PROVIDER NOTES
HPI   Chief Complaint   Patient presents with    Anxiety       40-year-old female presents complaining of auditory hallucinations racing thoughts and insomnia.  Patient reports a history of bipolar disorder.  She states that she recently began taking a new medication called Saphris.  She suspects that this may be the causative factor.  She reports that she has not slept in the past 24 hours.  She reports that her auditory hallucinations are negative.  She denies any homicidal or suicidal ideation.  No reports of recent illness.  She states no visual hallucinations.  Denies any use of alcohol or illicit substances.  She states that she feels as if she can jump out of her own skin.  She also reports that she feels as if she needs to keep moving.  She reports that she spoke with her psychiatry team who recommended being evaluated further at the emergency department for psychiatric evaluation.              Patient History   Medical History[1]  Surgical History[2]  Family History[3]  Social History[4]    Physical Exam   ED Triage Vitals [05/24/25 1615]   Temperature Heart Rate Respirations BP   37 °C (98.6 °F) 100 16 135/69      Pulse Ox Temp Source Heart Rate Source Patient Position   97 % Tympanic Monitor Sitting      BP Location FiO2 (%)     Left arm --       Physical Exam  Vitals and nursing note reviewed.   Constitutional:       General: She is not in acute distress.     Appearance: Normal appearance. She is normal weight. She is not ill-appearing, toxic-appearing or diaphoretic.   HENT:      Head: Normocephalic.      Nose: Nose normal.      Mouth/Throat:      Mouth: Mucous membranes are moist.   Eyes:      Extraocular Movements: Extraocular movements intact.      Conjunctiva/sclera: Conjunctivae normal.   Cardiovascular:      Rate and Rhythm: Normal rate and regular rhythm.      Pulses: Normal pulses.   Pulmonary:      Effort: Pulmonary effort is normal. No respiratory distress.      Breath sounds: Normal breath  sounds.   Abdominal:      General: Abdomen is flat. There is no distension.      Palpations: Abdomen is soft.      Tenderness: There is no abdominal tenderness. There is no guarding or rebound.   Musculoskeletal:         General: Normal range of motion.      Cervical back: Normal range of motion and neck supple.   Skin:     General: Skin is warm and dry.      Capillary Refill: Capillary refill takes less than 2 seconds.   Neurological:      General: No focal deficit present.      Mental Status: She is alert and oriented to person, place, and time.   Psychiatric:      Comments: Anxious in appearance           ED Course & MDM                  No data recorded     Luanne Coma Scale Score: 15 (05/24/25 1620 : Angel Dobson RN)                           Medical Decision Making    Medical Decision Making & ED Course  Medical Decision Making:  Patient was evaluated for auditory hallucinations as well as what appears to be mell.  The patient reportedly has not slept in 24 hours.  She states that she recently began taking new medication called Saphris which she suspects may be the causative factor.  She denies any SI or HI.  On arrival patient was found to be anxious in appearance.  She is calm and cooperative.  Given the history present illness psychiatric evaluation was obtained.  Patient was given Ativan and Benadryl for suspected EPS.  Blood work was obtained.  The patient was subsequently medically cleared.  Patient was evaluated by EPAT who recommended admission given her mell.  Patient was informed.  Patient requested Tylenol for her headache.  This was provided.  Patient pending placement  --  Scoring Tools Utilized: None    Differential diagnoses considered include but are not limited to: Mell, acute psychosis, subs abuse     Social Determinants of Health which Significantly Impact Care: None identified The following actions were taken to address these social determinants: None    EKG Independent  Interpretation: EKG interpreted by myself. Please see ED Course for full interpretation.    Independent Result Review and Interpretation: Relevant laboratory and radiographic results were reviewed and independently interpreted by myself.  As necessary, they are commented on in the ED Course.    Chronic conditions affecting the patient's care: None    The patient was discussed with the following consultants/services: EPAT    Care Considerations: None    ED Course:     Disposition  The patient has been placed at (PENDING) by the ED behavioral health team.  A pink slip and transfer note will be placed in the chart.  We will continue to monitor and manage the patient in the emergency department until transport for the transfer can be arranged.      This was a shared visit with an ED attending.  The patient was seen and discussed with the ED attending    David De PA-C  Emergency Medicine            Procedure  Procedures         [1]   Past Medical History:  Diagnosis Date    Normal routine history and physical examination     Normal routine history and physical examination     Other specified health status     No pertinent past medical history   [2]   Past Surgical History:  Procedure Laterality Date    OTHER SURGICAL HISTORY  05/08/2020    Venous access port placement   [3]   Family History  Problem Relation Name Age of Onset    Brain cancer Father      Kidney cancer Maternal Grandfather      Kidney cancer Sibling     [4]   Social History  Tobacco Use    Smoking status: Never    Smokeless tobacco: Never   Vaping Use    Vaping status: Never Used   Substance Use Topics    Alcohol use: Never    Drug use: Never        David De PA-C  05/24/25 5563

## 2025-05-25 VITALS
SYSTOLIC BLOOD PRESSURE: 128 MMHG | RESPIRATION RATE: 17 BRPM | WEIGHT: 230 LBS | BODY MASS INDEX: 42.33 KG/M2 | OXYGEN SATURATION: 96 % | DIASTOLIC BLOOD PRESSURE: 76 MMHG | TEMPERATURE: 98.6 F | HEART RATE: 89 BPM | HEIGHT: 62 IN

## 2025-05-25 SDOH — HEALTH STABILITY: MENTAL HEALTH: BEHAVIORAL HEALTH(WDL): EXCEPTIONS TO WDL

## 2025-05-25 SDOH — HEALTH STABILITY: MENTAL HEALTH
OTHER SUICIDE PRECAUTIONS INCLUDE: PATIENT PLACED IN AN EASILY OBSERVABLE ROOM WITH DOOR/CURTAIN REMAINING OPEN;PATIENT PLACED IN GOWN (SNAPS OR PAPER GOWNS PREFERRED) AND WANDED;REMAINING RISKS IDENTIFIED AND MITIGATED;PATIENT PLACED IN PSYCH SAFE ROOM (IF AVAILABLE);PROVIDER NOTIFIED;ELOPEMENT RISK IDENTIFIED;FREQUENT ROUNDING WITH IRREGULAR CHECKS AT MINIMUM OF EVERY 15 MINUTES TO ASSESS PSYCH SAFETY PERFORMED;HOURLY BEHAVIORAL ASSESSMENT PERFORMED;PERSONAL BELONGINGS SECURED;VISITORS LIMITED WHEN NECESSARY AND PERSONAL ITEMS SCREENED

## 2025-05-25 SDOH — HEALTH STABILITY: MENTAL HEALTH: BEHAVIORS/MOOD: SLEEPING

## 2025-05-25 SDOH — HEALTH STABILITY: MENTAL HEALTH: BEHAVIORS/MOOD: ANXIOUS

## 2025-05-25 NOTE — PROGRESS NOTES
EPAT - Social Work Psychiatric Assessment    Arrival Details  Mode of Arrival: Ambulatory  Admission Source: Home  Admission Type: Voluntary  EPAT Assessment Start Date: 05/24/25  EPAT Assessment Start Time: 2230  Name of : ROCKY Romero, DENVERW    History of Present Illness  Admission Reason: psych eval  HPI: HPI    Patient is a 40 year old female, with a history of Bipolar I disorder, presenting to the ED after speaking with psychiatry resident on answering service for psych eval. ED provider note, nursing notes, Chaves suicide risk scale and community records reviewed, Patient reportedly started to experience sensations of “things crawling under skin”, racing and loud thoughts, hyperactivity and not needing much sleep about 30 minutes after taking her night dose of Saphris. She reported to psychiatry answering service that he has been taking Saphris for 1-2 months without similar symptoms and has not missed any doses of her meds. Patient had second call again with worsening symptoms, severe restlessness, “feeling like she was jumping out of skin”. Therefore she was advised to come to ED for psych eval. Triage indicates moderate risk, negative BAL. Patient is currently linked with  psychiatry Dr. Nova, psychologist Garima Wong weekly, and is on Ketamine infusion, last procedure 5/21 with Dr. Álvarez. She has previous admissions for ángel, most recent one in [place] 10/2023. Patient has one SA via overdose in 2004, no hx of NSSIB.      Readmission Information   Readmission within 30 Days: No    Psychiatric Symptoms  Anxiety Symptoms: No problems reported or observed.  Depression Symptoms: No problems reported or observed.  Ángel Symptoms: Increased energy, Labile, Less need to sleep, Poor judgment, Psychomotor agitation    Psychosis Symptoms  Hallucination Type: No problems reported or observed.  Delusion Type: No problems reported or observed.    Additional Symptoms - Adult  Generalized Anxiety  Disorder: Difficult to control worry, Difficulity concentrating, Excessive anxiety/worry, Restlessness  Obsessive Compulsive Disorder: No problems reported or observed.  Panic Attack: No problems reported or observed.  Post Traumatic Stress Disorder: No problems reported or observed.  Delirium: No problems reported or observed.    Past Psychiatric History/Meds/Treatments  Past Psychiatric History: multiple prior admissions, most recent one in  10/2023 // father and uncle has bipolar disorder, cousin has schizophrenia, other family members have OCD, ADHD, and ASD // denies trauma hx  Past Psychiatric Meds/Treatments: Abilify (2-month injections), Wellbutrin, Vraylar, Buspar, Lamictal, and Ativan 1mg BID PRN   Dr. Nova recently increased her asenapine from 5mg to 10mg for sleep and hypomania symptoms (4/15/25)  Past Violence/Victimization History: none    Current Mental Health Contacts   Name/Phone Number: none   Last Appointment Date: none  Provider Name/Phone Number: Dr. 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  Cultural Requests During Hospitalization: none  Spiritual Requests During Hospitalization: none  Important Activities: Family    Legal  Legal Considerations: Patient/ Family Ability to Make Healthcare Decisions  Assistance with Managing/Advocating Healthcare Needs:  (self)  Criminal Activity/ Legal Involvement  Pertinent to Current Situation/ Hospitalization: none    Drug Screening  Have you used any substances (canabis, cocaine, heroin, hallucinogens, inhalants, etc.) in the past 12 months?: No  Have you used any prescription drugs other than prescribed in the past 12 months?: No  Is a toxicology screen needed?: Yes    Orientation  Orientation Level: Oriented X4    General Appearance  Motor Activity: Unremarkable  Speech Pattern: Excessively soft  General Attitude: Cooperative  Appearance/Hygiene: Unremarkable    Thought Process  Coherency: [place] thinking  Content: Unremarkable  Delusions:  (none)  Perception: Not altered  Hallucination: None  Judgment/Insight: Impaired  Confusion: None  Cognition: Appropriate safety awareness    Sleep Pattern  Sleep Pattern: Insomnia    Risk Factors  Self Harm/Suicidal Ideation Plan: denies  Previous Self Harm/Suicidal Plans: hx of SA via overdose in 2004  Risk Factors: None    Violence Risk Assessment  Assessment of Violence: None noted  Thoughts of Harm to Others: No    Ability to Assess Risk Screen  Risk Screen - Ability to Assess: Able to be screened  Ask Suicide-Screening Questions  1. In the past few weeks, have you wished you were dead?: No  2. In the past few weeks, have you felt that you or your family would be better off if you were dead?: No  3. In the past week, have you been having thoughts about killing yourself?: No  4. Have you ever tried to kill yourself?: Yes  5. Are you having thoughts of killing yourself right now?: No  Calculated Risk Score: Potential Risk  [place] Suicide Severity Rating Scale (Screener/Recent Self-Report)  1. Wish to be Dead (Past 1 Month): No  2. Non-Specific Active Suicidal Thoughts (Past 1 Month): No  6. Suicidal Behavior (Lifetime): Yes  6. Suicidal Behavior (3 Months): No  Calculated C-SSRS Risk Score (Lifetime/Recent): Moderate Risk  Step 1: Risk Factors  Current & Past Psychiatric Dx: Mood disorder  Presenting Symptoms: Anxiety and/or  panic, Impulsivity, Hopelessness or despair, Insomia  Precipitants/Stressors: Triggering events leading to humiliation, shame, and/or despair (e.g. loss of relationship, financial or health status) (real or anticipated), Perceived burden on others  Change in Treatment: Change in provider or treament (i.e., medications, psychotherapy, milieu)  Access to Lethal Methods : No  Step 2: Protective Factors   Protective Factors Internal: Ability to cope with stress, Frustration tolerance, Fear of death or the actual act of killing self, Identifies reasons for living  Protective Factors External: Cultural, spiritual and/or moral attitudes against suicide, Supportive social network or family or friends, Engaged in work or school, Positive therapeutic relationships  Step 3: Suicidal Ideation Intensity  Most Severe Suicidal Ideation Identified: denies  How Many Times Have You Had These Thoughts: Less than once a week  When You Have the Thoughts How Long do They Last : Fleeting - few seconds or minutes  Could/Can You Stop Thinking About Killing Yourself or Wanting to Die if You Want to: Does not attempt to control thoughts  Are There Things - Anyone or Anything - That Stopped You From Wanting to Die or Acting on: Does not apply  What Sort of Reasons Did You Have For Thinking About Wanting to Die or Killing Yourself: Does not apply  Total Score: 2  Step 5: Documentation  Risk Level: Low suicide risk    Prior to assessment, patient is calm and cooperative, comply with labs. Upon assessment, she presents as anxious with affect congruent to mood. Patient endorses difficulty controlling worries, excessive worries, insomnia, loss of appetite, hyperactivity, racing thoughts, thought blocking, helplessness, and impulsivity.She denies suicidal/homicidal ideation, visual/auditory hallucinations or delusional thinking. Patient reports she is currently residing with significant other and feels safe living there. She states last night she  "was having \"very loud racing thoughts as if they were screaming at me\" that she cannot think of other things and cannot answer boyfriend when he was talking to her. 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 Patient states she had a feeling to jump out of her skin. Patient was given Ativan IM 1mg prior to assessment and has calmed down with no current symptoms. She states she was prescribed with Ativan 1mg BID PRN, she took it last night and today but was not helping. Patient is worried that if she goes home and takes her meds, all the symptoms will be back. Patient has only been sleeping 2-4 hours every night for the past 3-4 days, and poor appetite, but no other acute changes with ADLs. Patient does not seem internally stimulated or under acute distress, she is able to demonstrate strong future orientation, coping skills, social support and community engagement.     Spoke with significant other Jesu, he reports great concerns for patient. He states patient has been presenting with hypomanic symptoms for the past week, not needing much sleep, restlessness, hard to concentrate when talking and complaining about racing thoughts. He states last night to today afternoon, patient has been largely below her baseline, \"talking over herself, hyperactivity, elevated mood and racing loud thoughts\". He states patient has had racing thoughts in prior manic episodes however has never described them as \"loud and unable to think around them\". He reports patient has had manic episodes that they can deal with at home in the past, \"the fact that she called hospital several times and went there made me worried that she is so much worse than she looks\". Jesu agrees patient needs admission today.     Due to her manic symptoms, hyperactivity, racing thoughts, thought " blocking, not needing much sleep, recent change in meds, multiple psychosocial stressors, and impulsivity, patient continues to be considered as an increasing risk of harm to herself and has been gravely disabled by her mental health. Patient is being recommended for psychiatric hospitalization for safety and stabilization, David RAY in agreement.     Dx: bipolar disorder    Psychiatric Impression and Plan of Care  Assessment and Plan: psychiatric hospitalization  Specific Resources Provided to Patient: none    Outcome/Disposition  Patient's Perception of Outcome Achieved: patient agrees  Assessment, Recommendations and Risk Level Reviewed with: David RAY  Contact Name: Jesu Cornelius  Contact Number(s): [phone]  Contact Relationship: significant other  EPAT Assessment Completed Date: 05/24/25  EPAT Assessment Completed Time: 2310

## 2025-05-25 NOTE — SIGNIFICANT EVENT
Application for Emergency Admission      Ready for Transfer?  Is the patient medically cleared for transfer to inpatient psychiatry: Yes  Has the patient been accepted to an inpatient psychiatric hospital: Yes    Application for Emergency Admission  IN ACCORDANCE WITH [place]122[place]  The Chief Clinical Officer of: [place] 5/25/2025 .1:12 AM    Reason for Hospitalization  The undersigned has reason to believe that: Kelli Silva Is a mentally ill person subject to hospitalization by court order under division B Section 5122.01 of the Revised Code, i.e., this person:    1.No  Represents a substantial risk of physical harm to self as manifested by evidence of threats of, or attempts at, suicide or serious self-inflicted bodily harm    2.No Represents a substantial risk of physical harm to others as manifested by evidence of recent homicidal or other violent behavior, evidence of recent threats that place another in reasonable fear of violent behavior and serious physical harm, or other evidence of present dangerousness    3.No Represents a substantial and immediate risk of serious physical impairment or injury to self as manifested by  evidence that the person is unable to provide for and is not providing for the person's basic physical needs because of the person's mental illness and that appropriate provision for those needs cannot be made  immediately available in the community    4.Yes Would benefit from treatment in a hospital for his mental illness and is in need of such treatment as manifested by evidence of behavior that creates a grave and imminent risk to substantial rights of others or  himself.    5.Yes Would benefit from treatment as manifested by evidence of behavior that indicates all of the following:       (a) The person is unlikely to survive safely in the community without supervision, based on a clinical determination.       (b) The person has a history of lack of compliance with  treatment for mental illness and one of the following applies:      (i) At least twice within the thirty-six months prior to the filing of an affidavit seeking court-ordered treatment of the person under section 5122.111 of the Revised Code, the lack of compliance has been a significant factor in necessitating hospitalization in a hospital or receipt of services in a forensic or other mental health unit of a correctional facility, provided that the thirty-six-month period shall be extended by the length of any hospitalization or incarceration of the person that occurred within the thirty-six-month period.      (ii) Within the forty-eight months prior to the filing of an affidavit seeking court-ordered treatment of the person under section 5122.111 of the Revised Code, the lack of compliance resulted in one or more acts of serious violent behavior toward self or others or threats of, or attempts at, serious physical harm to self or others, provided that the forty-eight-month period shall be extended by the length of any hospitalization or incarceration of the person that occurred within the forty-eight-month period.      (c) The person, as a result of mental illness, is unlikely to voluntarily participate in necessary treatment.       (d) In view of the person's treatment history and current behavior, the person is in need of treatment in order to prevent a relapse or deterioration that would be likely to result in substantial risk of serious harm to the person or others.    (e) Represents a substantial risk of physical harm to self or others if allowed to remain at liberty pending examination.    Therefore, it is requested that said person be admitted to the above named facility.    STATEMENT OF BELIEF    Must be filled out by one of the following: a psychiatrist, licensed physician, licensed clinical psychologist, health or ,  or .  (Statement shall include the circumstances under  which the individual was taken into custody and the reason for the person's belief that hospitalization is necessary. The statement shall also include a reference to efforts made to secure the individual's property at his residence if he was taken into custody there. Every reasonable and appropriate effort should be made to take this person into custody in the least conspicuous manner possible.)         Lauri Washington MD 5/25/2025     _____________________________________________________________   Place of Employment: [facility]    STATEMENT OF OBSERVATION BY PSYCHIATRIST, LICENSED PHYSICIAN, OR LICENSED CLINICAL PSYCHOLOGIST, IF APPLICABLE    Place of Observation (e.g., [facility] mental health center, general hospital, office, emergency facility)  (If applicable, please complete)    Yue Washington MD 5/25/2025    _____________________________________________________________

## 2025-05-26 LAB — BACTERIA UR CULT: NO GROWTH

## 2025-05-29 ENCOUNTER — APPOINTMENT (OUTPATIENT)
Dept: BEHAVIORAL HEALTH | Facility: CLINIC | Age: 40
End: 2025-05-29
Payer: COMMERCIAL

## 2025-06-03 ENCOUNTER — APPOINTMENT (OUTPATIENT)
Dept: BEHAVIORAL HEALTH | Facility: CLINIC | Age: 40
End: 2025-06-03
Payer: COMMERCIAL

## 2025-06-04 ENCOUNTER — APPOINTMENT (OUTPATIENT)
Facility: HOSPITAL | Age: 40
End: 2025-06-04
Payer: COMMERCIAL

## 2025-06-05 ENCOUNTER — APPOINTMENT (OUTPATIENT)
Dept: BEHAVIORAL HEALTH | Facility: CLINIC | Age: 40
End: 2025-06-05
Payer: COMMERCIAL

## 2025-06-11 ENCOUNTER — APPOINTMENT (OUTPATIENT)
Dept: NEUROLOGY | Facility: HOSPITAL | Age: 40
End: 2025-06-11
Payer: COMMERCIAL

## 2025-06-11 ENCOUNTER — HOSPITAL ENCOUNTER (OUTPATIENT)
Facility: HOSPITAL | Age: 40
Discharge: HOME | End: 2025-06-11
Payer: COMMERCIAL

## 2025-06-11 VITALS
SYSTOLIC BLOOD PRESSURE: 123 MMHG | OXYGEN SATURATION: 97 % | RESPIRATION RATE: 18 BRPM | HEART RATE: 103 BPM | WEIGHT: 236.33 LBS | TEMPERATURE: 99.3 F | BODY MASS INDEX: 43.23 KG/M2 | DIASTOLIC BLOOD PRESSURE: 72 MMHG

## 2025-06-11 DIAGNOSIS — F31.9 BIPOLAR I DISORDER WITH DEPRESSION (MULTI): Primary | ICD-10-CM

## 2025-06-11 LAB — GLUCOSE BLD MANUAL STRIP-MCNC: 121 MG/DL (ref 74–99)

## 2025-06-11 PROCEDURE — KETSP HC KETAMINE INFUSION SELF-PAY: Performed by: STUDENT IN AN ORGANIZED HEALTH CARE EDUCATION/TRAINING PROGRAM

## 2025-06-11 PROCEDURE — 82947 ASSAY GLUCOSE BLOOD QUANT: CPT

## 2025-06-11 PROCEDURE — 2500000004 HC RX 250 GENERAL PHARMACY W/ HCPCS (ALT 636 FOR OP/ED): Performed by: STUDENT IN AN ORGANIZED HEALTH CARE EDUCATION/TRAINING PROGRAM

## 2025-06-11 RX ORDER — ALBUTEROL SULFATE 0.83 MG/ML
3 SOLUTION RESPIRATORY (INHALATION) AS NEEDED
OUTPATIENT
Start: 2025-07-02

## 2025-06-11 RX ORDER — ACETAMINOPHEN 325 MG/1
650 TABLET ORAL ONCE AS NEEDED
OUTPATIENT
Start: 2025-07-02

## 2025-06-11 RX ORDER — LABETALOL HYDROCHLORIDE 5 MG/ML
10 INJECTION, SOLUTION INTRAVENOUS AS NEEDED
Status: DISCONTINUED | OUTPATIENT
Start: 2025-06-11 | End: 2025-06-12 | Stop reason: HOSPADM

## 2025-06-11 RX ORDER — EPINEPHRINE 1 MG/ML
0.3 INJECTION, SOLUTION, CONCENTRATE INTRAVENOUS EVERY 5 MIN PRN
OUTPATIENT
Start: 2025-07-02

## 2025-06-11 RX ORDER — METOPROLOL TARTRATE 1 MG/ML
15 INJECTION, SOLUTION INTRAVENOUS ONCE AS NEEDED
OUTPATIENT
Start: 2025-07-02

## 2025-06-11 RX ORDER — LABETALOL HYDROCHLORIDE 5 MG/ML
30 INJECTION, SOLUTION INTRAVENOUS AS NEEDED
Status: DISCONTINUED | OUTPATIENT
Start: 2025-06-11 | End: 2025-06-12 | Stop reason: HOSPADM

## 2025-06-11 RX ORDER — KETAMINE HCL IN 0.9 % NACL 200 MG/200
0.75 PLASTIC BAG, INJECTION (ML) INTRAVENOUS ONCE
OUTPATIENT
Start: 2025-07-02 | End: 2025-07-02

## 2025-06-11 RX ORDER — EPINEPHRINE 1 MG/ML
0.3 INJECTION, SOLUTION, CONCENTRATE INTRAVENOUS EVERY 5 MIN PRN
Status: DISCONTINUED | OUTPATIENT
Start: 2025-06-11 | End: 2025-06-12 | Stop reason: HOSPADM

## 2025-06-11 RX ORDER — DICYCLOMINE HYDROCHLORIDE 10 MG/1
10 CAPSULE ORAL ONCE AS NEEDED
OUTPATIENT
Start: 2025-07-02

## 2025-06-11 RX ORDER — DIPHENHYDRAMINE HYDROCHLORIDE 50 MG/ML
50 INJECTION, SOLUTION INTRAMUSCULAR; INTRAVENOUS ONCE AS NEEDED
Status: DISCONTINUED | OUTPATIENT
Start: 2025-06-11 | End: 2025-06-12 | Stop reason: HOSPADM

## 2025-06-11 RX ORDER — LIDOCAINE AND PRILOCAINE 25; 25 MG/G; MG/G
1 CREAM TOPICAL ONCE AS NEEDED
OUTPATIENT
Start: 2025-07-02

## 2025-06-11 RX ORDER — LABETALOL HYDROCHLORIDE 5 MG/ML
30 INJECTION, SOLUTION INTRAVENOUS AS NEEDED
OUTPATIENT
Start: 2025-07-02

## 2025-06-11 RX ORDER — METOPROLOL TARTRATE 1 MG/ML
15 INJECTION, SOLUTION INTRAVENOUS ONCE AS NEEDED
Status: DISCONTINUED | OUTPATIENT
Start: 2025-06-11 | End: 2025-06-12 | Stop reason: HOSPADM

## 2025-06-11 RX ORDER — FAMOTIDINE 10 MG/ML
20 INJECTION, SOLUTION INTRAVENOUS ONCE AS NEEDED
OUTPATIENT
Start: 2025-07-02

## 2025-06-11 RX ORDER — ALBUTEROL SULFATE 0.83 MG/ML
3 SOLUTION RESPIRATORY (INHALATION) AS NEEDED
Status: DISCONTINUED | OUTPATIENT
Start: 2025-06-11 | End: 2025-06-12 | Stop reason: HOSPADM

## 2025-06-11 RX ORDER — MIDAZOLAM HYDROCHLORIDE 1 MG/ML
1 INJECTION, SOLUTION INTRAMUSCULAR; INTRAVENOUS ONCE AS NEEDED
Status: DISCONTINUED | OUTPATIENT
Start: 2025-06-11 | End: 2025-06-12 | Stop reason: HOSPADM

## 2025-06-11 RX ORDER — METOPROLOL TARTRATE 1 MG/ML
10 INJECTION, SOLUTION INTRAVENOUS ONCE AS NEEDED
OUTPATIENT
Start: 2025-07-02

## 2025-06-11 RX ORDER — DIPHENHYDRAMINE HYDROCHLORIDE 50 MG/ML
50 INJECTION, SOLUTION INTRAMUSCULAR; INTRAVENOUS AS NEEDED
Status: COMPLETED | OUTPATIENT
Start: 2025-06-11 | End: 2025-06-11

## 2025-06-11 RX ORDER — CALCIUM CARBONATE 300MG(750)
400 TABLET,CHEWABLE ORAL NIGHTLY
COMMUNITY
Start: 2025-06-09

## 2025-06-11 RX ORDER — FAMOTIDINE 10 MG/ML
20 INJECTION, SOLUTION INTRAVENOUS ONCE AS NEEDED
Status: DISCONTINUED | OUTPATIENT
Start: 2025-06-11 | End: 2025-06-12 | Stop reason: HOSPADM

## 2025-06-11 RX ORDER — METOPROLOL TARTRATE 1 MG/ML
5 INJECTION, SOLUTION INTRAVENOUS ONCE AS NEEDED
Status: DISCONTINUED | OUTPATIENT
Start: 2025-06-11 | End: 2025-06-12 | Stop reason: HOSPADM

## 2025-06-11 RX ORDER — LABETALOL HYDROCHLORIDE 5 MG/ML
20 INJECTION, SOLUTION INTRAVENOUS AS NEEDED
OUTPATIENT
Start: 2025-07-02

## 2025-06-11 RX ORDER — LABETALOL HYDROCHLORIDE 5 MG/ML
20 INJECTION, SOLUTION INTRAVENOUS AS NEEDED
Status: DISCONTINUED | OUTPATIENT
Start: 2025-06-11 | End: 2025-06-12 | Stop reason: HOSPADM

## 2025-06-11 RX ORDER — ACETAMINOPHEN 325 MG/1
650 TABLET ORAL ONCE AS NEEDED
Status: DISCONTINUED | OUTPATIENT
Start: 2025-06-11 | End: 2025-06-12 | Stop reason: HOSPADM

## 2025-06-11 RX ORDER — LABETALOL HYDROCHLORIDE 5 MG/ML
10 INJECTION, SOLUTION INTRAVENOUS AS NEEDED
OUTPATIENT
Start: 2025-07-02

## 2025-06-11 RX ORDER — METOPROLOL TARTRATE 1 MG/ML
5 INJECTION, SOLUTION INTRAVENOUS ONCE AS NEEDED
OUTPATIENT
Start: 2025-07-02

## 2025-06-11 RX ORDER — DIPHENHYDRAMINE HYDROCHLORIDE 50 MG/ML
50 INJECTION, SOLUTION INTRAMUSCULAR; INTRAVENOUS ONCE AS NEEDED
OUTPATIENT
Start: 2025-07-02

## 2025-06-11 RX ORDER — LIDOCAINE AND PRILOCAINE 25; 25 MG/G; MG/G
1 CREAM TOPICAL ONCE AS NEEDED
Status: DISCONTINUED | OUTPATIENT
Start: 2025-06-11 | End: 2025-06-12 | Stop reason: HOSPADM

## 2025-06-11 RX ORDER — ONDANSETRON HYDROCHLORIDE 2 MG/ML
8 INJECTION, SOLUTION INTRAVENOUS ONCE AS NEEDED
OUTPATIENT
Start: 2025-07-02

## 2025-06-11 RX ORDER — ONDANSETRON HYDROCHLORIDE 2 MG/ML
8 INJECTION, SOLUTION INTRAVENOUS ONCE AS NEEDED
Status: DISCONTINUED | OUTPATIENT
Start: 2025-06-11 | End: 2025-06-12 | Stop reason: HOSPADM

## 2025-06-11 RX ORDER — METOPROLOL TARTRATE 1 MG/ML
10 INJECTION, SOLUTION INTRAVENOUS ONCE AS NEEDED
Status: DISCONTINUED | OUTPATIENT
Start: 2025-06-11 | End: 2025-06-12 | Stop reason: HOSPADM

## 2025-06-11 RX ORDER — KETAMINE HCL IN 0.9 % NACL 200 MG/200
0.75 PLASTIC BAG, INJECTION (ML) INTRAVENOUS ONCE
Status: COMPLETED | OUTPATIENT
Start: 2025-06-11 | End: 2025-06-11

## 2025-06-11 RX ORDER — DIPHENHYDRAMINE HYDROCHLORIDE 50 MG/ML
50 INJECTION, SOLUTION INTRAMUSCULAR; INTRAVENOUS AS NEEDED
OUTPATIENT
Start: 2025-07-02

## 2025-06-11 RX ORDER — MIDAZOLAM HYDROCHLORIDE 1 MG/ML
1 INJECTION, SOLUTION INTRAMUSCULAR; INTRAVENOUS ONCE AS NEEDED
OUTPATIENT
Start: 2025-07-02

## 2025-06-11 RX ORDER — DESVENLAFAXINE SUCCINATE 25 MG/1
25 TABLET, EXTENDED RELEASE ORAL DAILY
COMMUNITY
Start: 2025-06-09

## 2025-06-11 RX ORDER — DICYCLOMINE HYDROCHLORIDE 10 MG/1
10 CAPSULE ORAL ONCE AS NEEDED
Status: DISCONTINUED | OUTPATIENT
Start: 2025-06-11 | End: 2025-06-12 | Stop reason: HOSPADM

## 2025-06-11 RX ORDER — DESVENLAFAXINE 50 MG/1
50 TABLET, FILM COATED, EXTENDED RELEASE ORAL DAILY
COMMUNITY
Start: 2025-06-09

## 2025-06-11 RX ADMIN — KETAMINE HYDROCHLORIDE 80.4 MG: 50 INJECTION INTRAMUSCULAR; INTRAVENOUS at 13:04

## 2025-06-11 RX ADMIN — ONDANSETRON 8 MG: 2 INJECTION, SOLUTION INTRAMUSCULAR; INTRAVENOUS at 12:58

## 2025-06-11 RX ADMIN — DIPHENHYDRAMINE HYDROCHLORIDE 50 MG: 50 INJECTION INTRAMUSCULAR; INTRAVENOUS at 12:45

## 2025-06-11 ASSESSMENT — ANXIETY QUESTIONNAIRES
3. WORRYING TOO MUCH ABOUT DIFFERENT THINGS: SEVERAL DAYS
5. BEING SO RESTLESS THAT IT IS HARD TO SIT STILL: SEVERAL DAYS
IF YOU CHECKED OFF ANY PROBLEMS ON THIS QUESTIONNAIRE, HOW DIFFICULT HAVE THESE PROBLEMS MADE IT FOR YOU TO DO YOUR WORK, TAKE CARE OF THINGS AT HOME, OR GET ALONG WITH OTHER PEOPLE: SOMEWHAT DIFFICULT
IF YOU CHECKED OFF ANY PROBLEMS ON THIS QUESTIONNAIRE, HOW DIFFICULT HAVE THESE PROBLEMS MADE IT FOR YOU TO DO YOUR WORK, TAKE CARE OF THINGS AT HOME, OR GET ALONG WITH OTHER PEOPLE: SOMEWHAT DIFFICULT
4. TROUBLE RELAXING: SEVERAL DAYS
2. NOT BEING ABLE TO STOP OR CONTROL WORRYING: SEVERAL DAYS
4. TROUBLE RELAXING: SEVERAL DAYS
6. BECOMING EASILY ANNOYED OR IRRITABLE: NOT AT ALL
7. FEELING AFRAID AS IF SOMETHING AWFUL MIGHT HAPPEN: NOT AT ALL
2. NOT BEING ABLE TO STOP OR CONTROL WORRYING: SEVERAL DAYS
5. BEING SO RESTLESS THAT IT IS HARD TO SIT STILL: SEVERAL DAYS
3. WORRYING TOO MUCH ABOUT DIFFERENT THINGS: SEVERAL DAYS
7. FEELING AFRAID AS IF SOMETHING AWFUL MIGHT HAPPEN: NOT AT ALL
6. BECOMING EASILY ANNOYED OR IRRITABLE: NOT AT ALL
1. FEELING NERVOUS, ANXIOUS, OR ON EDGE: SEVERAL DAYS
1. FEELING NERVOUS, ANXIOUS, OR ON EDGE: SEVERAL DAYS
GAD7 TOTAL SCORE: 5

## 2025-06-11 ASSESSMENT — PATIENT HEALTH QUESTIONNAIRE - PHQ9
4. FEELING TIRED OR HAVING LITTLE ENERGY: SEVERAL DAYS
6. FEELING BAD ABOUT YOURSELF - OR THAT YOU ARE A FAILURE OR HAVE LET YOURSELF OR YOUR FAMILY DOWN: SEVERAL DAYS
3. TROUBLE FALLING OR STAYING ASLEEP OR SLEEPING TOO MUCH: NOT AT ALL
1. LITTLE INTEREST OR PLEASURE IN DOING THINGS: SEVERAL DAYS
8. MOVING OR SPEAKING SO SLOWLY THAT OTHER PEOPLE COULD HAVE NOTICED. OR THE OPPOSITE - BEING SO FIDGETY OR RESTLESS THAT YOU HAVE BEEN MOVING AROUND A LOT MORE THAN USUAL: SEVERAL DAYS
7. TROUBLE CONCENTRATING ON THINGS, SUCH AS READING THE NEWSPAPER OR WATCHING TELEVISION: SEVERAL DAYS
10. IF YOU CHECKED OFF ANY PROBLEMS, HOW DIFFICULT HAVE THESE PROBLEMS MADE IT FOR YOU TO DO YOUR WORK, TAKE CARE OF THINGS AT HOME, OR GET ALONG WITH OTHER PEOPLE: SOMEWHAT DIFFICULT
SUM OF ALL RESPONSES TO PHQ QUESTIONS 1-9: 6
2. FEELING DOWN, DEPRESSED OR HOPELESS: SEVERAL DAYS
5. POOR APPETITE OR OVEREATING: NOT AT ALL
SUM OF ALL RESPONSES TO PHQ9 QUESTIONS 1 & 2: 2
5. POOR APPETITE OR OVEREATING: NOT AT ALL
8. MOVING OR SPEAKING SO SLOWLY THAT OTHER PEOPLE COULD HAVE NOTICED. OR THE OPPOSITE, BEING SO FIGETY OR RESTLESS THAT YOU HAVE BEEN MOVING AROUND A LOT MORE THAN USUAL: SEVERAL DAYS
9. THOUGHTS THAT YOU WOULD BE BETTER OFF DEAD, OR OF HURTING YOURSELF: NOT AT ALL
9. THOUGHTS THAT YOU WOULD BE BETTER OFF DEAD, OR OF HURTING YOURSELF: NOT AT ALL
7. TROUBLE CONCENTRATING ON THINGS, SUCH AS READING THE NEWSPAPER OR WATCHING TELEVISION: SEVERAL DAYS
3. TROUBLE FALLING OR STAYING ASLEEP: NOT AT ALL
2. FEELING DOWN, DEPRESSED OR HOPELESS: SEVERAL DAYS
1. LITTLE INTEREST OR PLEASURE IN DOING THINGS: SEVERAL DAYS
6. FEELING BAD ABOUT YOURSELF - OR THAT YOU ARE A FAILURE OR HAVE LET YOURSELF OR YOUR FAMILY DOWN: SEVERAL DAYS
10. IF YOU CHECKED OFF ANY PROBLEMS, HOW DIFFICULT HAVE THESE PROBLEMS MADE IT FOR YOU TO DO YOUR WORK, TAKE CARE OF THINGS AT HOME, OR GET ALONG WITH OTHER PEOPLE: SOMEWHAT DIFFICULT
4. FEELING TIRED OR HAVING LITTLE ENERGY: SEVERAL DAYS

## 2025-06-11 ASSESSMENT — ENCOUNTER SYMPTOMS
DEPRESSION: 1
LOSS OF SENSATION IN FEET: 0
OCCASIONAL FEELINGS OF UNSTEADINESS: 0

## 2025-06-11 ASSESSMENT — PAIN - FUNCTIONAL ASSESSMENT
PAIN_FUNCTIONAL_ASSESSMENT: 0-10

## 2025-06-11 ASSESSMENT — PAIN SCALES - GENERAL
PAINLEVEL_OUTOF10: 0 - NO PAIN

## 2025-06-11 ASSESSMENT — COLUMBIA-SUICIDE SEVERITY RATING SCALE - C-SSRS
2. HAVE YOU ACTUALLY HAD ANY THOUGHTS OF KILLING YOURSELF?: NO
6. IN YOUR LIFETIME, HAVE YOU EVER DONE ANYTHING, STARTED TO DO ANYTHING, OR PREPARED TO DO ANYTHING TO END YOUR LIFE?: YES
1. IN THE PAST MONTH, HAVE YOU WISHED YOU WERE DEAD OR WISHED YOU COULD GO TO SLEEP AND NOT WAKE UP?: NO

## 2025-06-11 NOTE — PATIENT INSTRUCTIONS
Homegoing Instructions      Your Provider's Instructions:    Post-Treatment Instructions:    No alcohol for 24 hours.    Do not drive or operate machinery until the day after treatment and after a full night of sleep.    Do not make important decisions or sign legal documents for 24 hours.    A responsible adult should remain with you for 24 hours due to possible dizziness or sedation.    Resume a normal diet unless otherwise instructed.    When to Contact a Provider:    Contact your provider with any questions or concerns.    Call 911 for any emergencies.    Health Maintenance:    If you use tobacco, quitting is strongly recommended.  Resources:    American Heart Association: (788) 967-6975 or www.americanheart.org    [place] Tobacco Quit Line: 1-800-QUIT-NOW (1-819.223.4389)    [facility]: www.[facility].[place].gov    For alcohol or drug use resources: Call [place] at 211 or visit www.[employer].gov.    For safe medication disposal: Visit www.rxdrugdropbox.org or contact your local police department.      Instructions Before Next Spravato/Ketamine Appointment:      Arrange transportation to and from your appointment (friend, family member, or insurance-covered ride).    Avoid smoking, vaping, and nicotine use if possible.    No alcohol for 24 hours prior to your appointment.    Instructions for Day of Treatment:    Diet:  No solid food for 2 hours before appointment.    Clear liquids (water, tea, black coffee, clear juice, plain Jello, hard candy) are allowed up to 30 minutes before appointment. (No milk or cream.)    What to Bring:    Headphones for calming music or guided meditation.    Journal if desired.    Any prescribed inhalers.    Medications:    Take morning medications as prescribed, except:    No chlordiazepoxide (Librium), diazepam (Valium), or flurazepam (Dalmane) within 24 hours before appointment.    No lamotrigine (Lamictal) within 12 hours before appointment.    Avoid anti-anxiety  medications on the day of treatment unless absolutely necessary.    No stimulants prior to treatment (may resume after treatment).    Nasal sprays (for Spravato) can be used up to 1 hour before appointment.    Intentions:    Patients are encouraged to set a personal intention for each treatment session.    Cancellation Policy:    Please provide at least 24 hours’ notice for cancellations.    Three late cancellations or no-shows may require a treatment plan review with your outpatient psychiatrist before rescheduling.    If more than 15 minutes late, the appointment may need to be rescheduled.    Call [phone] to cancel or reschedule.

## 2025-06-11 NOTE — PROCEDURES
"Interventional Psychiatry Procedure    Date/Time: 6/11/2025 1:29 PM    Performed by: Suzie Álvarez DO  Authorized by: Suzie Álvarez DO      Ketamine Infusion for Depression    Ketamine Infusion: 0.75mg per kilo over 60 minutes, Maintenance, q2-3weeks    Subjective:  Procedure (Patient last ate (Date/Time): 6/10/25 9pm/Any concerns regarding today's treatment: No/Any concerns regarding previous treatment: No             )    Pt reports interval hospitalization for auditory hallucinations - states was very helpful, made multiple medication changes and doing better. Active in PHP and finding helpful. Some anxiety and N/V today, but was doing well prior. Sleep improved with magnesium oxide. Denies suicidal/homicidal ideation.  Interval changes in health: Denies   Interval changes in medications: Denies        Synopsis SmartLink 5/21/2025 6/11/2025   Scores   Patient Health Questionnaire-9 Score 19  6    BH ANUP-7 Total Score 18  5        Patient-reported     Objective:  Mental Status Exam  General/Appearance: no distress, appears stated age, well kept  Attitude/Behavior: Cooperative, conversant, engaged, and with good eye contact.  Motor Activity: no psychomotor agitation or retardation, no tremor or other abnormal movements, gait: Within normal limits  Mood: \"Better\"  Affect: Euthymic, Full range, Mood congruent, and Appropriate to content  Speech: Spontaneous, Coherent, Fluent, Appropriate rate, and Appropriate quantity, Appropriate volume  Thought Process: linear, goal directed and logical, future-oriented  Thought Content: no suicidal ideation, no homicidal ideation, delusions:none   Thought Perception: no perceptual abnormalities noted  Cognition: grossly intact  Insight: intact  Judgment: intact    Time out was taken with staff to confirm correct patient and correct procedure to be performed. Fall precautions in place throughout procedure. SpO2 continuously monitored throughout treatment. Intravenous Ketamine " infusion was initiated by psychiatrist and monitored by RN.    Vitals:    06/11/25 1205 06/11/25 1215 06/11/25 1344 06/11/25 1504   BP: (!) 151/108  126/82 123/72   Pulse: 95  91 103   Resp: 18  18 18   Temp: 37.4 °C (99.3 °F)      TempSrc: Temporal      SpO2: 96%  94% 97%   Weight:  107 kg (236 lb 5.3 oz)          Synopsis SmartLink 6/11/2025    12:05 6/11/2025    13:44 6/11/2025    15:04   RASS   Gonzalez Agitation Sedation Scale 0 -1 0     Patient tolerated treatment well. No complications. Patient was monitored for 60 minutes after the completion of the infusion. Discharge instructions were provided to patient with After Visit Summary. Patient was discharged home in the care of their designated .    Assessment:  1. Bipolar I disorder with depression (Multi)      No immediate risk of harm to self or others    Plan:  Continue Intravenous Ketamine Infusion. Follow up in about 3 weeks (around 7/2/2025). Continue outpatient medications and treatment with Dr. Nova.    Suzie Álvarez DO

## 2025-06-11 NOTE — ADDENDUM NOTE
Encounter addended by: Madison Leonard RN on: 6/11/2025 4:03 PM   Actions taken: MAR administration accepted

## 2025-06-12 ENCOUNTER — APPOINTMENT (OUTPATIENT)
Dept: BEHAVIORAL HEALTH | Facility: CLINIC | Age: 40
End: 2025-06-12
Payer: COMMERCIAL

## 2025-06-12 DIAGNOSIS — F31.9 BIPOLAR 1 DISORDER (MULTI): ICD-10-CM

## 2025-06-12 PROCEDURE — 90837 PSYTX W PT 60 MINUTES: CPT | Performed by: PSYCHOLOGIST

## 2025-06-12 PROCEDURE — 3044F HG A1C LEVEL LT 7.0%: CPT | Performed by: PSYCHOLOGIST

## 2025-06-12 NOTE — PROGRESS NOTES
Insight Oriented Therapy. 50 min.  BP1.  We spoke about her almost two long stay in the hospital for ángel, feeling better now, acceptance of her work not being top priority, but her health being number one.  Staying on leave for another week while she recuperates.     Teletherapy session  Chief complaint - Phase of life and emotional distress   Treatment plan - Insight and action consistent with ACT therapy. Providing support, safe space to process their thoughts and feelings, developing therapeutic relationship.  Goals - Self-care, insight, coping skills  Prognosis - Average  Progress - Average  Functional status - 70/100  Focused mental status: Patient was oriented to person, place, time and context  Focused mental exam - alert and oriented  Frequency - Every two weeks     An interactive audio and video telecommunication system which permits real time communications between the patient (at the originating site) and provider (at the distant site) was utilized to provide this telehealth service

## 2025-06-16 DIAGNOSIS — F90.0 ATTENTION DEFICIT HYPERACTIVITY DISORDER (ADHD), PREDOMINANTLY INATTENTIVE TYPE: ICD-10-CM

## 2025-06-16 RX ORDER — LISDEXAMFETAMINE DIMESYLATE 70 MG/1
70 CAPSULE ORAL EVERY MORNING
Qty: 30 CAPSULE | Refills: 0 | Status: SHIPPED | OUTPATIENT
Start: 2025-06-16 | End: 2025-07-16

## 2025-06-17 ENCOUNTER — APPOINTMENT (OUTPATIENT)
Dept: BEHAVIORAL HEALTH | Facility: CLINIC | Age: 40
End: 2025-06-17
Payer: COMMERCIAL

## 2025-06-19 ENCOUNTER — APPOINTMENT (OUTPATIENT)
Dept: BEHAVIORAL HEALTH | Facility: CLINIC | Age: 40
End: 2025-06-19
Payer: COMMERCIAL

## 2025-06-19 ENCOUNTER — PROCEDURE VISIT (OUTPATIENT)
Dept: BEHAVIORAL HEALTH | Facility: CLINIC | Age: 40
End: 2025-06-19
Payer: COMMERCIAL

## 2025-06-19 VITALS
HEART RATE: 119 BPM | TEMPERATURE: 98.6 F | RESPIRATION RATE: 16 BRPM | SYSTOLIC BLOOD PRESSURE: 121 MMHG | DIASTOLIC BLOOD PRESSURE: 83 MMHG

## 2025-06-19 DIAGNOSIS — F31.9 BIPOLAR 1 DISORDER (MULTI): ICD-10-CM

## 2025-06-19 DIAGNOSIS — F31.0 BIPOLAR AFFECTIVE DISORDER, CURRENT EPISODE HYPOMANIC (MULTI): Primary | ICD-10-CM

## 2025-06-19 PROCEDURE — 3044F HG A1C LEVEL LT 7.0%: CPT | Performed by: PSYCHOLOGIST

## 2025-06-19 PROCEDURE — 90837 PSYTX W PT 60 MINUTES: CPT | Performed by: PSYCHOLOGIST

## 2025-06-19 NOTE — PROGRESS NOTES
Patient here at the clinic for every 2 months injection of Abilify Asimtufii for Bipolar affective disorder,current episode hypomanic. Patient identified by full name and , vitals obtained. Patient last seen by Provider on 25, last seen by nurse on 25. Medication verified by providers note and medication order.      Appetite:  good  Sleep:  good  Appearance: clean, age appropriate and well groomed  Build: overweight  Attitude: cooperative, calm, pleasant, friendly, open  Eye Contact: normal  Activity: alert, attentive, appropriate  Speech: appropriate & spontaneous, normal rate & flow, clear  Delusions: denies  Thought Content: logical  Thought Process: logical  Judgement/insight:  Intact/good  Mood: calm   Affect: appropriate  AH/VH/SI/HI patient denies  Access to Firearms/weapons: No  Depression: Patient denies  Thoughts of hopelessness: denies  Anxiety: pt denies  Self-injurious behavior: denies at this time  Cravings/Urges: denies  Last Use: NA  Tobacco Use: Non smoker  Spiritual or cultural practices that may affect your care or impact your health care decisions? No  Living situation:  Lives with boyfriend  Employed: Employed at [employer]      Patient was pleasant,engaged and cooperative. Patient denies any issues with the previous injection.  Patient denies SI/HI, VH/AH and self harming thoughts at this time.       Patient received Abilify Asimtufii 960mg/3.2ml  via 22 gauge,1 and 1/2 inch needle in the left gluteal area with no reaction to the injection site. No bleeding or erythema noted, patient tolerated the procedure  well. Nurse informed patient to use warm compress for comfort if needed.    RN provided education on medication and side effects,  Patient is aware to contact nurse for any issues or concerns or call 911/ mobile crisis number for emergent situations.     Patient will return to the clinic in 2 months.      LOT: [id number]  EXP:  2026  NDC:  [id number]    Solange  Lamar RN,BSN

## 2025-06-20 ENCOUNTER — APPOINTMENT (OUTPATIENT)
Dept: CARDIOLOGY | Facility: HOSPITAL | Age: 40
End: 2025-06-20
Payer: COMMERCIAL

## 2025-06-20 ENCOUNTER — APPOINTMENT (OUTPATIENT)
Dept: RADIOLOGY | Facility: HOSPITAL | Age: 40
End: 2025-06-20
Payer: COMMERCIAL

## 2025-06-20 ENCOUNTER — HOSPITAL ENCOUNTER (EMERGENCY)
Facility: HOSPITAL | Age: 40
Discharge: HOME | End: 2025-06-20
Attending: EMERGENCY MEDICINE
Payer: COMMERCIAL

## 2025-06-20 VITALS
BODY MASS INDEX: 42.33 KG/M2 | HEART RATE: 103 BPM | SYSTOLIC BLOOD PRESSURE: 108 MMHG | TEMPERATURE: 98 F | WEIGHT: 230 LBS | DIASTOLIC BLOOD PRESSURE: 92 MMHG | OXYGEN SATURATION: 98 % | HEIGHT: 62 IN | RESPIRATION RATE: 14 BRPM

## 2025-06-20 DIAGNOSIS — R55 SYNCOPE, UNSPECIFIED SYNCOPE TYPE: ICD-10-CM

## 2025-06-20 DIAGNOSIS — G43.809 OTHER MIGRAINE WITHOUT STATUS MIGRAINOSUS, NOT INTRACTABLE: Primary | ICD-10-CM

## 2025-06-20 LAB
ALBUMIN SERPL BCP-MCNC: 3.9 G/DL (ref 3.4–5)
ALP SERPL-CCNC: 83 U/L (ref 33–110)
ALT SERPL W P-5'-P-CCNC: 24 U/L (ref 7–45)
ANION GAP SERPL CALC-SCNC: 14 MMOL/L (ref 10–20)
APTT PPP: 29 SECONDS (ref 26–36)
AST SERPL W P-5'-P-CCNC: 14 U/L (ref 9–39)
BASOPHILS # BLD AUTO: 0.03 X10*3/UL (ref 0–0.1)
BASOPHILS NFR BLD AUTO: 0.3 %
BILIRUB SERPL-MCNC: 0.3 MG/DL (ref 0–1.2)
BUN SERPL-MCNC: 7 MG/DL (ref 6–23)
CALCIUM SERPL-MCNC: 8.8 MG/DL (ref 8.6–10.3)
CARDIAC TROPONIN I PNL SERPL HS: 3 NG/L (ref 0–13)
CHLORIDE SERPL-SCNC: 107 MMOL/L (ref 98–107)
CO2 SERPL-SCNC: 21 MMOL/L (ref 21–32)
CREAT SERPL-MCNC: 0.75 MG/DL (ref 0.5–1.05)
EGFRCR SERPLBLD CKD-EPI 2021: >90 ML/MIN/1.73M*2
EOSINOPHIL # BLD AUTO: 0.03 X10*3/UL (ref 0–0.7)
EOSINOPHIL NFR BLD AUTO: 0.3 %
ERYTHROCYTE [DISTWIDTH] IN BLOOD BY AUTOMATED COUNT: 11.9 % (ref 11.5–14.5)
GLUCOSE SERPL-MCNC: 89 MG/DL (ref 74–99)
HCT VFR BLD AUTO: 40.9 % (ref 36–46)
HGB BLD-MCNC: 13.8 G/DL (ref 12–16)
IMM GRANULOCYTES # BLD AUTO: 0.03 X10*3/UL (ref 0–0.7)
IMM GRANULOCYTES NFR BLD AUTO: 0.3 % (ref 0–0.9)
INR PPP: 1.1 (ref 0.9–1.1)
LYMPHOCYTES # BLD AUTO: 2.42 X10*3/UL (ref 1.2–4.8)
LYMPHOCYTES NFR BLD AUTO: 21.8 %
MCH RBC QN AUTO: 31.4 PG (ref 26–34)
MCHC RBC AUTO-ENTMCNC: 33.7 G/DL (ref 32–36)
MCV RBC AUTO: 93 FL (ref 80–100)
MONOCYTES # BLD AUTO: 0.7 X10*3/UL (ref 0.1–1)
MONOCYTES NFR BLD AUTO: 6.3 %
NEUTROPHILS # BLD AUTO: 7.87 X10*3/UL (ref 1.2–7.7)
NEUTROPHILS NFR BLD AUTO: 71 %
NRBC BLD-RTO: 0 /100 WBCS (ref 0–0)
PLATELET # BLD AUTO: 240 X10*3/UL (ref 150–450)
POTASSIUM SERPL-SCNC: 3.7 MMOL/L (ref 3.5–5.3)
PROT SERPL-MCNC: 6 G/DL (ref 6.4–8.2)
PROTHROMBIN TIME: 12.3 SECONDS (ref 9.8–12.4)
RBC # BLD AUTO: 4.39 X10*6/UL (ref 4–5.2)
SODIUM SERPL-SCNC: 138 MMOL/L (ref 136–145)
WBC # BLD AUTO: 11.1 X10*3/UL (ref 4.4–11.3)

## 2025-06-20 PROCEDURE — 70450 CT HEAD/BRAIN W/O DYE: CPT

## 2025-06-20 PROCEDURE — 99285 EMERGENCY DEPT VISIT HI MDM: CPT | Mod: 25 | Performed by: EMERGENCY MEDICINE

## 2025-06-20 PROCEDURE — 2500000004 HC RX 250 GENERAL PHARMACY W/ HCPCS (ALT 636 FOR OP/ED): Performed by: EMERGENCY MEDICINE

## 2025-06-20 PROCEDURE — 84484 ASSAY OF TROPONIN QUANT: CPT | Performed by: EMERGENCY MEDICINE

## 2025-06-20 PROCEDURE — 85025 COMPLETE CBC W/AUTO DIFF WBC: CPT | Performed by: EMERGENCY MEDICINE

## 2025-06-20 PROCEDURE — 80053 COMPREHEN METABOLIC PANEL: CPT | Performed by: EMERGENCY MEDICINE

## 2025-06-20 PROCEDURE — 85610 PROTHROMBIN TIME: CPT | Performed by: EMERGENCY MEDICINE

## 2025-06-20 PROCEDURE — 93005 ELECTROCARDIOGRAM TRACING: CPT

## 2025-06-20 PROCEDURE — 85730 THROMBOPLASTIN TIME PARTIAL: CPT | Performed by: EMERGENCY MEDICINE

## 2025-06-20 PROCEDURE — 70450 CT HEAD/BRAIN W/O DYE: CPT | Performed by: RADIOLOGY

## 2025-06-20 RX ORDER — DIPHENHYDRAMINE HYDROCHLORIDE 50 MG/ML
50 INJECTION, SOLUTION INTRAMUSCULAR; INTRAVENOUS ONCE
Status: COMPLETED | OUTPATIENT
Start: 2025-06-20 | End: 2025-06-20

## 2025-06-20 RX ORDER — HEPARIN 100 UNIT/ML
5 SYRINGE INTRAVENOUS ONCE
Status: COMPLETED | OUTPATIENT
Start: 2025-06-20 | End: 2025-06-20

## 2025-06-20 RX ORDER — DIPHENHYDRAMINE HCL 25 MG
50 TABLET ORAL EVERY 6 HOURS
Qty: 40 TABLET | Refills: 0 | Status: SHIPPED | OUTPATIENT
Start: 2025-06-20 | End: 2025-06-25

## 2025-06-20 RX ORDER — METOCLOPRAMIDE 10 MG/1
10 TABLET ORAL EVERY 6 HOURS
Qty: 28 TABLET | Refills: 0 | Status: SHIPPED | OUTPATIENT
Start: 2025-06-20 | End: 2025-06-27

## 2025-06-20 RX ORDER — METOCLOPRAMIDE HYDROCHLORIDE 5 MG/ML
10 INJECTION INTRAMUSCULAR; INTRAVENOUS ONCE
Status: COMPLETED | OUTPATIENT
Start: 2025-06-20 | End: 2025-06-20

## 2025-06-20 RX ADMIN — HEPARIN 500 UNITS: 100 SYRINGE at 15:05

## 2025-06-20 RX ADMIN — DIPHENHYDRAMINE HYDROCHLORIDE 50 MG: 50 INJECTION, SOLUTION INTRAMUSCULAR; INTRAVENOUS at 13:50

## 2025-06-20 RX ADMIN — METOCLOPRAMIDE 10 MG: 5 INJECTION, SOLUTION INTRAMUSCULAR; INTRAVENOUS at 13:53

## 2025-06-20 ASSESSMENT — PAIN - FUNCTIONAL ASSESSMENT: PAIN_FUNCTIONAL_ASSESSMENT: 0-10

## 2025-06-20 ASSESSMENT — PAIN SCALES - GENERAL: PAINLEVEL_OUTOF10: 7

## 2025-06-20 ASSESSMENT — PAIN DESCRIPTION - LOCATION: LOCATION: HEAD

## 2025-06-20 ASSESSMENT — PAIN DESCRIPTION - DESCRIPTORS: DESCRIPTORS: ACHING

## 2025-06-20 ASSESSMENT — PAIN DESCRIPTION - ORIENTATION: ORIENTATION: ANTERIOR

## 2025-06-20 ASSESSMENT — PAIN DESCRIPTION - FREQUENCY: FREQUENCY: CONSTANT/CONTINUOUS

## 2025-06-20 ASSESSMENT — PAIN DESCRIPTION - PAIN TYPE: TYPE: ACUTE PAIN

## 2025-06-20 NOTE — PROGRESS NOTES
Insight Oriented Therapy. 50 min.  BP1.  We spoke about how she is in PHP, worried about finances and paying bills, might have to ask her partner to help.  Trying to schedule enjoyable things to do and looking forward to weekend.  Depression is stable.     Teletherapy session  Chief complaint - Phase of life and emotional distress   Treatment plan - Insight and action consistent with ACT therapy. Providing support, safe space to process their thoughts and feelings, developing therapeutic relationship.  Goals - Self-care, insight, coping skills  Prognosis - Average  Progress - Average  Functional status - 70/100  Focused mental status: Patient was oriented to person, place, time and context  Focused mental exam - alert and oriented  Frequency - Every two weeks     An interactive audio and video telecommunication system which permits real time communications between the patient (at the originating site) and provider (at the distant site) was utilized to provide this telehealth service

## 2025-06-20 NOTE — ED PROVIDER NOTES
HPI: 40-year-old female arrives with the worst headache she has ever had although she is used to a lot of headaches and actually just got a transfusion for her migraines yesterday.  She has no focal deficits her NIH score is 0 the headache was right behind her right orbit.  This is where she gets many of her migraine events and she was almost near syncopal at that time.  EKG read by me reviewed by me is sinus tach 106 bpm with nonspecific ST to T wave flattening.  Patient underwent emergent CT of her head that did not show a bleed or any acute process.  She has a multitude of allergies were removing Reglan from her allergies because she actually is not allergic to Reglan she just has to have Benadryl before that so she got 50 mg of Benadryl and 10 mg of Reglan her headache is nearly resolved.  Her chemistries and blood counts were excellent her troponin was only 3 with an NIH score of 0 resolution of her headache we discharged her on Reglan and Benadryl with the pharmacy of her choice we reviewed her images and labs all questions have been answered patient will return if symptoms worsen.    PMH: Migraine constipation anemia acne vulgaris asthma bipolar disorder prior blood clots    SH negative for both tobacco Alcohol  FH negative heart disease positive diabetes  ROS  General Appears in distress severe migraine as above  HEENT: No sore throat, No Visual Loss, No Headache, No Ear Pain  Neck: Denies neck pain  Chest: No chest pain, no pleuritic pain, no chest wall injury  Pulmonary: No SOB, No Cough, No Sputum production, No Wheezing  GI: No abdominal pain, no nausea or vomiting, no diarrhea.  : No dysuria, no frequency, no hematuria.  Extremities: No musculoskeletal pain, normal ambulation, no paresthesia.  Psych: Normal interaction, no anxiety, no depression, no suicidal ideation  Skin: No rashes    ROS is otherwise negative    PE: General: Appears in distress        HEENT: Throat is moist without exudate, midline  uvula dentate intact, Tms clear with normal anatomy.        Neck: Supple non tender        Chest CTA, good AE, no wheezing, rales, or rhonci        CVA: RRR S1S2 no S3S4 or murmur        ABD: W/S/NT no HSM, no pulsatile masses, good bowel sounds        Extremities: Excellent distal pulses, brisk capillary refill. Full ROM        Psych: Normal interactions with no signs of depression  or suicidal ideation.        Neuro: Alert and oriented, moves all and feels all.  NIH score is 0    MDM:40-year-old female arrives with the worst headache she has ever had although she is used to a lot of headaches and actually just got a transfusion for her migraines yesterday.  She has no focal deficits her NIH score is 0 the headache was right behind her right orbit.  This is where she gets many of her migraine events and she was almost near syncopal at that time.  EKG read by me reviewed by me is sinus tach 106 bpm with nonspecific ST to T wave flattening.  Patient underwent emergent CT of her head that did not show a bleed or any acute process.  She has a multitude of allergies were removing Reglan from her allergies because she actually is not allergic to Reglan she just has to have Benadryl before that so she got 50 mg of Benadryl and 10 mg of Reglan her headache is nearly resolved.  Her chemistries and blood counts were excellent her troponin was only 3 with an NIH score of 0 resolution of her headache we discharged her on Reglan and Benadryl with the pharmacy of her choice we reviewed her images and labs all questions have been answered patient will return if symptoms worsen.       Joe Sands MD  06/20/25 7981

## 2025-06-20 NOTE — ED TRIAGE NOTES
Patient was at group therapy today and had a syncopal episode with an unknown amount of time. Pt c/o lightheaded and headache. Pt denies chest pain. Pt alert and oriented x 4.

## 2025-06-24 ENCOUNTER — HOSPITAL ENCOUNTER (OUTPATIENT)
Facility: HOSPITAL | Age: 40
Setting detail: OBSERVATION
Discharge: HOME | End: 2025-06-25
Attending: EMERGENCY MEDICINE | Admitting: INTERNAL MEDICINE
Payer: COMMERCIAL

## 2025-06-24 ENCOUNTER — APPOINTMENT (OUTPATIENT)
Dept: RADIOLOGY | Facility: HOSPITAL | Age: 40
End: 2025-06-24
Payer: COMMERCIAL

## 2025-06-24 ENCOUNTER — APPOINTMENT (OUTPATIENT)
Dept: BEHAVIORAL HEALTH | Facility: CLINIC | Age: 40
End: 2025-06-24
Payer: COMMERCIAL

## 2025-06-24 ENCOUNTER — APPOINTMENT (OUTPATIENT)
Dept: CARDIOLOGY | Facility: HOSPITAL | Age: 40
End: 2025-06-24
Payer: COMMERCIAL

## 2025-06-24 DIAGNOSIS — N30.00 ACUTE CYSTITIS WITHOUT HEMATURIA: ICD-10-CM

## 2025-06-24 DIAGNOSIS — E16.2 HYPOGLYCEMIA: Primary | ICD-10-CM

## 2025-06-24 LAB
ALBUMIN SERPL BCP-MCNC: 4 G/DL (ref 3.4–5)
ALP SERPL-CCNC: 84 U/L (ref 33–110)
ALT SERPL W P-5'-P-CCNC: 20 U/L (ref 7–45)
ANION GAP SERPL CALC-SCNC: 13 MMOL/L (ref 10–20)
APPEARANCE UR: CLEAR
AST SERPL W P-5'-P-CCNC: 13 U/L (ref 9–39)
ATRIAL RATE: 104 BPM
ATRIAL RATE: 106 BPM
BACTERIA #/AREA URNS AUTO: ABNORMAL /HPF
BASOPHILS # BLD AUTO: 0.03 X10*3/UL (ref 0–0.1)
BASOPHILS NFR BLD AUTO: 0.3 %
BILIRUB SERPL-MCNC: 0.2 MG/DL (ref 0–1.2)
BILIRUB UR STRIP.AUTO-MCNC: NEGATIVE MG/DL
BUN SERPL-MCNC: 8 MG/DL (ref 6–23)
CALCIUM SERPL-MCNC: 8.7 MG/DL (ref 8.6–10.3)
CHLORIDE SERPL-SCNC: 106 MMOL/L (ref 98–107)
CO2 SERPL-SCNC: 22 MMOL/L (ref 21–32)
COLOR UR: ABNORMAL
CREAT SERPL-MCNC: 0.78 MG/DL (ref 0.5–1.05)
EGFRCR SERPLBLD CKD-EPI 2021: >90 ML/MIN/1.73M*2
EOSINOPHIL # BLD AUTO: 0.02 X10*3/UL (ref 0–0.7)
EOSINOPHIL NFR BLD AUTO: 0.2 %
ERYTHROCYTE [DISTWIDTH] IN BLOOD BY AUTOMATED COUNT: 11.8 % (ref 11.5–14.5)
FLUAV RNA RESP QL NAA+PROBE: NOT DETECTED
FLUBV RNA RESP QL NAA+PROBE: NOT DETECTED
GLUCOSE BLD MANUAL STRIP-MCNC: 100 MG/DL (ref 74–99)
GLUCOSE BLD MANUAL STRIP-MCNC: 103 MG/DL (ref 74–99)
GLUCOSE BLD MANUAL STRIP-MCNC: 111 MG/DL (ref 74–99)
GLUCOSE BLD MANUAL STRIP-MCNC: 41 MG/DL (ref 74–99)
GLUCOSE BLD MANUAL STRIP-MCNC: 56 MG/DL (ref 74–99)
GLUCOSE BLD MANUAL STRIP-MCNC: 70 MG/DL (ref 74–99)
GLUCOSE SERPL-MCNC: 84 MG/DL (ref 74–99)
GLUCOSE UR STRIP.AUTO-MCNC: ABNORMAL MG/DL
HCG UR QL IA.RAPID: NEGATIVE
HCT VFR BLD AUTO: 41.1 % (ref 36–46)
HGB BLD-MCNC: 13.9 G/DL (ref 12–16)
HYALINE CASTS #/AREA URNS AUTO: ABNORMAL /LPF
IMM GRANULOCYTES # BLD AUTO: 0.04 X10*3/UL (ref 0–0.7)
IMM GRANULOCYTES NFR BLD AUTO: 0.4 % (ref 0–0.9)
KETONES UR STRIP.AUTO-MCNC: NEGATIVE MG/DL
LEUKOCYTE ESTERASE UR QL STRIP.AUTO: ABNORMAL
LIPASE SERPL-CCNC: 31 U/L (ref 9–82)
LYMPHOCYTES # BLD AUTO: 1.84 X10*3/UL (ref 1.2–4.8)
LYMPHOCYTES NFR BLD AUTO: 17.8 %
MAGNESIUM SERPL-MCNC: 1.56 MG/DL (ref 1.6–2.4)
MCH RBC QN AUTO: 31.3 PG (ref 26–34)
MCHC RBC AUTO-ENTMCNC: 33.8 G/DL (ref 32–36)
MCV RBC AUTO: 93 FL (ref 80–100)
MONOCYTES # BLD AUTO: 0.45 X10*3/UL (ref 0.1–1)
MONOCYTES NFR BLD AUTO: 4.3 %
MUCOUS THREADS #/AREA URNS AUTO: ABNORMAL /LPF
NEUTROPHILS # BLD AUTO: 7.98 X10*3/UL (ref 1.2–7.7)
NEUTROPHILS NFR BLD AUTO: 77 %
NITRITE UR QL STRIP.AUTO: NEGATIVE
NRBC BLD-RTO: 0 /100 WBCS (ref 0–0)
P AXIS: 47 DEGREES
P AXIS: 62 DEGREES
P OFFSET: 200 MS
P OFFSET: 202 MS
P ONSET: 147 MS
P ONSET: 151 MS
PH UR STRIP.AUTO: 6.5 [PH]
PLATELET # BLD AUTO: 231 X10*3/UL (ref 150–450)
POTASSIUM SERPL-SCNC: 3.2 MMOL/L (ref 3.5–5.3)
PR INTERVAL: 148 MS
PR INTERVAL: 160 MS
PROT SERPL-MCNC: 6.3 G/DL (ref 6.4–8.2)
PROT UR STRIP.AUTO-MCNC: NEGATIVE MG/DL
Q ONSET: 225 MS
Q ONSET: 227 MS
QRS COUNT: 17 BEATS
QRS COUNT: 17 BEATS
QRS DURATION: 70 MS
QRS DURATION: 70 MS
QT INTERVAL: 334 MS
QT INTERVAL: 336 MS
QTC CALCULATION(BAZETT): 439 MS
QTC CALCULATION(BAZETT): 446 MS
QTC FREDERICIA: 401 MS
QTC FREDERICIA: 406 MS
R AXIS: -5 DEGREES
R AXIS: 21 DEGREES
RBC # BLD AUTO: 4.44 X10*6/UL (ref 4–5.2)
RBC # UR STRIP.AUTO: NEGATIVE MG/DL
RBC #/AREA URNS AUTO: ABNORMAL /HPF
SARS-COV-2 RNA RESP QL NAA+PROBE: NOT DETECTED
SODIUM SERPL-SCNC: 138 MMOL/L (ref 136–145)
SP GR UR STRIP.AUTO: 1.02
SQUAMOUS #/AREA URNS AUTO: ABNORMAL /HPF
T AXIS: 50 DEGREES
T AXIS: 58 DEGREES
T OFFSET: 393 MS
T OFFSET: 394 MS
UROBILINOGEN UR STRIP.AUTO-MCNC: NORMAL MG/DL
VENTRICULAR RATE: 104 BPM
VENTRICULAR RATE: 106 BPM
WBC # BLD AUTO: 10.4 X10*3/UL (ref 4.4–11.3)
WBC #/AREA URNS AUTO: ABNORMAL /HPF

## 2025-06-24 PROCEDURE — 99223 1ST HOSP IP/OBS HIGH 75: CPT | Performed by: INTERNAL MEDICINE

## 2025-06-24 PROCEDURE — 83036 HEMOGLOBIN GLYCOSYLATED A1C: CPT | Mod: AHULAB | Performed by: INTERNAL MEDICINE

## 2025-06-24 PROCEDURE — 96374 THER/PROPH/DIAG INJ IV PUSH: CPT

## 2025-06-24 PROCEDURE — 84681 ASSAY OF C-PEPTIDE: CPT | Mod: AHULAB | Performed by: INTERNAL MEDICINE

## 2025-06-24 PROCEDURE — 86337 INSULIN ANTIBODIES: CPT | Performed by: INTERNAL MEDICINE

## 2025-06-24 PROCEDURE — 81001 URINALYSIS AUTO W/SCOPE: CPT | Performed by: EMERGENCY MEDICINE

## 2025-06-24 PROCEDURE — 84206 ASSAY OF PROINSULIN: CPT | Performed by: INTERNAL MEDICINE

## 2025-06-24 PROCEDURE — 2500000004 HC RX 250 GENERAL PHARMACY W/ HCPCS (ALT 636 FOR OP/ED): Performed by: EMERGENCY MEDICINE

## 2025-06-24 PROCEDURE — 96376 TX/PRO/DX INJ SAME DRUG ADON: CPT

## 2025-06-24 PROCEDURE — 80377 DRUG/SUBSTANCE NOS 7/MORE: CPT | Performed by: INTERNAL MEDICINE

## 2025-06-24 PROCEDURE — G0378 HOSPITAL OBSERVATION PER HR: HCPCS

## 2025-06-24 PROCEDURE — 96372 THER/PROPH/DIAG INJ SC/IM: CPT | Mod: 59 | Performed by: INTERNAL MEDICINE

## 2025-06-24 PROCEDURE — 82947 ASSAY GLUCOSE BLOOD QUANT: CPT | Mod: 91,MUEWO

## 2025-06-24 PROCEDURE — 2500000005 HC RX 250 GENERAL PHARMACY W/O HCPCS: Performed by: EMERGENCY MEDICINE

## 2025-06-24 PROCEDURE — 87636 SARSCOV2 & INF A&B AMP PRB: CPT | Performed by: EMERGENCY MEDICINE

## 2025-06-24 PROCEDURE — 80053 COMPREHEN METABOLIC PANEL: CPT | Performed by: EMERGENCY MEDICINE

## 2025-06-24 PROCEDURE — 83735 ASSAY OF MAGNESIUM: CPT | Performed by: EMERGENCY MEDICINE

## 2025-06-24 PROCEDURE — 2500000001 HC RX 250 WO HCPCS SELF ADMINISTERED DRUGS (ALT 637 FOR MEDICARE OP): Performed by: EMERGENCY MEDICINE

## 2025-06-24 PROCEDURE — 82947 ASSAY GLUCOSE BLOOD QUANT: CPT

## 2025-06-24 PROCEDURE — 2500000002 HC RX 250 W HCPCS SELF ADMINISTERED DRUGS (ALT 637 FOR MEDICARE OP, ALT 636 FOR OP/ED): Performed by: EMERGENCY MEDICINE

## 2025-06-24 PROCEDURE — 74176 CT ABD & PELVIS W/O CONTRAST: CPT | Performed by: RADIOLOGY

## 2025-06-24 PROCEDURE — 74176 CT ABD & PELVIS W/O CONTRAST: CPT

## 2025-06-24 PROCEDURE — 81025 URINE PREGNANCY TEST: CPT | Performed by: EMERGENCY MEDICINE

## 2025-06-24 PROCEDURE — 93005 ELECTROCARDIOGRAM TRACING: CPT

## 2025-06-24 PROCEDURE — 87086 URINE CULTURE/COLONY COUNT: CPT | Mod: AHULAB | Performed by: EMERGENCY MEDICINE

## 2025-06-24 PROCEDURE — 99285 EMERGENCY DEPT VISIT HI MDM: CPT | Mod: 25 | Performed by: EMERGENCY MEDICINE

## 2025-06-24 PROCEDURE — 96361 HYDRATE IV INFUSION ADD-ON: CPT

## 2025-06-24 PROCEDURE — 2500000004 HC RX 250 GENERAL PHARMACY W/ HCPCS (ALT 636 FOR OP/ED): Performed by: INTERNAL MEDICINE

## 2025-06-24 PROCEDURE — 2500000001 HC RX 250 WO HCPCS SELF ADMINISTERED DRUGS (ALT 637 FOR MEDICARE OP): Performed by: INTERNAL MEDICINE

## 2025-06-24 PROCEDURE — 83690 ASSAY OF LIPASE: CPT | Performed by: EMERGENCY MEDICINE

## 2025-06-24 PROCEDURE — 85025 COMPLETE CBC W/AUTO DIFF WBC: CPT | Performed by: EMERGENCY MEDICINE

## 2025-06-24 RX ORDER — DIPHENHYDRAMINE HCL 25 MG
50 CAPSULE ORAL EVERY 6 HOURS
Status: DISCONTINUED | OUTPATIENT
Start: 2025-06-24 | End: 2025-06-25 | Stop reason: HOSPADM

## 2025-06-24 RX ORDER — DICYCLOMINE HYDROCHLORIDE 20 MG/1
20 TABLET ORAL
Status: DISCONTINUED | OUTPATIENT
Start: 2025-06-24 | End: 2025-06-25 | Stop reason: HOSPADM

## 2025-06-24 RX ORDER — MIDODRINE HYDROCHLORIDE 5 MG/1
5 TABLET ORAL 2 TIMES DAILY
Status: DISCONTINUED | OUTPATIENT
Start: 2025-06-24 | End: 2025-06-24

## 2025-06-24 RX ORDER — DEXTROSE 50 % IN WATER (D50W) INTRAVENOUS SYRINGE
12.5
Status: DISCONTINUED | OUTPATIENT
Start: 2025-06-24 | End: 2025-06-25 | Stop reason: HOSPADM

## 2025-06-24 RX ORDER — IVABRADINE 5 MG/1
7.5 TABLET, FILM COATED ORAL
Status: DISCONTINUED | OUTPATIENT
Start: 2025-06-25 | End: 2025-06-25 | Stop reason: HOSPADM

## 2025-06-24 RX ORDER — ACETAMINOPHEN 325 MG/1
975 TABLET ORAL ONCE
Status: COMPLETED | OUTPATIENT
Start: 2025-06-24 | End: 2025-06-24

## 2025-06-24 RX ORDER — DEXTROSE 50 % IN WATER (D50W) INTRAVENOUS SYRINGE
25 ONCE
Status: COMPLETED | OUTPATIENT
Start: 2025-06-24 | End: 2025-06-24

## 2025-06-24 RX ORDER — INSULIN LISPRO 100 [IU]/ML
0-5 INJECTION, SOLUTION INTRAVENOUS; SUBCUTANEOUS
Status: DISCONTINUED | OUTPATIENT
Start: 2025-06-25 | End: 2025-06-25 | Stop reason: HOSPADM

## 2025-06-24 RX ORDER — ENOXAPARIN SODIUM 100 MG/ML
40 INJECTION SUBCUTANEOUS EVERY 12 HOURS SCHEDULED
Status: DISCONTINUED | OUTPATIENT
Start: 2025-06-24 | End: 2025-06-25 | Stop reason: HOSPADM

## 2025-06-24 RX ORDER — DESVENLAFAXINE 50 MG/1
50 TABLET, EXTENDED RELEASE ORAL DAILY
Status: DISCONTINUED | OUTPATIENT
Start: 2025-06-24 | End: 2025-06-24

## 2025-06-24 RX ORDER — CETIRIZINE HYDROCHLORIDE 10 MG/1
10 TABLET ORAL NIGHTLY PRN
Status: DISCONTINUED | OUTPATIENT
Start: 2025-06-24 | End: 2025-06-25 | Stop reason: HOSPADM

## 2025-06-24 RX ORDER — CIPROFLOXACIN 500 MG/1
500 TABLET, FILM COATED ORAL ONCE
Status: COMPLETED | OUTPATIENT
Start: 2025-06-24 | End: 2025-06-24

## 2025-06-24 RX ORDER — PSYLLIUM HUSK 0.4 G
1 CAPSULE ORAL DAILY
Status: DISCONTINUED | OUTPATIENT
Start: 2025-06-24 | End: 2025-06-25 | Stop reason: HOSPADM

## 2025-06-24 RX ORDER — SULFAMETHOXAZOLE AND TRIMETHOPRIM 800; 160 MG/1; MG/1
1 TABLET ORAL ONCE
Status: DISCONTINUED | OUTPATIENT
Start: 2025-06-24 | End: 2025-06-24

## 2025-06-24 RX ORDER — DEXTROSE 50 % IN WATER (D50W) INTRAVENOUS SYRINGE
25
Status: DISCONTINUED | OUTPATIENT
Start: 2025-06-24 | End: 2025-06-25 | Stop reason: HOSPADM

## 2025-06-24 RX ORDER — DESVENLAFAXINE SUCCINATE 25 MG/1
25 TABLET, EXTENDED RELEASE ORAL DAILY
Status: DISCONTINUED | OUTPATIENT
Start: 2025-06-24 | End: 2025-06-24

## 2025-06-24 RX ORDER — LANOLIN ALCOHOL/MO/W.PET/CERES
241.3 CREAM (GRAM) TOPICAL NIGHTLY
Status: DISCONTINUED | OUTPATIENT
Start: 2025-06-25 | End: 2025-06-25 | Stop reason: HOSPADM

## 2025-06-24 RX ORDER — METOCLOPRAMIDE 10 MG/1
10 TABLET ORAL EVERY 6 HOURS
Status: DISCONTINUED | OUTPATIENT
Start: 2025-06-24 | End: 2025-06-25 | Stop reason: HOSPADM

## 2025-06-24 RX ORDER — LANOLIN ALCOHOL/MO/W.PET/CERES
800 CREAM (GRAM) TOPICAL ONCE
Status: COMPLETED | OUTPATIENT
Start: 2025-06-24 | End: 2025-06-24

## 2025-06-24 RX ORDER — IPRATROPIUM BROMIDE 21 UG/1
2 SPRAY, METERED NASAL 2 TIMES DAILY
Status: DISCONTINUED | OUTPATIENT
Start: 2025-06-24 | End: 2025-06-24 | Stop reason: CLARIF

## 2025-06-24 RX ORDER — POTASSIUM CHLORIDE 20 MEQ/1
40 TABLET, EXTENDED RELEASE ORAL ONCE
Status: COMPLETED | OUTPATIENT
Start: 2025-06-24 | End: 2025-06-24

## 2025-06-24 RX ORDER — DEXTROSE MONOHYDRATE AND SODIUM CHLORIDE 5; .45 G/100ML; G/100ML
100 INJECTION, SOLUTION INTRAVENOUS CONTINUOUS
Status: DISCONTINUED | OUTPATIENT
Start: 2025-06-24 | End: 2025-06-25 | Stop reason: HOSPADM

## 2025-06-24 RX ORDER — POLYETHYLENE GLYCOL 3350 17 G/17G
17 POWDER, FOR SOLUTION ORAL DAILY
Status: DISCONTINUED | OUTPATIENT
Start: 2025-06-24 | End: 2025-06-25 | Stop reason: HOSPADM

## 2025-06-24 RX ORDER — BUSPIRONE HYDROCHLORIDE 10 MG/1
30 TABLET ORAL 2 TIMES DAILY
Status: DISCONTINUED | OUTPATIENT
Start: 2025-06-24 | End: 2025-06-25 | Stop reason: HOSPADM

## 2025-06-24 RX ORDER — PROMETHAZINE HYDROCHLORIDE 25 MG/1
25 SUPPOSITORY RECTAL EVERY 6 HOURS PRN
Status: DISCONTINUED | OUTPATIENT
Start: 2025-06-24 | End: 2025-06-25 | Stop reason: HOSPADM

## 2025-06-24 RX ORDER — LISDEXAMFETAMINE DIMESYLATE 70 MG/1
70 CAPSULE ORAL EVERY MORNING
Status: DISCONTINUED | OUTPATIENT
Start: 2025-06-25 | End: 2025-06-25 | Stop reason: HOSPADM

## 2025-06-24 RX ORDER — PREGABALIN 75 MG/1
150 CAPSULE ORAL 2 TIMES DAILY
Status: DISCONTINUED | OUTPATIENT
Start: 2025-06-24 | End: 2025-06-25 | Stop reason: HOSPADM

## 2025-06-24 RX ORDER — LAMOTRIGINE 100 MG/1
200 TABLET ORAL 2 TIMES DAILY
Status: DISCONTINUED | OUTPATIENT
Start: 2025-06-24 | End: 2025-06-25 | Stop reason: HOSPADM

## 2025-06-24 RX ADMIN — ACETAMINOPHEN 975 MG: 325 TABLET ORAL at 14:20

## 2025-06-24 RX ADMIN — POTASSIUM CHLORIDE 40 MEQ: 1500 TABLET, EXTENDED RELEASE ORAL at 18:47

## 2025-06-24 RX ADMIN — BUSPIRONE HYDROCHLORIDE 30 MG: 10 TABLET ORAL at 23:52

## 2025-06-24 RX ADMIN — Medication 1 TABLET: at 23:52

## 2025-06-24 RX ADMIN — DEXTROSE MONOHYDRATE 25 G: 25 INJECTION, SOLUTION INTRAVENOUS at 18:47

## 2025-06-24 RX ADMIN — CIPROFLOXACIN 500 MG: 500 TABLET ORAL at 18:47

## 2025-06-24 RX ADMIN — Medication 2 TABLET: at 18:47

## 2025-06-24 RX ADMIN — METOCLOPRAMIDE 10 MG: 10 TABLET ORAL at 23:52

## 2025-06-24 RX ADMIN — DICYCLOMINE HYDROCHLORIDE 20 MG: 20 TABLET ORAL at 23:52

## 2025-06-24 RX ADMIN — SODIUM CHLORIDE, SODIUM LACTATE, POTASSIUM CHLORIDE, AND CALCIUM CHLORIDE 1000 ML: .6; .31; .03; .02 INJECTION, SOLUTION INTRAVENOUS at 14:39

## 2025-06-24 RX ADMIN — DEXTROSE AND SODIUM CHLORIDE 100 ML/HR: 5; .45 INJECTION, SOLUTION INTRAVENOUS at 19:19

## 2025-06-24 RX ADMIN — PREGABALIN 150 MG: 75 CAPSULE ORAL at 23:52

## 2025-06-24 RX ADMIN — ENOXAPARIN SODIUM 40 MG: 40 INJECTION SUBCUTANEOUS at 23:52

## 2025-06-24 RX ADMIN — LAMOTRIGINE 200 MG: 100 TABLET ORAL at 23:52

## 2025-06-24 RX ADMIN — DEXTROSE MONOHYDRATE 25 G: 25 INJECTION, SOLUTION INTRAVENOUS at 15:59

## 2025-06-24 SDOH — SOCIAL STABILITY: SOCIAL INSECURITY: WITHIN THE LAST YEAR, HAVE YOU BEEN HUMILIATED OR EMOTIONALLY ABUSED IN OTHER WAYS BY YOUR PARTNER OR EX-PARTNER?: NO

## 2025-06-24 SDOH — SOCIAL STABILITY: SOCIAL INSECURITY: WITHIN THE LAST YEAR, HAVE YOU BEEN AFRAID OF YOUR PARTNER OR EX-PARTNER?: NO

## 2025-06-24 SDOH — SOCIAL STABILITY: SOCIAL INSECURITY: DO YOU FEEL ANYONE HAS EXPLOITED OR TAKEN ADVANTAGE OF YOU FINANCIALLY OR OF YOUR PERSONAL PROPERTY?: NO

## 2025-06-24 SDOH — ECONOMIC STABILITY: FOOD INSECURITY: WITHIN THE PAST 12 MONTHS, THE FOOD YOU BOUGHT JUST DIDN'T LAST AND YOU DIDN'T HAVE MONEY TO GET MORE.: NEVER TRUE

## 2025-06-24 SDOH — ECONOMIC STABILITY: INCOME INSECURITY: IN THE PAST 12 MONTHS HAS THE ELECTRIC, GAS, OIL, OR WATER COMPANY THREATENED TO SHUT OFF SERVICES IN YOUR HOME?: NO

## 2025-06-24 SDOH — SOCIAL STABILITY: SOCIAL INSECURITY: HAS ANYONE EVER THREATENED TO HURT YOUR FAMILY OR YOUR PETS?: NO

## 2025-06-24 SDOH — SOCIAL STABILITY: SOCIAL INSECURITY: HAVE YOU HAD THOUGHTS OF HARMING ANYONE ELSE?: NO

## 2025-06-24 SDOH — ECONOMIC STABILITY: FOOD INSECURITY: WITHIN THE PAST 12 MONTHS, YOU WORRIED THAT YOUR FOOD WOULD RUN OUT BEFORE YOU GOT THE MONEY TO BUY MORE.: NEVER TRUE

## 2025-06-24 SDOH — SOCIAL STABILITY: SOCIAL INSECURITY: WERE YOU ABLE TO COMPLETE ALL THE BEHAVIORAL HEALTH SCREENINGS?: YES

## 2025-06-24 SDOH — SOCIAL STABILITY: SOCIAL INSECURITY: ABUSE: ADULT

## 2025-06-24 SDOH — SOCIAL STABILITY: SOCIAL INSECURITY: ARE YOU OR HAVE YOU BEEN THREATENED OR ABUSED PHYSICALLY, EMOTIONALLY, OR SEXUALLY BY ANYONE?: NO

## 2025-06-24 SDOH — SOCIAL STABILITY: SOCIAL INSECURITY: DO YOU FEEL UNSAFE GOING BACK TO THE PLACE WHERE YOU ARE LIVING?: NO

## 2025-06-24 SDOH — SOCIAL STABILITY: SOCIAL INSECURITY: DOES ANYONE TRY TO KEEP YOU FROM HAVING/CONTACTING OTHER FRIENDS OR DOING THINGS OUTSIDE YOUR HOME?: NO

## 2025-06-24 SDOH — SOCIAL STABILITY: SOCIAL INSECURITY: HAVE YOU HAD ANY THOUGHTS OF HARMING ANYONE ELSE?: NO

## 2025-06-24 SDOH — SOCIAL STABILITY: SOCIAL INSECURITY: ARE THERE ANY APPARENT SIGNS OF INJURIES/BEHAVIORS THAT COULD BE RELATED TO ABUSE/NEGLECT?: NO

## 2025-06-24 ASSESSMENT — ENCOUNTER SYMPTOMS
NUMBNESS: 0
FREQUENCY: 0
NECK PAIN: 0
HEADACHES: 0
ABDOMINAL PAIN: 0
FATIGUE: 0
SHORTNESS OF BREATH: 0
NAUSEA: 0
DIAPHORESIS: 0
JOINT SWELLING: 0
FEVER: 0
CHEST TIGHTNESS: 0
WEAKNESS: 1
DIARRHEA: 0
STRIDOR: 0
CHILLS: 0
COUGH: 0
ACTIVITY CHANGE: 0
POLYPHAGIA: 0
EYE ITCHING: 0
HALLUCINATIONS: 0
HEMATURIA: 0
SINUS PAIN: 0
TREMORS: 0
APPETITE CHANGE: 0
BACK PAIN: 0
POLYDIPSIA: 0

## 2025-06-24 ASSESSMENT — ACTIVITIES OF DAILY LIVING (ADL)
WALKS IN HOME: INDEPENDENT
LACK_OF_TRANSPORTATION: NO
TOILETING: INDEPENDENT
JUDGMENT_ADEQUATE_SAFELY_COMPLETE_DAILY_ACTIVITIES: YES
DRESSING YOURSELF: INDEPENDENT
BATHING: INDEPENDENT
FEEDING YOURSELF: INDEPENDENT
HEARING - LEFT EAR: FUNCTIONAL
GROOMING: INDEPENDENT
HEARING - RIGHT EAR: FUNCTIONAL
ASSISTIVE_DEVICE: EYEGLASSES
ADEQUATE_TO_COMPLETE_ADL: YES
PATIENT'S MEMORY ADEQUATE TO SAFELY COMPLETE DAILY ACTIVITIES?: YES

## 2025-06-24 ASSESSMENT — COGNITIVE AND FUNCTIONAL STATUS - GENERAL
MOBILITY SCORE: 24
DAILY ACTIVITIY SCORE: 24
PATIENT BASELINE BEDBOUND: NO

## 2025-06-24 ASSESSMENT — PAIN SCALES - PAIN ASSESSMENT IN ADVANCED DEMENTIA (PAINAD)
TOTALSCORE: 0
BREATHING: NORMAL
BODYLANGUAGE: RELAXED
FACIALEXPRESSION: SMILING OR INEXPRESSIVE
CONSOLABILITY: NO NEED TO CONSOLE

## 2025-06-24 ASSESSMENT — LIFESTYLE VARIABLES
HOW OFTEN DO YOU HAVE 6 OR MORE DRINKS ON ONE OCCASION: NEVER
AUDIT-C TOTAL SCORE: 0
SKIP TO QUESTIONS 9-10: 1
HOW MANY STANDARD DRINKS CONTAINING ALCOHOL DO YOU HAVE ON A TYPICAL DAY: PATIENT DOES NOT DRINK
HOW OFTEN DO YOU HAVE A DRINK CONTAINING ALCOHOL: NEVER
AUDIT-C TOTAL SCORE: 0

## 2025-06-24 ASSESSMENT — PAIN SCALES - GENERAL
PAINLEVEL_OUTOF10: 4
PAINLEVEL_OUTOF10: 6

## 2025-06-24 ASSESSMENT — PAIN - FUNCTIONAL ASSESSMENT: PAIN_FUNCTIONAL_ASSESSMENT: 0-10

## 2025-06-24 ASSESSMENT — PAIN DESCRIPTION - LOCATION: LOCATION: ABDOMEN

## 2025-06-24 NOTE — ED PROVIDER NOTES
Emergency Department Provider Note             History of Present Illness   CC: Hypoglycemia    History provided by: Patient and EMS  Limitations to History: None  External Records Reviewed: prior ED provider notes, clinic notes, discharge summaries    HPI:  Kelli Silva is a 40 y.o. female with history including asthma, insulin-dependent diabetes, bipolar disorder presenting to the emergency department via EMS from an outpatient appointment [place] for hypoglycemia.  States her glucose was high this morning so she took a total of 12 units of sliding scale insulin and ate lunch, after which her glucose dropped.  Her appointment, she has noted to be slightly altered and her glucose was 42.  She drank Gatorade and it improved to the 50s. On further questioning, she reports a few weeks of watery, nonbloody diarrhea, dysuria, increased urinary frequency, and overall just not feeling well.  States her glucose has been more elevated recently and her doctor increased her long-acting insulin from 10 units to 14 units about a week ago.    Physical Exam   Triage vitals:  T 36.5 °C (97.7 °F)  HR (!) 110  /82  RR 17  O2 98 % None (Room air)    General: awake, well-appearing, no distress  Head: normocephalic, atraumatic  Eyes: pupils equal, extraocular movements grossly intact, no conjunctival injection or scleral icterus  ENT: nares patent, moist mucous membranes  Neck: supple, trachea midline, no masses  CV: mildly tachycardia, regular rhythm, well-perfused  Resp: breathing is non-labored, speaking in full sentences. Lungs are clear to auscultation bilaterally  GI: soft, non-distended, non-tender, no rebound or guarding  Extremities: no edema, no gross deformity   Neuro: alert, oriented, speech is fluent, face is symmetric, moving all extremities   Psych: Appropriate mood and affect    ED Course & Medical Decision Making     40 y.o. female with history including asthma, insulin-dependent diabetes, bipolar  disorder presenting to the emergency department via EMS from an outpatient appointment [place] for hypoglycemia. It was discovered that her dexcom was incorrectly reading, which is what led her to believe she was hyperglycemic and administered additional insulin. Despite oral intake, she continues to have recurrent hypoglycemia. She was started on D5 infusion after receiving 2 amps of D50. Her workup is otherwise notable for mild hypokalemia, UTI which could be affecting her glucose levels as well. CT shows no significant acute process.  There is evidence of hepatic steatosis. She was started on oral antibiotics. Will admit for observation and glucose monitoring.     EKG: EKG interpretation reveals sinus tachycardia, rate 104, normal axis, normal intervals, no significant ST elevation/depression, subtle nonspecific T wave abnormality    Results: Independently reviewed and interpreted by me. Please see ED course and MDM for my full interpretation.     Chronic Medical Conditions Significantly Affecting Care: as per MDM    Patient was discussed with the following consultants/services: hospitalist    Care Considerations: as per [facility]    Diagnoses as of 06/27/25 1835   Hypoglycemia   Acute cystitis without hematuria       Disposition   Admission    MD Zenia Wiggins MD  06/27/25 5467

## 2025-06-24 NOTE — ED TRIAGE NOTES
Pt presents to the ED from [facility] with c/o low blood sugar. Pt states she was feeling off, having issues urinating and stating her dexcom did not catch the low either. Pt at [facility] for outpt treatment.

## 2025-06-25 ENCOUNTER — PHARMACY VISIT (OUTPATIENT)
Dept: PHARMACY | Facility: CLINIC | Age: 40
End: 2025-06-25
Payer: MEDICARE

## 2025-06-25 VITALS
HEIGHT: 62 IN | WEIGHT: 230 LBS | DIASTOLIC BLOOD PRESSURE: 71 MMHG | TEMPERATURE: 97.9 F | SYSTOLIC BLOOD PRESSURE: 108 MMHG | HEART RATE: 98 BPM | RESPIRATION RATE: 18 BRPM | OXYGEN SATURATION: 98 % | BODY MASS INDEX: 42.33 KG/M2

## 2025-06-25 LAB
ANION GAP SERPL CALC-SCNC: 13 MMOL/L (ref 10–20)
BUN SERPL-MCNC: 5 MG/DL (ref 6–23)
C PEPTIDE SERPL-MCNC: 9.6 NG/ML (ref 0.7–3.9)
CALCIUM SERPL-MCNC: 8.3 MG/DL (ref 8.6–10.3)
CHLORIDE SERPL-SCNC: 107 MMOL/L (ref 98–107)
CO2 SERPL-SCNC: 22 MMOL/L (ref 21–32)
CREAT SERPL-MCNC: 0.79 MG/DL (ref 0.5–1.05)
EGFRCR SERPLBLD CKD-EPI 2021: >90 ML/MIN/1.73M*2
EST. AVERAGE GLUCOSE BLD GHB EST-MCNC: 88 MG/DL
GLUCOSE BLD MANUAL STRIP-MCNC: 115 MG/DL (ref 74–99)
GLUCOSE BLD MANUAL STRIP-MCNC: 123 MG/DL (ref 74–99)
GLUCOSE SERPL-MCNC: 103 MG/DL (ref 74–99)
HBA1C MFR BLD: 4.7 % (ref ?–5.7)
MAGNESIUM SERPL-MCNC: 1.61 MG/DL (ref 1.6–2.4)
POTASSIUM SERPL-SCNC: 4 MMOL/L (ref 3.5–5.3)
SODIUM SERPL-SCNC: 138 MMOL/L (ref 136–145)

## 2025-06-25 PROCEDURE — G0378 HOSPITAL OBSERVATION PER HR: HCPCS

## 2025-06-25 PROCEDURE — 96372 THER/PROPH/DIAG INJ SC/IM: CPT | Performed by: INTERNAL MEDICINE

## 2025-06-25 PROCEDURE — 2500000001 HC RX 250 WO HCPCS SELF ADMINISTERED DRUGS (ALT 637 FOR MEDICARE OP): Performed by: INTERNAL MEDICINE

## 2025-06-25 PROCEDURE — 80048 BASIC METABOLIC PNL TOTAL CA: CPT

## 2025-06-25 PROCEDURE — 2500000004 HC RX 250 GENERAL PHARMACY W/ HCPCS (ALT 636 FOR OP/ED)

## 2025-06-25 PROCEDURE — 96375 TX/PRO/DX INJ NEW DRUG ADDON: CPT

## 2025-06-25 PROCEDURE — 82947 ASSAY GLUCOSE BLOOD QUANT: CPT | Mod: 91

## 2025-06-25 PROCEDURE — RXMED WILLOW AMBULATORY MEDICATION CHARGE

## 2025-06-25 PROCEDURE — 83735 ASSAY OF MAGNESIUM: CPT

## 2025-06-25 PROCEDURE — 2500000002 HC RX 250 W HCPCS SELF ADMINISTERED DRUGS (ALT 637 FOR MEDICARE OP, ALT 636 FOR OP/ED)

## 2025-06-25 PROCEDURE — 96361 HYDRATE IV INFUSION ADD-ON: CPT

## 2025-06-25 PROCEDURE — 99239 HOSP IP/OBS DSCHRG MGMT >30: CPT

## 2025-06-25 PROCEDURE — 94640 AIRWAY INHALATION TREATMENT: CPT | Mod: MUEWO

## 2025-06-25 PROCEDURE — 2500000004 HC RX 250 GENERAL PHARMACY W/ HCPCS (ALT 636 FOR OP/ED): Performed by: INTERNAL MEDICINE

## 2025-06-25 RX ORDER — DIPHENHYDRAMINE HYDROCHLORIDE 50 MG/ML
25 INJECTION, SOLUTION INTRAMUSCULAR; INTRAVENOUS ONCE
Status: COMPLETED | OUTPATIENT
Start: 2025-06-25 | End: 2025-06-25

## 2025-06-25 RX ORDER — HEPARIN 100 UNIT/ML
5 SYRINGE INTRAVENOUS ONCE
Status: COMPLETED | OUTPATIENT
Start: 2025-06-25 | End: 2025-06-25

## 2025-06-25 RX ORDER — INSULIN GLARGINE 100 [IU]/ML
10 INJECTION, SOLUTION SUBCUTANEOUS NIGHTLY
COMMUNITY

## 2025-06-25 RX ORDER — LORAZEPAM 1 MG/1
1 TABLET ORAL 2 TIMES DAILY PRN
COMMUNITY

## 2025-06-25 RX ORDER — IPRATROPIUM BROMIDE AND ALBUTEROL SULFATE 2.5; .5 MG/3ML; MG/3ML
3 SOLUTION RESPIRATORY (INHALATION)
Status: DISCONTINUED | OUTPATIENT
Start: 2025-06-25 | End: 2025-06-25 | Stop reason: HOSPADM

## 2025-06-25 RX ORDER — APREPITANT 80 MG/1
80 CAPSULE ORAL
COMMUNITY
Start: 2025-05-08

## 2025-06-25 RX ORDER — ALBUTEROL SULFATE 0.83 MG/ML
2.5 SOLUTION RESPIRATORY (INHALATION) EVERY 2 HOUR PRN
Status: DISCONTINUED | OUTPATIENT
Start: 2025-06-25 | End: 2025-06-25 | Stop reason: HOSPADM

## 2025-06-25 RX ORDER — IPRATROPIUM BROMIDE AND ALBUTEROL SULFATE 2.5; .5 MG/3ML; MG/3ML
3 SOLUTION RESPIRATORY (INHALATION) 3 TIMES DAILY PRN
COMMUNITY
Start: 2025-04-14

## 2025-06-25 RX ORDER — NITROFURANTOIN 25; 75 MG/1; MG/1
100 CAPSULE ORAL 2 TIMES DAILY
Qty: 10 CAPSULE | Refills: 0 | Status: SHIPPED | OUTPATIENT
Start: 2025-06-25 | End: 2025-06-30

## 2025-06-25 RX ORDER — LANCETS
EACH MISCELLANEOUS
Qty: 100 EACH | Refills: 0 | Status: SHIPPED | OUTPATIENT
Start: 2025-06-25

## 2025-06-25 RX ORDER — INSULIN ASPART 100 [IU]/ML
INJECTION, SOLUTION INTRAVENOUS; SUBCUTANEOUS
COMMUNITY
Start: 2025-06-20

## 2025-06-25 RX ORDER — ALBUTEROL SULFATE 0.83 MG/ML
2.5 SOLUTION RESPIRATORY (INHALATION) EVERY 6 HOURS PRN
Status: DISCONTINUED | OUTPATIENT
Start: 2025-06-25 | End: 2025-06-25

## 2025-06-25 RX ORDER — ASPIRIN 325 MG
1.25 TABLET, DELAYED RELEASE (ENTERIC COATED) ORAL WEEKLY
COMMUNITY
Start: 2025-06-09

## 2025-06-25 RX ORDER — DEXTROSE 4 G
TABLET,CHEWABLE ORAL
Qty: 1 EACH | Refills: 0 | Status: SHIPPED | OUTPATIENT
Start: 2025-06-25

## 2025-06-25 RX ORDER — TIRZEPATIDE 7.5 MG/.5ML
7.5 INJECTION, SOLUTION SUBCUTANEOUS WEEKLY
COMMUNITY
Start: 2025-03-04

## 2025-06-25 RX ADMIN — Medication 1 TABLET: at 10:00

## 2025-06-25 RX ADMIN — ALBUTEROL SULFATE 2.5 MG: 2.5 SOLUTION RESPIRATORY (INHALATION) at 10:59

## 2025-06-25 RX ADMIN — BUSPIRONE HYDROCHLORIDE 30 MG: 10 TABLET ORAL at 08:51

## 2025-06-25 RX ADMIN — IPRATROPIUM BROMIDE AND ALBUTEROL SULFATE 3 ML: 2.5; .5 SOLUTION RESPIRATORY (INHALATION) at 14:51

## 2025-06-25 RX ADMIN — ENOXAPARIN SODIUM 40 MG: 40 INJECTION SUBCUTANEOUS at 08:51

## 2025-06-25 RX ADMIN — LAMOTRIGINE 200 MG: 100 TABLET ORAL at 08:52

## 2025-06-25 RX ADMIN — DICYCLOMINE HYDROCHLORIDE 20 MG: 20 TABLET ORAL at 10:36

## 2025-06-25 RX ADMIN — HEPARIN 500 UNITS: 100 SYRINGE at 13:39

## 2025-06-25 RX ADMIN — IVABRADINE 7.5 MG: 5 TABLET, FILM COATED ORAL at 08:52

## 2025-06-25 RX ADMIN — DICYCLOMINE HYDROCHLORIDE 20 MG: 20 TABLET ORAL at 06:13

## 2025-06-25 RX ADMIN — DIPHENHYDRAMINE HYDROCHLORIDE 25 MG: 50 INJECTION, SOLUTION INTRAMUSCULAR; INTRAVENOUS at 10:36

## 2025-06-25 RX ADMIN — PREGABALIN 150 MG: 75 CAPSULE ORAL at 08:51

## 2025-06-25 RX ADMIN — METOCLOPRAMIDE 10 MG: 10 TABLET ORAL at 06:13

## 2025-06-25 RX ADMIN — DEXTROSE AND SODIUM CHLORIDE 100 ML/HR: 5; .45 INJECTION, SOLUTION INTRAVENOUS at 05:06

## 2025-06-25 RX ADMIN — DESVENLAFAXINE 75 MG: 50 TABLET, FILM COATED, EXTENDED RELEASE ORAL at 08:53

## 2025-06-25 ASSESSMENT — PAIN SCALES - GENERAL: PAINLEVEL_OUTOF10: 0 - NO PAIN

## 2025-06-25 ASSESSMENT — ACTIVITIES OF DAILY LIVING (ADL): LACK_OF_TRANSPORTATION: NO

## 2025-06-25 NOTE — H&P
History Of Present Illness  Kelli Silva is a 40 y.o. female presenting with Hypoglycemia     Patient is a 40 year old female with PMHX of Bipolar disorder, non-epileptic convulsions, POTS, Migraine, Asthma, GERD, DVT/PE, and DM presenting from outpatient appointment for hypoglycemia. Patient takes 14 units of lantus at night and utilizes sliding scale.  She utilizes her dexcom for glucose monitoring. This morning she reported she took  8 units of insulin during breakfast and 4 units at lunch. She stated her dexcom read  point higher than her usual and that's why she gave herself insulin and even while checking her sugars was not low per her report but she felt diaphoretic, lightheaded and shakiness.  She was taken to the ED and was found to have glucose of 50.  She also admitted to frequency and dysuria, but no diarrhea. She feels better.     She admitted to similar episode before back in 4/10/25 and her hemoglobin A1C was 4.7 at that time. She was counseled to follow up with endocrine.  She reports at home her glucose rans typically 100-250.       In the ED her  glucose was 70 then 41 and 56 at 1815 and patient was placed on D5 infusion and admitted to observation unit.     Past Medical History  Bipolar disorder, non-epileptic convulsions, POTS, Migraine, Asthma, GERD, DVT/PE, and DM     Surgical History  Cholecystectomy      Social History  She reports that she has never smoked. She has never used smokeless tobacco. She reports that she does not drink alcohol and does not use drugs.    Family History  Father had glioblastoma      Allergies  Aspirin, Cigarette smoke, Fish containing products, Iodinated contrast media, Ketorolac, Ketorolac tromethamine, Ondansetron, Other, Prochlorperazine, Shellfish containing products, Sumatriptan, Azithromycin, Ceftriaxone, Doxycycline, Dulaglutide, House dust, Liraglutide, Metformin, Metoclopramide, Prednisone, Sitagliptin, [id number] antimigraine agents, and  Zolmitriptan    Review of Systems   Constitutional:  Negative for activity change, appetite change, chills, diaphoresis, fatigue and fever.   HENT:  Negative for drooling, ear pain and sinus pain.    Eyes:  Negative for itching.   Respiratory:  Negative for cough, chest tightness, shortness of breath and stridor.    Cardiovascular:  Negative for chest pain.   Gastrointestinal:  Negative for abdominal pain, diarrhea and nausea.   Endocrine: Negative for cold intolerance, heat intolerance, polydipsia and polyphagia.   Genitourinary:  Negative for frequency and hematuria.   Musculoskeletal:  Negative for back pain, joint swelling and neck pain.   Neurological:  Positive for weakness. Negative for tremors, numbness and headaches.   Psychiatric/Behavioral:  Negative for hallucinations.         Physical Exam:  GEN: A&Ox3, no acute distress, appears comfortable.  Conversational and appropriate.     HEENT: moist oral mucosa   CARDIO: Normal rate and regular rhythm. No murmurs, rubs, or gallops.     PULM: LCTAB   GI: bowel sounds present, non tender, non distended   SKIN: Warm and dry, no rashes or lesions.   EXT: no cyanosis, no clubbing, no edema   PSYCH: Appropriate mood and behavior, converses and responds appropriately during exam     Last Recorded Vitals  BP (!) 129/100   Pulse (!) 110   Temp 36.5 °C (97.7 °F) (Oral)   Resp 17   Wt 104 kg (230 lb)   SpO2 97%     Relevant Results  Results for orders placed or performed during the hospital encounter of 06/24/25 (from the past 24 hours)   POCT GLUCOSE   Result Value Ref Range    POCT Glucose 70 (L) 74 - 99 mg/dL   CBC and Auto Differential   Result Value Ref Range    WBC 10.4 4.4 - 11.3 x10*3/uL    nRBC 0.0 0.0 - 0.0 /100 WBCs    RBC 4.44 4.00 - 5.20 x10*6/uL    Hemoglobin 13.9 12.0 - 16.0 g/dL    Hematocrit 41.1 36.0 - 46.0 %    MCV 93 80 - 100 fL    MCH 31.3 26.0 - 34.0 pg    MCHC 33.8 32.0 - 36.0 g/dL    RDW 11.8 11.5 - 14.5 %    Platelets 231 150 - 450 x10*3/uL     Neutrophils % 77.0 40.0 - 80.0 %    Immature Granulocytes %, Automated 0.4 0.0 - 0.9 %    Lymphocytes % 17.8 13.0 - 44.0 %    Monocytes % 4.3 2.0 - 10.0 %    Eosinophils % 0.2 0.0 - 6.0 %    Basophils % 0.3 0.0 - 2.0 %    Neutrophils Absolute 7.98 (H) 1.20 - 7.70 x10*3/uL    Immature Granulocytes Absolute, Automated 0.04 0.00 - 0.70 x10*3/uL    Lymphocytes Absolute 1.84 1.20 - 4.80 x10*3/uL    Monocytes Absolute 0.45 0.10 - 1.00 x10*3/uL    Eosinophils Absolute 0.02 0.00 - 0.70 x10*3/uL    Basophils Absolute 0.03 0.00 - 0.10 x10*3/uL   Comprehensive metabolic panel   Result Value Ref Range    Glucose 84 74 - 99 mg/dL    Sodium 138 136 - 145 mmol/L    Potassium 3.2 (L) 3.5 - 5.3 mmol/L    Chloride 106 98 - 107 mmol/L    Bicarbonate 22 21 - 32 mmol/L    Anion Gap 13 10 - 20 mmol/L    Urea Nitrogen 8 6 - 23 mg/dL    Creatinine 0.78 0.50 - 1.05 mg/dL    eGFR >90 >60 mL/min/1.73m*2    Calcium 8.7 8.6 - 10.3 mg/dL    Albumin 4.0 3.4 - 5.0 g/dL    Alkaline Phosphatase 84 33 - 110 U/L    Total Protein 6.3 (L) 6.4 - 8.2 g/dL    AST 13 9 - 39 U/L    Bilirubin, Total 0.2 0.0 - 1.2 mg/dL    ALT 20 7 - 45 U/L   Magnesium   Result Value Ref Range    Magnesium 1.56 (L) 1.60 - 2.40 mg/dL   Lipase   Result Value Ref Range    Lipase 31 9 - 82 U/L   Sars-CoV-2 and Influenza A/B PCR   Result Value Ref Range    Flu A Result Not Detected Not Detected    Flu B Result Not Detected Not Detected    Coronavirus 2019, PCR Not Detected Not Detected   ECG 12 lead   Result Value Ref Range    Ventricular Rate 104 BPM    Atrial Rate 104 BPM    IL Interval 160 ms    QRS Duration 70 ms    QT Interval 334 ms    QTC Calculation(Bazett) 439 ms    P Axis 62 degrees    R Axis 21 degrees    T Axis 50 degrees    QRS Count 17 beats    Q Onset 227 ms    P Onset 147 ms    P Offset 200 ms    T Offset 394 ms    QTC Fredericia 401 ms   POCT GLUCOSE   Result Value Ref Range    POCT Glucose 41 (L) 74 - 99 mg/dL   hCG, Urine, Qualitative   Result Value Ref Range     HCG, Urine NEGATIVE NEGATIVE   Urinalysis with Reflex Culture and Microscopic   Result Value Ref Range    Color, Urine Light-Yellow Light-Yellow, Yellow, Dark-Yellow    Appearance, Urine Clear Clear    Specific Gravity, Urine 1.018 1.005 - 1.035    pH, Urine 6.5 5.0, 5.5, 6.0, 6.5, 7.0, 7.5, 8.0    Protein, Urine NEGATIVE NEGATIVE, 10 (TRACE), 20 (TRACE) mg/dL    Glucose, Urine 70 (1+) (A) Normal mg/dL    Blood, Urine NEGATIVE NEGATIVE mg/dL    Ketones, Urine NEGATIVE NEGATIVE mg/dL    Bilirubin, Urine NEGATIVE NEGATIVE mg/dL    Urobilinogen, Urine Normal Normal mg/dL    Nitrite, Urine NEGATIVE NEGATIVE    Leukocyte Esterase, Urine 75 Connie/uL (A) NEGATIVE   Microscopic Only, Urine   Result Value Ref Range    WBC, Urine 6-10 (A) 1-5, NONE /HPF    RBC, Urine 3-5 NONE, 1-2, 3-5 /HPF    Squamous Epithelial Cells, Urine 1-9 (SPARSE) Reference range not established. /HPF    Bacteria, Urine 3+ (A) NONE SEEN /HPF    Mucus, Urine FEW Reference range not established. /LPF    Hyaline Casts, Urine OCCASIONAL (A) NONE /LPF   POCT GLUCOSE   Result Value Ref Range    POCT Glucose 111 (H) 74 - 99 mg/dL   POCT GLUCOSE   Result Value Ref Range    POCT Glucose 56 (L) 74 - 99 mg/dL   POCT GLUCOSE   Result Value Ref Range    POCT Glucose 100 (H) 74 - 99 mg/dL     *Note: Due to a large number of results and/or encounters for the requested time period, some results have not been displayed. A complete set of results can be found in Results Review.      Scheduled medications  Scheduled Medications[1]  Continuous medications  Continuous Medications[2]  PRN medications  PRN Medications[3]     Assessment:   Patient is a 40-year-old female with PMH significant for Bipolar disorder, non-epileptic convulsions, POTS, Migraine, Asthma, GERD, DVT/PE, and DM for hypoglycemia after insulin use    PLAN:  Hypoglycemia        1. Currently on D5W with POCT q 4 hours        2. Follow up insulin level, C-peptide, pro-insulin and sulfonylureas         3.  Hypoglycemia protocol      2. UTI      1. Continue ceftriaxone 1 gram daily       2. Awaiting urine culture     3. Bipolar Disorder      1. Buproprion 300mg, Buspirone 30mg BID, Cariprazine 6mg, Desvenlafaxine 75mg, Lamotrigine 200mg BID, Pregabalin 150mg BID, Ziprasidone      4. Hx of ADHD      1. Atomoxetine, Lisdexamfetamine 70mg      5. Non-Epileptic Seizures      1. Continue Lamotrigine      6. Asthma      1. On Tezepelumab at home     7. POTS      1. Ivabradine 7.5mg BID      2. Can resume midodrine 5 mg BID   -MIdodrine 5mg BID      Dispo: Patient last hemoglobin A1C was 4.7 but she continues to use insulin. Per patient her PCP is managing her insulin levels. She needs a follow up for proper management of her insulin.     Sarah San DO         [1] [START ON 8/14/2025] ARIPiprazole ER (2 month), 960 mg, intramuscular, q8 weeks  busPIRone, 30 mg, oral, BID  calcium carbonate-vitamin D3, 1 tablet, oral, Daily  [START ON 6/25/2025] desvenlafaxine, 75 mg, oral, Daily  dicyclomine, 20 mg, oral, Before meals & nightly  [Held by provider] diphenhydrAMINE, 50 mg, oral, q6h  enoxaparin, 40 mg, subcutaneous, q12h HELENA  [START ON 6/25/2025] insulin lispro, 0-5 Units, subcutaneous, TID AC  [START ON 6/25/2025] ivabradine, 7.5 mg, oral, BID  lamoTRIgine, 200 mg, oral, BID  [START ON 6/25/2025] lisdexamfetamine, 70 mg, oral, q AM  [START ON 6/25/2025] magnesium oxide, 241.3 mg of elemental magnesium, oral, Nightly  metoclopramide, 10 mg, oral, q6h  polyethylene glycol, 17 g, oral, Daily  pregabalin, 150 mg, oral, BID  [START ON 6/25/2025] vonoprazan, 20 mg, oral, q AM  [2] dextrose 5%-0.45 % sodium chloride, 100 mL/hr, Last Rate: 100 mL/hr (06/24/25 1919)  [3] PRN medications: cetirizine, dextrose, dextrose, glucagon, glucagon, lasmiditan, promethazine

## 2025-06-25 NOTE — PROGRESS NOTES
06/25/25 1024   Discharge Planning   Living Arrangements Spouse/significant other   Support Systems Spouse/significant other   Assistance Needed None   Type of Residence Private residence   Number of Stairs to Enter Residence 2   Number of Stairs Within Residence 12   Home or Post Acute Services Community services  (Receives treatment at [facility] for Bipolar)   Expected Discharge Disposition Home   Does the patient need discharge transport arranged? Yes   RoundTrip coordination needed? Yes   Financial Resource Strain   How hard is it for you to pay for the very basics like food, housing, medical care, and heating? Not hard   Housing Stability   In the last 12 months, was there a time when you were not able to pay the mortgage or rent on time? N   At any time in the past 12 months, were you homeless or living in a shelter (including now)? N   Transportation Needs   In the past 12 months, has lack of transportation kept you from medical appointments or from getting medications? no   In the past 12 months, has lack of transportation kept you from meetings, work, or from getting things needed for daily living? No     Met with patient at bedside to  discuss her preferences for care upon discharge. Discussed how patient manages health at home. Lives with her partner in a house .  Independent in all ADLs.  No home O2, dialysis, or assistive devices.  Patient does receive treatment for bipolar at [facility] and [facility].  Patient has a port--has a hx of kidney cancer and said she has to receive a lot of infusions so they decided to keep the port in.  No additional resources or needs identified. Reviewed today's plan of care.  Patient verbalized understanding as evidenced by teach back method. Patient plans to return home at discharge & follow up with her PCP.      Patient is not sure if she will need transportation home.  Says it is dependent on the time of discharge.  Explained that we can set up  transportation if she needs it.    Patient complaining of a migraine.  I turned off her room lights and updated Holly MAJANO and Samra RAY.   RICHIE JoeN RN [facility]

## 2025-06-25 NOTE — CARE PLAN
The patient's goals for the shift include      The clinical goals for the shift include to remain HDS this shift      Problem: Pain - Adult  Goal: Verbalizes/displays adequate comfort level or baseline comfort level  Outcome: Progressing     Problem: Safety - Adult  Goal: Free from fall injury  Outcome: Progressing     Problem: Discharge Planning  Goal: Discharge to home or other facility with appropriate resources  Outcome: Progressing     Problem: Chronic Conditions and Co-morbidities  Goal: Patient's chronic conditions and co-morbidity symptoms are monitored and maintained or improved  Outcome: Progressing     Problem: Nutrition  Goal: Nutrient intake appropriate for maintaining nutritional needs  Outcome: Progressing

## 2025-06-25 NOTE — CONSULTS
"Reason For Consult  CGM concern; hypoglycemia    History Of Present Illness  Kelli Silva is a 40 y.o. female presenting with hypoglycemia.     Past Medical History  She has a past medical history of Normal routine history and physical examination, Normal routine history and physical examination, and Other specified health status.    Surgical History  She has a past surgical history that includes Other surgical history (05/08/2020).     Social History  She reports that she has never smoked. She has never used smokeless tobacco. She reports that she does not drink alcohol and does not use drugs.    Family History  Family History[1]     Allergies  Aspirin, Cigarette smoke, Fish containing products, Iodinated contrast media, Ketorolac, Ketorolac tromethamine, Ondansetron, Other, Prochlorperazine, Shellfish containing products, Sumatriptan, Azithromycin, Ceftriaxone, Doxycycline, Dulaglutide, House dust, Liraglutide, Metformin, Metoclopramide, Prednisone, Sitagliptin, Umjhkyer-9-[id number] antimigraine agents, and Zolmitriptan    Review of Systems  Pt endorsing migraine headache     Physical Exam  N/A, A&Ox3  Lying in bed.      Last Recorded Vitals  Blood pressure 110/58, pulse 93, temperature 36.4 °C (97.5 °F), temperature source Temporal, resp. rate 20, height 1.575 m (5' 2\"), weight 104 kg (230 lb), SpO2 98%.    Relevant Results  HbA1C 4.7%  Last POCT BBG 123mg/dl  Dextrose gtt held     Assessment/Plan   Kelli Silva is a 40 year old female with PHM including Bipolar disorder, non-epileptic convulsions, POTS, Migraine, Asthma, GERD, DVT/PE, and DM.    -uses Dexcom G7 for CGM   -thought it was reading higher than normal  -states she has a UTI, could be contributing to elevated BG     -gave insulin per scale for breakfast and lunch then developed hypoglycemia   -has fingerstick glucometer, did not correlate results  -new Dexcom sensor given if pt wanted to replace   -contact [facility] for further troubleshooting  -endorses " decreased PO intake day(s) prior to hypo 2/2 nausea; gastroparesis   -was trying to manage with Gatorade; not able to eat food   -does not have endocrinology provider,   -PCP @ [facility] manages insulin and DM   -HbA1C 4.7%    -is communication with PCP via [facility] for follow up      All questions answered at this time.    Corby Rao RN  Time 25 minutes       [1]   Family History  Problem Relation Name Age of Onset    Brain cancer Father      Kidney cancer Maternal Grandfather      Kidney cancer Sibling

## 2025-06-25 NOTE — CARE PLAN
The patient's goals for the shift include      The clinical goals for the shift include to remain HDS. No hypoglycemic episodes    Over the shift, the patient did make progress toward the following goals. Patients blood glucose is stable at this time      Problem: Pain - Adult  Goal: Verbalizes/displays adequate comfort level or baseline comfort level  Outcome: Progressing

## 2025-06-25 NOTE — DISCHARGE SUMMARY
Discharge Diagnosis  Hypoglycemia     Issues Requiring Follow-Up  PCP  Endocrinology    Discharge Meds     Medication List      PAUSE taking these medications     NovoLOG Flexpen U-100 Insulin 100 unit/mL (3 mL) pen; Wait to take this   until your doctor or other care provider tells you to start again.;   Generic drug: insulin aspart     START taking these medications     Accu-Chek Guide Glucose Meter misc; Generic drug: blood-glucose meter;   Use as needed if feeling lightheaded, shaky, sweating, or nauseous   Accu-Chek Softclix Lancets misc; Generic drug: lancets; Use as needed if   feeling lightheaded, shaky, sweating, or nauseous   nitrofurantoin (macrocrystal-monohydrate) 100 mg capsule; Commonly known   as: Macrobid; Take 1 capsule (100 mg) by mouth 2 times a day for 5 days.     CHANGE how you take these medications     * Accu-Chek Guide test strips; Generic drug: blood sugar diagnostic;   What changed: Another medication with the same name was added. Make sure   you understand how and when to take each.   * blood sugar diagnostic; Use as needed if feeling lightheaded, shaky,   sweating, or nauseous; What changed: You were already taking a medication   with the same name, and this prescription was added. Make sure you   understand how and when to take each.   Lantus U-100 Insulin 100 unit/mL injection; Generic drug: insulin   glargine  * This list has 2 medication(s) that are the same as other medications   prescribed for you. Read the directions carefully, and ask your doctor or   other care provider to review them with you.     CONTINUE taking these medications     Abilify Asimtufii 960 mg/3.2 mL injection; Generic drug: ARIPiprazole ER   (2 month)   aprepitant 80 mg capsule; Commonly known as: Emend   Autolet lancing device   azelastine-fluticasone 137-50 mcg/spray nasal spray; Commonly known as:   Dymista   busPIRone 30 mg tablet; Commonly known as: Buspar; Take 1 tablet (30 mg)   by mouth 2 times a day.    calcium carbonate-vitamin D3 250 mg-3.125 mcg (125 unit) tablet   cholecalciferol 1.25 mg (50,000 units) capsule; Commonly known as:   Vitamin D-3   Corlanor 7.5 mg tablet; Generic drug: ivabradine   * desvenlafaxine succinate 25 mg 24 hour tablet; Commonly known as:   Pristiq   * desvenlafaxine 50 mg 24 hr tablet; Commonly known as: Pristiq   Dexcom G7 Sensor device; Generic drug: blood-glucose sensor   dicyclomine 20 mg tablet; Commonly known as: Bentyl   diphenhydrAMINE 25 mg tablet; Commonly known as: Sominex; Take 2 tablets   (50 mg) by mouth every 6 hours for 5 days.   glucose 4 gram chewable tablet   Gvoke HypoPen 2-Pack 1 mg/0.2 mL auto-injector; Generic drug: glucagon   ipratropium 21 mcg (0.03 %) nasal spray; Commonly known as: Atrovent   ipratropium-albuteroL 0.5-2.5 mg/3 mL nebulizer solution; Commonly known   as: Duo-Neb   lamoTRIgine 200 mg disintegrating tablet; Commonly known as: LaMICtal;   Take 1 tablet (200 mg) by mouth 2 times a day.   lisdexamfetamine 70 mg capsule; Commonly known as: Vyvanse; Take 1   capsule (70 mg) by mouth once daily in the morning.   LORazepam 1 mg tablet; Commonly known as: Ativan   magnesium oxide 400 mg tablet; Commonly known as: Mag-Ox   metoclopramide 10 mg tablet; Commonly known as: Reglan; Take 1 tablet   (10 mg) by mouth every 6 hours for 7 days.   midodrine 5 mg tablet; Commonly known as: Proamatine; Take 1 tablet (5   mg) by mouth 2 times a day.   Mounjaro 7.5 mg/0.5 mL pen injector; Generic drug: tirzepatide   pregabalin 150 mg capsule; Commonly known as: Lyrica   promethazine 25 mg suppository; Commonly known as: Phenergan   Reyvow 100 mg tablet; Generic drug: lasmiditan   Tezspire SubQ syringe; Generic drug: tezepelumab-ekko   Trelegy Ellipta 200-62.5-25 mcg blister with device; Generic drug:   fluticasone-umeclidin-vilanter   Voquezna 20 mg tablet; Generic drug: vonoprazan   ZyrTEC 10 mg tablet; Generic drug: cetirizine  * This list has 2 medication(s) that  are the same as other medications   prescribed for you. Read the directions carefully, and ask your doctor or   other care provider to review them with you.       Test Results Pending At Discharge  Pending Labs       Order Current Status    Extra Urine Gray Tube Collected (06/24/25 1614)    Insulin antibody In process    Proinsulin In process    Sulfonylurea Hypoglycemics,Serum In process    Urinalysis with Reflex Culture and Microscopic In process    Urine Culture In process            Hospital Course  Kelli Silva is a 40 y.o. female presenting with Hypoglycemia      Patient is a 40 year old female with PMHX of Bipolar disorder, non-epileptic convulsions, POTS, Migraine, Asthma, GERD, DVT/PE, and DM presenting from outpatient appointment for hypoglycemia. Patient takes 14 units of lantus at night and utilizes sliding scale.  She utilizes her dexcom for glucose monitoring. This morning she reported she took  8 units of insulin during breakfast and 4 units at lunch. She stated her dexcom read  point higher than her usual and that's why she gave herself insulin and even while checking her sugars was not low per her report but she felt diaphoretic, lightheaded and shakiness.  She was taken to the ED and was found to have glucose of 50.  She also admitted to frequency and dysuria, but no diarrhea. She feels better.      She admitted to similar episode before back in 4/10/25 and her hemoglobin A1C was 4.7 at that time. She was counseled to follow up with endocrine.  She reports at home her glucose rans typically 100-250.       In the ED her glucose was 70 then 41 and 56 at 1815 and patient was placed on D5 infusion and admitted to observation unit.     Hypoglycemia, Resolved  Insulin dependent Type 2 DM  - Glucose low on presentation. Patient given D5 infusion. Hypoglycemia resolved and blood glucose remained stable  - A1c 4.7  - Diabetes educator saw patient, patient concerned her Dexcom reading higher than  normal, patient given new Dexcom sensor.  - New glucose meter prescribed at discharge  - Nightly lantus decreased back to 10 units (was recently increased to 14 units per patient)  - Continue SSI  - Follow up with PCP  - Follow up with endocrinology, PCP will need to make referral as patient is interested in following up with [facility] given that is where her PCP is    UTI  - UA with 3+ bacteria and 75 leukocyte esterase  - Patient notes dysuria  - Given ceftriaxone   - Discharged with prescription for Macrobid  - Follow up with PCP    Disposition  - Patient was discharged home in stable condition. Blood glucose remained stable. Follow up with endocrinology and PCP.    Pertinent Physical Exam At Time of Discharge  Physical Exam  Vitals reviewed.   Constitutional:       Appearance: She is not ill-appearing.   Cardiovascular:      Rate and Rhythm: Normal rate and regular rhythm.   Pulmonary:      Effort: Pulmonary effort is normal. No respiratory distress.      Breath sounds: Normal breath sounds.      Comments: Cough present on exam  Abdominal:      General: Abdomen is flat. There is no distension.      Palpations: Abdomen is soft.      Tenderness: There is no abdominal tenderness.   Musculoskeletal:      Right lower leg: No edema.      Left lower leg: No edema.   Neurological:      Mental Status: She is alert.   Psychiatric:         Behavior: Behavior normal.         Outpatient Follow-Up  Future Appointments   Date Time Provider Department Center   6/26/2025  7:30 AM Chester Gabriel PhD [id number] [place]   7/2/2025 12:00 PM [facility] [id number] PROC EXAM ROOM 2 [id number] None   7/3/2025  7:30 AM Chester Gabriel PhD [id number] [place]   7/3/2025  1:30 PM Ata Nova MD PhD [id number] [facility]   7/10/2025  7:30 AM Chester Gabriel PhD [id number] [place]   7/17/2025  7:30 AM Chester Gabriel PhD [id number] [place]   7/24/2025  7:30 AM Chester Gabriel PhD [id number] [place]   7/30/2025  9:00 AM Rosalie Rivas MD [id number] [facility]    7/31/2025  7:30 AM Chester Gabriel PhD [id number] [place]   8/7/2025  7:30 AM Chester Gabriel PhD [id number] [place]   8/14/2025  7:30 AM Chester Gabriel PhD [id number] [place]   8/14/2025 10:00 AM [id number] [facility] INJE[facility] NURSE [id number] [facility]   8/21/2025  7:30 AM Chester Gabriel PhD [id number] [place]   8/28/2025  7:30 AM Chester Gabriel PhD [id number] [place]   9/4/2025  7:30 AM Chester Gabriel PhD [id number] [place]   9/11/2025  7:30 AM Chester Gabriel PhD [id number] [place]   9/18/2025  7:30 AM Chester Gabriel PhD [id number] [place]   9/25/2025  7:30 AM Chester Gabriel PhD [id number] [place]     Interdisciplinary team rounding completed with hospitalist, nurse, TCC  Discussed plan and lab/testing results with Dr. Angy Perez PA-C  6/25/2025  6:14 PM

## 2025-06-25 NOTE — PROGRESS NOTES
Pharmacy Medication History     Source of Information: Patient    Additional concerns with the patient's PTA list.   None    Notified Provider via [facility] : N/A    The following updates were made to the Prior to Admission medication list:     Medications ADDED:   Aprepitant 80mg qDaily  Vit. D3 50,000U qweekly  Insulin Aspart w/ Meals   Ipratropium/Albuterol nebs TID PRN  Lorazepam 1mg BID PRN  Mounjaro 7.5 mg qWeekly    Medications CHANGED:  None- but I did clarify the 'allergies' to metoclopramide & dulaglutide  Medications REMOVED:   None  Medications NOT TAKING:   Zyrtec  Allegra  Pherergan suppository     Allergy reviewed : Yes    Meds 2 Beds : Yes    Outpatient pharmacy confirmed and updated in chart : N/A    Pharmacy name: [facility] ([facility])    The list below reflectives the updated PTA list. Please review each medication in order reconciliation for additional clarification and justification.    Prior to Admission Medications   Prescriptions Last Dose Informant Patient Reported? Taking?   ARIPiprazole ER, 2 month, (Abilify Asimtufii) 960 mg/3.2 mL injection Past Week Self Yes Yes   Sig: Inject 3.2 mL (960 mg) into the muscle every 8 (eight) weeks.   Accu-Chek Guide test strips strip 2025 Self Yes Yes   Sig: USE TO CHECK BLOOD SUGAR UP TO 4 TIMES PER DAY AS INSTRUCTED. E11.9   Autolet lancing device 2025 Self Yes Yes   Sig: USE AS INSTRUCTED TO CHECK BLOOD SUGAR UP TO 4 TIMES DAILY. E11.9   Corlanor 7.5 mg tablet Past Week Self Yes Yes   Sig: Take 1 tablet (7.5 mg) by mouth 2 times daily (morning and late afternoon).   Dexcom G7 Sensor device 2025 Self Yes Yes   Si each every 10 (ten) days.   LORazepam (Ativan) 1 mg tablet Past Week Self Yes Yes   Sig: Take 1 tablet (1 mg) by mouth 2 times a day as needed for anxiety.   Mounjaro 7.5 mg/0.5 mL pen injector Past Week Self Yes Yes   Sig: Inject 7.5 mg under the skin 1 (one) time per week. ()   NovoLOG Flexpen U-100 Insulin 100 unit/mL (3 mL)  pen Past Week Self Yes Yes   Sig: Inject under the skin 3 times a day before meals. (Insulin Aspart)   Reyvow 100 mg tablet  Self Yes No   Sig: Take 1 tablet by mouth every 24 (twenty four) hours if needed. AT ONSET OF MIGRAINE NO MORE THAN 1X   Trelegy Ellipta 200-62.5-25 mcg blister with device Past Week Self Yes Yes   Si puff once daily.   aprepitant (Emend) 80 mg capsule Past Week Self Yes Yes   Sig: Take 1 capsule (80 mg) by mouth once daily.   azelastine-fluticasone 137-50 mcg/spray spray,non-aerosol Past Week Self Yes Yes   Sig: Administer 1 spray into each nostril twice a day.   busPIRone (Buspar) 30 mg tablet Past Week Self No Yes   Sig: Take 1 tablet (30 mg) by mouth 2 times a day.   calcium carbonate-vitamin D3 (Oyster Shell) 250 mg-3.125 mcg (125 unit) tablet Past Week Self Yes Yes   Sig: Take 1 tablet by mouth once daily.   cetirizine (ZyrTEC) 10 mg tablet Not Taking Self Yes No   Sig: Take 1 tablet (10 mg) by mouth as needed at bedtime for allergies.   Patient not taking: Reported on 2025   cholecalciferol (Vitamin D-3) 1.25 mg (50,000 units) capsule Past Week Self Yes Yes   Sig: Take 1 capsule (1.25 mg) by mouth 1 (one) time per week.   desvenlafaxine 50 mg 24 hr tablet Past Week Self Yes Yes   Sig: Take 1 tablet (50 mg) by mouth once daily. Take with 25mg for a total of 75mg.   desvenlafaxine succinate (Pristiq) 25 mg 24 hour tablet Past Week Self Yes Yes   Sig: Take 1 tablet (25 mg) by mouth once daily.   dicyclomine (Bentyl) 20 mg tablet Past Week Self Yes Yes   Sig: Take 1 tablet (20 mg) by mouth 4 times a day before meals.   diphenhydrAMINE (Sominex) 25 mg tablet Past Month Self No Yes   Sig: Take 2 tablets (50 mg) by mouth every 6 hours for 5 days.   glucagon (Gvoke HypoPen 2-Pack) 1 mg/0.2 mL auto-injector  Self Yes No   Sig: Inject 1 Pen under the skin if needed.   glucose 4 gram chewable tablet  Self Yes No   Sig: Chew 4 tablets (16 g) if needed for low blood sugar - see comments.    insulin glargine (Lantus U-100 Insulin) 100 unit/mL injection Past Week Self Yes Yes   Sig: Inject 10 Units under the skin once daily at bedtime. Take as directed per insulin instructions.   ipratropium (Atrovent) 21 mcg (0.03 %) nasal spray Past Week Self Yes Yes   Sig: Administer 2 sprays into each nostril 2 times a day.   ipratropium-albuteroL (Duo-Neb) 0.5-2.5 mg/3 mL nebulizer solution Past Week Self Yes Yes   Sig: Take 3 mL by nebulization 3 times a day as needed for shortness of breath.   lamoTRIgine (LaMICtal) 200 mg disintegrating tablet Past Week Self No Yes   Sig: Take 1 tablet (200 mg) by mouth 2 times a day.   lisdexamfetamine (Vyvanse) 70 mg capsule Past Week Self No Yes   Sig: Take 1 capsule (70 mg) by mouth once daily in the morning.   magnesium oxide (Mag-Ox) 400 mg tablet Past Week Self Yes Yes   Sig: Take 1 tablet (400 mg) by mouth once daily at bedtime.   metoclopramide (Reglan) 10 mg tablet Past Week Self No Yes   Sig: Take 1 tablet (10 mg) by mouth every 6 hours for 7 days.   midodrine (Proamatine) 5 mg tablet Past Week Self No Yes   Sig: Take 1 tablet (5 mg) by mouth 2 times a day.   pregabalin (Lyrica) 150 mg capsule Past Week Self Yes Yes   Sig: Take 1 capsule (150 mg) by mouth once daily. each day at the same time.   Patient taking differently: Take 1 capsule (150 mg) by mouth 2 times a day. each day at the same time.   promethazine (Phenergan) 25 mg suppository Not Taking Self Yes No   Sig: Insert 1 suppository (25 mg) into the rectum every 6 hours if needed for nausea or vomiting.   Patient not taking: Reported on 6/25/2025   tezepelumab-ekko (Tezspire) SubQ syringe Past Month Self Yes Yes   Sig: Inject 210 mg under the skin every 28 (twenty-eight) days.   vonoprazan (Voquezna) 20 mg tablet Past Week Self Yes Yes   Sig: Take 20 mg by mouth once daily in the morning.      Facility-Administered Medications: None       The list below reflectives the updated allergy list. Please review each  "documented allergy for additional clarification and justification.    Allergies   Allergen Reactions    Aspirin Shortness of breath    Cigarette Smoke Shortness of breath    Fish Containing Products Hives, Rash and Shortness of breath     All seafood    Iodinated Contrast Media Shortness of breath     \"Asthma attack\" per pt Bronchospasm    Ketorolac Shortness of breath     \"Asthma attack\" per pt    Ketorolac Tromethamine Shortness of breath and Other     asthma    Ondansetron Other     Prolonged QT interval    Other Shortness of breath     Nicotiana Tabacum    Prochlorperazine Shortness of breath and Other     \"Asthma attack\" per pt    agitation    Other Reaction(s): Unknown  Mental state changes    Shellfish Containing Products Hives, Rash and Shortness of breath     All seafood    Sumatriptan Runny nose     Aphasia, weakness    Azithromycin Nausea/vomiting     nausea/vomiting    Ceftriaxone Other    Doxycycline Diarrhea    Dulaglutide Nausea/vomiting    House Dust Other     Itchy watery eyes    Liraglutide Nausea Only    Metformin Other     Per patient, lactic acidosis    Prednisone Other     Becomes suicidal   Tolerated 1/10/2020*  Confusion  Mental state change    Sitagliptin Other    Pvthptgf-8-Py7 Antimigraine Agents Other    Zolmitriptan Other     Aphasia, and weakness,  no more triptans    Metoclopramide Agitation     Patient says they have no rxn when they take diphenhydramine before Metoclopramide           06/25/25 at 2:26 PM - Phu Hart \    "

## 2025-06-26 ENCOUNTER — APPOINTMENT (OUTPATIENT)
Dept: BEHAVIORAL HEALTH | Facility: CLINIC | Age: 40
End: 2025-06-26
Payer: COMMERCIAL

## 2025-06-26 DIAGNOSIS — F31.9 BIPOLAR 1 DISORDER (MULTI): ICD-10-CM

## 2025-06-26 LAB — BACTERIA UR CULT: NORMAL

## 2025-06-26 PROCEDURE — 3044F HG A1C LEVEL LT 7.0%: CPT | Performed by: PSYCHOLOGIST

## 2025-06-26 PROCEDURE — 90837 PSYTX W PT 60 MINUTES: CPT | Performed by: PSYCHOLOGIST

## 2025-06-26 NOTE — PROGRESS NOTES
Insight Oriented Therapy. 50 min. BP1.  We spoke about how she is doing much better, found out why she is getting nautious, got her check from STD, is able to see the issues in front of her more clearly, ready for vacation and returning to work after holiday.     Teletherapy session  Chief complaint - Phase of life and emotional distress   Treatment plan - Insight and action consistent with ACT therapy. Providing support, safe space to process their thoughts and feelings, developing therapeutic relationship.  Goals - Self-care, insight, coping skills  Prognosis - Average  Progress - Average  Functional status - 70/100  Focused mental status: Patient was oriented to person, place, time and context  Focused mental exam - alert and oriented  Frequency - Every two weeks     An interactive audio and video telecommunication system which permits real time communications between the patient (at the originating site) and provider (at the distant site) was utilized to provide this telehealth service

## 2025-06-28 LAB — PROINSULIN P 12H FAST SERPL-SCNC: 10.8 PMOL/L

## 2025-06-30 LAB — INSULIN AB SER IA-ACNC: <0.4 U/ML (ref 0–0.4)

## 2025-07-02 ENCOUNTER — HOSPITAL ENCOUNTER (OUTPATIENT)
Facility: HOSPITAL | Age: 40
Discharge: HOME | End: 2025-07-02

## 2025-07-02 VITALS
TEMPERATURE: 99.1 F | SYSTOLIC BLOOD PRESSURE: 124 MMHG | DIASTOLIC BLOOD PRESSURE: 52 MMHG | WEIGHT: 227.96 LBS | HEART RATE: 96 BPM | BODY MASS INDEX: 41.69 KG/M2 | OXYGEN SATURATION: 99 % | RESPIRATION RATE: 25 BRPM

## 2025-07-02 DIAGNOSIS — F31.9 BIPOLAR I DISORDER WITH DEPRESSION (MULTI): Primary | ICD-10-CM

## 2025-07-02 LAB
GLUCOSE BLD MANUAL STRIP-MCNC: 79 MG/DL (ref 74–99)
GLUCOSE BLD MANUAL STRIP-MCNC: 88 MG/DL (ref 74–99)

## 2025-07-02 PROCEDURE — 82947 ASSAY GLUCOSE BLOOD QUANT: CPT

## 2025-07-02 PROCEDURE — 2500000004 HC RX 250 GENERAL PHARMACY W/ HCPCS (ALT 636 FOR OP/ED): Performed by: STUDENT IN AN ORGANIZED HEALTH CARE EDUCATION/TRAINING PROGRAM

## 2025-07-02 RX ORDER — ACETAMINOPHEN 325 MG/1
650 TABLET ORAL ONCE AS NEEDED
OUTPATIENT
Start: 2025-07-23

## 2025-07-02 RX ORDER — DIAZEPAM 5 MG/ML
2 INJECTION, SOLUTION INTRAMUSCULAR; INTRAVENOUS EVERY 8 HOURS PRN
Status: DISCONTINUED | OUTPATIENT
Start: 2025-07-02 | End: 2025-07-03 | Stop reason: HOSPADM

## 2025-07-02 RX ORDER — ACETAMINOPHEN 325 MG/1
650 TABLET ORAL ONCE AS NEEDED
Status: DISCONTINUED | OUTPATIENT
Start: 2025-07-02 | End: 2025-07-03 | Stop reason: HOSPADM

## 2025-07-02 RX ORDER — FAMOTIDINE 10 MG/ML
20 INJECTION, SOLUTION INTRAVENOUS ONCE AS NEEDED
OUTPATIENT
Start: 2025-07-23

## 2025-07-02 RX ORDER — METOPROLOL TARTRATE 1 MG/ML
10 INJECTION, SOLUTION INTRAVENOUS ONCE AS NEEDED
Status: DISCONTINUED | OUTPATIENT
Start: 2025-07-02 | End: 2025-07-03 | Stop reason: HOSPADM

## 2025-07-02 RX ORDER — LABETALOL HYDROCHLORIDE 5 MG/ML
30 INJECTION, SOLUTION INTRAVENOUS AS NEEDED
Status: DISCONTINUED | OUTPATIENT
Start: 2025-07-02 | End: 2025-07-03 | Stop reason: HOSPADM

## 2025-07-02 RX ORDER — KETAMINE HCL IN 0.9 % NACL 200 MG/200
0.75 PLASTIC BAG, INJECTION (ML) INTRAVENOUS ONCE
OUTPATIENT
Start: 2025-07-23 | End: 2025-07-23

## 2025-07-02 RX ORDER — METOPROLOL TARTRATE 1 MG/ML
10 INJECTION, SOLUTION INTRAVENOUS ONCE AS NEEDED
OUTPATIENT
Start: 2025-07-23

## 2025-07-02 RX ORDER — LIDOCAINE AND PRILOCAINE 25; 25 MG/G; MG/G
1 CREAM TOPICAL ONCE AS NEEDED
Status: DISCONTINUED | OUTPATIENT
Start: 2025-07-02 | End: 2025-07-03 | Stop reason: HOSPADM

## 2025-07-02 RX ORDER — LAMOTRIGINE 150 MG/1
300 TABLET ORAL NIGHTLY
COMMUNITY

## 2025-07-02 RX ORDER — MIDAZOLAM HYDROCHLORIDE 1 MG/ML
1 INJECTION, SOLUTION INTRAMUSCULAR; INTRAVENOUS ONCE AS NEEDED
Status: DISCONTINUED | OUTPATIENT
Start: 2025-07-02 | End: 2025-07-03 | Stop reason: HOSPADM

## 2025-07-02 RX ORDER — METOPROLOL TARTRATE 1 MG/ML
5 INJECTION, SOLUTION INTRAVENOUS ONCE AS NEEDED
OUTPATIENT
Start: 2025-07-23

## 2025-07-02 RX ORDER — METOPROLOL TARTRATE 1 MG/ML
15 INJECTION, SOLUTION INTRAVENOUS ONCE AS NEEDED
OUTPATIENT
Start: 2025-07-23

## 2025-07-02 RX ORDER — METOPROLOL TARTRATE 1 MG/ML
15 INJECTION, SOLUTION INTRAVENOUS ONCE AS NEEDED
Status: DISCONTINUED | OUTPATIENT
Start: 2025-07-02 | End: 2025-07-03 | Stop reason: HOSPADM

## 2025-07-02 RX ORDER — DICYCLOMINE HYDROCHLORIDE 10 MG/1
10 CAPSULE ORAL ONCE AS NEEDED
OUTPATIENT
Start: 2025-07-23

## 2025-07-02 RX ORDER — KETAMINE HCL IN 0.9 % NACL 200 MG/200
0.75 PLASTIC BAG, INJECTION (ML) INTRAVENOUS ONCE
Status: COMPLETED | OUTPATIENT
Start: 2025-07-02 | End: 2025-07-02

## 2025-07-02 RX ORDER — DIPHENHYDRAMINE HYDROCHLORIDE 50 MG/ML
50 INJECTION, SOLUTION INTRAMUSCULAR; INTRAVENOUS ONCE AS NEEDED
Status: DISCONTINUED | OUTPATIENT
Start: 2025-07-02 | End: 2025-07-03 | Stop reason: HOSPADM

## 2025-07-02 RX ORDER — ALBUTEROL SULFATE 0.83 MG/ML
3 SOLUTION RESPIRATORY (INHALATION) AS NEEDED
Status: DISCONTINUED | OUTPATIENT
Start: 2025-07-02 | End: 2025-07-03 | Stop reason: HOSPADM

## 2025-07-02 RX ORDER — LIDOCAINE AND PRILOCAINE 25; 25 MG/G; MG/G
1 CREAM TOPICAL ONCE AS NEEDED
OUTPATIENT
Start: 2025-07-23

## 2025-07-02 RX ORDER — LABETALOL HYDROCHLORIDE 5 MG/ML
20 INJECTION, SOLUTION INTRAVENOUS AS NEEDED
OUTPATIENT
Start: 2025-07-23

## 2025-07-02 RX ORDER — METOPROLOL TARTRATE 1 MG/ML
5 INJECTION, SOLUTION INTRAVENOUS ONCE AS NEEDED
Status: DISCONTINUED | OUTPATIENT
Start: 2025-07-02 | End: 2025-07-03 | Stop reason: HOSPADM

## 2025-07-02 RX ORDER — LABETALOL HYDROCHLORIDE 5 MG/ML
10 INJECTION, SOLUTION INTRAVENOUS AS NEEDED
Status: DISCONTINUED | OUTPATIENT
Start: 2025-07-02 | End: 2025-07-03 | Stop reason: HOSPADM

## 2025-07-02 RX ORDER — DIPHENHYDRAMINE HYDROCHLORIDE 50 MG/ML
50 INJECTION, SOLUTION INTRAMUSCULAR; INTRAVENOUS ONCE AS NEEDED
OUTPATIENT
Start: 2025-07-23

## 2025-07-02 RX ORDER — EPINEPHRINE 1 MG/ML
0.3 INJECTION, SOLUTION, CONCENTRATE INTRAVENOUS EVERY 5 MIN PRN
OUTPATIENT
Start: 2025-07-23

## 2025-07-02 RX ORDER — ONDANSETRON HYDROCHLORIDE 2 MG/ML
8 INJECTION, SOLUTION INTRAVENOUS ONCE AS NEEDED
Status: DISCONTINUED | OUTPATIENT
Start: 2025-07-02 | End: 2025-07-03 | Stop reason: HOSPADM

## 2025-07-02 RX ORDER — ALBUTEROL SULFATE 0.83 MG/ML
3 SOLUTION RESPIRATORY (INHALATION) AS NEEDED
OUTPATIENT
Start: 2025-07-23

## 2025-07-02 RX ORDER — DIPHENHYDRAMINE HYDROCHLORIDE 50 MG/ML
50 INJECTION, SOLUTION INTRAMUSCULAR; INTRAVENOUS AS NEEDED
OUTPATIENT
Start: 2025-07-23

## 2025-07-02 RX ORDER — EPINEPHRINE 1 MG/ML
0.3 INJECTION, SOLUTION, CONCENTRATE INTRAVENOUS EVERY 5 MIN PRN
Status: DISCONTINUED | OUTPATIENT
Start: 2025-07-02 | End: 2025-07-03 | Stop reason: HOSPADM

## 2025-07-02 RX ORDER — LABETALOL HYDROCHLORIDE 5 MG/ML
30 INJECTION, SOLUTION INTRAVENOUS AS NEEDED
OUTPATIENT
Start: 2025-07-23

## 2025-07-02 RX ORDER — DIPHENHYDRAMINE HYDROCHLORIDE 50 MG/ML
50 INJECTION, SOLUTION INTRAMUSCULAR; INTRAVENOUS AS NEEDED
Status: DISCONTINUED | OUTPATIENT
Start: 2025-07-02 | End: 2025-07-03 | Stop reason: HOSPADM

## 2025-07-02 RX ORDER — LABETALOL HYDROCHLORIDE 5 MG/ML
10 INJECTION, SOLUTION INTRAVENOUS AS NEEDED
OUTPATIENT
Start: 2025-07-23

## 2025-07-02 RX ORDER — ONDANSETRON HYDROCHLORIDE 2 MG/ML
8 INJECTION, SOLUTION INTRAVENOUS ONCE AS NEEDED
OUTPATIENT
Start: 2025-07-23

## 2025-07-02 RX ORDER — MIDAZOLAM HYDROCHLORIDE 1 MG/ML
1 INJECTION, SOLUTION INTRAMUSCULAR; INTRAVENOUS ONCE AS NEEDED
OUTPATIENT
Start: 2025-07-23

## 2025-07-02 RX ORDER — DICYCLOMINE HYDROCHLORIDE 10 MG/1
10 CAPSULE ORAL ONCE AS NEEDED
Status: DISCONTINUED | OUTPATIENT
Start: 2025-07-02 | End: 2025-07-03 | Stop reason: HOSPADM

## 2025-07-02 RX ORDER — LABETALOL HYDROCHLORIDE 5 MG/ML
20 INJECTION, SOLUTION INTRAVENOUS AS NEEDED
Status: DISCONTINUED | OUTPATIENT
Start: 2025-07-02 | End: 2025-07-03 | Stop reason: HOSPADM

## 2025-07-02 RX ORDER — FAMOTIDINE 10 MG/ML
20 INJECTION, SOLUTION INTRAVENOUS ONCE AS NEEDED
Status: DISCONTINUED | OUTPATIENT
Start: 2025-07-02 | End: 2025-07-03 | Stop reason: HOSPADM

## 2025-07-02 RX ADMIN — DIAZEPAM 2 MG: 5 INJECTION INTRAMUSCULAR; INTRAVENOUS at 13:25

## 2025-07-02 RX ADMIN — Medication 77 MG: at 13:01

## 2025-07-02 RX ADMIN — DIPHENHYDRAMINE HYDROCHLORIDE 50 MG: 50 INJECTION INTRAMUSCULAR; INTRAVENOUS at 12:49

## 2025-07-02 ASSESSMENT — PATIENT HEALTH QUESTIONNAIRE - PHQ9
6. FEELING BAD ABOUT YOURSELF - OR THAT YOU ARE A FAILURE OR HAVE LET YOURSELF OR YOUR FAMILY DOWN: SEVERAL DAYS
2. FEELING DOWN, DEPRESSED OR HOPELESS: SEVERAL DAYS
3. TROUBLE FALLING OR STAYING ASLEEP OR SLEEPING TOO MUCH: NOT AT ALL
10. IF YOU CHECKED OFF ANY PROBLEMS, HOW DIFFICULT HAVE THESE PROBLEMS MADE IT FOR YOU TO DO YOUR WORK, TAKE CARE OF THINGS AT HOME, OR GET ALONG WITH OTHER PEOPLE: SOMEWHAT DIFFICULT
SUM OF ALL RESPONSES TO PHQ9 QUESTIONS 1 & 2: 1
7. TROUBLE CONCENTRATING ON THINGS, SUCH AS READING THE NEWSPAPER OR WATCHING TELEVISION: NOT AT ALL
3. TROUBLE FALLING OR STAYING ASLEEP: NOT AT ALL
5. POOR APPETITE OR OVEREATING: MORE THAN HALF THE DAYS
4. FEELING TIRED OR HAVING LITTLE ENERGY: NOT AT ALL
7. TROUBLE CONCENTRATING ON THINGS, SUCH AS READING THE NEWSPAPER OR WATCHING TELEVISION: NOT AT ALL
1. LITTLE INTEREST OR PLEASURE IN DOING THINGS: NOT AT ALL
9. THOUGHTS THAT YOU WOULD BE BETTER OFF DEAD, OR OF HURTING YOURSELF: NOT AT ALL
9. THOUGHTS THAT YOU WOULD BE BETTER OFF DEAD, OR OF HURTING YOURSELF: NOT AT ALL
4. FEELING TIRED OR HAVING LITTLE ENERGY: NOT AT ALL
8. MOVING OR SPEAKING SO SLOWLY THAT OTHER PEOPLE COULD HAVE NOTICED. OR THE OPPOSITE, BEING SO FIGETY OR RESTLESS THAT YOU HAVE BEEN MOVING AROUND A LOT MORE THAN USUAL: NOT AT ALL
8. MOVING OR SPEAKING SO SLOWLY THAT OTHER PEOPLE COULD HAVE NOTICED. OR THE OPPOSITE - BEING SO FIDGETY OR RESTLESS THAT YOU HAVE BEEN MOVING AROUND A LOT MORE THAN USUAL: NOT AT ALL
5. POOR APPETITE OR OVEREATING: MORE THAN HALF THE DAYS
10. IF YOU CHECKED OFF ANY PROBLEMS, HOW DIFFICULT HAVE THESE PROBLEMS MADE IT FOR YOU TO DO YOUR WORK, TAKE CARE OF THINGS AT HOME, OR GET ALONG WITH OTHER PEOPLE: SOMEWHAT DIFFICULT
1. LITTLE INTEREST OR PLEASURE IN DOING THINGS: NOT AT ALL
SUM OF ALL RESPONSES TO PHQ QUESTIONS 1-9: 4
6. FEELING BAD ABOUT YOURSELF - OR THAT YOU ARE A FAILURE OR HAVE LET YOURSELF OR YOUR FAMILY DOWN: SEVERAL DAYS
2. FEELING DOWN, DEPRESSED OR HOPELESS: SEVERAL DAYS

## 2025-07-02 ASSESSMENT — ANXIETY QUESTIONNAIRES
1. FEELING NERVOUS, ANXIOUS, OR ON EDGE: NOT AT ALL
4. TROUBLE RELAXING: NOT AT ALL
6. BECOMING EASILY ANNOYED OR IRRITABLE: NOT AT ALL
5. BEING SO RESTLESS THAT IT IS HARD TO SIT STILL: SEVERAL DAYS
1. FEELING NERVOUS, ANXIOUS, OR ON EDGE: NOT AT ALL
2. NOT BEING ABLE TO STOP OR CONTROL WORRYING: NOT AT ALL
IF YOU CHECKED OFF ANY PROBLEMS ON THIS QUESTIONNAIRE, HOW DIFFICULT HAVE THESE PROBLEMS MADE IT FOR YOU TO DO YOUR WORK, TAKE CARE OF THINGS AT HOME, OR GET ALONG WITH OTHER PEOPLE: SOMEWHAT DIFFICULT
5. BEING SO RESTLESS THAT IT IS HARD TO SIT STILL: SEVERAL DAYS
GAD7 TOTAL SCORE: 1
4. TROUBLE RELAXING: NOT AT ALL
3. WORRYING TOO MUCH ABOUT DIFFERENT THINGS: NOT AT ALL
IF YOU CHECKED OFF ANY PROBLEMS ON THIS QUESTIONNAIRE, HOW DIFFICULT HAVE THESE PROBLEMS MADE IT FOR YOU TO DO YOUR WORK, TAKE CARE OF THINGS AT HOME, OR GET ALONG WITH OTHER PEOPLE: SOMEWHAT DIFFICULT
6. BECOMING EASILY ANNOYED OR IRRITABLE: NOT AT ALL
3. WORRYING TOO MUCH ABOUT DIFFERENT THINGS: NOT AT ALL
7. FEELING AFRAID AS IF SOMETHING AWFUL MIGHT HAPPEN: NOT AT ALL
2. NOT BEING ABLE TO STOP OR CONTROL WORRYING: NOT AT ALL
7. FEELING AFRAID AS IF SOMETHING AWFUL MIGHT HAPPEN: NOT AT ALL

## 2025-07-02 ASSESSMENT — PAIN SCALES - GENERAL: PAINLEVEL_OUTOF10: 0 - NO PAIN

## 2025-07-02 ASSESSMENT — ENCOUNTER SYMPTOMS
DEPRESSION: 1
LOSS OF SENSATION IN FEET: 0
OCCASIONAL FEELINGS OF UNSTEADINESS: 0

## 2025-07-02 ASSESSMENT — COLUMBIA-SUICIDE SEVERITY RATING SCALE - C-SSRS
6. IN YOUR LIFETIME, HAVE YOU EVER DONE ANYTHING, STARTED TO DO ANYTHING, OR PREPARED TO DO ANYTHING TO END YOUR LIFE?: YES
1. IN THE PAST MONTH, HAVE YOU WISHED YOU WERE DEAD OR WISHED YOU COULD GO TO SLEEP AND NOT WAKE UP?: NO
2. HAVE YOU ACTUALLY HAD ANY THOUGHTS OF KILLING YOURSELF?: NO

## 2025-07-02 ASSESSMENT — PAIN - FUNCTIONAL ASSESSMENT: PAIN_FUNCTIONAL_ASSESSMENT: 0-10

## 2025-07-02 NOTE — PATIENT INSTRUCTIONS
Homegoing Instructions      Your Provider's Instructions:    Post-Treatment Instructions:    No alcohol for 24 hours.    Do not drive or operate machinery until the day after treatment and after a full night of sleep.    Do not make important decisions or sign legal documents for 24 hours.    A responsible adult should remain with you for 24 hours due to possible dizziness or sedation.    Resume a normal diet unless otherwise instructed.    When to Contact a Provider:    Contact your provider with any questions or concerns.    Call 911 for any emergencies.    Health Maintenance:    If you use tobacco, quitting is strongly recommended.  Resources:    American Heart Association: (907) 352-6516 or www.americanheart.org    [place] Tobacco Quit Line: 1-800-QUIT-NOW (1-434.709.6553)    [facility]: www.[facility].[place].gov    For alcohol or drug use resources: Call [facility] at 211 or visit www.[employer].gov.    For safe medication disposal: Visit www.rxdrugdropbox.org or contact your local police department.      Instructions Before Next Spravato/Ketamine Appointment:      Arrange transportation to and from your appointment (friend, family member, or insurance-covered ride).    Avoid smoking, vaping, and nicotine use if possible.    No alcohol for 24 hours prior to your appointment.    Instructions for Day of Treatment:    Diet:  No solid food for 2 hours before appointment.    Clear liquids (water, tea, black coffee, clear juice, plain Jello, hard candy) are allowed up to 30 minutes before appointment. (No milk or cream.)    What to Bring:    Headphones for calming music or guided meditation.    Journal if desired.    Any prescribed inhalers.    Medications:    Take morning medications as prescribed, except:    No chlordiazepoxide (Librium), diazepam (Valium), or flurazepam (Dalmane) within 24 hours before appointment.    No lamotrigine (Lamictal) within 12 hours before appointment.    Avoid anti-anxiety  medications on the day of treatment unless absolutely necessary.    No stimulants prior to treatment (may resume after treatment).    Nasal sprays (for Spravato) can be used up to 1 hour before appointment.    Intentions:    Patients are encouraged to set a personal intention for each treatment session.    Cancellation Policy:    Please provide at least 24 hours’ notice for cancellations.    Three late cancellations or no-shows may require a treatment plan review with your outpatient psychiatrist before rescheduling.    If more than 15 minutes late, the appointment may need to be rescheduled.    Call [phone] to cancel or reschedule.

## 2025-07-02 NOTE — PROCEDURES
"Interventional Psychiatry Procedure    Date/Time: 7/2/2025 2:34 PM    Performed by: Suzie Álvarez DO  Authorized by: Suzie Álvarez DO      Ketamine Infusion for Depression    Ketamine Infusion: 0.75mg per kilo over 60 minutes, Maintenance, [id number]    Subjective:  Procedure (Patient last ate (Date/Time): 07/01/25 8pm/Any concerns regarding today's treatment: No/Any concerns regarding previous treatment: No             )    Pt reports mood has been \"ok\" and has been tolerating med changes well. Has 1 more day of PHP. Has been sleeping a bit more poorly - ~6.5 hours/night. Eating ok. Had interval admission for low blood glucose - her Dexcom had malfunctioned and has now been replaced with no further occurences of low BS. Denies suicidal/homicidal ideation. Denies auditory/visual hallucinations.  Interval changes in health: Admission for hypoglycemia   Interval changes in medications: Lamictal increased to 300mg daily        Synopsis SmartLink 6/11/2025 7/2/2025   Scores   Patient Health Questionnaire-9 Score 6  4    BH ANUP-7 Total Score 5  1        Patient-reported     Objective:  Mental Status Exam  General/Appearance: no distress, appears stated age, well kept  Attitude/Behavior: Cooperative, conversant, engaged, and with good eye contact.  Motor Activity: no psychomotor agitation or retardation, no tremor or other abnormal movements, gait: Within normal limits  Mood: \"ok\"  Affect: Euthymic, Full range, Mood congruent, and Appropriate to content  Speech: Spontaneous, Coherent, Fluent, Appropriate rate, and Appropriate quantity, Appropriate volume  Thought Process: linear, goal directed and logical, future-oriented  Thought Content: no suicidal ideation, no homicidal ideation, delusions: none   Thought Perception: no perceptual abnormalities noted  Cognition: grossly intact  Insight: intact  Judgment: intact    Time out was taken with staff to confirm correct patient and correct procedure to be performed. Fall " precautions in place throughout procedure. SpO2 continuously monitored throughout treatment. Intravenous Ketamine infusion was initiated by psychiatrist and monitored by RN.    Vitals:    07/02/25 1206 07/02/25 1315 07/02/25 1316 07/02/25 1350   BP: 113/82 (!) 139/101 (!) 140/108 130/86   Pulse: 96 (!) 151 (!) 133 100   Resp: 18 25     Temp: 37.3 °C (99.1 °F)      TempSrc: Temporal      SpO2: 95% 99% 99% 99%   Weight: 103 kg (227 lb 15.3 oz)       07/02/25 1355 07/02/25 1415   BP: 145/86 124/52   Pulse: 104 96   Resp:     Temp:     TempSrc:     SpO2:     Weight:          Synopsis SmartLink 7/2/2025    12:06 7/2/2025    13:15 7/2/2025    13:16 7/2/2025    13:50 7/2/2025    13:55 7/2/2025    14:15   RASS   Gonzalez Agitation Sedation Scale 0 3 3 2 1 0     Early in the infusion, pt began screaming out. Infusion was immediately paused. HR incremented into 160s, pt was shaking, denied pain. BS 79. Valium 2mg IV push x 1 was administered and HR decreased. EKG showed sinus tach. ECG monitoring started. Pt status improved and she was able to discuss what happened - states wanted to ring call button but cannot recall if she did. Denied other Sx to her knowledge. Agreeable to restart infusion with RN in room as cause for Sx unknown.    Infusion restarted and within a few minutes pt experienced similar Sx. Stopped infusion, recovered pt without further meds. BS 88. Pt elected to stop infusion.    In D/W pharmacy, pt had received a total of 115mg of Ketamine over short period of time. Steps taken to address dosing. D/W pt that this was likely reason for her severe reaction. Pt agreeable to schedule next ketamine infusion and reschedule if needed as the effects of ketamine bolus on depression are uncertain. Patient was monitored for 60 minutes after the infusion was stopped. Discharge instructions were provided to patient with After Visit Summary. Patient was discharged home in the care of their designated  .    Assessment:  1. Bipolar I disorder with depression (Multi)    No immediate risk of harm to self or others    Plan:  Continue Intravenous Ketamine Infusion. Follow up in about 2 weeks (around 7/16/2025). Continue outpatient medications and treatment with Dr. Nova.    Suzie Álvarez,

## 2025-07-03 ENCOUNTER — TELEMEDICINE (OUTPATIENT)
Dept: BEHAVIORAL HEALTH | Facility: CLINIC | Age: 40
End: 2025-07-03
Payer: COMMERCIAL

## 2025-07-03 ENCOUNTER — TELEPHONE (OUTPATIENT)
Dept: BEHAVIORAL HEALTH | Facility: CLINIC | Age: 40
End: 2025-07-03

## 2025-07-03 ENCOUNTER — APPOINTMENT (OUTPATIENT)
Dept: BEHAVIORAL HEALTH | Facility: CLINIC | Age: 40
End: 2025-07-03
Payer: COMMERCIAL

## 2025-07-03 DIAGNOSIS — F31.9 BIPOLAR 1 DISORDER (MULTI): ICD-10-CM

## 2025-07-03 DIAGNOSIS — F51.01 PRIMARY INSOMNIA: Primary | ICD-10-CM

## 2025-07-03 PROCEDURE — 1036F TOBACCO NON-USER: CPT | Performed by: PSYCHOLOGIST

## 2025-07-03 PROCEDURE — 3044F HG A1C LEVEL LT 7.0%: CPT | Performed by: PSYCHOLOGIST

## 2025-07-03 PROCEDURE — 3044F HG A1C LEVEL LT 7.0%: CPT | Performed by: PSYCHIATRY & NEUROLOGY

## 2025-07-03 PROCEDURE — 1036F TOBACCO NON-USER: CPT | Performed by: PSYCHIATRY & NEUROLOGY

## 2025-07-03 PROCEDURE — 90837 PSYTX W PT 60 MINUTES: CPT | Performed by: PSYCHOLOGIST

## 2025-07-03 PROCEDURE — 99214 OFFICE O/P EST MOD 30 MIN: CPT | Performed by: PSYCHIATRY & NEUROLOGY

## 2025-07-03 RX ORDER — PALIPERIDONE 3 MG/1
3 TABLET, EXTENDED RELEASE ORAL EVERY MORNING
Qty: 30 TABLET | Refills: 0 | Status: SHIPPED | OUTPATIENT
Start: 2025-07-03 | End: 2025-07-03 | Stop reason: SDUPTHER

## 2025-07-03 RX ORDER — PALIPERIDONE 6 MG/1
6 TABLET, EXTENDED RELEASE ORAL NIGHTLY
Qty: 30 TABLET | Refills: 0 | Status: SHIPPED | OUTPATIENT
Start: 2025-07-03 | End: 2025-08-02

## 2025-07-03 RX ORDER — HYDROXYZINE PAMOATE 50 MG/1
50 CAPSULE ORAL 3 TIMES DAILY PRN
Qty: 30 CAPSULE | Refills: 0 | Status: SHIPPED | OUTPATIENT
Start: 2025-07-03 | End: 2025-07-13

## 2025-07-03 NOTE — PROGRESS NOTES
Humanistic/Insight Oriented Therapy. 50 min.  BP1.  We spoke about her acute distress due to being over medicated at [facility] yesterday.  We talked through this to take the edge off the experience and processed/validated her fears.  We talked about and filled out return to work paperwork as well as talked through her vacation.      Chief complaint - Phase of life and emotional distress   Treatment plan - Insight and action consistent with ACT therapy. Providing support, safe space to process their thoughts and feelings, developing therapeutic relationship.  Goals - Self-care, insight, coping skills  Prognosis - Average  Progress - Average  Functional status - 70/100  Focused mental status: Patient was oriented to person, place, time and context  Focused mental exam - alert and oriented  Frequency - Every two weeks     An interactive audio and video telecommunication system which permits real time communications between the patient (at the originating site) and provider (at the distant site) was utilized to provide this telehealth service

## 2025-07-03 NOTE — PROGRESS NOTES
Follow-up visit     She is at her office   Provider is in office     Virtual Consent    An interactive audio and video telecommunication system which permits real time communications between the patient (at the originating site) and provider (at the distant site) was utilized to provide this telehealth service.   Verbal consent was requested and obtained from Kelli Silva on this date, 07/03/25 for a telehealth visit and the patient's location was confirmed at the time of the visit.     Subjective: She said she still felt anxious today, but did not feel physically tired after she received a higher dose of ketamine by an error yesterday. she said he felt more talkative too. She said she slept 5 hours last night, but did not feel tired today. No depression and enjoyed life.  Did not eat today because she felt anxious. Concentration was not good. Felt easily distracted today. She felt her mind was racing. Not feel hopeless or helpless. Denied having SI/HI/AVH or paranoia. Felt mildly irritable.   She was hospitalized for 14 days in late May and late June because she was manic. She said asenapine did not control her manic symptom.   Currently taking lamotrigine 500 mg per day  Vyvanse 70 mg  Buspirone 60 mg per day  Pristiq 75 mg  Ativan 1 BID PRN   Abilify injection monthly     Objective:   Appearance: well-groomed.   Demeanor: average.   Eye Contact: average.   Motor Activity: average.   Speech: clear.   Language: Neurologic language is intact.   Fund of Knowledge: intact fund of knowledge.   Delusions: None Reported.   Hallucinations: not reported  Self Harm: None Reported.   Aggressive: None Reported.   Mood: happy  Affect:  full, restricted most of the time   Orientation: alert, oriented x3.   Manner: cooperative.   Thought process: goal-directed.   Thought association: displays rational thought process.   Content of thought: normal  Abstract/ Rational Thought: intact   Memory: grossly intact.   Behavior: normal  motor activity, relaxed, good eye contact, calm.   Attention/Concentration: normal.   Cognition: intact.   Intelligence Estimate: average.   Executive Function: intact.   Insight: intact.   Judgement: intact.   Musculoskeletal: normal strength and tone.   Impression: bipolar I depression, mild yesterday; but with hypomanic sx today   ANUP - moderate   ADHD - still problematic   Discussion/Plan:   Restart hydroxyzine 50 mg for sleep, worked in the hospital  Take Invega 3 - 6 mg for sleep and hypomania  Continue ketamine infusion as scheduled.   Continue other medications   Follow-up in a week   Ata Nova MD

## 2025-07-07 ENCOUNTER — TELEPHONE (OUTPATIENT)
Facility: HOSPITAL | Age: 40
End: 2025-07-07
Payer: COMMERCIAL

## 2025-07-07 LAB
CHLORPROPAMIDE SERPL-MCNC: NORMAL MCG/ML
GLIMEPIRIDE SERPL-MCNC: NORMAL NG/ML
GLIPIZIDE SERPL-MCNC: NORMAL NG/ML
GLYBURIDE SERPL-MCNC: NORMAL NG/ML
NATEGLINIDE SERPL-MCNC: NORMAL MCG/ML
PIOGLITAZONE: NORMAL NG/ML
REPAGLINIDE SERPL-MCNC: NORMAL NG/ML
ROSIGLITAZONE: NORMAL NG/ML
TOLAZAMIDE SERPL-MCNC: NORMAL MCG/ML
TOLBUTAMIDE SERPL-MCNC: NORMAL MCG/ML

## 2025-07-07 NOTE — PROGRESS NOTES
Spoke to patient this morning to check on how she was feeling.  Patient stated that she did have a hypomanic episode following her last treatment.   Advised patient that her payment from her last treatment will be applied to her next visit.   Patient stated that it appears her insurance is also being billed for the ketamine treatment.  I advised her I would send over an inquiry to the cashapp department and billing department.

## 2025-07-10 ENCOUNTER — APPOINTMENT (OUTPATIENT)
Dept: BEHAVIORAL HEALTH | Facility: CLINIC | Age: 40
End: 2025-07-10
Payer: COMMERCIAL

## 2025-07-10 DIAGNOSIS — F31.9 BIPOLAR 1 DISORDER (MULTI): ICD-10-CM

## 2025-07-10 PROCEDURE — 3044F HG A1C LEVEL LT 7.0%: CPT | Performed by: PSYCHOLOGIST

## 2025-07-10 PROCEDURE — 90837 PSYTX W PT 60 MINUTES: CPT | Performed by: PSYCHOLOGIST

## 2025-07-10 NOTE — PROGRESS NOTES
Humanistic/Insight Oriented Therapy. 50 min.  BP1. Talked about her getting back to work today, not getting into the past being/wishing it different, staying present, knowing the reality of her time off needed to happen to come back strong.  We talked about some adimistrative issues and logistical concerns and how to take small steps forward.     Chief complaint - Phase of life and emotional distress   Treatment plan - Insight and action consistent with ACT therapy. Providing support, safe space to process their thoughts and feelings, developing therapeutic relationship.  Goals - Self-care, insight, coping skills  Prognosis - Average  Progress - Average  Functional status - 70/100  Focused mental status: Patient was oriented to person, place, time and context  Focused mental exam - alert and oriented  Frequency - Every two weeks     An interactive audio and video telecommunication system which permits real time communications between the patient (at the originating site) and provider (at the distant site) was utilized to provide this telehealth service

## 2025-07-15 ENCOUNTER — APPOINTMENT (OUTPATIENT)
Dept: BEHAVIORAL HEALTH | Facility: CLINIC | Age: 40
End: 2025-07-15
Payer: COMMERCIAL

## 2025-07-15 DIAGNOSIS — F90.0 ATTENTION DEFICIT HYPERACTIVITY DISORDER (ADHD), PREDOMINANTLY INATTENTIVE TYPE: ICD-10-CM

## 2025-07-15 DIAGNOSIS — F51.01 PRIMARY INSOMNIA: ICD-10-CM

## 2025-07-15 PROCEDURE — 1036F TOBACCO NON-USER: CPT | Performed by: PSYCHIATRY & NEUROLOGY

## 2025-07-15 PROCEDURE — 3044F HG A1C LEVEL LT 7.0%: CPT | Performed by: PSYCHIATRY & NEUROLOGY

## 2025-07-15 PROCEDURE — 99214 OFFICE O/P EST MOD 30 MIN: CPT | Performed by: PSYCHIATRY & NEUROLOGY

## 2025-07-15 RX ORDER — HYDROXYZINE PAMOATE 50 MG/1
50 CAPSULE ORAL 3 TIMES DAILY PRN
Qty: 30 CAPSULE | Refills: 0 | Status: SHIPPED | OUTPATIENT
Start: 2025-07-15 | End: 2025-07-25

## 2025-07-15 RX ORDER — LISDEXAMFETAMINE DIMESYLATE 70 MG/1
70 CAPSULE ORAL EVERY MORNING
Qty: 30 CAPSULE | Refills: 0 | Status: SHIPPED | OUTPATIENT
Start: 2025-07-15 | End: 2025-08-14

## 2025-07-15 NOTE — PROGRESS NOTES
Follow-up visit     She is at home  Provider is in office     Virtual Consent    An interactive audio and video telecommunication system which permits real time communications between the patient (at the originating site) and provider (at the distant site) was utilized to provide this telehealth service.   Verbal consent was requested and obtained from Kelli Silva on this date, 07/15/25 for a telehealth visit and the patient's location was confirmed at the time of the visit.     Subjective: She said she felt good ans stable for a week. She said Invega and hydroxyzine worked well to control her hypomanic sx. She said she stopped those two medications after 5 days and she felt good. No depression or ángel/hypomania for a week.  She said she ate well and slept well in last 7 days. Energy and concentration were well. Not feel hopeless or helpless. Denied having SI/HI/AVH or paranoia. Not feel irritable.   No problem at work or home.  Anxiety - under control with Ativan and Buspar  Still taking Pristiq,  lamotrigine, Lyrica, Vyvanse, and Abilify injection   No side effect from medication    DM - stable  GERD with medication;   gastroparesis with medication   Migraine - medication and injection   Asthma - medication   PCOSs with treatment   No change in medication   No change in medical hx     Objective:   Appearance: well-groomed.   Demeanor: average.   Eye Contact: average.   Motor Activity: average.   Speech: clear.   Language: Neurologic language is intact.   Fund of Knowledge: intact fund of knowledge.   Delusions: None Reported.   Hallucinations: not reported  Self Harm: None Reported.   Aggressive: None Reported.   Mood: good  Affect: full with smile   Orientation: alert, oriented x3.   Manner: cooperative.   Thought process: goal-directed.   Thought association: displays rational thought process.   Content of thought: normal  Abstract/ Rational Thought: intact   Memory: grossly intact.   Behavior: normal motor  activity, relaxed, good eye contact, calm.   Attention/Concentration: normal.   Cognition: intact.   Intelligence Estimate: average.   Executive Function: intact.   Insight: intact.   Judgement: intact.   Musculoskeletal: normal strength and tone. No abnormal movement   Impression: bipolar I depression, recently hypomanic, currently euthymia for 7 days   ANUP - under control for a week or so  ADHD - better and manageable   Discussion/Plan:   Restart stopped hydroxyzine 50 mg and Invega for now, will restart them if she became manic/hypomanic again.   Continue ketamine infusion as scheduled.   Vyvanse was refilled  Continue other medications   Follow-up in 3 months or earlier

## 2025-07-16 ENCOUNTER — HOSPITAL ENCOUNTER (OUTPATIENT)
Facility: HOSPITAL | Age: 40
Discharge: HOME | End: 2025-07-16

## 2025-07-16 VITALS
WEIGHT: 229.72 LBS | BODY MASS INDEX: 42.02 KG/M2 | RESPIRATION RATE: 16 BRPM | HEART RATE: 87 BPM | SYSTOLIC BLOOD PRESSURE: 120 MMHG | OXYGEN SATURATION: 99 % | DIASTOLIC BLOOD PRESSURE: 60 MMHG

## 2025-07-16 DIAGNOSIS — F31.9 BIPOLAR I DISORDER WITH DEPRESSION (MULTI): Primary | ICD-10-CM

## 2025-07-16 PROCEDURE — KETSP PR KETAMINE INFUSION SELF-PAY: Performed by: STUDENT IN AN ORGANIZED HEALTH CARE EDUCATION/TRAINING PROGRAM

## 2025-07-16 PROCEDURE — KETSP HC KETAMINE INFUSION SELF-PAY: Performed by: STUDENT IN AN ORGANIZED HEALTH CARE EDUCATION/TRAINING PROGRAM

## 2025-07-16 PROCEDURE — 2500000004 HC RX 250 GENERAL PHARMACY W/ HCPCS (ALT 636 FOR OP/ED): Performed by: STUDENT IN AN ORGANIZED HEALTH CARE EDUCATION/TRAINING PROGRAM

## 2025-07-16 RX ORDER — DIPHENHYDRAMINE HYDROCHLORIDE 50 MG/ML
50 INJECTION, SOLUTION INTRAMUSCULAR; INTRAVENOUS AS NEEDED
OUTPATIENT
Start: 2025-07-23

## 2025-07-16 RX ORDER — KETAMINE HCL IN 0.9 % NACL 200 MG/200
PLASTIC BAG, INJECTION (ML) INTRAVENOUS
Status: DISPENSED
Start: 2025-07-16 | End: 2025-07-16

## 2025-07-16 RX ORDER — LABETALOL HYDROCHLORIDE 5 MG/ML
30 INJECTION, SOLUTION INTRAVENOUS AS NEEDED
OUTPATIENT
Start: 2025-07-23

## 2025-07-16 RX ORDER — KETAMINE HCL IN 0.9 % NACL 200 MG/200
0.75 PLASTIC BAG, INJECTION (ML) INTRAVENOUS ONCE
Status: COMPLETED | OUTPATIENT
Start: 2025-07-16 | End: 2025-07-16

## 2025-07-16 RX ORDER — METOPROLOL TARTRATE 1 MG/ML
15 INJECTION, SOLUTION INTRAVENOUS ONCE AS NEEDED
OUTPATIENT
Start: 2025-07-23

## 2025-07-16 RX ORDER — MIDAZOLAM HYDROCHLORIDE 1 MG/ML
1 INJECTION, SOLUTION INTRAMUSCULAR; INTRAVENOUS ONCE AS NEEDED
OUTPATIENT
Start: 2025-07-23

## 2025-07-16 RX ORDER — ONDANSETRON HYDROCHLORIDE 2 MG/ML
8 INJECTION, SOLUTION INTRAVENOUS ONCE AS NEEDED
OUTPATIENT
Start: 2025-07-23

## 2025-07-16 RX ORDER — DICYCLOMINE HYDROCHLORIDE 10 MG/1
10 CAPSULE ORAL ONCE AS NEEDED
OUTPATIENT
Start: 2025-07-23

## 2025-07-16 RX ORDER — ONDANSETRON HYDROCHLORIDE 2 MG/ML
8 INJECTION, SOLUTION INTRAVENOUS ONCE AS NEEDED
Status: DISCONTINUED | OUTPATIENT
Start: 2025-07-16 | End: 2025-07-17 | Stop reason: HOSPADM

## 2025-07-16 RX ORDER — DIPHENHYDRAMINE HYDROCHLORIDE 50 MG/ML
50 INJECTION, SOLUTION INTRAMUSCULAR; INTRAVENOUS ONCE AS NEEDED
OUTPATIENT
Start: 2025-07-23

## 2025-07-16 RX ORDER — DIPHENHYDRAMINE HYDROCHLORIDE 50 MG/ML
50 INJECTION, SOLUTION INTRAMUSCULAR; INTRAVENOUS ONCE AS NEEDED
Status: DISCONTINUED | OUTPATIENT
Start: 2025-07-16 | End: 2025-07-17 | Stop reason: HOSPADM

## 2025-07-16 RX ORDER — LABETALOL HYDROCHLORIDE 5 MG/ML
10 INJECTION, SOLUTION INTRAVENOUS AS NEEDED
OUTPATIENT
Start: 2025-07-23

## 2025-07-16 RX ORDER — FAMOTIDINE 10 MG/ML
20 INJECTION, SOLUTION INTRAVENOUS ONCE AS NEEDED
OUTPATIENT
Start: 2025-07-23

## 2025-07-16 RX ORDER — LIDOCAINE AND PRILOCAINE 25; 25 MG/G; MG/G
1 CREAM TOPICAL ONCE AS NEEDED
OUTPATIENT
Start: 2025-07-23

## 2025-07-16 RX ORDER — LABETALOL HYDROCHLORIDE 5 MG/ML
20 INJECTION, SOLUTION INTRAVENOUS AS NEEDED
OUTPATIENT
Start: 2025-07-23

## 2025-07-16 RX ORDER — METOPROLOL TARTRATE 1 MG/ML
5 INJECTION, SOLUTION INTRAVENOUS ONCE AS NEEDED
OUTPATIENT
Start: 2025-07-23

## 2025-07-16 RX ORDER — ACETAMINOPHEN 325 MG/1
650 TABLET ORAL ONCE AS NEEDED
OUTPATIENT
Start: 2025-07-23

## 2025-07-16 RX ORDER — ALBUTEROL SULFATE 0.83 MG/ML
3 SOLUTION RESPIRATORY (INHALATION) AS NEEDED
OUTPATIENT
Start: 2025-07-23

## 2025-07-16 RX ORDER — KETAMINE HCL IN 0.9 % NACL 200 MG/200
0.75 PLASTIC BAG, INJECTION (ML) INTRAVENOUS ONCE
OUTPATIENT
Start: 2025-07-23 | End: 2025-07-23

## 2025-07-16 RX ORDER — METOPROLOL TARTRATE 1 MG/ML
10 INJECTION, SOLUTION INTRAVENOUS ONCE AS NEEDED
OUTPATIENT
Start: 2025-07-23

## 2025-07-16 RX ORDER — EPINEPHRINE 1 MG/ML
0.3 INJECTION, SOLUTION, CONCENTRATE INTRAVENOUS EVERY 5 MIN PRN
OUTPATIENT
Start: 2025-07-23

## 2025-07-16 RX ADMIN — ONDANSETRON HYDROCHLORIDE 8 MG: 2 SOLUTION INTRAMUSCULAR; INTRAVENOUS at 10:03

## 2025-07-16 RX ADMIN — KETAMINE HYDROCHLORIDE 78 MG: 50 INJECTION INTRAMUSCULAR; INTRAVENOUS at 10:05

## 2025-07-16 RX ADMIN — DIPHENHYDRAMINE HYDROCHLORIDE 50 MG: 50 INJECTION INTRAMUSCULAR; INTRAVENOUS at 10:04

## 2025-07-16 ASSESSMENT — PAIN SCALES - GENERAL

## 2025-07-16 ASSESSMENT — ANXIETY QUESTIONNAIRES
3. WORRYING TOO MUCH ABOUT DIFFERENT THINGS: NOT AT ALL
5. BEING SO RESTLESS THAT IT IS HARD TO SIT STILL: NOT AT ALL
7. FEELING AFRAID AS IF SOMETHING AWFUL MIGHT HAPPEN: NOT AT ALL
7. FEELING AFRAID AS IF SOMETHING AWFUL MIGHT HAPPEN: NOT AT ALL
GAD7 TOTAL SCORE: 0
6. BECOMING EASILY ANNOYED OR IRRITABLE: NOT AT ALL
IF YOU CHECKED OFF ANY PROBLEMS ON THIS QUESTIONNAIRE, HOW DIFFICULT HAVE THESE PROBLEMS MADE IT FOR YOU TO DO YOUR WORK, TAKE CARE OF THINGS AT HOME, OR GET ALONG WITH OTHER PEOPLE: NOT DIFFICULT AT ALL
4. TROUBLE RELAXING: NOT AT ALL
1. FEELING NERVOUS, ANXIOUS, OR ON EDGE: NOT AT ALL
4. TROUBLE RELAXING: NOT AT ALL
IF YOU CHECKED OFF ANY PROBLEMS ON THIS QUESTIONNAIRE, HOW DIFFICULT HAVE THESE PROBLEMS MADE IT FOR YOU TO DO YOUR WORK, TAKE CARE OF THINGS AT HOME, OR GET ALONG WITH OTHER PEOPLE: NOT DIFFICULT AT ALL
6. BECOMING EASILY ANNOYED OR IRRITABLE: NOT AT ALL
2. NOT BEING ABLE TO STOP OR CONTROL WORRYING: NOT AT ALL
1. FEELING NERVOUS, ANXIOUS, OR ON EDGE: NOT AT ALL
2. NOT BEING ABLE TO STOP OR CONTROL WORRYING: NOT AT ALL
3. WORRYING TOO MUCH ABOUT DIFFERENT THINGS: NOT AT ALL
5. BEING SO RESTLESS THAT IT IS HARD TO SIT STILL: NOT AT ALL

## 2025-07-16 ASSESSMENT — PATIENT HEALTH QUESTIONNAIRE - PHQ9
8. MOVING OR SPEAKING SO SLOWLY THAT OTHER PEOPLE COULD HAVE NOTICED. OR THE OPPOSITE, BEING SO FIGETY OR RESTLESS THAT YOU HAVE BEEN MOVING AROUND A LOT MORE THAN USUAL: NOT AT ALL
6. FEELING BAD ABOUT YOURSELF - OR THAT YOU ARE A FAILURE OR HAVE LET YOURSELF OR YOUR FAMILY DOWN: NOT AT ALL
7. TROUBLE CONCENTRATING ON THINGS, SUCH AS READING THE NEWSPAPER OR WATCHING TELEVISION: SEVERAL DAYS
1. LITTLE INTEREST OR PLEASURE IN DOING THINGS: SEVERAL DAYS
8. MOVING OR SPEAKING SO SLOWLY THAT OTHER PEOPLE COULD HAVE NOTICED. OR THE OPPOSITE - BEING SO FIDGETY OR RESTLESS THAT YOU HAVE BEEN MOVING AROUND A LOT MORE THAN USUAL: NOT AT ALL
5. POOR APPETITE OR OVEREATING: NOT AT ALL
3. TROUBLE FALLING OR STAYING ASLEEP OR SLEEPING TOO MUCH: NEARLY EVERY DAY
10. IF YOU CHECKED OFF ANY PROBLEMS, HOW DIFFICULT HAVE THESE PROBLEMS MADE IT FOR YOU TO DO YOUR WORK, TAKE CARE OF THINGS AT HOME, OR GET ALONG WITH OTHER PEOPLE: SOMEWHAT DIFFICULT
SUM OF ALL RESPONSES TO PHQ9 QUESTIONS 1 & 2: 2
SUM OF ALL RESPONSES TO PHQ QUESTIONS 1-9: 6
10. IF YOU CHECKED OFF ANY PROBLEMS, HOW DIFFICULT HAVE THESE PROBLEMS MADE IT FOR YOU TO DO YOUR WORK, TAKE CARE OF THINGS AT HOME, OR GET ALONG WITH OTHER PEOPLE: SOMEWHAT DIFFICULT
2. FEELING DOWN, DEPRESSED OR HOPELESS: SEVERAL DAYS
9. THOUGHTS THAT YOU WOULD BE BETTER OFF DEAD, OR OF HURTING YOURSELF: NOT AT ALL
5. POOR APPETITE OR OVEREATING: NOT AT ALL
1. LITTLE INTEREST OR PLEASURE IN DOING THINGS: SEVERAL DAYS
3. TROUBLE FALLING OR STAYING ASLEEP: NEARLY EVERY DAY
4. FEELING TIRED OR HAVING LITTLE ENERGY: NOT AT ALL
9. THOUGHTS THAT YOU WOULD BE BETTER OFF DEAD, OR OF HURTING YOURSELF: NOT AT ALL
7. TROUBLE CONCENTRATING ON THINGS, SUCH AS READING THE NEWSPAPER OR WATCHING TELEVISION: SEVERAL DAYS
6. FEELING BAD ABOUT YOURSELF - OR THAT YOU ARE A FAILURE OR HAVE LET YOURSELF OR YOUR FAMILY DOWN: NOT AT ALL
2. FEELING DOWN, DEPRESSED OR HOPELESS: SEVERAL DAYS
4. FEELING TIRED OR HAVING LITTLE ENERGY: NOT AT ALL

## 2025-07-16 ASSESSMENT — ENCOUNTER SYMPTOMS
DEPRESSION: 1
LOSS OF SENSATION IN FEET: 0
OCCASIONAL FEELINGS OF UNSTEADINESS: 0

## 2025-07-16 ASSESSMENT — PAIN - FUNCTIONAL ASSESSMENT
PAIN_FUNCTIONAL_ASSESSMENT: 0-10

## 2025-07-16 NOTE — PROCEDURES
"Interventional Psychiatry Procedure    Date/Time: 7/16/2025 10:19 AM    Performed by: Suzie Álvarez DO  Authorized by: Suzie Álvarez DO      Ketamine Infusion for Depression    Ketamine Infusion: 0.75mg per kilo over 60 minutes, Maintenance, [id number]    Subjective:  Procedure (Patient last ate (Date/Time): 07/16/2025 0800/Any concerns regarding today's treatment: No/Any concerns regarding previous treatment: Yes             /)    Pt reports mood has been \"ok\" with some increased stress from returning to work. Had some hypomania after last Tx, but managed with PRN Invega and hydroxyzine and feels has resolved - no longer needs PRNs. Sleeping and eating ok. Denies suicidal/homicidal ideation. Denies auditory/visual hallucinations.   Interval changes in health: Denies   Interval changes in medications: Denies         5/21/2025 6/11/2025 7/2/2025 7/16/2025   Scores   Patient Health Questionnaire-9 Score 19  6  4  6    BH ANUP-7 Total Score 18  5  1  0        Patient-reported     Objective:  Mental Status Exam  General/Appearance: no distress, appears stated age, well kept  Attitude/Behavior: Cooperative, conversant, engaged, and with good eye contact.  Motor Activity: no psychomotor agitation or retardation, no tremor or other abnormal movements, gait: Within normal limits  Mood: \"ok\"  Affect: Euthymic, Full range, Mood congruent, and Appropriate to content  Speech: Spontaneous, Coherent, Fluent, Appropriate rate, and Appropriate quantity, Appropriate volume  Thought Process: linear, goal directed and logical, future-oriented  Thought Content: no suicidal ideation, no homicidal ideation, delusions: none   Thought Perception: no perceptual abnormalities noted  Cognition: grossly intact  Insight: intact  Judgment: intact    Time out was taken with staff to confirm correct patient and correct procedure to be performed. Fall precautions in place throughout procedure. SpO2 continuously monitored throughout treatment. " Intravenous Ketamine infusion was initiated by psychiatrist and monitored by RN.    Vitals:    07/16/25 0950 07/16/25 1000 07/16/25 1010 07/16/25 1020   BP: 119/66 128/75 138/74 133/86   Pulse: 83 80 85 90   Resp: 16 16 16 16   SpO2: 99% 99% 98% 99%   Weight: 104 kg (229 lb 11.5 oz)       07/16/25 1030 07/16/25 1040 07/16/25 1050 07/16/25 1100   BP: 126/73 123/74 122/86 128/77   Pulse: 86 88 87 79   Resp: 16 16 16 16   SpO2: 97% 99% 98% 97%   Weight:        07/16/25 1110 07/16/25 1120 07/16/25 1130 07/16/25 1140   BP: 123/71 122/66 120/89 117/71   Pulse: 85 88 85 86   Resp: 16 16 16 16   SpO2: 98% 99% 100% 98%   Weight:        07/16/25 1150 07/16/25 1200 07/16/25 1210 07/16/25 1218   BP: 115/69 119/66 120/70 120/60   Pulse: 85 86 88 87   Resp: 16 16 16 16   SpO2: 97% 99% 100% 99%   Weight:             7/16/2025    09:50 7/16/2025    10:00 7/16/2025    10:10 7/16/2025    10:20 7/16/2025    10:30 7/16/2025    10:40 7/16/2025    10:50 7/16/2025    11:00   RASS   Gonzalez Agitation Sedation Scale 0 0 0 -1 -1 -1 -1 -1         7/16/2025    11:10 7/16/2025    11:20 7/16/2025    11:30 7/16/2025    11:40 7/16/2025    11:50 7/16/2025    12:00 7/16/2025    12:10 7/16/2025    12:18   RASS   Gonzalez Agitation Sedation Scale -1 -1 -1 0 0 0 0 0     Patient tolerated treatment well. No complications. Patient was monitored for 60 minutes after the completion of the infusion. Discharge instructions were provided to patient with After Visit Summary. Patient was discharged home in the care of their designated .    Assessment:  1. Bipolar I disorder with depression (Multi)      No immediate risk of harm to self or others    Plan:  Continue Intravenous Ketamine Infusion. Follow up in about 3 weeks (around 8/6/2025). Continue outpatient medications and treatment with Dr. HAZEL Álvarez DO

## 2025-07-17 ENCOUNTER — APPOINTMENT (OUTPATIENT)
Dept: BEHAVIORAL HEALTH | Facility: CLINIC | Age: 40
End: 2025-07-17
Payer: COMMERCIAL

## 2025-07-17 DIAGNOSIS — F31.9 BIPOLAR 1 DISORDER (MULTI): ICD-10-CM

## 2025-07-17 LAB
ATRIAL RATE: 104 BPM
P AXIS: 62 DEGREES
P OFFSET: 200 MS
P ONSET: 147 MS
PR INTERVAL: 160 MS
Q ONSET: 227 MS
QRS COUNT: 17 BEATS
QRS DURATION: 70 MS
QT INTERVAL: 334 MS
QTC CALCULATION(BAZETT): 439 MS
QTC FREDERICIA: 401 MS
R AXIS: 21 DEGREES
T AXIS: 50 DEGREES
T OFFSET: 394 MS
VENTRICULAR RATE: 104 BPM

## 2025-07-17 PROCEDURE — 90837 PSYTX W PT 60 MINUTES: CPT | Performed by: PSYCHOLOGIST

## 2025-07-17 NOTE — PROGRESS NOTES
Humanistic/Insight Oriented Therapy. 50 min.  BP1.  We spoke about how she is feeling better, calm after the storm, taking steps to get back into work, enjoy her time away, a weekend trip to look forward to.  Processed a bit of countertransferrence she had with a patient.      Chief complaint - Phase of life and emotional distress   Treatment plan - Insight and action consistent with ACT therapy. Providing support, safe space to process their thoughts and feelings, developing therapeutic relationship.  Goals - Self-care, insight, coping skills  Prognosis - Average  Progress - Average  Functional status - 70/100  Focused mental status: Patient was oriented to person, place, time and context  Focused mental exam - alert and oriented  Frequency - Every two weeks     An interactive audio and video telecommunication system which permits real time communications between the patient (at the originating site) and provider (at the distant site) was utilized to provide this telehealth service

## 2025-07-18 LAB
ATRIAL RATE: 106 BPM
P AXIS: 47 DEGREES
P OFFSET: 202 MS
P ONSET: 151 MS
PR INTERVAL: 148 MS
Q ONSET: 225 MS
QRS COUNT: 17 BEATS
QRS DURATION: 70 MS
QT INTERVAL: 336 MS
QTC CALCULATION(BAZETT): 446 MS
QTC FREDERICIA: 406 MS
R AXIS: -5 DEGREES
T AXIS: 58 DEGREES
T OFFSET: 393 MS
VENTRICULAR RATE: 106 BPM

## 2025-07-22 ENCOUNTER — CLINICAL SUPPORT (OUTPATIENT)
Dept: BEHAVIORAL HEALTH | Facility: CLINIC | Age: 40
End: 2025-07-22
Payer: COMMERCIAL

## 2025-07-22 DIAGNOSIS — F31.9 BIPOLAR 1 DISORDER (MULTI): ICD-10-CM

## 2025-07-22 PROCEDURE — 90853 GROUP PSYCHOTHERAPY: CPT | Performed by: PSYCHOLOGIST

## 2025-07-24 ENCOUNTER — APPOINTMENT (OUTPATIENT)
Dept: BEHAVIORAL HEALTH | Facility: CLINIC | Age: 40
End: 2025-07-24
Payer: COMMERCIAL

## 2025-07-24 DIAGNOSIS — F31.9 BIPOLAR 1 DISORDER (MULTI): ICD-10-CM

## 2025-07-24 PROCEDURE — 90837 PSYTX W PT 60 MINUTES: CPT | Performed by: PSYCHOLOGIST

## 2025-07-24 NOTE — PROGRESS NOTES
Humanistic/Insight Oriented Therapy. 50 min.  BP1.  We spoke about how she had a wonderful time last weekend at the show and B&B.  We talked about her feeling a little hypomanic, but coming down to feel grounded this week, some clients, but not too busy.  We spoke about today as she has 6-7 scheduled, pacing herself while utilizing breaks to her advantage.     Chief complaint - Phase of life and emotional distress   Treatment plan - Insight and action consistent with ACT therapy. Providing support, safe space to process their thoughts and feelings, developing therapeutic relationship.  Goals - Self-care, insight, coping skills  Prognosis - Average  Progress - Average  Functional status - 70/100  Focused mental status: Patient was oriented to person, place, time and context  Focused mental exam - alert and oriented  Frequency - Every two weeks     An interactive audio and video telecommunication system which permits real time communications between the patient (at the originating site) and provider (at the distant site) was utilized to provide this telehealth service

## 2025-07-25 DIAGNOSIS — F31.9 BIPOLAR 1 DISORDER (MULTI): ICD-10-CM

## 2025-07-25 RX ORDER — PALIPERIDONE 6 MG/1
6 TABLET, EXTENDED RELEASE ORAL NIGHTLY
Qty: 90 TABLET | Refills: 1 | Status: SHIPPED | OUTPATIENT
Start: 2025-07-25 | End: 2025-08-24

## 2025-07-30 ENCOUNTER — APPOINTMENT (OUTPATIENT)
Dept: NEUROLOGY | Facility: HOSPITAL | Age: 40
End: 2025-07-30
Payer: COMMERCIAL

## 2025-07-30 ENCOUNTER — HOSPITAL ENCOUNTER (OUTPATIENT)
Facility: HOSPITAL | Age: 40
Discharge: HOME | End: 2025-07-30
Payer: COMMERCIAL

## 2025-07-30 VITALS
BODY MASS INDEX: 42.54 KG/M2 | HEART RATE: 84 BPM | WEIGHT: 232.59 LBS | DIASTOLIC BLOOD PRESSURE: 67 MMHG | SYSTOLIC BLOOD PRESSURE: 117 MMHG | TEMPERATURE: 97.9 F | RESPIRATION RATE: 18 BRPM | OXYGEN SATURATION: 96 %

## 2025-07-30 DIAGNOSIS — F31.9 BIPOLAR I DISORDER WITH DEPRESSION (MULTI): Primary | ICD-10-CM

## 2025-07-30 LAB — GLUCOSE BLD MANUAL STRIP-MCNC: 79 MG/DL (ref 74–99)

## 2025-07-30 PROCEDURE — KETSP HC KETAMINE INFUSION SELF-PAY: Performed by: STUDENT IN AN ORGANIZED HEALTH CARE EDUCATION/TRAINING PROGRAM

## 2025-07-30 PROCEDURE — 82947 ASSAY GLUCOSE BLOOD QUANT: CPT

## 2025-07-30 PROCEDURE — 2500000001 HC RX 250 WO HCPCS SELF ADMINISTERED DRUGS (ALT 637 FOR MEDICARE OP): Performed by: STUDENT IN AN ORGANIZED HEALTH CARE EDUCATION/TRAINING PROGRAM

## 2025-07-30 PROCEDURE — 2500000004 HC RX 250 GENERAL PHARMACY W/ HCPCS (ALT 636 FOR OP/ED): Performed by: STUDENT IN AN ORGANIZED HEALTH CARE EDUCATION/TRAINING PROGRAM

## 2025-07-30 RX ORDER — MIDAZOLAM HYDROCHLORIDE 1 MG/ML
1 INJECTION, SOLUTION INTRAMUSCULAR; INTRAVENOUS ONCE AS NEEDED
OUTPATIENT
Start: 2025-08-06

## 2025-07-30 RX ORDER — ACETAMINOPHEN 325 MG/1
650 TABLET ORAL ONCE AS NEEDED
OUTPATIENT
Start: 2025-08-06

## 2025-07-30 RX ORDER — LABETALOL HYDROCHLORIDE 5 MG/ML
30 INJECTION, SOLUTION INTRAVENOUS AS NEEDED
OUTPATIENT
Start: 2025-08-06

## 2025-07-30 RX ORDER — KETAMINE HCL IN 0.9 % NACL 200 MG/200
0.75 PLASTIC BAG, INJECTION (ML) INTRAVENOUS ONCE
Status: COMPLETED | OUTPATIENT
Start: 2025-07-30 | End: 2025-07-30

## 2025-07-30 RX ORDER — LABETALOL HYDROCHLORIDE 5 MG/ML
10 INJECTION, SOLUTION INTRAVENOUS AS NEEDED
Status: DISCONTINUED | OUTPATIENT
Start: 2025-07-30 | End: 2025-07-31 | Stop reason: HOSPADM

## 2025-07-30 RX ORDER — METOPROLOL TARTRATE 1 MG/ML
10 INJECTION, SOLUTION INTRAVENOUS ONCE AS NEEDED
Status: DISCONTINUED | OUTPATIENT
Start: 2025-07-30 | End: 2025-07-31 | Stop reason: HOSPADM

## 2025-07-30 RX ORDER — METOPROLOL TARTRATE 1 MG/ML
5 INJECTION, SOLUTION INTRAVENOUS ONCE AS NEEDED
Status: DISCONTINUED | OUTPATIENT
Start: 2025-07-30 | End: 2025-07-31 | Stop reason: HOSPADM

## 2025-07-30 RX ORDER — METOPROLOL TARTRATE 1 MG/ML
15 INJECTION, SOLUTION INTRAVENOUS ONCE AS NEEDED
OUTPATIENT
Start: 2025-08-06

## 2025-07-30 RX ORDER — FAMOTIDINE 10 MG/ML
20 INJECTION, SOLUTION INTRAVENOUS ONCE AS NEEDED
OUTPATIENT
Start: 2025-08-06

## 2025-07-30 RX ORDER — AMOXICILLIN 875 MG/1
875 TABLET, COATED ORAL 2 TIMES DAILY
COMMUNITY
Start: 2025-07-29

## 2025-07-30 RX ORDER — LIDOCAINE AND PRILOCAINE 25; 25 MG/G; MG/G
1 CREAM TOPICAL ONCE AS NEEDED
OUTPATIENT
Start: 2025-08-06

## 2025-07-30 RX ORDER — FAMOTIDINE 10 MG/ML
20 INJECTION, SOLUTION INTRAVENOUS ONCE AS NEEDED
Status: DISCONTINUED | OUTPATIENT
Start: 2025-07-30 | End: 2025-07-31 | Stop reason: HOSPADM

## 2025-07-30 RX ORDER — DICYCLOMINE HYDROCHLORIDE 10 MG/1
10 CAPSULE ORAL ONCE AS NEEDED
OUTPATIENT
Start: 2025-08-06

## 2025-07-30 RX ORDER — EPINEPHRINE 1 MG/ML
0.3 INJECTION, SOLUTION, CONCENTRATE INTRAVENOUS EVERY 5 MIN PRN
OUTPATIENT
Start: 2025-08-06

## 2025-07-30 RX ORDER — ACETAMINOPHEN 325 MG/1
650 TABLET ORAL ONCE AS NEEDED
Status: DISCONTINUED | OUTPATIENT
Start: 2025-07-30 | End: 2025-07-31 | Stop reason: HOSPADM

## 2025-07-30 RX ORDER — LABETALOL HYDROCHLORIDE 5 MG/ML
20 INJECTION, SOLUTION INTRAVENOUS AS NEEDED
OUTPATIENT
Start: 2025-08-06

## 2025-07-30 RX ORDER — DIPHENHYDRAMINE HYDROCHLORIDE 50 MG/ML
50 INJECTION, SOLUTION INTRAMUSCULAR; INTRAVENOUS ONCE AS NEEDED
OUTPATIENT
Start: 2025-08-06

## 2025-07-30 RX ORDER — LABETALOL HYDROCHLORIDE 5 MG/ML
20 INJECTION, SOLUTION INTRAVENOUS AS NEEDED
Status: DISCONTINUED | OUTPATIENT
Start: 2025-07-30 | End: 2025-07-31 | Stop reason: HOSPADM

## 2025-07-30 RX ORDER — LABETALOL HYDROCHLORIDE 5 MG/ML
30 INJECTION, SOLUTION INTRAVENOUS AS NEEDED
Status: DISCONTINUED | OUTPATIENT
Start: 2025-07-30 | End: 2025-07-31 | Stop reason: HOSPADM

## 2025-07-30 RX ORDER — EPINEPHRINE 1 MG/ML
0.3 INJECTION, SOLUTION, CONCENTRATE INTRAVENOUS EVERY 5 MIN PRN
Status: DISCONTINUED | OUTPATIENT
Start: 2025-07-30 | End: 2025-07-31 | Stop reason: HOSPADM

## 2025-07-30 RX ORDER — METOPROLOL TARTRATE 1 MG/ML
15 INJECTION, SOLUTION INTRAVENOUS ONCE AS NEEDED
Status: DISCONTINUED | OUTPATIENT
Start: 2025-07-30 | End: 2025-07-31 | Stop reason: HOSPADM

## 2025-07-30 RX ORDER — ALBUTEROL SULFATE 0.83 MG/ML
3 SOLUTION RESPIRATORY (INHALATION) AS NEEDED
OUTPATIENT
Start: 2025-08-06

## 2025-07-30 RX ORDER — METOPROLOL TARTRATE 1 MG/ML
10 INJECTION, SOLUTION INTRAVENOUS ONCE AS NEEDED
OUTPATIENT
Start: 2025-08-06

## 2025-07-30 RX ORDER — LIDOCAINE AND PRILOCAINE 25; 25 MG/G; MG/G
1 CREAM TOPICAL ONCE AS NEEDED
Status: DISCONTINUED | OUTPATIENT
Start: 2025-07-30 | End: 2025-07-31 | Stop reason: HOSPADM

## 2025-07-30 RX ORDER — LABETALOL HYDROCHLORIDE 5 MG/ML
10 INJECTION, SOLUTION INTRAVENOUS AS NEEDED
OUTPATIENT
Start: 2025-08-06

## 2025-07-30 RX ORDER — ONDANSETRON HYDROCHLORIDE 2 MG/ML
8 INJECTION, SOLUTION INTRAVENOUS ONCE AS NEEDED
OUTPATIENT
Start: 2025-08-06

## 2025-07-30 RX ORDER — DIPHENHYDRAMINE HYDROCHLORIDE 50 MG/ML
50 INJECTION, SOLUTION INTRAMUSCULAR; INTRAVENOUS ONCE AS NEEDED
Status: DISCONTINUED | OUTPATIENT
Start: 2025-07-30 | End: 2025-07-31 | Stop reason: HOSPADM

## 2025-07-30 RX ORDER — DIPHENHYDRAMINE HYDROCHLORIDE 50 MG/ML
50 INJECTION, SOLUTION INTRAMUSCULAR; INTRAVENOUS AS NEEDED
Status: DISCONTINUED | OUTPATIENT
Start: 2025-07-30 | End: 2025-07-31 | Stop reason: HOSPADM

## 2025-07-30 RX ORDER — METOPROLOL TARTRATE 1 MG/ML
5 INJECTION, SOLUTION INTRAVENOUS ONCE AS NEEDED
OUTPATIENT
Start: 2025-08-06

## 2025-07-30 RX ORDER — DICYCLOMINE HYDROCHLORIDE 10 MG/1
10 CAPSULE ORAL ONCE AS NEEDED
Status: DISCONTINUED | OUTPATIENT
Start: 2025-07-30 | End: 2025-07-31 | Stop reason: HOSPADM

## 2025-07-30 RX ORDER — MIDAZOLAM HYDROCHLORIDE 1 MG/ML
1 INJECTION, SOLUTION INTRAMUSCULAR; INTRAVENOUS ONCE AS NEEDED
Status: DISCONTINUED | OUTPATIENT
Start: 2025-07-30 | End: 2025-07-31 | Stop reason: HOSPADM

## 2025-07-30 RX ORDER — DIPHENHYDRAMINE HYDROCHLORIDE 50 MG/ML
50 INJECTION, SOLUTION INTRAMUSCULAR; INTRAVENOUS AS NEEDED
OUTPATIENT
Start: 2025-08-06

## 2025-07-30 RX ORDER — ALBUTEROL SULFATE 0.83 MG/ML
3 SOLUTION RESPIRATORY (INHALATION) AS NEEDED
Status: DISCONTINUED | OUTPATIENT
Start: 2025-07-30 | End: 2025-07-31 | Stop reason: HOSPADM

## 2025-07-30 RX ORDER — ONDANSETRON HYDROCHLORIDE 2 MG/ML
8 INJECTION, SOLUTION INTRAVENOUS ONCE AS NEEDED
Status: DISCONTINUED | OUTPATIENT
Start: 2025-07-30 | End: 2025-07-31 | Stop reason: HOSPADM

## 2025-07-30 RX ORDER — KETAMINE HCL IN 0.9 % NACL 200 MG/200
0.75 PLASTIC BAG, INJECTION (ML) INTRAVENOUS ONCE
OUTPATIENT
Start: 2025-08-06 | End: 2025-08-06

## 2025-07-30 RX ADMIN — ONDANSETRON HYDROCHLORIDE 8 MG: 2 SOLUTION INTRAMUSCULAR; INTRAVENOUS at 11:44

## 2025-07-30 RX ADMIN — SODIUM CHLORIDE 80 MG: 9 INJECTION, SOLUTION INTRAVENOUS at 12:12

## 2025-07-30 RX ADMIN — ACETAMINOPHEN 975 MG: 325 TABLET ORAL at 12:30

## 2025-07-30 RX ADMIN — DIPHENHYDRAMINE HYDROCHLORIDE 50 MG: 50 INJECTION INTRAMUSCULAR; INTRAVENOUS at 11:43

## 2025-07-30 ASSESSMENT — COLUMBIA-SUICIDE SEVERITY RATING SCALE - C-SSRS
2. HAVE YOU ACTUALLY HAD ANY THOUGHTS OF KILLING YOURSELF?: NO
1. IN THE PAST MONTH, HAVE YOU WISHED YOU WERE DEAD OR WISHED YOU COULD GO TO SLEEP AND NOT WAKE UP?: NO
6. IN YOUR LIFETIME, HAVE YOU EVER DONE ANYTHING, STARTED TO DO ANYTHING, OR PREPARED TO DO ANYTHING TO END YOUR LIFE?: YES

## 2025-07-30 ASSESSMENT — ANXIETY QUESTIONNAIRES
IF YOU CHECKED OFF ANY PROBLEMS ON THIS QUESTIONNAIRE, HOW DIFFICULT HAVE THESE PROBLEMS MADE IT FOR YOU TO DO YOUR WORK, TAKE CARE OF THINGS AT HOME, OR GET ALONG WITH OTHER PEOPLE: SOMEWHAT DIFFICULT
2. NOT BEING ABLE TO STOP OR CONTROL WORRYING: SEVERAL DAYS
GAD7 TOTAL SCORE: 6
5. BEING SO RESTLESS THAT IT IS HARD TO SIT STILL: SEVERAL DAYS
6. BECOMING EASILY ANNOYED OR IRRITABLE: NOT AT ALL
6. BECOMING EASILY ANNOYED OR IRRITABLE: NOT AT ALL
1. FEELING NERVOUS, ANXIOUS, OR ON EDGE: SEVERAL DAYS
7. FEELING AFRAID AS IF SOMETHING AWFUL MIGHT HAPPEN: SEVERAL DAYS
4. TROUBLE RELAXING: SEVERAL DAYS
7. FEELING AFRAID AS IF SOMETHING AWFUL MIGHT HAPPEN: SEVERAL DAYS
3. WORRYING TOO MUCH ABOUT DIFFERENT THINGS: SEVERAL DAYS
IF YOU CHECKED OFF ANY PROBLEMS ON THIS QUESTIONNAIRE, HOW DIFFICULT HAVE THESE PROBLEMS MADE IT FOR YOU TO DO YOUR WORK, TAKE CARE OF THINGS AT HOME, OR GET ALONG WITH OTHER PEOPLE: SOMEWHAT DIFFICULT
5. BEING SO RESTLESS THAT IT IS HARD TO SIT STILL: SEVERAL DAYS
3. WORRYING TOO MUCH ABOUT DIFFERENT THINGS: SEVERAL DAYS
1. FEELING NERVOUS, ANXIOUS, OR ON EDGE: SEVERAL DAYS
4. TROUBLE RELAXING: SEVERAL DAYS
2. NOT BEING ABLE TO STOP OR CONTROL WORRYING: SEVERAL DAYS

## 2025-07-30 ASSESSMENT — PAIN SCALES - GENERAL

## 2025-07-30 ASSESSMENT — PATIENT HEALTH QUESTIONNAIRE - PHQ9
8. MOVING OR SPEAKING SO SLOWLY THAT OTHER PEOPLE COULD HAVE NOTICED. OR THE OPPOSITE, BEING SO FIGETY OR RESTLESS THAT YOU HAVE BEEN MOVING AROUND A LOT MORE THAN USUAL: SEVERAL DAYS
9. THOUGHTS THAT YOU WOULD BE BETTER OFF DEAD, OR OF HURTING YOURSELF: NOT AT ALL
3. TROUBLE FALLING OR STAYING ASLEEP OR SLEEPING TOO MUCH: SEVERAL DAYS
2. FEELING DOWN, DEPRESSED OR HOPELESS: SEVERAL DAYS
10. IF YOU CHECKED OFF ANY PROBLEMS, HOW DIFFICULT HAVE THESE PROBLEMS MADE IT FOR YOU TO DO YOUR WORK, TAKE CARE OF THINGS AT HOME, OR GET ALONG WITH OTHER PEOPLE: VERY DIFFICULT
1. LITTLE INTEREST OR PLEASURE IN DOING THINGS: SEVERAL DAYS
9. THOUGHTS THAT YOU WOULD BE BETTER OFF DEAD, OR OF HURTING YOURSELF: NOT AT ALL
3. TROUBLE FALLING OR STAYING ASLEEP: SEVERAL DAYS
SUM OF ALL RESPONSES TO PHQ9 QUESTIONS 1 & 2: 2
SUM OF ALL RESPONSES TO PHQ QUESTIONS 1-9: 10
7. TROUBLE CONCENTRATING ON THINGS, SUCH AS READING THE NEWSPAPER OR WATCHING TELEVISION: SEVERAL DAYS
4. FEELING TIRED OR HAVING LITTLE ENERGY: NEARLY EVERY DAY
8. MOVING OR SPEAKING SO SLOWLY THAT OTHER PEOPLE COULD HAVE NOTICED. OR THE OPPOSITE - BEING SO FIDGETY OR RESTLESS THAT YOU HAVE BEEN MOVING AROUND A LOT MORE THAN USUAL: SEVERAL DAYS
4. FEELING TIRED OR HAVING LITTLE ENERGY: NEARLY EVERY DAY
5. POOR APPETITE OR OVEREATING: SEVERAL DAYS
7. TROUBLE CONCENTRATING ON THINGS, SUCH AS READING THE NEWSPAPER OR WATCHING TELEVISION: SEVERAL DAYS
5. POOR APPETITE OR OVEREATING: SEVERAL DAYS
10. IF YOU CHECKED OFF ANY PROBLEMS, HOW DIFFICULT HAVE THESE PROBLEMS MADE IT FOR YOU TO DO YOUR WORK, TAKE CARE OF THINGS AT HOME, OR GET ALONG WITH OTHER PEOPLE: VERY DIFFICULT
6. FEELING BAD ABOUT YOURSELF - OR THAT YOU ARE A FAILURE OR HAVE LET YOURSELF OR YOUR FAMILY DOWN: SEVERAL DAYS
1. LITTLE INTEREST OR PLEASURE IN DOING THINGS: SEVERAL DAYS
6. FEELING BAD ABOUT YOURSELF - OR THAT YOU ARE A FAILURE OR HAVE LET YOURSELF OR YOUR FAMILY DOWN: SEVERAL DAYS
2. FEELING DOWN, DEPRESSED OR HOPELESS: SEVERAL DAYS

## 2025-07-30 ASSESSMENT — PAIN - FUNCTIONAL ASSESSMENT

## 2025-07-30 ASSESSMENT — ENCOUNTER SYMPTOMS
OCCASIONAL FEELINGS OF UNSTEADINESS: 0
DEPRESSION: 1
LOSS OF SENSATION IN FEET: 0

## 2025-07-30 NOTE — PATIENT INSTRUCTIONS
Homegoing Instructions      Your Provider's Instructions:    Post-Treatment Instructions:    No alcohol for 24 hours.    Do not drive or operate machinery until the day after treatment and after a full night of sleep.    Do not make important decisions or sign legal documents for 24 hours.    A responsible adult should remain with you for 24 hours due to possible dizziness or sedation.    Resume a normal diet unless otherwise instructed.    When to Contact a Provider:    Contact your provider with any questions or concerns.    Call 911 for any emergencies.    Health Maintenance:    If you use tobacco, quitting is strongly recommended.  Resources:    American Heart Association: (218) 560-5259 or www.americanheart.org    [place] Tobacco Quit Line: 1-800-QUIT-NOW (1-666.843.5298)    [facility]: www.[facility].[place].gov    For alcohol or drug use resources: Call [employer] at 211 or visit www.[place].gov.    For safe medication disposal: Visit www.rxdrugdropbox.org or contact your local police department.      Instructions Before Next Spravato/Ketamine Appointment:      Arrange transportation to and from your appointment (friend, family member, or insurance-covered ride).    Avoid smoking, vaping, and nicotine use if possible.    No alcohol for 24 hours prior to your appointment.    Instructions for Day of Treatment:    Diet:  No solid food for 2 hours before appointment.    Clear liquids (water, tea, black coffee, clear juice, plain Jello, hard candy) are allowed up to 30 minutes before appointment. (No milk or cream.)    What to Bring:    Headphones for calming music or guided meditation.    Journal if desired.    Any prescribed inhalers.    Medications:    Take morning medications as prescribed, except:    No chlordiazepoxide (Librium), diazepam (Valium), or flurazepam (Dalmane) within 24 hours before appointment.    No lamotrigine (Lamictal) within 12 hours before appointment.    Avoid anti-anxiety  medications on the day of treatment unless absolutely necessary.    No stimulants prior to treatment (may resume after treatment).    Nasal sprays (for Spravato) can be used up to 1 hour before appointment.    Intentions:    Patients are encouraged to set a personal intention for each treatment session.    Cancellation Policy:    Please provide at least 24 hours’ notice for cancellations.    Three late cancellations or no-shows may require a treatment plan review with your outpatient psychiatrist before rescheduling.    If more than 15 minutes late, the appointment may need to be rescheduled.    Call [phone] to cancel or reschedule.

## 2025-07-30 NOTE — PROCEDURES
"Interventional Psychiatry Procedure    Date/Time: 7/30/2025 12:44 PM    Performed by: Suzie Álvarez DO  Authorized by: Suzie Álvarez DO      Ketamine Infusion for Depression    Ketamine Infusion: 0.75mg per kilo over 60 minutes, Maintenance, [id number]    Subjective:  Procedure (Patient last ate (Date/Time): 7/29/25/Any concerns regarding today's treatment: No/Any concerns regarding previous treatment: No             )    Pt reports depression/anxiety are increased from prior - came in early for infusion. Sleep has not been great. Lots of stress with work - plans to start attending group supervision for support. Denies suicidal/homicidal ideation. Denies auditory/visual hallucinations.   Interval changes in health: Interval infected mosquito bite and cough   Interval changes in medications: Amoxicillin         6/11/2025 7/2/2025 7/16/2025 7/30/2025   Scores   Patient Health Questionnaire-9 Score 6  4  6  10    BH ANUP-7 Total Score 5  1  0  6        Patient-reported     Objective:  Mental Status Exam  General/Appearance: no distress, appears stated age, well kept  Attitude/Behavior: Cooperative, conversant, engaged, and with good eye contact.  Motor Activity: no psychomotor agitation or retardation, no tremor or other abnormal movements, gait: Within normal limits  Mood: \"more depression and anxiety than before\"  Affect: Mildly dysphoric, restricted, Mood congruent, and Appropriate to content  Speech: Spontaneous, Coherent, Fluent, Appropriate rate, and Appropriate quantity, Appropriate volume  Thought Process: linear, goal directed and logical, future-oriented  Thought Content: no suicidal ideation, no homicidal ideation, delusions: none   Thought Perception: no perceptual abnormalities noted  Cognition: grossly intact  Insight: intact  Judgment: intact    Time out was taken with staff to confirm correct patient and correct procedure to be performed. Fall precautions in place throughout procedure. SpO2 " continuously monitored throughout treatment. Intravenous Ketamine infusion was initiated by psychiatrist and monitored by RN.    Vitals:    07/30/25 1110 07/30/25 1140 07/30/25 1146 07/30/25 1211   BP:  96/67 (!) 120/100 126/79   Pulse:  75 88 92   SpO2:  97% 98% 99%   Weight: 105 kg (232 lb 9.4 oz)       07/30/25 1220 07/30/25 1226 07/30/25 1230 07/30/25 1233   BP: 157/57 (!) 133/102 130/77 (!) 117/99   Pulse: 88 92 94 94   SpO2: 99% 100% 100% 98%   Weight:        07/30/25 1240 07/30/25 1250 07/30/25 1300 07/30/25 1310   BP: 126/81 (!) 121/108 131/80 133/76   Pulse: 95 96 93 92   SpO2: 98% 97% 96% 96%   Weight:        07/30/25 1320 07/30/25 1330 07/30/25 1350 07/30/25 1400   BP: 127/87 133/74 124/76 126/73   Pulse: 98 94 90 87   SpO2: 96% 96% 96% 98%   Weight:        07/30/25 1410 07/30/25 1420 07/30/25 1430   BP: 139/71 122/70 117/67   Pulse: 90 86 84   SpO2: 98% 95% 96%   Weight:            7/30/2025    11:40 7/30/2025    11:46 7/30/2025    12:11 7/30/2025    12:20 7/30/2025    12:26 7/30/2025    12:30 7/30/2025    12:33 7/30/2025    12:40   RASS   Gonzalez Agitation Sedation Scale (RASS) 0 0 0 -1 -1 0 0 0         7/30/2025    12:50 7/30/2025    13:00 7/30/2025    13:10 7/30/2025    13:20 7/30/2025    13:30 7/30/2025    13:50 7/30/2025    14:00 7/30/2025    14:10   RASS   Gonzalez Agitation Sedation Scale (RASS) 0 -1 -1 0 0 0 0 0         7/30/2025    14:20 7/30/2025    14:30   RASS   Gonzalez Agitation Sedation Scale (RASS) 0 0     Patient tolerated treatment well. No complications. Patient was monitored for 60 minutes after the completion of the infusion. Discharge instructions were provided to patient with After Visit Summary. Patient was discharged home in the care of their designated .    Assessment:  1. Bipolar I disorder with depression (Multi)      No immediate risk of harm to self or others    Plan:  Continue Intravenous Ketamine Infusion. Follow up in about 3 weeks (around 8/20/2025). Continue  outpatient medications and treatment with Dr. Nova.    Suzie Álvarez DO

## 2025-07-30 NOTE — GROUP NOTE
"Group Topic: Coping Skills   Group Date: 7/22/2025  Start Time:  6:00 PM  End Time:  7:30 PM  Facilitators: Garima Wong PsyD   Department: [facility]    Number of Participants: 11   Group Focus: check in  Treatment Modality: Other: Supportive therapy  Interventions utilized were support  Purpose: coping skills and other: Check-in    This was a video IOP aftercare group on a HIPAA compliant platform. All patients were provided with informed consent.    IOP Aftercare Group: Check-in and coping  Participants had opportunity to check-in with their peers and discuss recent stressors. They shared ways they have been coping and continued to identify healthy ways of coping with uncertainty.   Garima Wong Psy.D.      Name: Kelli Silva YOB: 1985   MR: [id number]      Kelli was present and actively engaged in discussion. She processed recent hospitalization for ángel and reported that she completed PHP upon discharge. She described feeling \"better overall\" and is focusing on keeping better track of her red flags and warning signs.     Garima Wong Psy.D.        "

## 2025-07-31 ENCOUNTER — APPOINTMENT (OUTPATIENT)
Dept: BEHAVIORAL HEALTH | Facility: CLINIC | Age: 40
End: 2025-07-31
Payer: COMMERCIAL

## 2025-07-31 DIAGNOSIS — F31.9 BIPOLAR 1 DISORDER (MULTI): ICD-10-CM

## 2025-07-31 PROCEDURE — 90837 PSYTX W PT 60 MINUTES: CPT | Performed by: PSYCHOLOGIST

## 2025-07-31 NOTE — PROGRESS NOTES
Humanistic/Insight Oriented Therapy. 50 min.  BP1.  We spoke about how she is feeling ok, ketimine yesterday, work is going fine, but she still needs/wants more people on her workload, trying to be patient and accepting.  We processed her weekend, her struggle with sleep, and how she has been working hard to be more organized.     Chief complaint - Phase of life and emotional distress   Treatment plan - Insight and action consistent with ACT therapy. Providing support, safe space to process their thoughts and feelings, developing therapeutic relationship.  Goals - Self-care, insight, coping skills  Prognosis - Average  Progress - Average  Functional status - 70/100  Focused mental status: Patient was oriented to person, place, time and context  Focused mental exam - alert and oriented  Frequency - Every two weeks     An interactive audio and video telecommunication system which permits real time communications between the patient (at the originating site) and provider (at the distant site) was utilized to provide this telehealth service

## 2025-08-04 ENCOUNTER — APPOINTMENT (OUTPATIENT)
Dept: CARDIOLOGY | Facility: HOSPITAL | Age: 40
End: 2025-08-04
Payer: COMMERCIAL

## 2025-08-04 ENCOUNTER — APPOINTMENT (OUTPATIENT)
Dept: RADIOLOGY | Facility: HOSPITAL | Age: 40
End: 2025-08-04
Payer: COMMERCIAL

## 2025-08-04 ENCOUNTER — HOSPITAL ENCOUNTER (EMERGENCY)
Facility: HOSPITAL | Age: 40
Discharge: HOME | End: 2025-08-04
Payer: COMMERCIAL

## 2025-08-04 ENCOUNTER — APPOINTMENT (OUTPATIENT)
Dept: VASCULAR MEDICINE | Facility: HOSPITAL | Age: 40
End: 2025-08-04
Payer: COMMERCIAL

## 2025-08-04 VITALS
HEART RATE: 90 BPM | TEMPERATURE: 98.1 F | SYSTOLIC BLOOD PRESSURE: 131 MMHG | BODY MASS INDEX: 43.24 KG/M2 | DIASTOLIC BLOOD PRESSURE: 58 MMHG | OXYGEN SATURATION: 100 % | RESPIRATION RATE: 20 BRPM | HEIGHT: 62 IN | WEIGHT: 235 LBS

## 2025-08-04 DIAGNOSIS — M79.18 MUSCULOSKELETAL PAIN: ICD-10-CM

## 2025-08-04 DIAGNOSIS — R06.02 SHORTNESS OF BREATH: Primary | ICD-10-CM

## 2025-08-04 DIAGNOSIS — J21.9 BRONCHIOLITIS: ICD-10-CM

## 2025-08-04 DIAGNOSIS — I87.022 POSTTHROMBOTIC SYNDROME WITH INFLAMMATION OF LEFT LOWER EXTREMITY: ICD-10-CM

## 2025-08-04 LAB
ALBUMIN SERPL BCP-MCNC: 3.9 G/DL (ref 3.4–5)
ALP SERPL-CCNC: 72 U/L (ref 33–110)
ALT SERPL W P-5'-P-CCNC: 31 U/L (ref 7–45)
ANION GAP SERPL CALC-SCNC: 12 MMOL/L (ref 10–20)
AST SERPL W P-5'-P-CCNC: 18 U/L (ref 9–39)
ATRIAL RATE: 106 BPM
BASOPHILS # BLD AUTO: 0.03 X10*3/UL (ref 0–0.1)
BASOPHILS NFR BLD AUTO: 0.3 %
BILIRUB SERPL-MCNC: 0.2 MG/DL (ref 0–1.2)
BNP SERPL-MCNC: 56 PG/ML (ref 0–99)
BODY SURFACE AREA: 2.16 M2
BUN SERPL-MCNC: 9 MG/DL (ref 6–23)
CALCIUM SERPL-MCNC: 8.9 MG/DL (ref 8.6–10.3)
CARDIAC TROPONIN I PNL SERPL HS: 4 NG/L (ref 0–13)
CARDIAC TROPONIN I PNL SERPL HS: 4 NG/L (ref 0–13)
CHLORIDE SERPL-SCNC: 105 MMOL/L (ref 98–107)
CO2 SERPL-SCNC: 26 MMOL/L (ref 21–32)
CREAT SERPL-MCNC: 0.85 MG/DL (ref 0.5–1.05)
EGFRCR SERPLBLD CKD-EPI 2021: 88 ML/MIN/1.73M*2
EOSINOPHIL # BLD AUTO: 0.03 X10*3/UL (ref 0–0.7)
EOSINOPHIL NFR BLD AUTO: 0.3 %
ERYTHROCYTE [DISTWIDTH] IN BLOOD BY AUTOMATED COUNT: 12.2 % (ref 11.5–14.5)
FLUAV RNA RESP QL NAA+PROBE: NOT DETECTED
FLUBV RNA RESP QL NAA+PROBE: NOT DETECTED
GLUCOSE SERPL-MCNC: 103 MG/DL (ref 74–99)
HCT VFR BLD AUTO: 42.2 % (ref 36–46)
HGB BLD-MCNC: 14 G/DL (ref 12–16)
IMM GRANULOCYTES # BLD AUTO: 0.04 X10*3/UL (ref 0–0.7)
IMM GRANULOCYTES NFR BLD AUTO: 0.4 % (ref 0–0.9)
LYMPHOCYTES # BLD AUTO: 2.13 X10*3/UL (ref 1.2–4.8)
LYMPHOCYTES NFR BLD AUTO: 19.2 %
MAGNESIUM SERPL-MCNC: 1.66 MG/DL (ref 1.6–2.4)
MCH RBC QN AUTO: 30.9 PG (ref 26–34)
MCHC RBC AUTO-ENTMCNC: 33.2 G/DL (ref 32–36)
MCV RBC AUTO: 93 FL (ref 80–100)
MONOCYTES # BLD AUTO: 0.54 X10*3/UL (ref 0.1–1)
MONOCYTES NFR BLD AUTO: 4.9 %
NEUTROPHILS # BLD AUTO: 8.34 X10*3/UL (ref 1.2–7.7)
NEUTROPHILS NFR BLD AUTO: 74.9 %
NRBC BLD-RTO: 0 /100 WBCS (ref 0–0)
P AXIS: 66 DEGREES
P OFFSET: 218 MS
P ONSET: 157 MS
PLATELET # BLD AUTO: 194 X10*3/UL (ref 150–450)
POTASSIUM SERPL-SCNC: 3.7 MMOL/L (ref 3.5–5.3)
PR INTERVAL: 146 MS
PROT SERPL-MCNC: 6.1 G/DL (ref 6.4–8.2)
Q ONSET: 230 MS
QRS COUNT: 17 BEATS
QRS DURATION: 62 MS
QT INTERVAL: 306 MS
QTC CALCULATION(BAZETT): 406 MS
QTC FREDERICIA: 369 MS
R AXIS: 8 DEGREES
RBC # BLD AUTO: 4.53 X10*6/UL (ref 4–5.2)
RSV RNA RESP QL NAA+PROBE: NOT DETECTED
SARS-COV-2 RNA RESP QL NAA+PROBE: NOT DETECTED
SODIUM SERPL-SCNC: 139 MMOL/L (ref 136–145)
T AXIS: 28 DEGREES
T OFFSET: 383 MS
VENTRICULAR RATE: 106 BPM
WBC # BLD AUTO: 11.1 X10*3/UL (ref 4.4–11.3)

## 2025-08-04 PROCEDURE — 85025 COMPLETE CBC W/AUTO DIFF WBC: CPT | Performed by: NURSE PRACTITIONER

## 2025-08-04 PROCEDURE — 93005 ELECTROCARDIOGRAM TRACING: CPT

## 2025-08-04 PROCEDURE — 80053 COMPREHEN METABOLIC PANEL: CPT | Performed by: NURSE PRACTITIONER

## 2025-08-04 PROCEDURE — 2500000002 HC RX 250 W HCPCS SELF ADMINISTERED DRUGS (ALT 637 FOR MEDICARE OP, ALT 636 FOR OP/ED): Performed by: NURSE PRACTITIONER

## 2025-08-04 PROCEDURE — 94640 AIRWAY INHALATION TREATMENT: CPT

## 2025-08-04 PROCEDURE — 93971 EXTREMITY STUDY: CPT

## 2025-08-04 PROCEDURE — 84484 ASSAY OF TROPONIN QUANT: CPT | Performed by: NURSE PRACTITIONER

## 2025-08-04 PROCEDURE — 96375 TX/PRO/DX INJ NEW DRUG ADDON: CPT

## 2025-08-04 PROCEDURE — 87637 SARSCOV2&INF A&B&RSV AMP PRB: CPT | Performed by: NURSE PRACTITIONER

## 2025-08-04 PROCEDURE — 71046 X-RAY EXAM CHEST 2 VIEWS: CPT | Performed by: RADIOLOGY

## 2025-08-04 PROCEDURE — 71046 X-RAY EXAM CHEST 2 VIEWS: CPT

## 2025-08-04 PROCEDURE — 83735 ASSAY OF MAGNESIUM: CPT | Performed by: NURSE PRACTITIONER

## 2025-08-04 PROCEDURE — 93971 EXTREMITY STUDY: CPT | Performed by: INTERNAL MEDICINE

## 2025-08-04 PROCEDURE — 99285 EMERGENCY DEPT VISIT HI MDM: CPT | Mod: 25

## 2025-08-04 PROCEDURE — 2500000004 HC RX 250 GENERAL PHARMACY W/ HCPCS (ALT 636 FOR OP/ED): Mod: JZ | Performed by: NURSE PRACTITIONER

## 2025-08-04 PROCEDURE — 71250 CT THORAX DX C-: CPT

## 2025-08-04 PROCEDURE — 71250 CT THORAX DX C-: CPT | Performed by: STUDENT IN AN ORGANIZED HEALTH CARE EDUCATION/TRAINING PROGRAM

## 2025-08-04 PROCEDURE — 96374 THER/PROPH/DIAG INJ IV PUSH: CPT

## 2025-08-04 PROCEDURE — 83880 ASSAY OF NATRIURETIC PEPTIDE: CPT | Performed by: NURSE PRACTITIONER

## 2025-08-04 RX ORDER — IPRATROPIUM BROMIDE AND ALBUTEROL SULFATE 2.5; .5 MG/3ML; MG/3ML
3 SOLUTION RESPIRATORY (INHALATION) ONCE
Status: COMPLETED | OUTPATIENT
Start: 2025-08-04 | End: 2025-08-04

## 2025-08-04 RX ORDER — HEPARIN SODIUM,PORCINE 10 UNIT/ML
10 VIAL (ML) INTRAVENOUS AS NEEDED
Status: DISCONTINUED | OUTPATIENT
Start: 2025-08-04 | End: 2025-08-04

## 2025-08-04 RX ORDER — GUAIFENESIN 100 MG/5ML
100-200 LIQUID ORAL EVERY 4 HOURS PRN
Qty: 120 ML | Refills: 0 | Status: ON HOLD | OUTPATIENT
Start: 2025-08-04 | End: 2025-09-03

## 2025-08-04 RX ORDER — HEPARIN 100 UNIT/ML
5 SYRINGE INTRAVENOUS ONCE
Status: COMPLETED | OUTPATIENT
Start: 2025-08-04 | End: 2025-08-04

## 2025-08-04 RX ORDER — PREDNISONE 20 MG/1
60 TABLET ORAL DAILY
Qty: 12 TABLET | Refills: 0 | Status: ON HOLD | OUTPATIENT
Start: 2025-08-04 | End: 2025-08-08

## 2025-08-04 RX ORDER — BENZONATATE 100 MG/1
100 CAPSULE ORAL EVERY 8 HOURS
Qty: 21 CAPSULE | Refills: 0 | Status: ON HOLD | OUTPATIENT
Start: 2025-08-04 | End: 2025-08-11

## 2025-08-04 RX ADMIN — METHYLPREDNISOLONE SODIUM SUCCINATE 125 MG: 125 INJECTION, POWDER, FOR SOLUTION INTRAMUSCULAR; INTRAVENOUS at 12:51

## 2025-08-04 RX ADMIN — HEPARIN 500 UNITS: 100 SYRINGE at 14:10

## 2025-08-04 RX ADMIN — IPRATROPIUM BROMIDE AND ALBUTEROL SULFATE 3 ML: .5; 3 SOLUTION RESPIRATORY (INHALATION) at 12:30

## 2025-08-04 ASSESSMENT — HEART SCORE
AGE: <45
TROPONIN: LESS THAN OR EQUAL TO NORMAL LIMIT
ECG: NORMAL
HISTORY: SLIGHTLY SUSPICIOUS
HEART SCORE: 2
RISK FACTORS: >2 RISK FACTORS OR HX OF ATHEROSCLEROTIC DISEASE

## 2025-08-04 NOTE — ED TRIAGE NOTES
Pt presents to ED c/o chest pain in upper chest that began this morning after waking up. Rates pain 6/10 sharp pain in triage.  Pt reports she developed a cough and SOB yesterday. Pt has hx of asthma. Pt states she was using duo-neb treatments q4 hours at home with no relief. Pt states SOB feels worse today.

## 2025-08-04 NOTE — ED NOTES
Writer discussed discharge information with patient. Patient verbalized understanding.  Port de-accessed. Questions answered. No acute distress upon discharge.      Mary Zelaya RN  08/04/25 3000

## 2025-08-04 NOTE — ED PROVIDER NOTES
Dictation #1  MRN:[id number]  [facility]:[id number] EMERGENCY DEPARTMENT ENCOUNTER      Pt Name: Kelli Silva  MRN: [id number]  Birthdate 1985  Date of evaluation: 8/4/2025  Provider: EDIN Morris    CHIEF COMPLAINT       Chief Complaint   Patient presents with    Chest Pain    Shortness of Breath    Cough       HISTORY OF PRESENT ILLNESS    Kelli Silva is a 40 y.o. female who presents to the emergency department with complaint of midsternal chest pressure that does not radiate this morning upon awakening.  Patient describes it as a sharp stabbing pain.  She states that she developed a nonproductive cough and shortness of breath yesterday and woke up with the pain.  States the symptoms worsen with exertion.  She states that she does have left calf pain and swelling.  She does have a history of asthma and was taking her breathing treatments at home without resolution.  She states her symptoms acutely worsened this morning.  She denies any trauma, fall, injury or current anticoagulation use.  Denies any recent travel, or crowds or known sick contacts.  Denies any recent surgical procedures or hemoptysis.   denies any smoking, drugs or alcohol use.  Denies any fevers, chills, nausea, vomiting, diarrhea, abdominal pain, dizziness, numbness, tingling, weakness or any additional symptoms or complaints at this time.  She does have a history of pulmonary embolus/DVT -> provoked and now off anticoagulation.  She spoke with her PCP who sent her in for work up and PE/DVT rule out.     Nursing Notes were reviewed.    History provided by patient.  No  used.    REVIEW OF SYSTEMS     ROS is otherwise negative unless stated above.    PAST MEDICAL HISTORY   Medical History[1]    SURGICAL HISTORY     Surgical History[2]    ALLERGIES     Aspirin, Cigarette smoke, Fish containing products, Iodinated contrast media, Ketorolac, Ketorolac tromethamine, Ondansetron, Other, Prochlorperazine, Shellfish  containing products, Sumatriptan, Azithromycin, Ceftriaxone, Doxycycline, Dulaglutide, House dust, Liraglutide, Metformin, Prednisone, Sitagliptin, [id number]-7-[id number] antimigraine agents, Zolmitriptan, and Metoclopramide    FAMILY HISTORY     Family History[3]     SOCIAL HISTORY     Social History[4]    PHYSICAL EXAM   VS: As documented in the triage note and EMR flowsheet from this visit were reviewed.    GEN: NAD, nontoxic, well-appearing, ambulates without difficulty  EYES:  PERRLA  HEENT: Airway patent, ears with clear tympanic membranes bilaterally. Nasal mucosa clear. Mouth with normal mucosa.  No area of abscess or fluctuance noted.  No drainage noted. Throat has no vesicles, no oropharyngeal exudates and uvula is midline. Face with no lymph node enlargement. No trismus or drooling noted.  Handling secretions.  Speech clear.  CARD: Tachycardic rate, regular rhythm, nontender chest, no crepitus deformities, no JVD, no murmurs rubs or gallops ; No edema noted.  Positive pulses bilaterally throughout.  Capillary refill less than 3 seconds.  No abnormal redness, warmth, tenderness or swelling noted to bilateral lower extremities.  PULMONARY: Rhonchorous all lung fields. Moving air well, Nonlabored, no accessory muscle use, able to speak complete sentences  ABDOMEN: Abdomen soft, non-distended, no rebound, no guarding. Bowel sounds normal in all 4 quadrants. No tenderness to palpation.  No CVA tenderness.  No masses or organomegaly noted.  No evidence of peritonitis.   : deferred  MUSK: Spine appears normal, range of motion is not limited, no muscle or joint tenderness. Strength 5 out of 5 equal bilaterally throughout.  No step-offs, deformities or additional signs of trauma noted.  No spinal/midline tenderness to palpation  SKIN: Skin normal color for race, warm, dry and intact. No evidence of trauma. No rash noted.  NEURO: Alert and oriented x 3, speech is clear, no obvious deficits noted. No facial droop noted.   GCS 15  PSYCH: Alert and oriented to person, place, time/situation. normal mood and affect. No apparent risk to self or others. Thoughts are linear.  Does not appear decompensated.  Does not appear internally stimulated.  LYMPH: No adenopathy or splenomegaly. No cervical, supraclavicular or inguinal lymphadenopathy.    DIAGNOSTIC RESULTS   RADIOLOGY:   Non-plain film images such as CT, Ultrasound and MRI are read by the radiologist. Plain radiographic images are visualized and preliminarily interpreted by myself with the below findings: Chest x-ray revealed no acute cardiopulmonary process      Interpretation per the Radiologist below, if available at the time of this note:    Lower extremity venous duplex left   Final Result      CT chest wo IV contrast   Final Result        Mild multifocal bronchiolitis. No focal consolidation.        MACRO:   None        Signed by: Aleksandra Chiang 8/4/2025 12:03 PM   Dictation workstation:   [id number]      XR chest 2 views   Final Result   No evidence of acute intrathoracic abnormality.        Signed by: Ken Loredo 8/4/2025 10:49 AM   Dictation workstation:   [id number]            ED BEDSIDE ULTRASOUND:   Performed by myself - none    LABS:  Labs Reviewed   CBC WITH AUTO DIFFERENTIAL - Abnormal       Result Value    WBC 11.1      nRBC 0.0      RBC 4.53      Hemoglobin 14.0      Hematocrit 42.2      MCV 93      MCH 30.9      MCHC 33.2      RDW 12.2      Platelets 194      Neutrophils % 74.9      Immature Granulocytes %, Automated 0.4      Lymphocytes % 19.2      Monocytes % 4.9      Eosinophils % 0.3      Basophils % 0.3      Neutrophils Absolute 8.34 (*)     Immature Granulocytes Absolute, Automated 0.04      Lymphocytes Absolute 2.13      Monocytes Absolute 0.54      Eosinophils Absolute 0.03      Basophils Absolute 0.03     COMPREHENSIVE METABOLIC PANEL - Abnormal    Glucose 103 (*)     Sodium 139      Potassium 3.7      Chloride 105      Bicarbonate 26      Anion Gap 12       Urea Nitrogen 9      Creatinine 0.85      eGFR 88      Calcium 8.9      Albumin 3.9      Alkaline Phosphatase 72      Total Protein 6.1 (*)     AST 18      Bilirubin, Total 0.2      ALT 31     MAGNESIUM - Normal    Magnesium 1.66     B-TYPE NATRIURETIC PEPTIDE - Normal    BNP 56      Narrative:        <100 pg/mL - Heart failure unlikely  100-299 pg/mL - Intermediate probability of acute heart                  failure exacerbation. Correlate with clinical                  context and patient history.    >=300 pg/mL - Heart Failure likely. Correlate with clinical                  context and patient history.    BNP testing is performed using different testing methodology at [facility] than at [facility]. Direct result comparisons should only be made within the same method.      SARS-COV-2, INFLUENZA A/B AND RSV PCR - Normal    Coronavirus 2019, PCR Not Detected      Flu A Result Not Detected      Flu B Result Not Detected      RSV PCR Not Detected      Narrative:     This assay is an FDA-cleared, in vitro diagnostic nucleic acid amplification test for the qualitative detection and differentiation of SARS CoV-2/ Influenza A/B/ RSV from nasopharyngeal specimens collected from individuals with signs and symptoms of respiratory tract infections, and has been validated for use at [facility]. Negative results do not preclude COVID-19/ Influenza A/B/ RSV infections and should not be used as the sole basis for diagnosis, treatment, or other management decisions. Testing for SARS CoV-2 is recommended only for patients who meet current clinical and/or epidemiological criteria defined by federal, state, or local public health directives.   SERIAL TROPONIN-INITIAL - Normal    Troponin I, High Sensitivity 4      Narrative:     Less than 99th percentile of normal range cutoff-  Female and children under 18 years old <14 ng/L; Male <21 ng/L: Negative  Repeat testing should be  performed if clinically indicated.     Female and children under 18 years old 14-50 ng/L; Male 21-50 ng/L:  Consistent with possible cardiac damage and possible increased clinical   risk. Serial measurements may help to assess extent of myocardial damage.     >50 ng/L: Consistent with cardiac damage, increased clinical risk and  myocardial infarction. Serial measurements may help assess extent of   myocardial damage.      NOTE: Children less than 1 year old may have higher baseline troponin   levels and results should be interpreted in conjunction with the overall   clinical context.     NOTE: Troponin I testing is performed using a different   testing methodology at [facility] than at other   [facility]. Direct result comparisons should only   be made within the same method.   SERIAL TROPONIN, 1 HOUR - Normal    Troponin I, High Sensitivity 4      Narrative:     Less than 99th percentile of normal range cutoff-  Female and children under 18 years old <14 ng/L; Male <21 ng/L: Negative  Repeat testing should be performed if clinically indicated.     Female and children under 18 years old 14-50 ng/L; Male 21-50 ng/L:  Consistent with possible cardiac damage and possible increased clinical   risk. Serial measurements may help to assess extent of myocardial damage.     >50 ng/L: Consistent with cardiac damage, increased clinical risk and  myocardial infarction. Serial measurements may help assess extent of   myocardial damage.      NOTE: Children less than 1 year old may have higher baseline troponin   levels and results should be interpreted in conjunction with the overall   clinical context.     NOTE: Troponin I testing is performed using a different   testing methodology at [facility] than at other   [facility]. Direct result comparisons should only   be made within the same method.   TROPONIN SERIES- (INITIAL, 1 HR)    Narrative:     The following orders were created for panel  "order Troponin I Series, High Sensitivity (0, 1 HR).  Procedure                               Abnormality         Status                     ---------                               -----------         ------                     Troponin I, High Sensiti...[[id number]]  Normal              Final result               Troponin, High Sensitivi...[[id number]]  Normal              Final result                 Please view results for these tests on the individual orders.       All other labs were within normal range or not returned as of this dictation.    EMERGENCY DEPARTMENT COURSE/MDM:   Vitals:    Vitals:    08/04/25 1012 08/04/25 1345   BP: 129/66 131/58   BP Location: Right arm    Patient Position: Sitting    Pulse: (!) 105 90   Resp: 18 20   Temp: 36.7 °C (98.1 °F)    TempSrc: Tympanic    SpO2: 96% 100%   Weight: 107 kg (235 lb)    Height: 1.575 m (5' 2\")        I reviewed the patient's triage vitals.    This is a 40-year-old female presenting for evaluation of chest pain and shortness of breath.  She is ambulatory without any difficulty but is actively coughing on my examination.  She is in no respiratory distress but she is tachycardic.  She is not hypoxic on room air.  She is given medications for her symptoms.    Initial workup was reviewed.  Laboratory studies are reassuring and as chest x-ray had no acute cardiopulmonary process I did obtain a CT chest without contrast to assess for any underlying cause of her symptoms.  She does have a contrast allergy but I have low suspicion for a PE at this time due to her negative troponins x 2 and otherwise reassuring workup without any hypoxia.  CT chest confirmed mild multifocal bronchiolitis which is the cause of her presentation.  Heart score 2, less likely ACS in the setting of normal troponins x 2 and no EKG changes.  No indication for admission for cardiac risk stratification.    Upon reevaluation she feels improved and reassured.  There is no indication for " anticoagulation at this time.  She was initially tachycardic upon arrival likely due to her symptoms, normalized on recheck and she made otherwise hemodynamic stable on room air throughout my ED course of care.  I had a shared discussion with patient she feels comfortable being discharged home with proper symptomatic management.   She is educated on continued symptomatic and supportive care at home.  There is no indication for antibiotic at this time.    Diagnoses as of 08/04/25 1400   Shortness of breath   Bronchiolitis   Musculoskeletal pain       Patient was counseled regarding labs, imaging, likely diagnosis, and plan. All questions were answered.     ------------------------------------------------------------------  EKG Interpretation: Sinus tachycardia at a rate of 106, , QRS 62, QTc 406 without evidence of STEMI or ischemia    Differential Diagnoses Considered: ACS, influenza, RSV, COVID, viral URI, pneumonia, asthma exacerbation, PE, pneumothorax, musculoskeletal pain, electrolyte abnormality, bronchitis, bronchiolitis    Chronic Medical Conditions Significantly Affecting Care: none    External Records Reviewed: I reviewed recent and relevant outside records including: PCP notes, prior discharge summary, previous radiologic studies, HIE     Escalation of Care:  Appropriate for outpatient management with primary care provider follow-up in the next week for reevaluation.  Return precautions discussed and patient verbalized understanding. All questions and concerns were addressed.    Social Determinants of Health Significantly Affecting Care:  none    Prescription Drug Consideration: Robitussin p.o., Tessalon Perles p.o. and prednisone burst course for symptomatic management at home.  Prednisone was prescribed after shared discussion with patient and she feels comfortable taking this for the next couple days.  She is educated to continue her breathing treatments as directed.    Diagnostic testing  considered: Considered a CT PE/VQ scan but I have lower suspicion for PE in the setting of reassuring workup and bronchiolitis on CT scan, she is not hypoxic or in any distress at this time I do not believe excessive radiation is indicated    Discussion of Management with Other Providers:   I discussed the patient/results with: None      ------------------------------------------------------------------  ED Medications administered this visit:    Medications   heparin lock flush (porcine) 10 unit/mL injection 10 Units (has no administration in time range)   ipratropium-albuteroL (Duo-Neb) 0.5-2.5 mg/3 mL nebulizer solution 3 mL (3 mL nebulization Given 8/4/25 1230)   methylPREDNISolone sod succinate (SOLU-Medrol) injection 125 mg (125 mg intravenous Given 8/4/25 1251)       New Prescriptions from this visit:    New Prescriptions    BENZONATATE (TESSALON) 100 MG CAPSULE    Take 1 capsule (100 mg) by mouth every 8 hours for 7 days. Do not crush or chew.    GUAIFENESIN (ROBITUSSIN) 100 MG/5 ML SYRUP    Take 5-10 mL (100-200 mg) by mouth every 4 hours if needed for cough.    PREDNISONE (DELTASONE) 20 MG TABLET    Take 3 tablets (60 mg) by mouth once daily for 4 days.       Follow-up:  Omar Woods MD  [address] [place] [address] [phone]    Schedule an appointment as soon as possible for a visit in 1 week  reevaluation, If symptoms worsen        Final Impression:   1. Shortness of breath    2. 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 Bronchiolitis    4. Musculoskeletal pain          Sherrie Roger, APRN-CNP      (Please note that portions of this note were completed with a voice recognition program.  Efforts were made to edit the dictations but occasionally words are mis-transcribed.)         [1]   Past Medical History:  Diagnosis Date    ADHD (attention deficit hyperactivity disorder)     Anxiety     Bipolar disorder     Cancer (Multi)     Chronic  pain disorder     Depression     Head injury     Headache     Normal routine history and physical examination     Normal routine history and physical examination     Other specified health status     No pertinent past medical history    Panic attack     Psychiatric illness     Suicide attempt (Multi)    [2]   Past Surgical History:  Procedure Laterality Date    OTHER SURGICAL HISTORY  05/08/2020    Venous access port placement   [3]   Family History  Problem Relation Name Age of Onset    Brain cancer Father      Kidney cancer Maternal Grandfather      Kidney cancer Sibling      Dementia Maternal Grandmother Wilma Manocchio     Migraines Sister Rain     Parkinsonism Paternal Grandfather Bill     Tics Brother Benjamín     Tourette syndrome Brother Benjamín Shira     Alcohol abuse Brother Benjamín     ADD / ADHD Brother Benjamín     OCD Brother Benjamín     Depression Mother Latisha     Anxiety disorder Mother Latisha     Dementia Maternal Grandmother Wilma     Anxiety disorder Maternal Grandmother Wilma     Schizophrenia Other Cousin    [4]   Social History  Socioeconomic History    Marital status: Significant Other   Tobacco Use    Smoking status: Never    Smokeless tobacco: Never   Vaping Use    Vaping status: Never Used   Substance and Sexual Activity    Alcohol use: Never    Drug use: Never    Sexual activity: Yes     Partners: Male     Birth control/protection: I.U.D.     Social Drivers of Health     Financial Resource Strain: Low Risk  (7/23/2025)    Received from [employer]    Overall Financial Resource Strain (CARDIA)     Difficulty of Paying Living Expenses: Not hard at all   Food Insecurity: No Food Insecurity (7/23/2025)    Received from [employer]    Hunger Vital Sign     Within the past 12 months, you worried that your food would run out before you got the money to buy more.: Never true     Within the past 12 months, the food you bought just didn't last and you didn't have money to get more.: Never true    Transportation Needs: No Transportation Needs (7/23/2025)    Received from [facility]    PRAPARE - Transportation     Lack of Transportation (Medical): No     Lack of Transportation (Non-Medical): No   Physical Activity: Inactive (7/23/2025)    Received from [facility]    Exercise Vital Sign     On average, how many days per week do you engage in moderate to strenuous exercise (like a brisk walk)?: 0 days     On average, how many minutes do you engage in exercise at this level?: 0 min   Stress: No Stress Concern Present (7/23/2025)    Received from [facility]    [employer] [place] [employer] - Occupational Stress Questionnaire     Feeling of Stress : Not at all   Social Connections: Moderately Integrated (7/23/2025)    Received from [facility]    Social Connection and Isolation Panel     In a typical week, how many times do you talk on the phone with family, friends, or neighbors?: More than three times a week     How often do you get together with friends or relatives?: More than three times a week     How often do you attend Yarsanism or Orthodoxy services?: 1 to 4 times per year     Do you belong to any clubs or organizations such as Yarsanism groups, unions, fraternal or athletic groups, or school groups?: No     How often do you attend meetings of the clubs or organizations you belong to?: Never     Are you , , , , never , or living with a partner?: Living with partner   Intimate Partner Violence: Not At Risk (7/23/2025)    Received from [facility]    Humiliation, Afraid, Rape, and Kick questionnaire     Within the last year, have you been afraid of your partner or ex-partner?: No     Within the last year, have you been humiliated or emotionally abused in other ways by your partner or ex-partner?: No     Within the last year, have you been kicked, hit, slapped, or otherwise physically hurt by your partner or ex-partner?: No     Within the last year, have you  been raped or forced to have any kind of sexual activity by your partner or ex-partner?: No   Housing Stability: Low Risk  (7/23/2025)    Received from [facility]    Housing Stability Vital Sign     In the last 12 months, was there a time when you were not able to pay the mortgage or rent on time?: No     In the past 12 months, how many times have you moved where you were living?: 0     At any time in the past 12 months, were you homeless or living in a shelter (including now)?: No        DELPHINE Morris-CNP  08/04/25 1400

## 2025-08-04 NOTE — Clinical Note
Kelli Silva was seen and treated in our emergency department on 8/4/2025.  She may return to work on 08/06/2025.       If you have any questions or concerns, please don't hesitate to call.      Sherrie Roger, APRN-CNP

## 2025-08-06 ENCOUNTER — APPOINTMENT (OUTPATIENT)
Facility: HOSPITAL | Age: 40
End: 2025-08-06
Payer: COMMERCIAL

## 2025-08-07 ENCOUNTER — APPOINTMENT (OUTPATIENT)
Dept: BEHAVIORAL HEALTH | Facility: CLINIC | Age: 40
End: 2025-08-07
Payer: COMMERCIAL

## 2025-08-07 DIAGNOSIS — F31.9 BIPOLAR 1 DISORDER (MULTI): ICD-10-CM

## 2025-08-07 PROCEDURE — 90832 PSYTX W PT 30 MINUTES: CPT | Performed by: PSYCHOLOGIST
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
## 2025-08-08 ENCOUNTER — HOSPITAL ENCOUNTER (OUTPATIENT)
Facility: HOSPITAL | Age: 40
Discharge: HOME | End: 2025-08-08
Payer: COMMERCIAL

## 2025-08-08 VITALS
SYSTOLIC BLOOD PRESSURE: 128 MMHG | HEART RATE: 97 BPM | OXYGEN SATURATION: 97 % | DIASTOLIC BLOOD PRESSURE: 70 MMHG | BODY MASS INDEX: 43.63 KG/M2 | WEIGHT: 238.54 LBS | RESPIRATION RATE: 18 BRPM | TEMPERATURE: 98.1 F

## 2025-08-08 DIAGNOSIS — F31.9 BIPOLAR I DISORDER WITH DEPRESSION (MULTI): Primary | ICD-10-CM

## 2025-08-08 PROCEDURE — KETSP HC KETAMINE INFUSION SELF-PAY: Performed by: PSYCHIATRY & NEUROLOGY

## 2025-08-08 PROCEDURE — 2500000004 HC RX 250 GENERAL PHARMACY W/ HCPCS (ALT 636 FOR OP/ED): Performed by: STUDENT IN AN ORGANIZED HEALTH CARE EDUCATION/TRAINING PROGRAM

## 2025-08-08 RX ORDER — METOPROLOL TARTRATE 1 MG/ML
15 INJECTION, SOLUTION INTRAVENOUS ONCE AS NEEDED
OUTPATIENT
Start: 2025-08-20

## 2025-08-08 RX ORDER — LABETALOL HYDROCHLORIDE 5 MG/ML
20 INJECTION, SOLUTION INTRAVENOUS AS NEEDED
OUTPATIENT
Start: 2025-08-20

## 2025-08-08 RX ORDER — ACETAMINOPHEN 325 MG/1
650 TABLET ORAL ONCE AS NEEDED
OUTPATIENT
Start: 2025-08-20

## 2025-08-08 RX ORDER — FAMOTIDINE 10 MG/ML
20 INJECTION, SOLUTION INTRAVENOUS ONCE AS NEEDED
OUTPATIENT
Start: 2025-08-20

## 2025-08-08 RX ORDER — DIPHENHYDRAMINE HYDROCHLORIDE 50 MG/ML
50 INJECTION, SOLUTION INTRAMUSCULAR; INTRAVENOUS ONCE AS NEEDED
Status: DISCONTINUED | OUTPATIENT
Start: 2025-08-08 | End: 2025-08-09 | Stop reason: HOSPADM

## 2025-08-08 RX ORDER — METOPROLOL TARTRATE 1 MG/ML
10 INJECTION, SOLUTION INTRAVENOUS ONCE AS NEEDED
OUTPATIENT
Start: 2025-08-20

## 2025-08-08 RX ORDER — KETAMINE HCL IN 0.9 % NACL 200 MG/200
0.75 PLASTIC BAG, INJECTION (ML) INTRAVENOUS ONCE
OUTPATIENT
Start: 2025-08-20 | End: 2025-08-20

## 2025-08-08 RX ORDER — DIPHENHYDRAMINE HYDROCHLORIDE 50 MG/ML
50 INJECTION, SOLUTION INTRAMUSCULAR; INTRAVENOUS AS NEEDED
OUTPATIENT
Start: 2025-08-20

## 2025-08-08 RX ORDER — ALBUTEROL SULFATE 0.83 MG/ML
3 SOLUTION RESPIRATORY (INHALATION) AS NEEDED
OUTPATIENT
Start: 2025-08-20

## 2025-08-08 RX ORDER — DIPHENHYDRAMINE HYDROCHLORIDE 50 MG/ML
50 INJECTION, SOLUTION INTRAMUSCULAR; INTRAVENOUS ONCE AS NEEDED
OUTPATIENT
Start: 2025-08-20

## 2025-08-08 RX ORDER — ONDANSETRON HYDROCHLORIDE 2 MG/ML
8 INJECTION, SOLUTION INTRAVENOUS ONCE AS NEEDED
OUTPATIENT
Start: 2025-08-20

## 2025-08-08 RX ORDER — ONDANSETRON HYDROCHLORIDE 2 MG/ML
8 INJECTION, SOLUTION INTRAVENOUS ONCE AS NEEDED
Status: DISCONTINUED | OUTPATIENT
Start: 2025-08-08 | End: 2025-08-09 | Stop reason: HOSPADM

## 2025-08-08 RX ORDER — LIDOCAINE AND PRILOCAINE 25; 25 MG/G; MG/G
1 CREAM TOPICAL ONCE AS NEEDED
OUTPATIENT
Start: 2025-08-20

## 2025-08-08 RX ORDER — MIDAZOLAM HYDROCHLORIDE 1 MG/ML
1 INJECTION, SOLUTION INTRAMUSCULAR; INTRAVENOUS ONCE AS NEEDED
OUTPATIENT
Start: 2025-08-20

## 2025-08-08 RX ORDER — LABETALOL HYDROCHLORIDE 5 MG/ML
10 INJECTION, SOLUTION INTRAVENOUS AS NEEDED
OUTPATIENT
Start: 2025-08-20

## 2025-08-08 RX ORDER — EPINEPHRINE 1 MG/ML
0.3 INJECTION, SOLUTION, CONCENTRATE INTRAVENOUS EVERY 5 MIN PRN
OUTPATIENT
Start: 2025-08-20

## 2025-08-08 RX ORDER — DICYCLOMINE HYDROCHLORIDE 10 MG/1
10 CAPSULE ORAL ONCE AS NEEDED
OUTPATIENT
Start: 2025-08-20

## 2025-08-08 RX ORDER — METOPROLOL TARTRATE 1 MG/ML
5 INJECTION, SOLUTION INTRAVENOUS ONCE AS NEEDED
OUTPATIENT
Start: 2025-08-20

## 2025-08-08 RX ORDER — LABETALOL HYDROCHLORIDE 5 MG/ML
30 INJECTION, SOLUTION INTRAVENOUS AS NEEDED
OUTPATIENT
Start: 2025-08-20

## 2025-08-08 RX ORDER — KETAMINE HCL IN 0.9 % NACL 200 MG/200
0.75 PLASTIC BAG, INJECTION (ML) INTRAVENOUS ONCE
Status: COMPLETED | OUTPATIENT
Start: 2025-08-08 | End: 2025-08-08

## 2025-08-08 RX ADMIN — ONDANSETRON 8 MG: 2 INJECTION, SOLUTION INTRAMUSCULAR; INTRAVENOUS at 11:24

## 2025-08-08 RX ADMIN — SODIUM CHLORIDE 80 MG: 9 INJECTION, SOLUTION INTRAVENOUS at 11:37

## 2025-08-08 RX ADMIN — DIPHENHYDRAMINE HYDROCHLORIDE 50 MG: 50 INJECTION INTRAMUSCULAR; INTRAVENOUS at 11:28

## 2025-08-08 ASSESSMENT — ANXIETY QUESTIONNAIRES
1. FEELING NERVOUS, ANXIOUS, OR ON EDGE: SEVERAL DAYS
2. NOT BEING ABLE TO STOP OR CONTROL WORRYING: SEVERAL DAYS
7. FEELING AFRAID AS IF SOMETHING AWFUL MIGHT HAPPEN: SEVERAL DAYS
3. WORRYING TOO MUCH ABOUT DIFFERENT THINGS: SEVERAL DAYS
4. TROUBLE RELAXING: SEVERAL DAYS
GAD7 TOTAL SCORE: 7
IF YOU CHECKED OFF ANY PROBLEMS ON THIS QUESTIONNAIRE, HOW DIFFICULT HAVE THESE PROBLEMS MADE IT FOR YOU TO DO YOUR WORK, TAKE CARE OF THINGS AT HOME, OR GET ALONG WITH OTHER PEOPLE: SOMEWHAT DIFFICULT
5. BEING SO RESTLESS THAT IT IS HARD TO SIT STILL: SEVERAL DAYS
IF YOU CHECKED OFF ANY PROBLEMS ON THIS QUESTIONNAIRE, HOW DIFFICULT HAVE THESE PROBLEMS MADE IT FOR YOU TO DO YOUR WORK, TAKE CARE OF THINGS AT HOME, OR GET ALONG WITH OTHER PEOPLE: SOMEWHAT DIFFICULT
1. FEELING NERVOUS, ANXIOUS, OR ON EDGE: SEVERAL DAYS
4. TROUBLE RELAXING: SEVERAL DAYS
6. BECOMING EASILY ANNOYED OR IRRITABLE: SEVERAL DAYS
6. BECOMING EASILY ANNOYED OR IRRITABLE: SEVERAL DAYS
7. FEELING AFRAID AS IF SOMETHING AWFUL MIGHT HAPPEN: SEVERAL DAYS
5. BEING SO RESTLESS THAT IT IS HARD TO SIT STILL: SEVERAL DAYS
2. NOT BEING ABLE TO STOP OR CONTROL WORRYING: SEVERAL DAYS
3. WORRYING TOO MUCH ABOUT DIFFERENT THINGS: SEVERAL DAYS

## 2025-08-08 ASSESSMENT — PATIENT HEALTH QUESTIONNAIRE - PHQ9
4. FEELING TIRED OR HAVING LITTLE ENERGY: MORE THAN HALF THE DAYS
SUM OF ALL RESPONSES TO PHQ QUESTIONS 1-9: 16
9. THOUGHTS THAT YOU WOULD BE BETTER OFF DEAD, OR OF HURTING YOURSELF: NOT AT ALL
10. IF YOU CHECKED OFF ANY PROBLEMS, HOW DIFFICULT HAVE THESE PROBLEMS MADE IT FOR YOU TO DO YOUR WORK, TAKE CARE OF THINGS AT HOME, OR GET ALONG WITH OTHER PEOPLE: VERY DIFFICULT
9. THOUGHTS THAT YOU WOULD BE BETTER OFF DEAD, OR OF HURTING YOURSELF: NOT AT ALL
7. TROUBLE CONCENTRATING ON THINGS, SUCH AS READING THE NEWSPAPER OR WATCHING TELEVISION: MORE THAN HALF THE DAYS
SUM OF ALL RESPONSES TO PHQ9 QUESTIONS 1 & 2: 4
4. FEELING TIRED OR HAVING LITTLE ENERGY: MORE THAN HALF THE DAYS
7. TROUBLE CONCENTRATING ON THINGS, SUCH AS READING THE NEWSPAPER OR WATCHING TELEVISION: MORE THAN HALF THE DAYS
3. TROUBLE FALLING OR STAYING ASLEEP: NEARLY EVERY DAY
10. IF YOU CHECKED OFF ANY PROBLEMS, HOW DIFFICULT HAVE THESE PROBLEMS MADE IT FOR YOU TO DO YOUR WORK, TAKE CARE OF THINGS AT HOME, OR GET ALONG WITH OTHER PEOPLE: VERY DIFFICULT
6. FEELING BAD ABOUT YOURSELF - OR THAT YOU ARE A FAILURE OR HAVE LET YOURSELF OR YOUR FAMILY DOWN: MORE THAN HALF THE DAYS
8. MOVING OR SPEAKING SO SLOWLY THAT OTHER PEOPLE COULD HAVE NOTICED. OR THE OPPOSITE, BEING SO FIGETY OR RESTLESS THAT YOU HAVE BEEN MOVING AROUND A LOT MORE THAN USUAL: MORE THAN HALF THE DAYS
2. FEELING DOWN, DEPRESSED OR HOPELESS: MORE THAN HALF THE DAYS
5. POOR APPETITE OR OVEREATING: SEVERAL DAYS
2. FEELING DOWN, DEPRESSED OR HOPELESS: MORE THAN HALF THE DAYS
6. FEELING BAD ABOUT YOURSELF - OR THAT YOU ARE A FAILURE OR HAVE LET YOURSELF OR YOUR FAMILY DOWN: MORE THAN HALF THE DAYS
3. TROUBLE FALLING OR STAYING ASLEEP OR SLEEPING TOO MUCH: NEARLY EVERY DAY
5. POOR APPETITE OR OVEREATING: SEVERAL DAYS
8. MOVING OR SPEAKING SO SLOWLY THAT OTHER PEOPLE COULD HAVE NOTICED. OR THE OPPOSITE - BEING SO FIDGETY OR RESTLESS THAT YOU HAVE BEEN MOVING AROUND A LOT MORE THAN USUAL: MORE THAN HALF THE DAYS
1. LITTLE INTEREST OR PLEASURE IN DOING THINGS: MORE THAN HALF THE DAYS
1. LITTLE INTEREST OR PLEASURE IN DOING THINGS: MORE THAN HALF THE DAYS

## 2025-08-08 ASSESSMENT — PAIN SCALES - GENERAL
PAINLEVEL_OUTOF10: 0 - NO PAIN
PAINLEVEL_OUTOF10: 0 - NO PAIN

## 2025-08-08 ASSESSMENT — PAIN - FUNCTIONAL ASSESSMENT
PAIN_FUNCTIONAL_ASSESSMENT: 0-10
PAIN_FUNCTIONAL_ASSESSMENT: 0-10

## 2025-08-08 NOTE — PATIENT INSTRUCTIONS
Homegoing Instructions      Your Provider's Instructions:    Post-Treatment Instructions:    No alcohol for 24 hours.    Do not drive or operate machinery until the day after treatment and after a full night of sleep.    Do not make important decisions or sign legal documents for 24 hours.    A responsible adult should remain with you for 24 hours due to possible dizziness or sedation.    Resume a normal diet unless otherwise instructed.    When to Contact a Provider:    Contact your provider with any questions or concerns.    Call 911 for any emergencies.    Health Maintenance:    If you use tobacco, quitting is strongly recommended.  Resources:    American Heart Association: (582) 177-6632 or www.americanheart.org    [place] Tobacco Quit Line: 1-800-QUIT-NOW (1-916.271.7088)    [facility]: www.[facility].[place].gov    For alcohol or drug use resources: Call NearWoo at 211 or visit www.Socializr.gov.    For safe medication disposal: Visit www.rxdrugdropbox.org or contact your local police department.      Instructions Before Next Spravato/Ketamine Appointment:      Arrange transportation to and from your appointment (friend, family member, or insurance-covered ride).    Avoid smoking, vaping, and nicotine use if possible.    No alcohol for 24 hours prior to your appointment.    Instructions for Day of Treatment:    Diet:  No solid food for 2 hours before appointment.    Clear liquids (water, tea, black coffee, clear juice, plain Jello, hard candy) are allowed up to 30 minutes before appointment. (No milk or cream.)    What to Bring:    Headphones for calming music or guided meditation.    Journal if desired.    Any prescribed inhalers.    Medications:    Take morning medications as prescribed, except:    No chlordiazepoxide (Librium), diazepam (Valium), or flurazepam (Dalmane) within 24 hours before appointment.    No lamotrigine (Lamictal) within 12 hours before appointment.    Avoid anti-anxiety  medications on the day of treatment unless absolutely necessary.    No stimulants prior to treatment (may resume after treatment).    Nasal sprays (for Spravato) can be used up to 1 hour before appointment.    Intentions:    Patients are encouraged to set a personal intention for each treatment session.    Cancellation Policy:    Please provide at least 24 hours’ notice for cancellations.    Three late cancellations or no-shows may require a treatment plan review with your outpatient psychiatrist before rescheduling.    If more than 15 minutes late, the appointment may need to be rescheduled.    Call [phone] to cancel or reschedule.

## 2025-08-08 NOTE — PROCEDURES
"Interventional Psychiatry    Date/Time: 8/8/2025 10:34 AM    Performed by: Ata Nova MD PhD  Authorized by: Ata Nova MD PhD          Ketamine Infusion for Depression    Ketamine Infusion: 0.75mg per kilo over 60 minutes, Maintenance, q2-4 weeks    Subjective:  Procedure (Patient last ate (Date/Time): 08/08/2025 0600//Any concerns regarding today's treatment: No/Any concerns regarding previous treatment: No            /)    She said she felt ok and relatively stable until this past Monday. She said she was diagnosed with bronchitis and was put on a steroid that made her depressed in last 5 days. She scored her depression 7 of 10. Ten was the worst. Also did not have much energy or motivation. Not feel hopeless or helpless. Denied having SI/HI/AVH or paranoia. Not feel irritable.     Interval changes in health: visited ER on 8/4/25 due to bronchitis    Interval changes in medications: Denies         7/2/2025 7/16/2025 7/30/2025 8/8/2025   Scores   Patient Health Questionnaire-9 Score 4  6  10  16    BH ANUP-7 Total Score 1  0  6  7        Patient-reported     Objective:  Mental Status Exam  General/Appearance: no distress, appears stated age, moderately kept  Attitude/Behavior: Cooperative, conversant, engaged, and with good eye contact.  Motor Activity: no psychomotor agitation or retardation, no tremor or other abnormal movements, gait: Within normal limits  Mood: \"depressed\"  Affect: Restricted range, Mood congruent, and Appropriate to content  Speech: Spontaneous, Coherent, Fluent, Appropriate volume, Appropriate rate, and Appropriate quantity  Thought Process: linear, goal directed  Thought Content: no suicidal ideation, delusions:none   Thought Perception: no perceptual abnormalities noted  Cognition: grossly intact  Insight: intact  Judgment: intact    Time out was taken with staff to confirm correct patient and correct procedure to be performed. Fall precautions in place throughout procedure. SpO2 " continuously monitored throughout treatment. Intravenous Ketamine infusion was initiated by psychiatrist and monitored by RN.    Vitals:    08/08/25 1057 08/08/25 1130 08/08/25 1140 08/08/25 1150   BP: 106/73 115/60 111/72 123/74   Pulse: 103 102 103 103   Resp: 21 17 16 18   Temp: 36.7 °C (98.1 °F)      TempSrc: Temporal      SpO2: 100% 97% 98% 97%   Weight: 108 kg (238 lb 8.6 oz)       08/08/25 1200 08/08/25 1210 08/08/25 1220 08/08/25 1230   BP: 131/78 121/62 127/75 132/67   Pulse: 102 100 103 101   Resp: 20 21 24 20   Temp:       TempSrc:       SpO2: 96% 95% 98% 97%   Weight:        08/08/25 1240 08/08/25 1250 08/08/25 1300 08/08/25 1310   BP: 122/79 117/53 111/53 99/78   Pulse: 108 104 101 97   Resp: 19 15 20 18   Temp:       TempSrc:       SpO2: 94% 97% 95% 96%   Weight:        08/08/25 1320 08/08/25 1330 08/08/25 1340   BP: 107/75 122/69 128/70   Pulse: 95 97 97   Resp: 20 17 18   Temp:      TempSrc:      SpO2: 97% 95% 97%   Weight:            8/8/2025    10:57   RASS   Gonzalez Agitation Sedation Scale (RASS) 0     Patient tolerated treatment well. No complications. About a half was infused. She said she felt better and more relaxed. No side effect. Patient was monitored for 60 minutes after the completion of the infusion. Discharge instructions were provided to patient with After Visit Summary. Patient was discharged home in the care of their designated .    Assessment:  1. Bipolar I disorder with depression (Multi)      No immediate risk of harm to self or others    Plan:  Continue Intravenous Ketamine Infusion as scheduled. Follow up in about 12 days (around 8/20/2025). Continue outpatient medications and treatment with Dr. Nova.    Ata Nova MD PhD

## 2025-08-09 ENCOUNTER — APPOINTMENT (OUTPATIENT)
Dept: RADIOLOGY | Facility: HOSPITAL | Age: 40
End: 2025-08-09
Payer: COMMERCIAL

## 2025-08-09 ENCOUNTER — APPOINTMENT (OUTPATIENT)
Dept: CARDIOLOGY | Facility: HOSPITAL | Age: 40
End: 2025-08-09
Payer: COMMERCIAL

## 2025-08-09 ENCOUNTER — HOSPITAL ENCOUNTER (OUTPATIENT)
Facility: HOSPITAL | Age: 40
Setting detail: OBSERVATION
End: 2025-08-09
Attending: EMERGENCY MEDICINE | Admitting: INTERNAL MEDICINE
Payer: COMMERCIAL

## 2025-08-09 DIAGNOSIS — J18.0 BRONCHOPNEUMONIA: ICD-10-CM

## 2025-08-09 DIAGNOSIS — J45.41 MODERATE PERSISTENT ASTHMA WITH ACUTE EXACERBATION (HHS-HCC): Primary | ICD-10-CM

## 2025-08-09 LAB
ALBUMIN SERPL BCP-MCNC: 4 G/DL (ref 3.4–5)
ALP SERPL-CCNC: 119 U/L (ref 33–110)
ALT SERPL W P-5'-P-CCNC: 56 U/L (ref 7–45)
ANION GAP SERPL CALC-SCNC: 14 MMOL/L (ref 10–20)
AST SERPL W P-5'-P-CCNC: 30 U/L (ref 9–39)
BASOPHILS # BLD AUTO: 0.03 X10*3/UL (ref 0–0.1)
BASOPHILS NFR BLD AUTO: 0.2 %
BILIRUB SERPL-MCNC: 0.4 MG/DL (ref 0–1.2)
BNP SERPL-MCNC: 3 PG/ML (ref 0–99)
BUN SERPL-MCNC: 13 MG/DL (ref 6–23)
CALCIUM SERPL-MCNC: 8.7 MG/DL (ref 8.6–10.3)
CARDIAC TROPONIN I PNL SERPL HS: 3 NG/L (ref 0–13)
CARDIAC TROPONIN I PNL SERPL HS: 4 NG/L (ref 0–13)
CHLORIDE SERPL-SCNC: 102 MMOL/L (ref 98–107)
CO2 SERPL-SCNC: 26 MMOL/L (ref 21–32)
CREAT SERPL-MCNC: 0.9 MG/DL (ref 0.5–1.05)
EGFRCR SERPLBLD CKD-EPI 2021: 82 ML/MIN/1.73M*2
EOSINOPHIL # BLD AUTO: 0.09 X10*3/UL (ref 0–0.7)
EOSINOPHIL NFR BLD AUTO: 0.6 %
ERYTHROCYTE [DISTWIDTH] IN BLOOD BY AUTOMATED COUNT: 12.4 % (ref 11.5–14.5)
GLUCOSE SERPL-MCNC: 98 MG/DL (ref 74–99)
HCT VFR BLD AUTO: 42.4 % (ref 36–46)
HGB BLD-MCNC: 14.2 G/DL (ref 12–16)
IMM GRANULOCYTES # BLD AUTO: 0.08 X10*3/UL (ref 0–0.7)
IMM GRANULOCYTES NFR BLD AUTO: 0.6 % (ref 0–0.9)
LIPASE SERPL-CCNC: 15 U/L (ref 9–82)
LYMPHOCYTES # BLD AUTO: 3.02 X10*3/UL (ref 1.2–4.8)
LYMPHOCYTES NFR BLD AUTO: 21.5 %
MAGNESIUM SERPL-MCNC: 1.92 MG/DL (ref 1.6–2.4)
MCH RBC QN AUTO: 30.6 PG (ref 26–34)
MCHC RBC AUTO-ENTMCNC: 33.5 G/DL (ref 32–36)
MCV RBC AUTO: 91 FL (ref 80–100)
MONOCYTES # BLD AUTO: 0.82 X10*3/UL (ref 0.1–1)
MONOCYTES NFR BLD AUTO: 5.8 %
NEUTROPHILS # BLD AUTO: 10.01 X10*3/UL (ref 1.2–7.7)
NEUTROPHILS NFR BLD AUTO: 71.3 %
NRBC BLD-RTO: 0 /100 WBCS (ref 0–0)
PLATELET # BLD AUTO: 250 X10*3/UL (ref 150–450)
POTASSIUM SERPL-SCNC: 3.6 MMOL/L (ref 3.5–5.3)
PROT SERPL-MCNC: 6.6 G/DL (ref 6.4–8.2)
RBC # BLD AUTO: 4.64 X10*6/UL (ref 4–5.2)
SODIUM SERPL-SCNC: 138 MMOL/L (ref 136–145)
WBC # BLD AUTO: 14.1 X10*3/UL (ref 4.4–11.3)

## 2025-08-09 PROCEDURE — G0378 HOSPITAL OBSERVATION PER HR: HCPCS

## 2025-08-09 PROCEDURE — 83735 ASSAY OF MAGNESIUM: CPT

## 2025-08-09 PROCEDURE — 71275 CT ANGIOGRAPHY CHEST: CPT | Performed by: SURGERY

## 2025-08-09 PROCEDURE — 93005 ELECTROCARDIOGRAM TRACING: CPT

## 2025-08-09 PROCEDURE — 83690 ASSAY OF LIPASE: CPT | Performed by: EMERGENCY MEDICINE

## 2025-08-09 PROCEDURE — 71275 CT ANGIOGRAPHY CHEST: CPT

## 2025-08-09 PROCEDURE — 99291 CRITICAL CARE FIRST HOUR: CPT | Performed by: EMERGENCY MEDICINE

## 2025-08-09 PROCEDURE — 96375 TX/PRO/DX INJ NEW DRUG ADDON: CPT | Mod: 59

## 2025-08-09 PROCEDURE — 94640 AIRWAY INHALATION TREATMENT: CPT

## 2025-08-09 PROCEDURE — 71046 X-RAY EXAM CHEST 2 VIEWS: CPT

## 2025-08-09 PROCEDURE — 99223 1ST HOSP IP/OBS HIGH 75: CPT | Performed by: INTERNAL MEDICINE

## 2025-08-09 PROCEDURE — 2500000001 HC RX 250 WO HCPCS SELF ADMINISTERED DRUGS (ALT 637 FOR MEDICARE OP): Performed by: EMERGENCY MEDICINE

## 2025-08-09 PROCEDURE — 96365 THER/PROPH/DIAG IV INF INIT: CPT | Mod: 59

## 2025-08-09 PROCEDURE — 2550000001 HC RX 255 CONTRASTS: Performed by: EMERGENCY MEDICINE

## 2025-08-09 PROCEDURE — 71046 X-RAY EXAM CHEST 2 VIEWS: CPT | Performed by: RADIOLOGY

## 2025-08-09 PROCEDURE — 84484 ASSAY OF TROPONIN QUANT: CPT

## 2025-08-09 PROCEDURE — 36415 COLL VENOUS BLD VENIPUNCTURE: CPT

## 2025-08-09 PROCEDURE — 2500000002 HC RX 250 W HCPCS SELF ADMINISTERED DRUGS (ALT 637 FOR MEDICARE OP, ALT 636 FOR OP/ED): Performed by: EMERGENCY MEDICINE

## 2025-08-09 PROCEDURE — 83880 ASSAY OF NATRIURETIC PEPTIDE: CPT

## 2025-08-09 PROCEDURE — 2500000004 HC RX 250 GENERAL PHARMACY W/ HCPCS (ALT 636 FOR OP/ED)

## 2025-08-09 PROCEDURE — 80053 COMPREHEN METABOLIC PANEL: CPT

## 2025-08-09 PROCEDURE — 2500000002 HC RX 250 W HCPCS SELF ADMINISTERED DRUGS (ALT 637 FOR MEDICARE OP, ALT 636 FOR OP/ED)

## 2025-08-09 PROCEDURE — 85025 COMPLETE CBC W/AUTO DIFF WBC: CPT

## 2025-08-09 RX ORDER — IPRATROPIUM BROMIDE AND ALBUTEROL SULFATE 2.5; .5 MG/3ML; MG/3ML
3 SOLUTION RESPIRATORY (INHALATION) EVERY 6 HOURS PRN
Status: DISCONTINUED | OUTPATIENT
Start: 2025-08-09 | End: 2025-08-09

## 2025-08-09 RX ORDER — DIPHENHYDRAMINE HYDROCHLORIDE 50 MG/ML
50 INJECTION, SOLUTION INTRAMUSCULAR; INTRAVENOUS ONCE
Status: COMPLETED | OUTPATIENT
Start: 2025-08-09 | End: 2025-08-09

## 2025-08-09 RX ORDER — IPRATROPIUM BROMIDE AND ALBUTEROL SULFATE 2.5; .5 MG/3ML; MG/3ML
3 SOLUTION RESPIRATORY (INHALATION)
Status: DISCONTINUED | OUTPATIENT
Start: 2025-08-09 | End: 2025-08-09

## 2025-08-09 RX ORDER — PREDNISONE 20 MG/1
40 TABLET ORAL DAILY
Status: DISPENSED | OUTPATIENT
Start: 2025-08-10

## 2025-08-09 RX ORDER — LANOLIN ALCOHOL/MO/W.PET/CERES
400 CREAM (GRAM) TOPICAL NIGHTLY
Status: DISPENSED | OUTPATIENT
Start: 2025-08-09

## 2025-08-09 RX ORDER — PREGABALIN 75 MG/1
150 CAPSULE ORAL DAILY
Status: DISCONTINUED | OUTPATIENT
Start: 2025-08-10 | End: 2025-08-10

## 2025-08-09 RX ORDER — RISPERIDONE 1 MG/1
1 TABLET ORAL 2 TIMES DAILY
Status: DISCONTINUED | OUTPATIENT
Start: 2025-08-09 | End: 2025-08-10

## 2025-08-09 RX ORDER — FLUTICASONE FUROATE AND VILANTEROL 200; 25 UG/1; UG/1
1 POWDER RESPIRATORY (INHALATION)
Status: DISPENSED | OUTPATIENT
Start: 2025-08-10

## 2025-08-09 RX ORDER — ENOXAPARIN SODIUM 100 MG/ML
40 INJECTION SUBCUTANEOUS EVERY 12 HOURS SCHEDULED
Status: DISPENSED | OUTPATIENT
Start: 2025-08-09

## 2025-08-09 RX ORDER — APREPITANT 40 MG/1
80 CAPSULE ORAL DAILY
Status: DISCONTINUED | OUTPATIENT
Start: 2025-08-10 | End: 2025-08-10

## 2025-08-09 RX ORDER — IVABRADINE 5 MG/1
7.5 TABLET, FILM COATED ORAL
Status: DISPENSED | OUTPATIENT
Start: 2025-08-10

## 2025-08-09 RX ORDER — LORAZEPAM 0.5 MG/1
1 TABLET ORAL 2 TIMES DAILY PRN
Status: ACTIVE | OUTPATIENT
Start: 2025-08-09

## 2025-08-09 RX ORDER — CIPROFLOXACIN 500 MG/1
500 TABLET, FILM COATED ORAL ONCE
Status: COMPLETED | OUTPATIENT
Start: 2025-08-09 | End: 2025-08-09

## 2025-08-09 RX ORDER — LAMOTRIGINE 100 MG/1
300 TABLET ORAL NIGHTLY
Status: DISPENSED | OUTPATIENT
Start: 2025-08-09

## 2025-08-09 RX ORDER — DESVENLAFAXINE SUCCINATE 25 MG/1
50 TABLET, EXTENDED RELEASE ORAL DAILY
Status: DISCONTINUED | OUTPATIENT
Start: 2025-08-10 | End: 2025-08-09

## 2025-08-09 RX ORDER — DICYCLOMINE HYDROCHLORIDE 10 MG/1
20 CAPSULE ORAL
Status: DISPENSED | OUTPATIENT
Start: 2025-08-10

## 2025-08-09 RX ORDER — DESVENLAFAXINE SUCCINATE 25 MG/1
75 TABLET, EXTENDED RELEASE ORAL DAILY
Status: DISPENSED | OUTPATIENT
Start: 2025-08-10

## 2025-08-09 RX ORDER — DEXTROSE 50 % IN WATER (D50W) INTRAVENOUS SYRINGE
25
Status: ACTIVE | OUTPATIENT
Start: 2025-08-09

## 2025-08-09 RX ORDER — MAGNESIUM SULFATE HEPTAHYDRATE 40 MG/ML
2 INJECTION, SOLUTION INTRAVENOUS ONCE
Status: COMPLETED | OUTPATIENT
Start: 2025-08-09 | End: 2025-08-09

## 2025-08-09 RX ORDER — INSULIN GLARGINE 100 [IU]/ML
10 INJECTION, SOLUTION SUBCUTANEOUS NIGHTLY
Status: DISPENSED | OUTPATIENT
Start: 2025-08-09

## 2025-08-09 RX ORDER — CETIRIZINE HYDROCHLORIDE 10 MG/1
10 TABLET ORAL NIGHTLY
Status: DISPENSED | OUTPATIENT
Start: 2025-08-09

## 2025-08-09 RX ORDER — BUSPIRONE HYDROCHLORIDE 10 MG/1
30 TABLET ORAL 2 TIMES DAILY
Status: DISPENSED | OUTPATIENT
Start: 2025-08-09

## 2025-08-09 RX ORDER — AMOXICILLIN AND CLAVULANATE POTASSIUM 875; 125 MG/1; MG/1
1 TABLET, FILM COATED ORAL EVERY 12 HOURS SCHEDULED
Status: DISPENSED | OUTPATIENT
Start: 2025-08-10

## 2025-08-09 RX ORDER — IPRATROPIUM BROMIDE AND ALBUTEROL SULFATE 2.5; .5 MG/3ML; MG/3ML
3 SOLUTION RESPIRATORY (INHALATION) EVERY 2 HOUR PRN
Status: ACTIVE | OUTPATIENT
Start: 2025-08-09

## 2025-08-09 RX ORDER — LAMOTRIGINE 100 MG/1
200 TABLET ORAL
Status: DISCONTINUED | OUTPATIENT
Start: 2025-08-10 | End: 2025-08-10

## 2025-08-09 RX ORDER — IPRATROPIUM BROMIDE AND ALBUTEROL SULFATE 2.5; .5 MG/3ML; MG/3ML
3 SOLUTION RESPIRATORY (INHALATION) 3 TIMES DAILY
Status: DISCONTINUED | OUTPATIENT
Start: 2025-08-10 | End: 2025-08-10

## 2025-08-09 RX ORDER — MIDODRINE HYDROCHLORIDE 5 MG/1
5 TABLET ORAL 2 TIMES DAILY
Status: DISPENSED | OUTPATIENT
Start: 2025-08-09

## 2025-08-09 RX ORDER — LISDEXAMFETAMINE DIMESYLATE 70 MG/1
70 CAPSULE ORAL EVERY MORNING
Status: ACTIVE | OUTPATIENT
Start: 2025-08-10

## 2025-08-09 RX ORDER — DEXTROSE 50 % IN WATER (D50W) INTRAVENOUS SYRINGE
12.5
Status: ACTIVE | OUTPATIENT
Start: 2025-08-09

## 2025-08-09 RX ADMIN — IPRATROPIUM BROMIDE AND ALBUTEROL SULFATE 3 ML: .5; 3 SOLUTION RESPIRATORY (INHALATION) at 14:01

## 2025-08-09 RX ADMIN — IPRATROPIUM BROMIDE AND ALBUTEROL SULFATE 3 ML: .5; 3 SOLUTION RESPIRATORY (INHALATION) at 21:23

## 2025-08-09 RX ADMIN — DIPHENHYDRAMINE HYDROCHLORIDE 50 MG: 50 INJECTION, SOLUTION INTRAMUSCULAR; INTRAVENOUS at 17:23

## 2025-08-09 RX ADMIN — IOHEXOL 65 ML: 350 INJECTION, SOLUTION INTRAVENOUS at 19:39

## 2025-08-09 RX ADMIN — MAGNESIUM SULFATE HEPTAHYDRATE 2 G: 40 INJECTION, SOLUTION INTRAVENOUS at 14:31

## 2025-08-09 RX ADMIN — METHYLPREDNISOLONE SODIUM SUCCINATE 125 MG: 125 INJECTION, POWDER, FOR SOLUTION INTRAMUSCULAR; INTRAVENOUS at 14:30

## 2025-08-09 RX ADMIN — CIPROFLOXACIN 500 MG: 500 TABLET ORAL at 21:37

## 2025-08-09 SDOH — SOCIAL STABILITY: SOCIAL INSECURITY: DOES ANYONE TRY TO KEEP YOU FROM HAVING/CONTACTING OTHER FRIENDS OR DOING THINGS OUTSIDE YOUR HOME?: NO

## 2025-08-09 SDOH — SOCIAL STABILITY: SOCIAL INSECURITY: DO YOU FEEL UNSAFE GOING BACK TO THE PLACE WHERE YOU ARE LIVING?: NO

## 2025-08-09 SDOH — ECONOMIC STABILITY: FOOD INSECURITY: WITHIN THE PAST 12 MONTHS, THE FOOD YOU BOUGHT JUST DIDN'T LAST AND YOU DIDN'T HAVE MONEY TO GET MORE.: NEVER TRUE

## 2025-08-09 SDOH — ECONOMIC STABILITY: FOOD INSECURITY: WITHIN THE PAST 12 MONTHS, YOU WORRIED THAT YOUR FOOD WOULD RUN OUT BEFORE YOU GOT THE MONEY TO BUY MORE.: NEVER TRUE

## 2025-08-09 SDOH — SOCIAL STABILITY: SOCIAL INSECURITY: WITHIN THE LAST YEAR, HAVE YOU BEEN AFRAID OF YOUR PARTNER OR EX-PARTNER?: NO

## 2025-08-09 SDOH — SOCIAL STABILITY: SOCIAL INSECURITY: ARE YOU OR HAVE YOU BEEN THREATENED OR ABUSED PHYSICALLY, EMOTIONALLY, OR SEXUALLY BY ANYONE?: NO

## 2025-08-09 SDOH — SOCIAL STABILITY: SOCIAL INSECURITY: HAVE YOU HAD THOUGHTS OF HARMING ANYONE ELSE?: NO

## 2025-08-09 SDOH — ECONOMIC STABILITY: INCOME INSECURITY: IN THE PAST 12 MONTHS HAS THE ELECTRIC, GAS, OIL, OR WATER COMPANY THREATENED TO SHUT OFF SERVICES IN YOUR HOME?: NO

## 2025-08-09 SDOH — SOCIAL STABILITY: SOCIAL INSECURITY: ABUSE: ADULT

## 2025-08-09 SDOH — SOCIAL STABILITY: SOCIAL INSECURITY: WITHIN THE LAST YEAR, HAVE YOU BEEN HUMILIATED OR EMOTIONALLY ABUSED IN OTHER WAYS BY YOUR PARTNER OR EX-PARTNER?: NO

## 2025-08-09 SDOH — SOCIAL STABILITY: SOCIAL INSECURITY: ARE THERE ANY APPARENT SIGNS OF INJURIES/BEHAVIORS THAT COULD BE RELATED TO ABUSE/NEGLECT?: NO

## 2025-08-09 SDOH — SOCIAL STABILITY: SOCIAL INSECURITY: HAVE YOU HAD ANY THOUGHTS OF HARMING ANYONE ELSE?: NO

## 2025-08-09 SDOH — SOCIAL STABILITY: SOCIAL INSECURITY: HAS ANYONE EVER THREATENED TO HURT YOUR FAMILY OR YOUR PETS?: NO

## 2025-08-09 SDOH — SOCIAL STABILITY: SOCIAL INSECURITY: DO YOU FEEL ANYONE HAS EXPLOITED OR TAKEN ADVANTAGE OF YOU FINANCIALLY OR OF YOUR PERSONAL PROPERTY?: NO

## 2025-08-09 SDOH — SOCIAL STABILITY: SOCIAL INSECURITY: WERE YOU ABLE TO COMPLETE ALL THE BEHAVIORAL HEALTH SCREENINGS?: YES

## 2025-08-09 ASSESSMENT — COGNITIVE AND FUNCTIONAL STATUS - GENERAL
DAILY ACTIVITIY SCORE: 24
PATIENT BASELINE BEDBOUND: NO
MOBILITY SCORE: 24

## 2025-08-09 ASSESSMENT — LIFESTYLE VARIABLES
HOW OFTEN DO YOU HAVE 6 OR MORE DRINKS ON ONE OCCASION: NEVER
HOW MANY STANDARD DRINKS CONTAINING ALCOHOL DO YOU HAVE ON A TYPICAL DAY: PATIENT DOES NOT DRINK
HOW OFTEN DO YOU HAVE A DRINK CONTAINING ALCOHOL: NEVER
SKIP TO QUESTIONS 9-10: 1
AUDIT-C TOTAL SCORE: 0
AUDIT-C TOTAL SCORE: 0

## 2025-08-09 ASSESSMENT — PAIN SCALES - GENERAL: PAINLEVEL_OUTOF10: 7

## 2025-08-09 ASSESSMENT — PAIN DESCRIPTION - DESCRIPTORS: DESCRIPTORS: SHARP

## 2025-08-09 ASSESSMENT — ACTIVITIES OF DAILY LIVING (ADL)
LACK_OF_TRANSPORTATION: YES
ADEQUATE_TO_COMPLETE_ADL: YES
FEEDING YOURSELF: INDEPENDENT
HEARING - RIGHT EAR: FUNCTIONAL
JUDGMENT_ADEQUATE_SAFELY_COMPLETE_DAILY_ACTIVITIES: YES
TOILETING: INDEPENDENT
GROOMING: INDEPENDENT
ASSISTIVE_DEVICE: EYEGLASSES
WALKS IN HOME: INDEPENDENT
HEARING - LEFT EAR: FUNCTIONAL
PATIENT'S MEMORY ADEQUATE TO SAFELY COMPLETE DAILY ACTIVITIES?: YES
DRESSING YOURSELF: INDEPENDENT
BATHING: INDEPENDENT

## 2025-08-09 ASSESSMENT — PATIENT HEALTH QUESTIONNAIRE - PHQ9
1. LITTLE INTEREST OR PLEASURE IN DOING THINGS: SEVERAL DAYS
2. FEELING DOWN, DEPRESSED OR HOPELESS: SEVERAL DAYS
SUM OF ALL RESPONSES TO PHQ9 QUESTIONS 1 & 2: 2

## 2025-08-09 NOTE — ED TRIAGE NOTES
Pt presents to department from home with chest pain and SOB. Pt states seen here last week for cough, congestion. Pt states started prednisone and Augmentin due to asthma  hx. Pt states worsening chest pain and SOB today. Pt endorses productive cough and congestions, denies fever at home. VSS. Respirations are even and unlabored, no acute distress.

## 2025-08-09 NOTE — ED PROVIDER NOTES
HPI   Chief Complaint   Patient presents with    Chest Pain    Shortness of Breath       40-year-old female presenting to the emergency department from home for evaluation of chest pain and shortness of breath.  Patient reports she was seen in the emergency department 5 days ago for similar symptoms that have not improved.  Endorses chills, nasal congestion, nonproductive cough in association with her shortness of breath and chest pain.  Describes the chest pain as midsternal and radiates down her left arm.  Denies numbness or tingling in her left arm.  Denies fevers.  States she was prescribed prednisone and Augmentin has been compliant with the medications.  She has a history of asthma and is utilizing her albuterol inhaler more frequently and doing nebulizing treatments every 4 hours.  Also has a history of prior DVT/PE, however she is not currently anticoagulated.  No reported recent travel, surgeries, hospitalizations, history of cancer, or exogenous hormone use.  Denies smoking, alcohol, or illicit drug use.              Patient History   Medical History[1]  Surgical History[2]  Family History[3]  Social History[4]    Physical Exam   ED Triage Vitals [08/09/25 1322]   Temperature Heart Rate Respirations BP   37.2 °C (99 °F) (!) 108 18 134/60      Pulse Ox Temp src Heart Rate Source Patient Position   96 % -- Monitor Sitting      BP Location FiO2 (%)     Left arm --       Physical Exam  Vitals and nursing note reviewed.   Constitutional:       Appearance: Normal appearance.   HENT:      Head: Atraumatic.      Nose: Congestion present.      Mouth/Throat:      Mouth: Mucous membranes are moist.     Eyes:      Extraocular Movements: Extraocular movements intact.      Conjunctiva/sclera: Conjunctivae normal.       Cardiovascular:      Rate and Rhythm: Normal rate and regular rhythm.   Pulmonary:      Effort: Pulmonary effort is normal. No respiratory distress.      Breath sounds: No stridor.      Comments: Diffuse  rhonchi auscultated bilaterally.  Chest:      Chest wall: No tenderness.   Abdominal:      General: Abdomen is flat.      Palpations: Abdomen is soft.      Tenderness: There is no abdominal tenderness.     Musculoskeletal:         General: Normal range of motion.      Cervical back: Normal range of motion and neck supple.      Right lower leg: No edema.      Left lower leg: No edema.     Skin:     General: Skin is warm and dry.      Capillary Refill: Capillary refill takes less than 2 seconds.     Neurological:      Mental Status: She is alert and oriented to person, place, and time.     Psychiatric:         Mood and Affect: Mood normal.           ED Course & MDM                  No data recorded     Aiken Coma Scale Score: 15 (08/09/25 1323 : Meche Rodgers RN)                           Medical Decision Making  40-year-old female presenting to the emergency department from home for evaluation of chest pain and shortness of breath.  Vital signs reviewed and stable.  Patient is tachycardic at 108 on arrival.  EKG interpreted independently as sinus tachycardia, rate 112, , QRS 64, QTc 455.  No acute injury pattern.  High-sensitivity troponin negative x 2 making ACS unlikely.  BNP 3.  CBC remarkable for mild leukocytosis 14.1, likely transiently elevated due to current steroid use.  No anemia present.  CMP relatively unremarkable.  Mag WNL.  CXR negative for acute cardiopulmonary process. No consolidation or infiltrate, pleural effusions, pleural edema, or pneumothorax. Upon EMR review, viral testing and left lower extremity venous duplex US were negative on recent visit to the emergency department on 8/4/2025.  CT chest without obtained during this visit was remarkable for mild multifocal bronchiolitis. No focal consolidation.  CTA ordered, patient received 125 mg Solu-Medrol 4 hours and 50 mg Benadryl 1 hour prior to imaging as she has tolerated contrast in the past with pre-treatment. She also received 2 g  Mag for bronchospasm and a duoneb. Patient signed out to my attending, Dr. Starr, pending CTA imaging and disposition.        Procedure  Procedures       [1]   Past Medical History:  Diagnosis Date    ADHD (attention deficit hyperactivity disorder)     Anxiety     Bipolar disorder     Cancer (Multi)     Chronic pain disorder     Depression     Head injury     Headache     Normal routine history and physical examination     Normal routine history and physical examination     Other specified health status     No pertinent past medical history    Panic attack     Psychiatric illness     Suicide attempt (Multi)    [2]   Past Surgical History:  Procedure Laterality Date    OTHER SURGICAL HISTORY  05/08/2020    Venous access port placement   [3]   Family History  Problem Relation Name Age of Onset    Brain cancer Father      Kidney cancer Maternal Grandfather      Kidney cancer Sibling      Dementia Maternal Grandmother Wilma Manocchio     Migraines Sister Rain     Parkinsonism Paternal Grandfather Bill     Tics Brother Benjamín     Tourette syndrome Brother Benjamín Shira     Alcohol abuse Brother Benjamín     ADD / ADHD Brother Benjamín     OCD Brother Benjamín     Depression Mother Latisha     Anxiety disorder Mother Latisha     Dementia Maternal Grandmother Wilma     Anxiety disorder Maternal Grandmother Wilma     Schizophrenia Other Cousin    [4]   Social History  Tobacco Use    Smoking status: Never    Smokeless tobacco: Never   Vaping Use    Vaping status: Never Used   Substance Use Topics    Alcohol use: Never    Drug use: Never        Rafaela Barker PA-C  08/09/25 2301

## 2025-08-09 NOTE — LETTER
August 11, 2025     Patient: Kelli Silva   YOB: 1985   Date of Visit: 8/9/2025       To Whom It May Concern:    Kelli Silva was hospitalized at [facility] under my care from 8/9/25 to 8/11/25. She is cleared to return to work on 8/13/25. Please excuse her absence from work during this time.     If you have any questions or concerns, please don't hesitate to call.         Sincerely,     Dulce Green DO  Hospitalist

## 2025-08-10 LAB
GLUCOSE BLD MANUAL STRIP-MCNC: 132 MG/DL (ref 74–99)
GLUCOSE BLD MANUAL STRIP-MCNC: 147 MG/DL (ref 74–99)
GLUCOSE BLD MANUAL STRIP-MCNC: 158 MG/DL (ref 74–99)
GLUCOSE BLD MANUAL STRIP-MCNC: 183 MG/DL (ref 74–99)

## 2025-08-10 PROCEDURE — 94640 AIRWAY INHALATION TREATMENT: CPT

## 2025-08-10 PROCEDURE — 82947 ASSAY GLUCOSE BLOOD QUANT: CPT

## 2025-08-10 PROCEDURE — 99232 SBSQ HOSP IP/OBS MODERATE 35: CPT | Performed by: INTERNAL MEDICINE

## 2025-08-10 PROCEDURE — 2500000004 HC RX 250 GENERAL PHARMACY W/ HCPCS (ALT 636 FOR OP/ED): Performed by: INTERNAL MEDICINE

## 2025-08-10 PROCEDURE — 2500000001 HC RX 250 WO HCPCS SELF ADMINISTERED DRUGS (ALT 637 FOR MEDICARE OP): Performed by: INTERNAL MEDICINE

## 2025-08-10 PROCEDURE — 96366 THER/PROPH/DIAG IV INF ADDON: CPT

## 2025-08-10 PROCEDURE — 96375 TX/PRO/DX INJ NEW DRUG ADDON: CPT

## 2025-08-10 PROCEDURE — 96372 THER/PROPH/DIAG INJ SC/IM: CPT | Performed by: INTERNAL MEDICINE

## 2025-08-10 PROCEDURE — 96376 TX/PRO/DX INJ SAME DRUG ADON: CPT

## 2025-08-10 PROCEDURE — G0378 HOSPITAL OBSERVATION PER HR: HCPCS

## 2025-08-10 PROCEDURE — 2500000002 HC RX 250 W HCPCS SELF ADMINISTERED DRUGS (ALT 637 FOR MEDICARE OP, ALT 636 FOR OP/ED): Performed by: INTERNAL MEDICINE

## 2025-08-10 PROCEDURE — 2500000005 HC RX 250 GENERAL PHARMACY W/O HCPCS: Performed by: INTERNAL MEDICINE

## 2025-08-10 RX ORDER — APREPITANT 40 MG/1
80 CAPSULE ORAL DAILY PRN
Status: DISCONTINUED | OUTPATIENT
Start: 2025-08-10 | End: 2025-08-10

## 2025-08-10 RX ORDER — MAGNESIUM SULFATE 1 G/100ML
1 INJECTION INTRAVENOUS ONCE
Status: COMPLETED | OUTPATIENT
Start: 2025-08-10 | End: 2025-08-10

## 2025-08-10 RX ORDER — METOCLOPRAMIDE HYDROCHLORIDE 5 MG/ML
10 INJECTION INTRAMUSCULAR; INTRAVENOUS ONCE
Status: COMPLETED | OUTPATIENT
Start: 2025-08-10 | End: 2025-08-10

## 2025-08-10 RX ORDER — DIPHENHYDRAMINE HYDROCHLORIDE 50 MG/ML
25 INJECTION, SOLUTION INTRAMUSCULAR; INTRAVENOUS ONCE
Status: COMPLETED | OUTPATIENT
Start: 2025-08-10 | End: 2025-08-10

## 2025-08-10 RX ORDER — IPRATROPIUM BROMIDE 21 UG/1
2 SPRAY, METERED NASAL 2 TIMES DAILY
Status: DISPENSED | OUTPATIENT
Start: 2025-08-10

## 2025-08-10 RX ORDER — CHLORZOXAZONE 500 MG/1
500 TABLET ORAL 4 TIMES DAILY PRN
Status: ON HOLD | COMMUNITY

## 2025-08-10 RX ORDER — KETOROLAC TROMETHAMINE 30 MG/ML
15 INJECTION, SOLUTION INTRAMUSCULAR; INTRAVENOUS ONCE
Status: CANCELLED | OUTPATIENT
Start: 2025-08-10 | End: 2025-08-15

## 2025-08-10 RX ORDER — LAMOTRIGINE 200 MG/1
200 TABLET ORAL DAILY
Status: ON HOLD | COMMUNITY

## 2025-08-10 RX ORDER — ERGOCALCIFEROL 1.25 MG/1
1.25 CAPSULE ORAL
Status: ON HOLD | COMMUNITY

## 2025-08-10 RX ORDER — RISPERIDONE 1 MG/1
1 TABLET ORAL 2 TIMES DAILY
Status: DISPENSED | OUTPATIENT
Start: 2025-08-10

## 2025-08-10 RX ORDER — LIDOCAINE 560 MG/1
1 PATCH PERCUTANEOUS; TOPICAL; TRANSDERMAL DAILY
Status: DISPENSED | OUTPATIENT
Start: 2025-08-10

## 2025-08-10 RX ORDER — LAMOTRIGINE 100 MG/1
200 TABLET ORAL DAILY
Status: ACTIVE | OUTPATIENT
Start: 2025-08-11

## 2025-08-10 RX ORDER — GUAIFENESIN 600 MG/1
600 TABLET, EXTENDED RELEASE ORAL 2 TIMES DAILY
Status: DISPENSED | OUTPATIENT
Start: 2025-08-10

## 2025-08-10 RX ORDER — ZAVEGEPANT 10 MG/.1ML
1 SPRAY NASAL DAILY PRN
Status: ON HOLD | COMMUNITY

## 2025-08-10 RX ORDER — PREGABALIN 75 MG/1
150 CAPSULE ORAL 2 TIMES DAILY
Status: DISPENSED | OUTPATIENT
Start: 2025-08-10

## 2025-08-10 RX ORDER — BUTALBITAL, ACETAMINOPHEN AND CAFFEINE 50; 325; 40 MG/1; MG/1; MG/1
1 TABLET ORAL EVERY 4 HOURS PRN
Status: DISPENSED | OUTPATIENT
Start: 2025-08-10

## 2025-08-10 RX ORDER — IPRATROPIUM BROMIDE AND ALBUTEROL SULFATE 2.5; .5 MG/3ML; MG/3ML
3 SOLUTION RESPIRATORY (INHALATION)
Status: DISPENSED | OUTPATIENT
Start: 2025-08-10

## 2025-08-10 RX ORDER — BENZONATATE 100 MG/1
100 CAPSULE ORAL 3 TIMES DAILY PRN
Status: ACTIVE | OUTPATIENT
Start: 2025-08-10

## 2025-08-10 RX ORDER — ACETAMINOPHEN 325 MG/1
975 TABLET ORAL EVERY 6 HOURS PRN
Status: DISPENSED | OUTPATIENT
Start: 2025-08-10

## 2025-08-10 RX ORDER — HYDROXYZINE HYDROCHLORIDE 25 MG/1
50 TABLET, FILM COATED ORAL EVERY 6 HOURS
Status: DISPENSED | OUTPATIENT
Start: 2025-08-10

## 2025-08-10 RX ORDER — ONDANSETRON HYDROCHLORIDE 2 MG/ML
4 INJECTION, SOLUTION INTRAVENOUS EVERY 8 HOURS PRN
Status: ACTIVE | OUTPATIENT
Start: 2025-08-10

## 2025-08-10 RX ORDER — INSULIN ASPART 100 [IU]/ML
INJECTION, SOLUTION INTRAVENOUS; SUBCUTANEOUS
Status: ON HOLD | COMMUNITY

## 2025-08-10 RX ADMIN — PREDNISONE 40 MG: 20 TABLET ORAL at 08:13

## 2025-08-10 RX ADMIN — LAMOTRIGINE 300 MG: 100 TABLET ORAL at 21:19

## 2025-08-10 RX ADMIN — BUSPIRONE HYDROCHLORIDE 30 MG: 10 TABLET ORAL at 00:13

## 2025-08-10 RX ADMIN — PREGABALIN 150 MG: 75 CAPSULE ORAL at 21:20

## 2025-08-10 RX ADMIN — APREPITANT 80 MG: 40 CAPSULE ORAL at 08:17

## 2025-08-10 RX ADMIN — INSULIN GLARGINE 10 UNITS: 100 INJECTION, SOLUTION SUBCUTANEOUS at 00:14

## 2025-08-10 RX ADMIN — TIOTROPIUM BROMIDE INHALATION SPRAY 2 PUFF: 3.12 SPRAY, METERED RESPIRATORY (INHALATION) at 06:39

## 2025-08-10 RX ADMIN — IPRATROPIUM BROMIDE AND ALBUTEROL SULFATE 3 ML: .5; 3 SOLUTION RESPIRATORY (INHALATION) at 20:15

## 2025-08-10 RX ADMIN — MAGNESIUM SULFATE HEPTAHYDRATE 1 G: 1 INJECTION, SOLUTION INTRAVENOUS at 03:08

## 2025-08-10 RX ADMIN — BUSPIRONE HYDROCHLORIDE 30 MG: 10 TABLET ORAL at 08:14

## 2025-08-10 RX ADMIN — ENOXAPARIN SODIUM 40 MG: 100 INJECTION SUBCUTANEOUS at 00:13

## 2025-08-10 RX ADMIN — LAMOTRIGINE 200 MG: 100 TABLET ORAL at 08:13

## 2025-08-10 RX ADMIN — CETIRIZINE HYDROCHLORIDE 10 MG: 10 TABLET, FILM COATED ORAL at 00:13

## 2025-08-10 RX ADMIN — RISPERIDONE 1 MG: 1 TABLET, FILM COATED ORAL at 08:18

## 2025-08-10 RX ADMIN — BUTALBITAL, ACETAMINOPHEN, AND CAFFEINE 1 TABLET: 325; 50; 40 TABLET ORAL at 19:51

## 2025-08-10 RX ADMIN — MAGNESIUM OXIDE TAB 400 MG (241.3 MG ELEMENTAL MG) 1 TABLET: 400 (241.3 MG) TAB at 21:19

## 2025-08-10 RX ADMIN — CETIRIZINE HYDROCHLORIDE 10 MG: 10 TABLET, FILM COATED ORAL at 21:19

## 2025-08-10 RX ADMIN — IPRATROPIUM BROMIDE 2 SPRAY: 21 SPRAY, METERED NASAL at 21:29

## 2025-08-10 RX ADMIN — MIDODRINE HYDROCHLORIDE 5 MG: 5 TABLET ORAL at 00:13

## 2025-08-10 RX ADMIN — DICYCLOMINE HYDROCHLORIDE 20 MG: 10 CAPSULE ORAL at 06:30

## 2025-08-10 RX ADMIN — IVABRADINE 7.5 MG: 5 TABLET, FILM COATED ORAL at 08:16

## 2025-08-10 RX ADMIN — GUAIFENESIN 600 MG: 600 TABLET, EXTENDED RELEASE ORAL at 15:36

## 2025-08-10 RX ADMIN — RISPERIDONE 1 MG: 1 TABLET, FILM COATED ORAL at 19:51

## 2025-08-10 RX ADMIN — AMOXICILLIN AND CLAVULANATE POTASSIUM 1 TABLET: 875; 125 TABLET, COATED ORAL at 21:20

## 2025-08-10 RX ADMIN — ENOXAPARIN SODIUM 40 MG: 100 INJECTION SUBCUTANEOUS at 21:20

## 2025-08-10 RX ADMIN — PREGABALIN 150 MG: 75 CAPSULE ORAL at 01:10

## 2025-08-10 RX ADMIN — MIDODRINE HYDROCHLORIDE 5 MG: 5 TABLET ORAL at 21:22

## 2025-08-10 RX ADMIN — MAGNESIUM OXIDE TAB 400 MG (241.3 MG ELEMENTAL MG) 1 TABLET: 400 (241.3 MG) TAB at 00:16

## 2025-08-10 RX ADMIN — DIPHENHYDRAMINE HYDROCHLORIDE 25 MG: 50 INJECTION, SOLUTION INTRAMUSCULAR; INTRAVENOUS at 01:11

## 2025-08-10 RX ADMIN — DESVENLAFAXINE SUCCINATE 75 MG: 25 TABLET, FILM COATED, EXTENDED RELEASE ORAL at 08:16

## 2025-08-10 RX ADMIN — IPRATROPIUM BROMIDE AND ALBUTEROL SULFATE 3 ML: .5; 3 SOLUTION RESPIRATORY (INHALATION) at 06:36

## 2025-08-10 RX ADMIN — RISPERIDONE 1 MG: 1 TABLET, FILM COATED ORAL at 00:39

## 2025-08-10 RX ADMIN — METOCLOPRAMIDE 10 MG: 5 INJECTION, SOLUTION INTRAMUSCULAR; INTRAVENOUS at 01:14

## 2025-08-10 RX ADMIN — MIDODRINE HYDROCHLORIDE 5 MG: 5 TABLET ORAL at 08:14

## 2025-08-10 RX ADMIN — INSULIN GLARGINE 10 UNITS: 100 INJECTION, SOLUTION SUBCUTANEOUS at 21:20

## 2025-08-10 RX ADMIN — HYDROXYZINE HYDROCHLORIDE 50 MG: 25 TABLET, FILM COATED ORAL at 21:19

## 2025-08-10 RX ADMIN — DICYCLOMINE HYDROCHLORIDE 20 MG: 10 CAPSULE ORAL at 12:41

## 2025-08-10 RX ADMIN — BUTALBITAL, ACETAMINOPHEN, AND CAFFEINE 1 TABLET: 325; 50; 40 TABLET ORAL at 12:00

## 2025-08-10 RX ADMIN — LAMOTRIGINE 300 MG: 100 TABLET ORAL at 00:13

## 2025-08-10 RX ADMIN — AMOXICILLIN AND CLAVULANATE POTASSIUM 1 TABLET: 875; 125 TABLET, COATED ORAL at 08:14

## 2025-08-10 RX ADMIN — ENOXAPARIN SODIUM 40 MG: 100 INJECTION SUBCUTANEOUS at 08:13

## 2025-08-10 RX ADMIN — ACETAMINOPHEN 975 MG: 325 TABLET ORAL at 03:04

## 2025-08-10 RX ADMIN — FLUTICASONE FUROATE AND VILANTEROL TRIFENATATE 1 PUFF: 200; 25 POWDER RESPIRATORY (INHALATION) at 06:40

## 2025-08-10 RX ADMIN — INSULIN HUMAN 2 UNITS: 100 INJECTION, SOLUTION PARENTERAL at 08:00

## 2025-08-10 RX ADMIN — DICYCLOMINE HYDROCHLORIDE 20 MG: 10 CAPSULE ORAL at 17:33

## 2025-08-10 RX ADMIN — BUSPIRONE HYDROCHLORIDE 30 MG: 10 TABLET ORAL at 21:19

## 2025-08-10 RX ADMIN — IVABRADINE 7.5 MG: 5 TABLET, FILM COATED ORAL at 17:33

## 2025-08-10 RX ADMIN — GUAIFENESIN 600 MG: 600 TABLET, EXTENDED RELEASE ORAL at 23:19

## 2025-08-10 RX ADMIN — PREGABALIN 150 MG: 75 CAPSULE ORAL at 08:14

## 2025-08-10 RX ADMIN — HYDROXYZINE HYDROCHLORIDE 50 MG: 25 TABLET, FILM COATED ORAL at 15:36

## 2025-08-10 RX ADMIN — LIDOCAINE 1 PATCH: 4 PATCH TOPICAL at 15:36

## 2025-08-10 RX ADMIN — IPRATROPIUM BROMIDE AND ALBUTEROL SULFATE 3 ML: .5; 3 SOLUTION RESPIRATORY (INHALATION) at 11:39

## 2025-08-10 RX ADMIN — DICYCLOMINE HYDROCHLORIDE 20 MG: 10 CAPSULE ORAL at 21:25

## 2025-08-10 SDOH — ECONOMIC STABILITY: FOOD INSECURITY: WITHIN THE PAST 12 MONTHS, THE FOOD YOU BOUGHT JUST DIDN'T LAST AND YOU DIDN'T HAVE MONEY TO GET MORE.: NEVER TRUE

## 2025-08-10 SDOH — ECONOMIC STABILITY: GENERAL

## 2025-08-10 SDOH — ECONOMIC STABILITY: HOUSING INSECURITY

## 2025-08-10 SDOH — ECONOMIC STABILITY: FOOD INSECURITY: WITHIN THE PAST 12 MONTHS, YOU WORRIED THAT YOUR FOOD WOULD RUN OUT BEFORE YOU GOT MONEY TO BUY MORE.: NEVER TRUE

## 2025-08-10 SDOH — ECONOMIC STABILITY: INCOME INSECURITY: IN THE LAST 12 MONTHS, WAS THERE A TIME WHEN YOU WERE NOT ABLE TO PAY THE MORTGAGE OR RENT ON TIME?: NO

## 2025-08-10 SDOH — ECONOMIC STABILITY: TRANSPORTATION INSECURITY: IN THE PAST 12 MONTHS, HAS LACK OF TRANSPORTATION KEPT YOU FROM MEDICAL APPOINTMENTS OR FROM GETTING MEDICATIONS?: YES

## 2025-08-10 SDOH — ECONOMIC STABILITY: FOOD INSECURITY

## 2025-08-10 SDOH — ECONOMIC STABILITY: HOUSING INSECURITY: IN THE LAST 12 MONTHS, HOW MANY PLACES HAVE YOU LIVED?: 1

## 2025-08-10 SDOH — ECONOMIC STABILITY: TRANSPORTATION INSECURITY
IN THE PAST 12 MONTHS, HAS THE LACK OF TRANSPORTATION KEPT YOU FROM MEDICAL APPOINTMENTS OR FROM GETTING MEDICATIONS?: YES

## 2025-08-10 SDOH — ECONOMIC STABILITY: HOUSING INSECURITY: IN THE PAST 12 MONTHS HAS THE ELECTRIC, GAS, OIL, OR WATER COMPANY THREATENED TO SHUT OFF SERVICES IN YOUR HOME?: NO

## 2025-08-10 SDOH — ECONOMIC STABILITY: FOOD INSECURITY: WITHIN THE PAST 12 MONTHS, YOU WORRIED THAT YOUR FOOD WOULD RUN OUT BEFORE YOU GOT THE MONEY TO BUY MORE.: NEVER TRUE

## 2025-08-10 SDOH — ECONOMIC STABILITY: HOUSING INSECURITY: IN THE LAST 12 MONTHS, WAS THERE A TIME WHEN YOU WERE NOT ABLE TO PAY THE MORTGAGE OR RENT ON TIME?: NO

## 2025-08-10 SDOH — ECONOMIC STABILITY: TRANSPORTATION INSECURITY

## 2025-08-10 ASSESSMENT — COGNITIVE AND FUNCTIONAL STATUS - GENERAL
DAILY ACTIVITIY SCORE: 24
MOBILITY SCORE: 24
DAILY ACTIVITIY SCORE: 24
MOBILITY SCORE: 24

## 2025-08-10 ASSESSMENT — PAIN - FUNCTIONAL ASSESSMENT
PAIN_FUNCTIONAL_ASSESSMENT: 0-10

## 2025-08-10 ASSESSMENT — PAIN DESCRIPTION - DESCRIPTORS
DESCRIPTORS: SHARP
DESCRIPTORS: ACHING

## 2025-08-10 ASSESSMENT — PAIN SCALES - GENERAL
PAINLEVEL_OUTOF10: 6
PAINLEVEL_OUTOF10: 0 - NO PAIN
PAINLEVEL_OUTOF10: 7

## 2025-08-10 ASSESSMENT — SOCIAL DETERMINANTS OF HEALTH (SDOH): IN THE PAST 12 MONTHS, HAS THE ELECTRIC, GAS, OIL, OR WATER COMPANY THREATENED TO SHUT OFF SERVICE IN YOUR HOME?: NO

## 2025-08-10 ASSESSMENT — PAIN DESCRIPTION - LOCATION: LOCATION: HEAD

## 2025-08-10 ASSESSMENT — ACTIVITIES OF DAILY LIVING (ADL): LACK_OF_TRANSPORTATION: NO

## 2025-08-10 NOTE — PROGRESS NOTES
"Hospital Medicine Progress Note    Subjective:  Kelli Silva is a 40 y.o. female, who is hospital day 0.     Patient endorses some new left upper back pain, denies any injury. Had a migraine earlier today but resolved now after dose of Fioricet. Coughing up yellow colored phlegm, starting to feel a little better.     Objective:    /74   Pulse (!) 112   Temp 36.7 °C (98.1 °F)   Resp 19   Ht 1.575 m (5' 2\")   Wt 108 kg (238 lb)   SpO2 96%   BMI 43.53 kg/m²     Physical Exam:  General: A&Ox3, no distress, cooperative  HEENT: NC/AT, clear sclera, MMM  NECK: Supple  Cardiovascular: RRR, no murmurs. S1/S2  Respiratory: CTAB, no RRW or crackles. No distress. Moist sounding cough  Abdomen: Soft, ND, non-tender  Extremities: No peripheral edema  Neurological: A&Ox3. No focal deficits  Skin: Warm and dry, no rashes  Psych: appropriate mood and affect    I personally reviewed all imaging, labs and notes/ documentation.     Assessment & Plan:    Bronchitis   Migraine disorder    DM   Asthma/Allergies  POTS  Bipolar  Non epileptic convulsions   VTE no longer anticoagulated     Finish augmentin course that pt was already started on, missing some atypical coverage due to pt allergies, prednisone, continue inhalers, add mucinex  PRN Fioricet for migraine today with resolution  Home med rec updated, meds continued accordingly     DVT Prophylaxis: Lovenox, SCDs  Code Status: Full Code   Disposition: Likely dc home tomorrow       Dulce Green DO  Hospitalist   "

## 2025-08-10 NOTE — ED PROCEDURE NOTE
Procedure  Critical Care    Performed by: Regina Starr MD  Authorized by: Regina Starr MD    Critical care provider statement:     Critical care time (minutes):  36    Critical care time was exclusive of:  Separately billable procedures and treating other patients    Critical care was necessary to treat or prevent imminent or life-threatening deterioration of the following conditions:  Respiratory failure    Critical care was time spent personally by me on the following activities:  Ordering and performing treatments and interventions, ordering and review of laboratory studies, ordering and review of radiographic studies, pulse oximetry, discussions with primary provider and evaluation of patient's response to treatment    Care discussed with: admitting provider                 Regina Starr MD  08/09/25 5230

## 2025-08-10 NOTE — CARE PLAN
"The patient's goals for the shift include \"to feel better\"    The clinical goals for the shift include \"to have a decrease in cough and shortness of breath        "

## 2025-08-10 NOTE — H&P
"                                             Hospital Medicine History & Physical       Patient Name: Kelli Silva   YOB: 1985    Subjective:    Kelli Silva is a 40 y.o. female who presents to the hospital with complaints of chest pain and SOB. Patient has been having cough, sob, congestion for 1 week. Was seen in our ED and discharged with steroids and amoxicillin. Did not improve. Has been using her inhaler more frequently. Her allergist started Augmenin 8/6 for 7 day course.  Denies fevers. She is not hypoxic. CTA not very convincing of acute process. Afebrile and without WBC. Can only take Augmentin or Cipro for abx. Feels steroids cause her more anxiety and make her feel manic. She received IV steroids, Mg, Cipro and nebs in the ED but did not really feel well enough for discharge.     A 10 point ROS was completed and is negative expect as stated in HPI.     Past Medical History:  DM   Asthma/Allergies  Migraines  POTS  Bipolar  Non epileptic convulsions   VTE no longer anticoagulated     Past Surgical History:  Cholecystectomy     Social History[1]     Family History[2]      Objective:    /55 Comment: Simultaneous filing. User may not have seen previous data.  Pulse 96 Comment: Simultaneous filing. User may not have seen previous data.  Temp 37.2 °C (99 °F)   Resp 20 Comment: Simultaneous filing. User may not have seen previous data.  Ht 1.575 m (5' 2\")   Wt 108 kg (238 lb)   SpO2 94% Comment: Simultaneous filing. User may not have seen previous data.  BMI 43.53 kg/m²     Physical Exam:    GENERAL: non-toxic, NAD, alert & cooperative  HEENT: normocephalic, atraumatic, sclera clear  CARDIAC: regular rate & rhythm, S1/S2  PULMONARY: CTA b/l, no respiratory distress, + expiratory wheezes  ABDOMEN: soft, non-tender, non-distended  MSK: no peripheral edema, no obvious deformity  NEURO: A&O X 3, CN II-XII grossly intact, non-focal  SKIN: Warm and dry, no lesions, no rashes.  PSYCH: " appropriate mood & affect     Assessment/Plan:    Bronchitis   DM   Asthma/Allergies  Migraines  POTS  Bipolar  Non epileptic convulsions   VTE no longer anticoagulated       Finish already in progress course of Augmentin. Missing some atypical respiratory coverage due to allergies. Cont long and short acting inhalers. Oral prednisone 40mg daily as tolerated.   Her home med rec has not yet been updated. Resumed some of the meds I could confirm.       DVT Prophylaxis: Lovenox  Code Status: FULL CODE  Disposition: home tmrw      Carrillo Yeager, DO  Hospital Medicine         [1]   Social History  Tobacco Use    Smoking status: Never    Smokeless tobacco: Never   Vaping Use    Vaping status: Never Used   Substance Use Topics    Alcohol use: Never    Drug use: Never   [2]   Family History  Problem Relation Name Age of Onset    Brain cancer Father      Kidney cancer Maternal Grandfather      Kidney cancer Sibling      Dementia Maternal Grandmother iWlma Manocchio     Migraines Sister Rain     Parkinsonism Paternal Grandfather Bill     Tics Brother Benjamín     Tourette syndrome Brother Benjamín Silva     Alcohol abuse Brother Benjamín     ADD / ADHD Brother Benjamín     OCD Brother Benjamín     Depression Mother Latisha     Anxiety disorder Mother Latisha     Dementia Maternal Grandmother Wilma     Anxiety disorder Maternal Grandmother Wilma     Schizophrenia Other Cousin

## 2025-08-10 NOTE — PROGRESS NOTES
08/10/25 1010   Discharge Planning   Living Arrangements Spouse/significant other   Support Systems Spouse/significant other   Type of Residence Private residence   Do you have animals or pets at home? No   Who is requesting discharge planning? Provider   Expected Discharge Disposition Home   Stroke Family Assessment   Stroke Family Assessment Needed No   Intensity of Service   Intensity of Service 0-30 min     Met with pt this morning, introduced myself and role.  Pt admit for sob.  Pt treated for bronchitis.   Pt is up ad keon.  Pt denies any needs at this time.  Pt works from home.  Pt verified home address, insurance and PCP- whom is Dr Woods  at [facility].  DC plan is for home.   Shey Armstrong RN [facility]

## 2025-08-11 ENCOUNTER — PHARMACY VISIT (OUTPATIENT)
Dept: PHARMACY | Facility: CLINIC | Age: 40
End: 2025-08-11
Payer: COMMERCIAL

## 2025-08-11 VITALS
WEIGHT: 238 LBS | TEMPERATURE: 98.1 F | DIASTOLIC BLOOD PRESSURE: 60 MMHG | HEIGHT: 62 IN | BODY MASS INDEX: 43.79 KG/M2 | OXYGEN SATURATION: 97 % | RESPIRATION RATE: 18 BRPM | SYSTOLIC BLOOD PRESSURE: 121 MMHG | HEART RATE: 63 BPM

## 2025-08-11 VITALS
SYSTOLIC BLOOD PRESSURE: 114 MMHG | RESPIRATION RATE: 19 BRPM | TEMPERATURE: 98.4 F | DIASTOLIC BLOOD PRESSURE: 59 MMHG | HEIGHT: 62 IN | OXYGEN SATURATION: 93 % | BODY MASS INDEX: 43.79 KG/M2 | HEART RATE: 72 BPM | WEIGHT: 238 LBS

## 2025-08-11 LAB
GLUCOSE BLD MANUAL STRIP-MCNC: 100 MG/DL (ref 74–99)
GLUCOSE BLD MANUAL STRIP-MCNC: 121 MG/DL (ref 74–99)
GLUCOSE BLD MANUAL STRIP-MCNC: 195 MG/DL (ref 74–99)
GLUCOSE BLD MANUAL STRIP-MCNC: 94 MG/DL (ref 74–99)

## 2025-08-11 PROCEDURE — 94640 AIRWAY INHALATION TREATMENT: CPT

## 2025-08-11 PROCEDURE — RXMED WILLOW AMBULATORY MEDICATION CHARGE

## 2025-08-11 PROCEDURE — 2500000001 HC RX 250 WO HCPCS SELF ADMINISTERED DRUGS (ALT 637 FOR MEDICARE OP): Performed by: INTERNAL MEDICINE

## 2025-08-11 PROCEDURE — 2500000002 HC RX 250 W HCPCS SELF ADMINISTERED DRUGS (ALT 637 FOR MEDICARE OP, ALT 636 FOR OP/ED): Performed by: INTERNAL MEDICINE

## 2025-08-11 PROCEDURE — 99238 HOSP IP/OBS DSCHRG MGMT 30/<: CPT | Performed by: INTERNAL MEDICINE

## 2025-08-11 PROCEDURE — 2500000004 HC RX 250 GENERAL PHARMACY W/ HCPCS (ALT 636 FOR OP/ED): Performed by: INTERNAL MEDICINE

## 2025-08-11 PROCEDURE — 96372 THER/PROPH/DIAG INJ SC/IM: CPT | Performed by: INTERNAL MEDICINE

## 2025-08-11 PROCEDURE — 82947 ASSAY GLUCOSE BLOOD QUANT: CPT

## 2025-08-11 PROCEDURE — G0378 HOSPITAL OBSERVATION PER HR: HCPCS

## 2025-08-11 RX ORDER — AMOXICILLIN AND CLAVULANATE POTASSIUM 875; 125 MG/1; MG/1
1 TABLET, FILM COATED ORAL EVERY 12 HOURS SCHEDULED
Qty: 8 TABLET | Refills: 0 | Status: SHIPPED | OUTPATIENT
Start: 2025-08-11 | End: 2025-08-15

## 2025-08-11 RX ORDER — PREDNISONE 20 MG/1
40 TABLET ORAL DAILY
Qty: 6 TABLET | Refills: 0 | Status: SHIPPED | OUTPATIENT
Start: 2025-08-12 | End: 2025-08-15

## 2025-08-11 RX ORDER — HEPARIN 100 UNIT/ML
1 SYRINGE INTRAVENOUS ONCE
Status: DISCONTINUED | OUTPATIENT
Start: 2025-08-11 | End: 2025-08-11 | Stop reason: HOSPADM

## 2025-08-11 RX ORDER — GUAIFENESIN 600 MG/1
600 TABLET, EXTENDED RELEASE ORAL 2 TIMES DAILY
Qty: 14 TABLET | Refills: 0 | Status: SHIPPED | OUTPATIENT
Start: 2025-08-11 | End: 2025-08-18

## 2025-08-11 RX ORDER — LIDOCAINE 560 MG/1
1 PATCH PERCUTANEOUS; TOPICAL; TRANSDERMAL DAILY
Qty: 14 PATCH | Refills: 0 | Status: SHIPPED | OUTPATIENT
Start: 2025-08-11 | End: 2025-08-25

## 2025-08-11 RX ADMIN — PREGABALIN 150 MG: 75 CAPSULE ORAL at 09:00

## 2025-08-11 RX ADMIN — DICYCLOMINE HYDROCHLORIDE 20 MG: 10 CAPSULE ORAL at 12:16

## 2025-08-11 RX ADMIN — HYDROXYZINE HYDROCHLORIDE 50 MG: 25 TABLET, FILM COATED ORAL at 09:00

## 2025-08-11 RX ADMIN — LAMOTRIGINE 200 MG: 100 TABLET ORAL at 09:01

## 2025-08-11 RX ADMIN — PREDNISONE 40 MG: 20 TABLET ORAL at 08:59

## 2025-08-11 RX ADMIN — RISPERIDONE 1 MG: 1 TABLET, FILM COATED ORAL at 09:02

## 2025-08-11 RX ADMIN — ENOXAPARIN SODIUM 40 MG: 100 INJECTION SUBCUTANEOUS at 08:59

## 2025-08-11 RX ADMIN — BUSPIRONE HYDROCHLORIDE 30 MG: 10 TABLET ORAL at 08:59

## 2025-08-11 RX ADMIN — HYDROXYZINE HYDROCHLORIDE 50 MG: 25 TABLET, FILM COATED ORAL at 04:09

## 2025-08-11 RX ADMIN — IPRATROPIUM BROMIDE AND ALBUTEROL SULFATE 3 ML: .5; 3 SOLUTION RESPIRATORY (INHALATION) at 07:45

## 2025-08-11 RX ADMIN — TIOTROPIUM BROMIDE INHALATION SPRAY 2 PUFF: 3.12 SPRAY, METERED RESPIRATORY (INHALATION) at 07:51

## 2025-08-11 RX ADMIN — GUAIFENESIN 600 MG: 600 TABLET, EXTENDED RELEASE ORAL at 12:15

## 2025-08-11 RX ADMIN — IPRATROPIUM BROMIDE AND ALBUTEROL SULFATE 3 ML: .5; 3 SOLUTION RESPIRATORY (INHALATION) at 13:14

## 2025-08-11 RX ADMIN — DICYCLOMINE HYDROCHLORIDE 20 MG: 10 CAPSULE ORAL at 06:35

## 2025-08-11 RX ADMIN — FLUTICASONE FUROATE AND VILANTEROL TRIFENATATE 1 PUFF: 200; 25 POWDER RESPIRATORY (INHALATION) at 07:50

## 2025-08-11 RX ADMIN — AMOXICILLIN AND CLAVULANATE POTASSIUM 1 TABLET: 875; 125 TABLET, COATED ORAL at 08:59

## 2025-08-11 RX ADMIN — DESVENLAFAXINE SUCCINATE 75 MG: 25 TABLET, FILM COATED, EXTENDED RELEASE ORAL at 09:02

## 2025-08-11 RX ADMIN — IVABRADINE 7.5 MG: 5 TABLET, FILM COATED ORAL at 08:59

## 2025-08-11 RX ADMIN — MIDODRINE HYDROCHLORIDE 5 MG: 5 TABLET ORAL at 09:00

## 2025-08-11 ASSESSMENT — PAIN SCALES - GENERAL: PAINLEVEL_OUTOF10: 0 - NO PAIN

## 2025-08-11 NOTE — CARE PLAN
"The patient's goals for the shift include \"to feel better\"    The clinical goals for the shift include Pt will remain safe and have ease of breathing throughout shift    Over the shift, the patient did not make progress toward the following goals. Barriers to progression include acute illness. Recommendations to address these barriers include communication.    "

## 2025-08-11 NOTE — CARE PLAN
"The patient's goals for the shift include \"to feel better\"    The clinical goals for the shift include patient will remain hds      Problem: Pain - Adult  Goal: Verbalizes/displays adequate comfort level or baseline comfort level  Outcome: Progressing     Problem: Safety - Adult  Goal: Free from fall injury  Outcome: Progressing     Problem: Discharge Planning  Goal: Discharge to home or other facility with appropriate resources  Outcome: Progressing     Problem: Chronic Conditions and Co-morbidities  Goal: Patient's chronic conditions and co-morbidity symptoms are monitored and maintained or improved  Outcome: Progressing     Problem: Nutrition  Goal: Nutrient intake appropriate for maintaining nutritional needs  Outcome: Progressing       "

## 2025-08-11 NOTE — DISCHARGE SUMMARY
Discharge Diagnosis  Bronchitis   Migraine disorder      Issues Requiring Follow-Up  As above    Discharge Meds     Medication List      START taking these medications     amoxicillin-clavulanate 875-125 mg tablet; Commonly known as: Augmentin;   Take 1 tablet by mouth every 12 hours for 4 days.   guaiFENesin 600 mg 12 hr tablet; Commonly known as: Mucinex; Take 1   tablet (600 mg) by mouth 2 times a day for 7 days. Do not crush, chew, or   split.; Replaces: guaiFENesin 100 mg/5 mL syrup   lidocaine 4 % patch; Place 1 patch over 12 hours on the skin once daily   for 14 days. Remove & discard patch within 12 hours or as directed by   provider     CHANGE how you take these medications     hydrOXYzine pamoate 50 mg capsule; Commonly known as: VistariL; Take 1   capsule (50 mg) by mouth 3 times a day as needed for itching for up to 10   days.; What changed: when to take this, reasons to take this   * lamoTRIgine 150 mg tablet; Commonly known as: LaMICtal; What changed:   Another medication with the same name was removed. Continue taking this   medication, and follow the directions you see here.   * lamoTRIgine 200 mg tablet; Commonly known as: LaMICtal; What changed:   Another medication with the same name was removed. Continue taking this   medication, and follow the directions you see here.   NovoLOG U-100 Insulin aspart 100 unit/mL injection; Generic drug:   insulin aspart; What changed: Another medication with the same name was   removed. Continue taking this medication, and follow the directions you   see here.   predniSONE 20 mg tablet; Commonly known as: Deltasone; Take 2 tablets   (40 mg) by mouth once daily for 3 days.; Start taking on: August 12, 2025;   What changed: how much to take  * This list has 2 medication(s) that are the same as other medications   prescribed for you. Read the directions carefully, and ask your doctor or   other care provider to review them with you.     CONTINUE taking these  medications     Abilify Asimtufii 960 mg/3.2 mL injection; Generic drug: ARIPiprazole ER   (2 month)   Accu-Chek Guide Glucose Meter misc; Generic drug: blood-glucose meter;   Use as needed if feeling lightheaded, shaky, sweating, or nauseous   * Accu-Chek Guide test strips; Generic drug: blood sugar diagnostic   * blood sugar diagnostic; Use as needed if feeling lightheaded, shaky,   sweating, or nauseous   Accu-Chek Softclix Lancets misc; Generic drug: lancets; Use as needed if   feeling lightheaded, shaky, sweating, or nauseous   aprepitant 80 mg capsule; Commonly known as: Emend   Autolet lancing device   azelastine-fluticasone 137-50 mcg/spray nasal spray; Commonly known as:   Dymista   benzonatate 100 mg capsule; Commonly known as: Tessalon; Take 1 capsule   (100 mg) by mouth every 8 hours for 7 days. Do not crush or chew.   busPIRone 30 mg tablet; Commonly known as: Buspar; Take 1 tablet (30 mg)   by mouth 2 times a day.   calcium carbonate-vitamin D3 250 mg-3.125 mcg (125 unit) tablet   chlorzoxazone 500 mg tablet; Commonly known as: Parafon Forte   cholecalciferol 1.25 mg (50,000 units) capsule; Commonly known as:   Vitamin D-3   Corlanor 7.5 mg tablet; Generic drug: ivabradine   CULTURELLE ORAL   desvenlafaxine succinate 25 mg 24 hour tablet; Commonly known as:   Pristiq   Dexcom G7 Sensor device; Generic drug: blood-glucose sensor   dicyclomine 20 mg tablet; Commonly known as: Bentyl   eptinezumab injection; Commonly known as: Vyepti   ergocalciferol 1250 mcg (50,000 units) capsule; Commonly known as:   Vitamin D-2   Gvoke HypoPen 2-Pack 1 mg/0.2 mL auto-injector; Generic drug: glucagon   ipratropium 21 mcg (0.03 %) nasal spray; Commonly known as: Atrovent   ipratropium-albuteroL 0.5-2.5 mg/3 mL nebulizer solution; Commonly known   as: Duo-Neb   Lantus U-100 Insulin 100 unit/mL injection; Generic drug: insulin   glargine   lisdexamfetamine 70 mg capsule; Commonly known as: Vyvanse; Take 1   capsule (70  mg) by mouth once daily in the morning.   LORazepam 1 mg tablet; Commonly known as: Ativan   magnesium oxide 400 mg tablet; Commonly known as: Mag-Ox   metoclopramide 10 mg tablet; Commonly known as: Reglan; Take 1 tablet   (10 mg) by mouth every 6 hours for 7 days.   midodrine 5 mg tablet; Commonly known as: Proamatine; Take 1 tablet (5   mg) by mouth 2 times a day.   paliperidone 6 mg 24 hr tablet; Commonly known as: Invega; TAKE 1 TABLET   (6 MG) BY MOUTH ONCE DAILY AT BEDTIME. DO NOT CRUSH, CHEW, OR SPLIT.   pregabalin 150 mg capsule; Commonly known as: Lyrica   PROAIR HFA INHL   Reyvow 100 mg tablet; Generic drug: lasmiditan   Tezspire SubQ syringe; Generic drug: tezepelumab-ekko   Trelegy Ellipta 200-62.5-25 mcg blister with device; Generic drug:   fluticasone-umeclidin-vilanter   Voquezna 20 mg tablet; Generic drug: vonoprazan   Zavzpret 10 mg/actuation spray,non-aerosol; Generic drug: zavegepant   ZyrTEC 10 mg tablet; Generic drug: cetirizine  * This list has 2 medication(s) that are the same as other medications   prescribed for you. Read the directions carefully, and ask your doctor or   other care provider to review them with you.     STOP taking these medications     amoxicillin 875 mg tablet; Commonly known as: Amoxil   guaiFENesin 100 mg/5 mL syrup; Commonly known as: Robitussin; Replaced   by: guaiFENesin 600 mg 12 hr tablet   promethazine 25 mg suppository; Commonly known as: Phenergan       Test Results Pending At Discharge  Pending Labs       No current pending labs.            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 Was seen in our ED and discharged with steroids and amoxicillin. Did not improve. Has been using her inhaler more frequently. Her allergist started Augmenin 8/6 for 7 day course. Workup revealed bronchitis, CTA chest  was negative for PE or pna. Pt admitted as she did not feel comfortable going home. She did not require any supplemental oxygen. She was tx with augmentin, prednisone and breathing tx. Pt improved with tx and now feels comfortable going home. She will complete 10 day course of augmentin and 5 day course prednisone. Work note provided. Pt discharged home in stable condition.     Pertinent Physical Exam At Time of Discharge  Physical Exam  General: A&Ox3, no distress, cooperative  HEENT: NC/AT, clear sclera, MMM  NECK: Supple  Cardiovascular: RRR, no murmurs. S1/S2  Respiratory: CTAB, no RRW or crackles. No distress. Moist sounding cough  Abdomen: Soft, ND, non-tender  Extremities: No peripheral edema  Neurological: A&Ox3. No focal deficits  Skin: Warm and dry, no rashes  Psych: appropriate mood and affect    Outpatient Follow-Up  Future Appointments   Date Time Provider Department Center   8/14/2025  7:30 AM Chester Gabriel PhD [id number] [place]   8/19/2025  8:00 AM Ata Nova MD PhD [id number] [facility]   8/20/2025 11:30 AM CHRIS [id number] PROC EXAM ROOM 1 [id number] None   8/21/2025  7:30 AM Chester Gabriel PhD [id number] [facility]   8/21/2025  9:00 AM DO [id number], INJECTION NURSE [id number] [facility]   8/28/2025  7:30 AM Chester Gabriel PhD [id number] [facility]   9/4/2025  7:30 AM Chester Gabriel PhD [id number] [facility]   9/11/2025  7:30 AM Chester Gabriel PhD [id number] [facility]   9/18/2025  7:30 AM Chester Gabriel PhD [id number] [facility]   9/25/2025  7:30 AM Chester Gabriel PhD [id number] [facility]   10/2/2025  7:30 AM Chester Gabriel PhD [id number] [facility]   10/9/2025  7:30 AM Chester Gabriel PhD [id number] [facility]   10/23/2025  7:30 AM Chester Gabriel PhD [id number] [facility]   10/30/2025  7:30 AM Chester Gabriel PhD [id number] [facility]   11/6/2025  7:30 AM Chester Gabriel PhD [id number] [facility]   11/13/2025  7:30 AM Chester Gabriel PhD [id number] [facility]   11/19/2025  9:00 AM Rosalie Rivas MD [id number] [facility]   11/20/2025   7:30 AM Chester Gabriel, PhD [id number] [place]   12/4/2025  7:30 AM Chester Gabriel PhD [id number] [place]   12/11/2025  7:30 AM Chester Gabriel PhD [id number] [place]   12/18/2025  7:30 AM Chester Gabriel, PhD [id number] [facility]      Dulce Green DO

## 2025-08-13 LAB
ATRIAL RATE: 112 BPM
P AXIS: 72 DEGREES
P OFFSET: 213 MS
P ONSET: 154 MS
PR INTERVAL: 150 MS
Q ONSET: 229 MS
QRS COUNT: 19 BEATS
QRS DURATION: 64 MS
QT INTERVAL: 334 MS
QTC CALCULATION(BAZETT): 455 MS
QTC FREDERICIA: 411 MS
R AXIS: 16 DEGREES
T AXIS: 39 DEGREES
T OFFSET: 396 MS
VENTRICULAR RATE: 112 BPM

## 2025-08-14 ENCOUNTER — APPOINTMENT (OUTPATIENT)
Dept: BEHAVIORAL HEALTH | Facility: CLINIC | Age: 40
End: 2025-08-14
Payer: COMMERCIAL

## 2025-08-14 DIAGNOSIS — F31.9 BIPOLAR 1 DISORDER (MULTI): ICD-10-CM

## 2025-08-14 PROCEDURE — 90837 PSYTX W PT 60 MINUTES: CPT | Performed by: PSYCHOLOGIST

## 2025-08-19 ENCOUNTER — APPOINTMENT (OUTPATIENT)
Dept: BEHAVIORAL HEALTH | Facility: CLINIC | Age: 40
End: 2025-08-19
Payer: COMMERCIAL

## 2025-08-19 DIAGNOSIS — F90.0 ATTENTION DEFICIT HYPERACTIVITY DISORDER (ADHD), PREDOMINANTLY INATTENTIVE TYPE: ICD-10-CM

## 2025-08-19 RX ORDER — LISDEXAMFETAMINE DIMESYLATE 70 MG/1
70 CAPSULE ORAL EVERY MORNING
Qty: 30 CAPSULE | Refills: 0 | Status: SHIPPED | OUTPATIENT
Start: 2025-08-19 | End: 2025-09-18

## 2025-08-20 ENCOUNTER — HOSPITAL ENCOUNTER (OUTPATIENT)
Facility: HOSPITAL | Age: 40
Discharge: HOME | End: 2025-08-20
Payer: COMMERCIAL

## 2025-08-20 VITALS
RESPIRATION RATE: 20 BRPM | WEIGHT: 226.19 LBS | TEMPERATURE: 97.9 F | SYSTOLIC BLOOD PRESSURE: 111 MMHG | DIASTOLIC BLOOD PRESSURE: 68 MMHG | HEART RATE: 82 BPM | BODY MASS INDEX: 41.37 KG/M2 | OXYGEN SATURATION: 97 %

## 2025-08-20 DIAGNOSIS — F31.9 BIPOLAR I DISORDER WITH DEPRESSION (MULTI): Primary | ICD-10-CM

## 2025-08-20 LAB — GLUCOSE BLD MANUAL STRIP-MCNC: 61 MG/DL (ref 74–99)

## 2025-08-20 PROCEDURE — KETSP PR KETAMINE INFUSION SELF-PAY: Performed by: PSYCHIATRY & NEUROLOGY

## 2025-08-20 PROCEDURE — 82947 ASSAY GLUCOSE BLOOD QUANT: CPT

## 2025-08-20 PROCEDURE — KETSP HC KETAMINE INFUSION SELF-PAY: Performed by: PSYCHIATRY & NEUROLOGY

## 2025-08-20 PROCEDURE — 2500000004 HC RX 250 GENERAL PHARMACY W/ HCPCS (ALT 636 FOR OP/ED): Performed by: STUDENT IN AN ORGANIZED HEALTH CARE EDUCATION/TRAINING PROGRAM

## 2025-08-20 RX ORDER — ALBUTEROL SULFATE 0.83 MG/ML
3 SOLUTION RESPIRATORY (INHALATION) AS NEEDED
Status: DISCONTINUED | OUTPATIENT
Start: 2025-08-20 | End: 2025-08-21 | Stop reason: HOSPADM

## 2025-08-20 RX ORDER — DIPHENHYDRAMINE HYDROCHLORIDE 50 MG/ML
50 INJECTION, SOLUTION INTRAMUSCULAR; INTRAVENOUS AS NEEDED
OUTPATIENT
Start: 2025-08-27

## 2025-08-20 RX ORDER — DIPHENHYDRAMINE HYDROCHLORIDE 50 MG/ML
50 INJECTION, SOLUTION INTRAMUSCULAR; INTRAVENOUS ONCE AS NEEDED
OUTPATIENT
Start: 2025-08-27

## 2025-08-20 RX ORDER — LABETALOL HYDROCHLORIDE 5 MG/ML
10 INJECTION, SOLUTION INTRAVENOUS AS NEEDED
OUTPATIENT
Start: 2025-08-27

## 2025-08-20 RX ORDER — METOPROLOL TARTRATE 1 MG/ML
10 INJECTION, SOLUTION INTRAVENOUS ONCE AS NEEDED
OUTPATIENT
Start: 2025-08-27

## 2025-08-20 RX ORDER — FAMOTIDINE 10 MG/ML
20 INJECTION, SOLUTION INTRAVENOUS ONCE AS NEEDED
OUTPATIENT
Start: 2025-08-27

## 2025-08-20 RX ORDER — ALBUTEROL SULFATE 0.83 MG/ML
3 SOLUTION RESPIRATORY (INHALATION) AS NEEDED
OUTPATIENT
Start: 2025-08-27

## 2025-08-20 RX ORDER — LABETALOL HYDROCHLORIDE 5 MG/ML
20 INJECTION, SOLUTION INTRAVENOUS AS NEEDED
Status: DISCONTINUED | OUTPATIENT
Start: 2025-08-20 | End: 2025-08-21 | Stop reason: HOSPADM

## 2025-08-20 RX ORDER — KETAMINE HCL IN 0.9 % NACL 200 MG/200
0.75 PLASTIC BAG, INJECTION (ML) INTRAVENOUS ONCE
OUTPATIENT
Start: 2025-08-27 | End: 2025-08-27

## 2025-08-20 RX ORDER — DICYCLOMINE HYDROCHLORIDE 10 MG/1
10 CAPSULE ORAL ONCE AS NEEDED
OUTPATIENT
Start: 2025-08-27

## 2025-08-20 RX ORDER — DIPHENHYDRAMINE HYDROCHLORIDE 50 MG/ML
50 INJECTION, SOLUTION INTRAMUSCULAR; INTRAVENOUS AS NEEDED
Status: COMPLETED | OUTPATIENT
Start: 2025-08-20 | End: 2025-08-20

## 2025-08-20 RX ORDER — FAMOTIDINE 10 MG/ML
20 INJECTION, SOLUTION INTRAVENOUS ONCE AS NEEDED
Status: DISCONTINUED | OUTPATIENT
Start: 2025-08-20 | End: 2025-08-21 | Stop reason: HOSPADM

## 2025-08-20 RX ORDER — LABETALOL HYDROCHLORIDE 5 MG/ML
20 INJECTION, SOLUTION INTRAVENOUS AS NEEDED
OUTPATIENT
Start: 2025-08-27

## 2025-08-20 RX ORDER — DICYCLOMINE HYDROCHLORIDE 10 MG/1
10 CAPSULE ORAL ONCE AS NEEDED
Status: DISCONTINUED | OUTPATIENT
Start: 2025-08-20 | End: 2025-08-21 | Stop reason: HOSPADM

## 2025-08-20 RX ORDER — MIDAZOLAM HYDROCHLORIDE 1 MG/ML
1 INJECTION, SOLUTION INTRAMUSCULAR; INTRAVENOUS ONCE AS NEEDED
OUTPATIENT
Start: 2025-08-27

## 2025-08-20 RX ORDER — EPINEPHRINE 1 MG/ML
0.3 INJECTION, SOLUTION, CONCENTRATE INTRAVENOUS EVERY 5 MIN PRN
Status: DISCONTINUED | OUTPATIENT
Start: 2025-08-20 | End: 2025-08-21 | Stop reason: HOSPADM

## 2025-08-20 RX ORDER — ACETAMINOPHEN 325 MG/1
650 TABLET ORAL ONCE AS NEEDED
OUTPATIENT
Start: 2025-08-27

## 2025-08-20 RX ORDER — KETAMINE HCL IN 0.9 % NACL 200 MG/200
0.75 PLASTIC BAG, INJECTION (ML) INTRAVENOUS ONCE
Status: COMPLETED | OUTPATIENT
Start: 2025-08-20 | End: 2025-08-20

## 2025-08-20 RX ORDER — METOPROLOL TARTRATE 1 MG/ML
5 INJECTION, SOLUTION INTRAVENOUS ONCE AS NEEDED
OUTPATIENT
Start: 2025-08-27

## 2025-08-20 RX ORDER — ONDANSETRON HYDROCHLORIDE 2 MG/ML
8 INJECTION, SOLUTION INTRAVENOUS ONCE AS NEEDED
OUTPATIENT
Start: 2025-08-27

## 2025-08-20 RX ORDER — DIPHENHYDRAMINE HYDROCHLORIDE 50 MG/ML
50 INJECTION, SOLUTION INTRAMUSCULAR; INTRAVENOUS ONCE AS NEEDED
Status: DISCONTINUED | OUTPATIENT
Start: 2025-08-20 | End: 2025-08-21 | Stop reason: HOSPADM

## 2025-08-20 RX ORDER — ONDANSETRON HYDROCHLORIDE 2 MG/ML
8 INJECTION, SOLUTION INTRAVENOUS ONCE AS NEEDED
Status: DISCONTINUED | OUTPATIENT
Start: 2025-08-20 | End: 2025-08-21 | Stop reason: HOSPADM

## 2025-08-20 RX ORDER — LABETALOL HYDROCHLORIDE 5 MG/ML
30 INJECTION, SOLUTION INTRAVENOUS AS NEEDED
OUTPATIENT
Start: 2025-08-27

## 2025-08-20 RX ORDER — METOPROLOL TARTRATE 1 MG/ML
15 INJECTION, SOLUTION INTRAVENOUS ONCE AS NEEDED
OUTPATIENT
Start: 2025-08-27

## 2025-08-20 RX ORDER — LIDOCAINE AND PRILOCAINE 25; 25 MG/G; MG/G
1 CREAM TOPICAL ONCE AS NEEDED
OUTPATIENT
Start: 2025-08-27

## 2025-08-20 RX ORDER — ACETAMINOPHEN 325 MG/1
650 TABLET ORAL ONCE AS NEEDED
Status: DISCONTINUED | OUTPATIENT
Start: 2025-08-20 | End: 2025-08-21 | Stop reason: HOSPADM

## 2025-08-20 RX ORDER — EPINEPHRINE 1 MG/ML
0.3 INJECTION, SOLUTION, CONCENTRATE INTRAVENOUS EVERY 5 MIN PRN
OUTPATIENT
Start: 2025-08-27

## 2025-08-20 RX ADMIN — DIPHENHYDRAMINE HYDROCHLORIDE 50 MG: 50 INJECTION INTRAMUSCULAR; INTRAVENOUS at 12:04

## 2025-08-20 RX ADMIN — KETAMINE HYDROCHLORIDE 77 MG: 50 INJECTION INTRAMUSCULAR; INTRAVENOUS at 12:21

## 2025-08-20 RX ADMIN — ONDANSETRON 8 MG: 2 INJECTION, SOLUTION INTRAMUSCULAR; INTRAVENOUS at 12:02

## 2025-08-20 RX ADMIN — LABETALOL HYDROCHLORIDE 20 MG: 5 INJECTION INTRAVENOUS at 12:51

## 2025-08-20 ASSESSMENT — PATIENT HEALTH QUESTIONNAIRE - PHQ9
2. FEELING DOWN, DEPRESSED OR HOPELESS: SEVERAL DAYS
9. THOUGHTS THAT YOU WOULD BE BETTER OFF DEAD, OR OF HURTING YOURSELF: NOT AT ALL
2. FEELING DOWN, DEPRESSED OR HOPELESS: SEVERAL DAYS
3. TROUBLE FALLING OR STAYING ASLEEP OR SLEEPING TOO MUCH: SEVERAL DAYS
10. IF YOU CHECKED OFF ANY PROBLEMS, HOW DIFFICULT HAVE THESE PROBLEMS MADE IT FOR YOU TO DO YOUR WORK, TAKE CARE OF THINGS AT HOME, OR GET ALONG WITH OTHER PEOPLE: SOMEWHAT DIFFICULT
6. FEELING BAD ABOUT YOURSELF - OR THAT YOU ARE A FAILURE OR HAVE LET YOURSELF OR YOUR FAMILY DOWN: SEVERAL DAYS
3. TROUBLE FALLING OR STAYING ASLEEP: SEVERAL DAYS
10. IF YOU CHECKED OFF ANY PROBLEMS, HOW DIFFICULT HAVE THESE PROBLEMS MADE IT FOR YOU TO DO YOUR WORK, TAKE CARE OF THINGS AT HOME, OR GET ALONG WITH OTHER PEOPLE: SOMEWHAT DIFFICULT
5. POOR APPETITE OR OVEREATING: SEVERAL DAYS
1. LITTLE INTEREST OR PLEASURE IN DOING THINGS: SEVERAL DAYS
SUM OF ALL RESPONSES TO PHQ9 QUESTIONS 1 & 2: 2
7. TROUBLE CONCENTRATING ON THINGS, SUCH AS READING THE NEWSPAPER OR WATCHING TELEVISION: SEVERAL DAYS
9. THOUGHTS THAT YOU WOULD BE BETTER OFF DEAD, OR OF HURTING YOURSELF: NOT AT ALL
5. POOR APPETITE OR OVEREATING: SEVERAL DAYS
SUM OF ALL RESPONSES TO PHQ QUESTIONS 1-9: 7
8. MOVING OR SPEAKING SO SLOWLY THAT OTHER PEOPLE COULD HAVE NOTICED. OR THE OPPOSITE - BEING SO FIDGETY OR RESTLESS THAT YOU HAVE BEEN MOVING AROUND A LOT MORE THAN USUAL: NOT AT ALL
8. MOVING OR SPEAKING SO SLOWLY THAT OTHER PEOPLE COULD HAVE NOTICED. OR THE OPPOSITE, BEING SO FIGETY OR RESTLESS THAT YOU HAVE BEEN MOVING AROUND A LOT MORE THAN USUAL: NOT AT ALL
4. FEELING TIRED OR HAVING LITTLE ENERGY: SEVERAL DAYS
7. TROUBLE CONCENTRATING ON THINGS, SUCH AS READING THE NEWSPAPER OR WATCHING TELEVISION: SEVERAL DAYS
1. LITTLE INTEREST OR PLEASURE IN DOING THINGS: SEVERAL DAYS
6. FEELING BAD ABOUT YOURSELF - OR THAT YOU ARE A FAILURE OR HAVE LET YOURSELF OR YOUR FAMILY DOWN: SEVERAL DAYS
4. FEELING TIRED OR HAVING LITTLE ENERGY: SEVERAL DAYS

## 2025-08-20 ASSESSMENT — ANXIETY QUESTIONNAIRES
2. NOT BEING ABLE TO STOP OR CONTROL WORRYING: SEVERAL DAYS
1. FEELING NERVOUS, ANXIOUS, OR ON EDGE: SEVERAL DAYS
2. NOT BEING ABLE TO STOP OR CONTROL WORRYING: SEVERAL DAYS
7. FEELING AFRAID AS IF SOMETHING AWFUL MIGHT HAPPEN: SEVERAL DAYS
3. WORRYING TOO MUCH ABOUT DIFFERENT THINGS: SEVERAL DAYS
6. BECOMING EASILY ANNOYED OR IRRITABLE: NOT AT ALL
IF YOU CHECKED OFF ANY PROBLEMS ON THIS QUESTIONNAIRE, HOW DIFFICULT HAVE THESE PROBLEMS MADE IT FOR YOU TO DO YOUR WORK, TAKE CARE OF THINGS AT HOME, OR GET ALONG WITH OTHER PEOPLE: SOMEWHAT DIFFICULT
4. TROUBLE RELAXING: SEVERAL DAYS
4. TROUBLE RELAXING: SEVERAL DAYS
7. FEELING AFRAID AS IF SOMETHING AWFUL MIGHT HAPPEN: SEVERAL DAYS
GAD7 TOTAL SCORE: 6
3. WORRYING TOO MUCH ABOUT DIFFERENT THINGS: SEVERAL DAYS
6. BECOMING EASILY ANNOYED OR IRRITABLE: NOT AT ALL
IF YOU CHECKED OFF ANY PROBLEMS ON THIS QUESTIONNAIRE, HOW DIFFICULT HAVE THESE PROBLEMS MADE IT FOR YOU TO DO YOUR WORK, TAKE CARE OF THINGS AT HOME, OR GET ALONG WITH OTHER PEOPLE: SOMEWHAT DIFFICULT
5. BEING SO RESTLESS THAT IT IS HARD TO SIT STILL: SEVERAL DAYS
5. BEING SO RESTLESS THAT IT IS HARD TO SIT STILL: SEVERAL DAYS
1. FEELING NERVOUS, ANXIOUS, OR ON EDGE: SEVERAL DAYS

## 2025-08-20 ASSESSMENT — PAIN SCALES - GENERAL: PAINLEVEL_OUTOF10: 0 - NO PAIN

## 2025-08-20 ASSESSMENT — COLUMBIA-SUICIDE SEVERITY RATING SCALE - C-SSRS
6. IN YOUR LIFETIME, HAVE YOU EVER DONE ANYTHING, STARTED TO DO ANYTHING, OR PREPARED TO DO ANYTHING TO END YOUR LIFE?: NO
2. HAVE YOU ACTUALLY HAD ANY THOUGHTS OF KILLING YOURSELF?: NO
1. IN THE PAST MONTH, HAVE YOU WISHED YOU WERE DEAD OR WISHED YOU COULD GO TO SLEEP AND NOT WAKE UP?: NO

## 2025-08-20 ASSESSMENT — ENCOUNTER SYMPTOMS
OCCASIONAL FEELINGS OF UNSTEADINESS: 0
LOSS OF SENSATION IN FEET: 0
DEPRESSION: 1

## 2025-08-20 ASSESSMENT — PAIN - FUNCTIONAL ASSESSMENT: PAIN_FUNCTIONAL_ASSESSMENT: 0-10

## 2025-08-21 ENCOUNTER — APPOINTMENT (OUTPATIENT)
Dept: BEHAVIORAL HEALTH | Facility: CLINIC | Age: 40
End: 2025-08-21
Payer: COMMERCIAL

## 2025-08-21 ENCOUNTER — PROCEDURE VISIT (OUTPATIENT)
Dept: BEHAVIORAL HEALTH | Facility: CLINIC | Age: 40
End: 2025-08-21
Payer: COMMERCIAL

## 2025-08-21 VITALS — HEART RATE: 107 BPM | DIASTOLIC BLOOD PRESSURE: 74 MMHG | TEMPERATURE: 98.2 F | SYSTOLIC BLOOD PRESSURE: 133 MMHG

## 2025-08-21 DIAGNOSIS — F31.9 BIPOLAR 1 DISORDER (MULTI): ICD-10-CM

## 2025-08-21 DIAGNOSIS — F31.0 BIPOLAR AFFECTIVE DISORDER, CURRENT EPISODE HYPOMANIC (MULTI): Primary | ICD-10-CM

## 2025-08-21 PROCEDURE — 90837 PSYTX W PT 60 MINUTES: CPT | Performed by: PSYCHOLOGIST

## 2025-08-28 ENCOUNTER — APPOINTMENT (OUTPATIENT)
Dept: BEHAVIORAL HEALTH | Facility: CLINIC | Age: 40
End: 2025-08-28
Payer: COMMERCIAL

## 2025-08-28 DIAGNOSIS — F31.9 BIPOLAR 1 DISORDER (MULTI): ICD-10-CM

## 2025-08-28 PROCEDURE — 3044F HG A1C LEVEL LT 7.0%: CPT | Performed by: PSYCHOLOGIST

## 2025-08-28 PROCEDURE — 90837 PSYTX W PT 60 MINUTES: CPT | Performed by: PSYCHOLOGIST

## 2025-09-02 ENCOUNTER — APPOINTMENT (OUTPATIENT)
Dept: BEHAVIORAL HEALTH | Facility: CLINIC | Age: 40
End: 2025-09-02
Payer: COMMERCIAL

## 2025-09-03 ENCOUNTER — HOSPITAL ENCOUNTER (OUTPATIENT)
Facility: HOSPITAL | Age: 40
Discharge: HOME | End: 2025-09-03
Payer: COMMERCIAL

## 2025-09-03 VITALS
DIASTOLIC BLOOD PRESSURE: 61 MMHG | WEIGHT: 233.03 LBS | BODY MASS INDEX: 42.62 KG/M2 | OXYGEN SATURATION: 96 % | RESPIRATION RATE: 18 BRPM | SYSTOLIC BLOOD PRESSURE: 105 MMHG | HEART RATE: 76 BPM | TEMPERATURE: 97 F

## 2025-09-03 DIAGNOSIS — F31.9 BIPOLAR I DISORDER WITH DEPRESSION (MULTI): Primary | ICD-10-CM

## 2025-09-03 PROCEDURE — KETSP PR KETAMINE INFUSION SELF-PAY: Performed by: PSYCHIATRY & NEUROLOGY

## 2025-09-03 PROCEDURE — 2500000004 HC RX 250 GENERAL PHARMACY W/ HCPCS (ALT 636 FOR OP/ED): Performed by: STUDENT IN AN ORGANIZED HEALTH CARE EDUCATION/TRAINING PROGRAM

## 2025-09-03 PROCEDURE — KETSP HC KETAMINE INFUSION SELF-PAY: Performed by: PSYCHIATRY & NEUROLOGY

## 2025-09-03 RX ORDER — LIDOCAINE AND PRILOCAINE 25; 25 MG/G; MG/G
1 CREAM TOPICAL ONCE AS NEEDED
OUTPATIENT
Start: 2025-09-10

## 2025-09-03 RX ORDER — MIDAZOLAM HYDROCHLORIDE 1 MG/ML
1 INJECTION, SOLUTION INTRAMUSCULAR; INTRAVENOUS ONCE AS NEEDED
OUTPATIENT
Start: 2025-09-10

## 2025-09-03 RX ORDER — FAMOTIDINE 10 MG/ML
20 INJECTION, SOLUTION INTRAVENOUS ONCE AS NEEDED
Status: DISCONTINUED | OUTPATIENT
Start: 2025-09-03 | End: 2025-09-04 | Stop reason: HOSPADM

## 2025-09-03 RX ORDER — DIPHENHYDRAMINE HYDROCHLORIDE 50 MG/ML
50 INJECTION, SOLUTION INTRAMUSCULAR; INTRAVENOUS ONCE AS NEEDED
OUTPATIENT
Start: 2025-09-10

## 2025-09-03 RX ORDER — DIPHENHYDRAMINE HYDROCHLORIDE 50 MG/ML
50 INJECTION, SOLUTION INTRAMUSCULAR; INTRAVENOUS AS NEEDED
Status: COMPLETED | OUTPATIENT
Start: 2025-09-03 | End: 2025-09-03

## 2025-09-03 RX ORDER — ALBUTEROL SULFATE 0.83 MG/ML
3 SOLUTION RESPIRATORY (INHALATION) AS NEEDED
Status: DISCONTINUED | OUTPATIENT
Start: 2025-09-03 | End: 2025-09-04 | Stop reason: HOSPADM

## 2025-09-03 RX ORDER — EPINEPHRINE 1 MG/ML
0.3 INJECTION, SOLUTION, CONCENTRATE INTRAVENOUS EVERY 5 MIN PRN
Status: DISCONTINUED | OUTPATIENT
Start: 2025-09-03 | End: 2025-09-04 | Stop reason: HOSPADM

## 2025-09-03 RX ORDER — METOPROLOL TARTRATE 1 MG/ML
15 INJECTION, SOLUTION INTRAVENOUS ONCE AS NEEDED
OUTPATIENT
Start: 2025-09-10

## 2025-09-03 RX ORDER — LABETALOL HYDROCHLORIDE 5 MG/ML
30 INJECTION, SOLUTION INTRAVENOUS AS NEEDED
OUTPATIENT
Start: 2025-09-10

## 2025-09-03 RX ORDER — ACETAMINOPHEN 325 MG/1
650 TABLET ORAL ONCE AS NEEDED
OUTPATIENT
Start: 2025-09-10

## 2025-09-03 RX ORDER — DICYCLOMINE HYDROCHLORIDE 10 MG/1
10 CAPSULE ORAL ONCE AS NEEDED
OUTPATIENT
Start: 2025-09-10

## 2025-09-03 RX ORDER — EPINEPHRINE 1 MG/ML
0.3 INJECTION, SOLUTION, CONCENTRATE INTRAVENOUS EVERY 5 MIN PRN
OUTPATIENT
Start: 2025-09-10

## 2025-09-03 RX ORDER — QUETIAPINE FUMARATE 100 MG/1
100 TABLET, FILM COATED ORAL NIGHTLY
Qty: 30 TABLET | Refills: 0 | Status: SHIPPED | OUTPATIENT
Start: 2025-09-03 | End: 2025-10-03

## 2025-09-03 RX ORDER — LABETALOL HYDROCHLORIDE 5 MG/ML
10 INJECTION, SOLUTION INTRAVENOUS AS NEEDED
OUTPATIENT
Start: 2025-09-10

## 2025-09-03 RX ORDER — FAMOTIDINE 10 MG/ML
20 INJECTION, SOLUTION INTRAVENOUS ONCE AS NEEDED
OUTPATIENT
Start: 2025-09-10

## 2025-09-03 RX ORDER — ONDANSETRON HYDROCHLORIDE 2 MG/ML
8 INJECTION, SOLUTION INTRAVENOUS ONCE AS NEEDED
Status: DISCONTINUED | OUTPATIENT
Start: 2025-09-03 | End: 2025-09-04 | Stop reason: HOSPADM

## 2025-09-03 RX ORDER — METOPROLOL TARTRATE 1 MG/ML
10 INJECTION, SOLUTION INTRAVENOUS ONCE AS NEEDED
OUTPATIENT
Start: 2025-09-10

## 2025-09-03 RX ORDER — ALBUTEROL SULFATE 0.83 MG/ML
3 SOLUTION RESPIRATORY (INHALATION) AS NEEDED
OUTPATIENT
Start: 2025-09-10

## 2025-09-03 RX ORDER — LABETALOL HYDROCHLORIDE 5 MG/ML
20 INJECTION, SOLUTION INTRAVENOUS AS NEEDED
OUTPATIENT
Start: 2025-09-10

## 2025-09-03 RX ORDER — KETAMINE HCL IN 0.9 % NACL 200 MG/200
0.75 PLASTIC BAG, INJECTION (ML) INTRAVENOUS ONCE
OUTPATIENT
Start: 2025-09-10 | End: 2025-09-10

## 2025-09-03 RX ORDER — KETAMINE HCL IN 0.9 % NACL 200 MG/200
0.75 PLASTIC BAG, INJECTION (ML) INTRAVENOUS ONCE
Status: COMPLETED | OUTPATIENT
Start: 2025-09-03 | End: 2025-09-03

## 2025-09-03 RX ORDER — DIPHENHYDRAMINE HYDROCHLORIDE 50 MG/ML
50 INJECTION, SOLUTION INTRAMUSCULAR; INTRAVENOUS AS NEEDED
OUTPATIENT
Start: 2025-09-10

## 2025-09-03 RX ORDER — METOPROLOL TARTRATE 1 MG/ML
5 INJECTION, SOLUTION INTRAVENOUS ONCE AS NEEDED
OUTPATIENT
Start: 2025-09-10

## 2025-09-03 RX ORDER — DIPHENHYDRAMINE HYDROCHLORIDE 50 MG/ML
50 INJECTION, SOLUTION INTRAMUSCULAR; INTRAVENOUS ONCE AS NEEDED
Status: DISCONTINUED | OUTPATIENT
Start: 2025-09-03 | End: 2025-09-04 | Stop reason: HOSPADM

## 2025-09-03 RX ORDER — ONDANSETRON HYDROCHLORIDE 2 MG/ML
8 INJECTION, SOLUTION INTRAVENOUS ONCE AS NEEDED
OUTPATIENT
Start: 2025-09-10

## 2025-09-03 RX ADMIN — SODIUM CHLORIDE 80 MG: 9 INJECTION, SOLUTION INTRAVENOUS at 11:58

## 2025-09-03 RX ADMIN — ONDANSETRON 8 MG: 2 INJECTION, SOLUTION INTRAMUSCULAR; INTRAVENOUS at 11:50

## 2025-09-03 RX ADMIN — DIPHENHYDRAMINE HYDROCHLORIDE 50 MG: 50 INJECTION INTRAMUSCULAR; INTRAVENOUS at 11:51

## 2025-09-03 ASSESSMENT — ANXIETY QUESTIONNAIRES
6. BECOMING EASILY ANNOYED OR IRRITABLE: NOT AT ALL
7. FEELING AFRAID AS IF SOMETHING AWFUL MIGHT HAPPEN: SEVERAL DAYS
2. NOT BEING ABLE TO STOP OR CONTROL WORRYING: SEVERAL DAYS
GAD7 TOTAL SCORE: 6
2. NOT BEING ABLE TO STOP OR CONTROL WORRYING: SEVERAL DAYS
IF YOU CHECKED OFF ANY PROBLEMS ON THIS QUESTIONNAIRE, HOW DIFFICULT HAVE THESE PROBLEMS MADE IT FOR YOU TO DO YOUR WORK, TAKE CARE OF THINGS AT HOME, OR GET ALONG WITH OTHER PEOPLE: SOMEWHAT DIFFICULT
5. BEING SO RESTLESS THAT IT IS HARD TO SIT STILL: SEVERAL DAYS
5. BEING SO RESTLESS THAT IT IS HARD TO SIT STILL: SEVERAL DAYS
1. FEELING NERVOUS, ANXIOUS, OR ON EDGE: SEVERAL DAYS
4. TROUBLE RELAXING: SEVERAL DAYS
4. TROUBLE RELAXING: SEVERAL DAYS
6. BECOMING EASILY ANNOYED OR IRRITABLE: NOT AT ALL
IF YOU CHECKED OFF ANY PROBLEMS ON THIS QUESTIONNAIRE, HOW DIFFICULT HAVE THESE PROBLEMS MADE IT FOR YOU TO DO YOUR WORK, TAKE CARE OF THINGS AT HOME, OR GET ALONG WITH OTHER PEOPLE: SOMEWHAT DIFFICULT
3. WORRYING TOO MUCH ABOUT DIFFERENT THINGS: SEVERAL DAYS
1. FEELING NERVOUS, ANXIOUS, OR ON EDGE: SEVERAL DAYS
7. FEELING AFRAID AS IF SOMETHING AWFUL MIGHT HAPPEN: SEVERAL DAYS
3. WORRYING TOO MUCH ABOUT DIFFERENT THINGS: SEVERAL DAYS

## 2025-09-03 ASSESSMENT — ENCOUNTER SYMPTOMS
OCCASIONAL FEELINGS OF UNSTEADINESS: 0
DEPRESSION: 1
LOSS OF SENSATION IN FEET: 0

## 2025-09-03 ASSESSMENT — PATIENT HEALTH QUESTIONNAIRE - PHQ9
4. FEELING TIRED OR HAVING LITTLE ENERGY: SEVERAL DAYS
1. LITTLE INTEREST OR PLEASURE IN DOING THINGS: SEVERAL DAYS
1. LITTLE INTEREST OR PLEASURE IN DOING THINGS: SEVERAL DAYS
6. FEELING BAD ABOUT YOURSELF - OR THAT YOU ARE A FAILURE OR HAVE LET YOURSELF OR YOUR FAMILY DOWN: SEVERAL DAYS
3. TROUBLE FALLING OR STAYING ASLEEP OR SLEEPING TOO MUCH: SEVERAL DAYS
5. POOR APPETITE OR OVEREATING: NOT AT ALL
8. MOVING OR SPEAKING SO SLOWLY THAT OTHER PEOPLE COULD HAVE NOTICED. OR THE OPPOSITE - BEING SO FIDGETY OR RESTLESS THAT YOU HAVE BEEN MOVING AROUND A LOT MORE THAN USUAL: SEVERAL DAYS
7. TROUBLE CONCENTRATING ON THINGS, SUCH AS READING THE NEWSPAPER OR WATCHING TELEVISION: SEVERAL DAYS
9. THOUGHTS THAT YOU WOULD BE BETTER OFF DEAD, OR OF HURTING YOURSELF: NOT AT ALL
10. IF YOU CHECKED OFF ANY PROBLEMS, HOW DIFFICULT HAVE THESE PROBLEMS MADE IT FOR YOU TO DO YOUR WORK, TAKE CARE OF THINGS AT HOME, OR GET ALONG WITH OTHER PEOPLE: VERY DIFFICULT
3. TROUBLE FALLING OR STAYING ASLEEP: SEVERAL DAYS
6. FEELING BAD ABOUT YOURSELF - OR THAT YOU ARE A FAILURE OR HAVE LET YOURSELF OR YOUR FAMILY DOWN: SEVERAL DAYS
5. POOR APPETITE OR OVEREATING: NOT AT ALL
7. TROUBLE CONCENTRATING ON THINGS, SUCH AS READING THE NEWSPAPER OR WATCHING TELEVISION: SEVERAL DAYS
4. FEELING TIRED OR HAVING LITTLE ENERGY: SEVERAL DAYS
9. THOUGHTS THAT YOU WOULD BE BETTER OFF DEAD, OR OF HURTING YOURSELF: NOT AT ALL
2. FEELING DOWN, DEPRESSED OR HOPELESS: SEVERAL DAYS
2. FEELING DOWN, DEPRESSED OR HOPELESS: SEVERAL DAYS
SUM OF ALL RESPONSES TO PHQ QUESTIONS 1-9: 7
10. IF YOU CHECKED OFF ANY PROBLEMS, HOW DIFFICULT HAVE THESE PROBLEMS MADE IT FOR YOU TO DO YOUR WORK, TAKE CARE OF THINGS AT HOME, OR GET ALONG WITH OTHER PEOPLE: VERY DIFFICULT
8. MOVING OR SPEAKING SO SLOWLY THAT OTHER PEOPLE COULD HAVE NOTICED. OR THE OPPOSITE, BEING SO FIGETY OR RESTLESS THAT YOU HAVE BEEN MOVING AROUND A LOT MORE THAN USUAL: SEVERAL DAYS
SUM OF ALL RESPONSES TO PHQ9 QUESTIONS 1 & 2: 2

## 2025-09-03 ASSESSMENT — PAIN SCALES - GENERAL

## 2025-09-04 ENCOUNTER — APPOINTMENT (OUTPATIENT)
Dept: BEHAVIORAL HEALTH | Facility: CLINIC | Age: 40
End: 2025-09-04
Payer: COMMERCIAL

## 2025-09-09 ENCOUNTER — APPOINTMENT (OUTPATIENT)
Dept: BEHAVIORAL HEALTH | Facility: CLINIC | Age: 40
End: 2025-09-09
Payer: COMMERCIAL

## 2025-09-10 ENCOUNTER — APPOINTMENT (OUTPATIENT)
Facility: HOSPITAL | Age: 40
End: 2025-09-10
Payer: COMMERCIAL

## 2025-09-11 ENCOUNTER — APPOINTMENT (OUTPATIENT)
Dept: BEHAVIORAL HEALTH | Facility: CLINIC | Age: 40
End: 2025-09-11
Payer: COMMERCIAL

## 2025-09-18 ENCOUNTER — APPOINTMENT (OUTPATIENT)
Dept: BEHAVIORAL HEALTH | Facility: CLINIC | Age: 40
End: 2025-09-18
Payer: COMMERCIAL

## 2025-09-25 ENCOUNTER — APPOINTMENT (OUTPATIENT)
Dept: BEHAVIORAL HEALTH | Facility: CLINIC | Age: 40
End: 2025-09-25
Payer: COMMERCIAL

## 2025-10-02 ENCOUNTER — APPOINTMENT (OUTPATIENT)
Dept: BEHAVIORAL HEALTH | Facility: CLINIC | Age: 40
End: 2025-10-02
Payer: COMMERCIAL

## 2025-10-09 ENCOUNTER — APPOINTMENT (OUTPATIENT)
Dept: BEHAVIORAL HEALTH | Facility: CLINIC | Age: 40
End: 2025-10-09
Payer: COMMERCIAL

## 2025-10-16 ENCOUNTER — APPOINTMENT (OUTPATIENT)
Dept: BEHAVIORAL HEALTH | Facility: CLINIC | Age: 40
End: 2025-10-16
Payer: COMMERCIAL

## 2025-10-23 ENCOUNTER — APPOINTMENT (OUTPATIENT)
Dept: BEHAVIORAL HEALTH | Facility: CLINIC | Age: 40
End: 2025-10-23
Payer: COMMERCIAL

## 2025-10-30 ENCOUNTER — APPOINTMENT (OUTPATIENT)
Dept: BEHAVIORAL HEALTH | Facility: CLINIC | Age: 40
End: 2025-10-30
Payer: COMMERCIAL

## 2025-11-06 ENCOUNTER — APPOINTMENT (OUTPATIENT)
Dept: BEHAVIORAL HEALTH | Facility: CLINIC | Age: 40
End: 2025-11-06
Payer: COMMERCIAL

## 2025-11-13 ENCOUNTER — APPOINTMENT (OUTPATIENT)
Dept: BEHAVIORAL HEALTH | Facility: CLINIC | Age: 40
End: 2025-11-13
Payer: COMMERCIAL

## 2025-11-20 ENCOUNTER — APPOINTMENT (OUTPATIENT)
Dept: BEHAVIORAL HEALTH | Facility: CLINIC | Age: 40
End: 2025-11-20
Payer: COMMERCIAL

## 2025-12-04 ENCOUNTER — APPOINTMENT (OUTPATIENT)
Dept: BEHAVIORAL HEALTH | Facility: CLINIC | Age: 40
End: 2025-12-04
Payer: COMMERCIAL

## 2025-12-11 ENCOUNTER — APPOINTMENT (OUTPATIENT)
Dept: BEHAVIORAL HEALTH | Facility: CLINIC | Age: 40
End: 2025-12-11
Payer: COMMERCIAL

## 2025-12-18 ENCOUNTER — APPOINTMENT (OUTPATIENT)
Dept: BEHAVIORAL HEALTH | Facility: CLINIC | Age: 40
End: 2025-12-18
Payer: COMMERCIAL